# Patient Record
Sex: FEMALE | Race: WHITE | Employment: OTHER | URBAN - METROPOLITAN AREA
[De-identification: names, ages, dates, MRNs, and addresses within clinical notes are randomized per-mention and may not be internally consistent; named-entity substitution may affect disease eponyms.]

---

## 2017-02-08 ENCOUNTER — HOSPITAL ENCOUNTER (EMERGENCY)
Age: 72
Discharge: HOME | End: 2017-02-08
Attending: EMERGENCY MEDICINE
Payer: MEDICARE

## 2017-02-08 ENCOUNTER — APPOINTMENT (OUTPATIENT)
Dept: GENERAL RADIOLOGY | Age: 72
End: 2017-02-08
Attending: EMERGENCY MEDICINE
Payer: MEDICARE

## 2017-02-08 VITALS
TEMPERATURE: 97.9 F | DIASTOLIC BLOOD PRESSURE: 67 MMHG | HEIGHT: 62 IN | SYSTOLIC BLOOD PRESSURE: 157 MMHG | WEIGHT: 220 LBS | RESPIRATION RATE: 20 BRPM | OXYGEN SATURATION: 95 % | BODY MASS INDEX: 40.48 KG/M2 | HEART RATE: 61 BPM

## 2017-02-08 DIAGNOSIS — S20.212A RIB CONTUSION, LEFT, INITIAL ENCOUNTER: ICD-10-CM

## 2017-02-08 DIAGNOSIS — S50.02XA LEFT ELBOW CONTUSION: Primary | ICD-10-CM

## 2017-02-08 DIAGNOSIS — I10 ESSENTIAL HYPERTENSION: ICD-10-CM

## 2017-02-08 PROCEDURE — 99282 EMERGENCY DEPT VISIT SF MDM: CPT

## 2017-02-08 PROCEDURE — 71100 X-RAY EXAM RIBS UNI 2 VIEWS: CPT

## 2017-02-08 PROCEDURE — 73080 X-RAY EXAM OF ELBOW: CPT

## 2017-02-08 RX ORDER — TRAMADOL HYDROCHLORIDE 50 MG/1
50 TABLET ORAL
COMMUNITY
End: 2017-10-11 | Stop reason: SDUPTHER

## 2017-02-08 NOTE — DISCHARGE INSTRUCTIONS
[employer] Activation    Thank you for requesting access to [employer] Please follow the instructions below to securely access and download your online medical record. [employer] allows you to send messages to your doctor, view your test results, renew your prescriptions, schedule appointments, and more. How Do I Sign Up? 1. In your internet browser, go to [email or web address]  2. Click on the First Time User? Click Here link in the Sign In box. You will be redirect to the New Member Sign Up page. 3. Enter your [employer] Access Code exactly as it appears below. You will not need to use this code after youve completed the sign-up process. If you do not sign up before the expiration date, you must request a new code. [employer] Access Code: [email or web address]  Expires: 2017  9:28 AM (This is the date your [employer] access code will )    4. Enter the last four digits of your Social Security Number (xxxx) and Date of Birth (mm/dd/yyyy) as indicated and click Submit. You will be taken to the next sign-up page. 5. Create a [employer] ID. This will be your [employer] login ID and cannot be changed, so think of one that is secure and easy to remember. 6. Create a [employer] password. You can change your password at any time. 7. Enter your Password Reset Question and Answer. This can be used at a later time if you forget your password. 8. Enter your e-mail address. You will receive e-mail notification when new information is available in [address] 9. Click Sign Up. You can now view and download portions of your medical record. 10. Click the Download Summary menu link to download a portable copy of your medical information. Additional Information    If you have questions, please visit the Frequently Asked Questions section of the [employer] website at [email or web address] Remember, [employer] is NOT to be used for urgent needs. For medical emergencies, dial 911.

## 2017-02-08 NOTE — ED TRIAGE NOTES
Pt presents to ER c/o fall 2 weeks ago, states she was trying to sit down on toilet and fell striking her left ribs and left elbow on lip on walk in shower. Pt states she has been taking Tramadol, Tylenol 3,  motrin & aleve for pain. States Motrin helped pain. Pt denies any head injury or LOC.

## 2017-02-08 NOTE — ED NOTES
Guilherme Lopez is a 67 y.o. female that was discharged in stable. Pt was accompanied by self. Pt is driving. The patients diagnosis, condition and treatment were explained to  patient and aftercare instructions were given. The patient verbalized understanding. Patient armband removed and shredded.

## 2017-02-08 NOTE — ED PROVIDER NOTES
HPI Comments: Gunner Castillo is a 67year old female with a PMHX Essential Hypertension, DM, GERD, Chronic Pain, Arthritis, Unspecified Sleep Apnea, Depression, and Hiatal Hernia arrived to ER c/o injury to left elbow and left ribs two weeks ago. Patient states she accidentally hit it on the edge of a walk in tub. Patient states, \"my elbow hurts more than my ribs. \" Patient states she has been taking her prescribed tylenol #3 and tramadol with minimal to moderate relief. Denies falling or head injury. Denies fever, chills, hemoptysis, headache, dizziness, CP, SOB, abdominal pain, N/V/D, or any other concerns. Patient is a 67 y.o. female presenting with fall, rib pain, and elbow pain. The history is provided by the patient. Fall     Rib Pain   Pertinent negatives include no cough and no wheezing. Neck pain: left elbow injury. Elbow Pain    Neck pain: left elbow injury. 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     1973-asymptomatic    Hypertension     Psychiatric disorder      Depression    Unspecified sleep apnea      Bipap       Past Surgical History:   Procedure Laterality Date    Hx bladder suspension       4x    Hx tubal ligation      Hx appendectomy      Hx cholecystectomy      Hx orthopaedic  10/2013     RIGHT TKA    Hx septoplasty      Hx tonsillectomy      Hx heent       Sx for droopy eyes         Family History:   Problem Relation Age of Onset    Cancer Father 61     lung       Social History     Social History    Marital status:      Spouse name: N/A    Number of children: N/A    Years of education: N/A     Occupational History    Not on file.      Social History Main Topics    Smoking status: Never Smoker    Smokeless tobacco: Never Used    Alcohol use Yes      Comment: socially    Drug use: No    Sexual activity: Not on file     Other Topics Concern    Not on file     Social History Narrative         ALLERGIES: Other medication    Review of Systems   Constitutional: Negative. HENT: Negative. Eyes: Negative. Respiratory: Negative. Negative for apnea, cough, choking, chest tightness, shortness of breath, wheezing and stridor. C/o left rib injury   Cardiovascular: Negative. Gastrointestinal: Negative. Genitourinary: Negative. Musculoskeletal: Neck pain: left elbow injury. Skin: Negative. Neurological: Negative. Vitals:    02/08/17 1122   BP: 157/67   Pulse: 61   Resp: 20   Temp: 97.9 °F (36.6 °C)   SpO2: 95%   Weight: 99.8 kg (220 lb)   Height: 5' 2\" (1.575 m)            Physical Exam   Constitutional: She is oriented to person, place, and time. She appears well-developed and well-nourished. No distress. HENT:   Right Ear: Hearing, tympanic membrane, external ear and ear canal normal.   Left Ear: Hearing, tympanic membrane, external ear and ear canal normal.   Nose: Nose normal.   Mouth/Throat: Uvula is midline, oropharynx is clear and moist and mucous membranes are normal. No oropharyngeal exudate. Cardiovascular: Normal rate, regular rhythm and normal heart sounds. Exam reveals no gallop and no friction rub. No murmur heard. Pulmonary/Chest: Effort normal and breath sounds normal. No accessory muscle usage. No respiratory distress. She has no decreased breath sounds. She has no wheezes. She has no rhonchi. She has no rales. She exhibits tenderness and bony tenderness. She exhibits no crepitus, no edema, no deformity, no swelling and no retraction. Abdominal: Soft. Bowel sounds are normal. She exhibits no distension and no mass. There is no tenderness. There is no rebound and no guarding. Musculoskeletal: Normal range of motion. Left elbow: She exhibits swelling (very mild).  She exhibits normal range of motion, no effusion, no deformity and no laceration. Tenderness found. Radial head and medial epicondyle tenderness noted. Neurological: She is alert and oriented to person, place, and time. Skin: Skin is warm and dry. She is not diaphoretic. Psychiatric: She has a normal mood and affect. Her behavior is normal. Judgment and thought content normal.   Nursing note and vitals reviewed. MDM  Number of Diagnoses or Management Options  Essential hypertension:   Left elbow contusion:   Rib contusion, left, initial encounter:   Diagnosis management comments: DDX:  Rib Fracture  Dislocation  Pneumothorax    Left elbow fracture  Dislocation    Clinical Impression/plan:  Patient stable condition. Reviewed x-rays with patient. Answered questions. Follow up with PCP in 2-4 days. If symptoms worsen, return to ER. Patient verbalizes d/c instructions.         Amount and/or Complexity of Data Reviewed  Tests in the radiology section of CPT®: ordered and reviewed (Dr. Bryce Power has reviewed x-rays ,no fx or dislocation)  Review and summarize past medical records: yes    Risk of Complications, Morbidity, and/or Mortality  Presenting problems: low  Diagnostic procedures: low  Management options: low      ED Course       Procedures

## 2017-03-21 ENCOUNTER — OFFICE VISIT (OUTPATIENT)
Dept: ORTHOPEDIC SURGERY | Age: 72
End: 2017-03-21

## 2017-03-21 VITALS
BODY MASS INDEX: 40.24 KG/M2 | SYSTOLIC BLOOD PRESSURE: 149 MMHG | OXYGEN SATURATION: 95 % | RESPIRATION RATE: 18 BRPM | TEMPERATURE: 98.4 F | HEART RATE: 78 BPM | WEIGHT: 220 LBS | DIASTOLIC BLOOD PRESSURE: 62 MMHG

## 2017-03-21 DIAGNOSIS — M79.2 NEURITIS: ICD-10-CM

## 2017-03-21 DIAGNOSIS — R29.898 RIGHT LEG WEAKNESS: ICD-10-CM

## 2017-03-21 DIAGNOSIS — M54.50 PAIN OF LUMBAR SPINE: ICD-10-CM

## 2017-03-21 DIAGNOSIS — M43.17 SPONDYLOLISTHESIS AT L5-S1 LEVEL: Primary | ICD-10-CM

## 2017-03-21 DIAGNOSIS — R26.89 FUNCTIONAL GAIT ABNORMALITY: ICD-10-CM

## 2017-03-21 NOTE — MR AVS SNAPSHOT
Visit Information Date & Time Provider Department Dept. Phone Encounter #  
 3/21/2017  2:00 PM Lucita Lindquist MD [address] [facility] [phone] [id number] Follow-up Instructions Return in about 3 weeks (around 4/11/2017) for Diagnostic Test follow up. Upcoming Health Maintenance Date Due  
 LIPID PANEL Q1 1945 FOOT EXAM Q1 1/2/1954 MICROALBUMIN Q1 1/2/1954 EYE EXAM RETINAL OR DILATED Q1 1/2/1954 DTaP/Tdap/Td series (1 - Tdap) 1/2/1965 FOBT Q 1 YEAR AGE 50-75 1/2/1994 ZOSTER VACCINE AGE 60> 1/2/2004 GLAUCOMA SCREENING Q2Y 1/2/2009 Pneumococcal 65+ Low/Medium Risk (1 of 2 - PCV13) 1/2/2009 MEDICARE YEARLY EXAM 1/2/2009 HEMOGLOBIN A1C Q6M 5/11/2015 INFLUENZA AGE 9 TO ADULT 8/1/2016 BREAST CANCER SCRN MAMMOGRAM 11/4/2018 Allergies as of 3/21/2017  Review Complete On: 3/21/2017 By: Lucita Lindquist MD  
  
 Severity Noted Reaction Type Reactions Other Medication  01/21/2013    Other (comments) SEASONAL ALLERGIES Current Immunizations  Never Reviewed No immunizations on file. Not reviewed this visit You Were Diagnosed With   
  
 Codes Comments Spondylolisthesis at L5-S1 level    -  Primary ICD-10-CM: M43.17 ICD-9-CM: 756.12 Pain of lumbar spine     ICD-10-CM: M54.5 ICD-9-CM: 724.2 Neuritis     ICD-10-CM: M79.2 ICD-9-CM: 729.2 Right leg weakness     ICD-10-CM: M62.81 ICD-9-CM: 729.89 Functional gait abnormality     ICD-10-CM: R26.89 
ICD-9-CM: 781. 2 Vitals BP Pulse Temp Resp Weight(growth percentile) SpO2  
 149/62 78 98.4 °F (36.9 °C) (Oral) 18 220 lb (99.8 kg) 95% BMI OB Status Smoking Status 40.24 kg/m2 Menopause Never Smoker BMI and BSA Data Body Mass Index Body Surface Area  
 40.24 kg/m 2 2.09 m 2 Preferred Pharmacy Pharmacy Name Phone [facility] [address], [address] [phone] Your Updated Medication List  
  
   
This list is accurate as of: 3/21/17  3:14 PM.  Always use your most recent med list.  
  
  
  
  
 ABILIFY 5 mg tablet Generic drug:  ARIPiprazole Take 2.5 mg by mouth nightly. amLODIPine 10 mg tablet Commonly known as:  Radha Darting Take  by mouth nightly. Indications: HYPERTENSION  
  
 aspirin delayed-release 81 mg tablet Take  by mouth daily. chlorthalidone 25 mg tablet Commonly known as:  Aimee Mend Take  by mouth nightly. Indications: HYPERTENSION  
  
 FLAXSEED PO Take  by mouth two (2) times a day.  
  
 gabapentin 300 mg capsule Commonly known as:  NEURONTIN Take  by mouth three (3) times daily. 300mg in the morning, 1200mg at bedtime  Indications: NEUROPATHIC PAIN  
  
 glyBURIDE-metFORMIN 5-500 mg per tablet Commonly known as:  Trinda Grieve Take 2 Tabs by mouth two (2) times daily (with meals). LIPITOR 40 mg tablet Generic drug:  atorvastatin Take  by mouth nightly. lisinopril 40 mg tablet Commonly known as:  Marilynne Shows Take 40 mg by mouth nightly. Indications: HYPERTENSION  
  
 metoprolol tartrate 50 mg tablet Commonly known as:  LOPRESSOR Take 50 mg by mouth nightly. Indications: HYPERTENSION  
  
 NASONEX 50 mcg/actuation nasal spray Generic drug:  mometasone 2 sprays two (2) times a day. NUVIGIL 250 mg Tab tablet Generic drug:  Armodafinil Take 150 mg by mouth daily. Indications: NARCOLEPSY SYNDROME  
  
 traMADol 50 mg tablet Commonly known as:  ULTRAM  
Take 50 mg by mouth every six (6) hours as needed for Pain. ZOLOFT 100 mg tablet Generic drug:  sertraline Take 200 mg by mouth daily. We Performed the Following AMB POC XRAY, SPINE, LUMBOSACRAL; 4+ [id number] CPT(R)] Follow-up Instructions Return in about 3 weeks (around 4/11/2017) for Diagnostic Test follow up. To-Do List   
 03/28/2017 Imaging:  MRI LUMB SPINE WO CONT 03/29/2017  8:30 AM  
(Arrive by 8:00 AM) Appointment with [facility] at [facility] ([phone]) 1. Wash your hair the night before test.  Use only shampoo and water come to the lab with clean, dry hair. Do not use gels, mousse, hair spray or oils of any kind after washing your hair. Remove all extensions and or hair weaves. If not removed, we may not be able to do your study. 2.  Take all regular medication as scheduled, unless your doctor specifically tells you to stop. 3.  Continue to eat as usual.     4.  Bring a written list of all current medications including over the counter medications and any supplements you take. Please arrive 30 minutes prior to appaointment to register 03/29/2017  9:30 AM  
  Appointment with [facility] CATH LAB HOLDING; CARDIOVASCULAR PROC ROOM at [address] ([phone]) 1. Nothing to Eat or Drink  6 Hours before the Exam.     2. Wear Comfortable Clothes and shoes. 3. Bring a list of All Medications (prescription and over the counter). 4. Hold all medications unless instructed not to, by your ordering physician. You may bring them to take after stress test if desired. 5. Absolutely NO Caffeine 24 hours Prior to the exam (Coffee, Tea, Soda, Diet Soda, Chocolate, etc.. ). 6. You will not be able to drive yourself home, so please arrange to have a  with you when you check in for test.     7. Test may take up to 4 hours of your time. 8. To reschedule or cancel an appointment, please contact the [facility] [phone] as soon as possible. 04/09/2017  9:00 PM  
(Arrive by 8:45 PM) Appointment with Rodolfo Myles 4 at [facility] ([phone]) Please bring a list of ALL medications that you are currently taking (include prescription and over the counter medications).       NO SHOW FEE:  Due to patient demand, and limited availability of appointments we have instituted a $100.00 no show fee. As of April 1, 2011, you must give a 24 hour notice in advance to avoid a no show fee. Failure to do so within this limited time or failure to show up for scheduled appointments will result in a $100.00 fee charged to your account. The bill will be sent to the account roque and will not get charged to the insurance company. This is your responsibility and payment is expected immediately. 1. Please continue taking all prescribed medications unless otherwise instructed by the Sleep Lab. If you need to take medication while you are in the laboratory, bring them with you. We are an outpatient facility and we will not be able to provide any medications for you. Also bring a list of all current medications. 2.  Please DO NOT drink ANY alcohol on the day of your sleep study unless otherwise instructed by the Sleep Lab. 3.  Please DO NOT drink ANY stimulant (caffeinated) beverages AFTER 3:00pm; therefore, avoid coffee, tea, Coke, Pepsi, Mountain Dew, etc.       4.  Please DO NOT take ANY naps on the day of your study unless absolutely necessary. 5.  Please bring all necessary items needed for your stay in the laboratory (sleepwear, toothbrush, shampoo, favorite pillow, etc.). The laboratory supplies towels and soap. We suggest two-piece pajamas rather than a nightgown for female patients. Please remember to take your belongings with you the next morning. If you leave your belongings we will hold them for 2 weeks for you to come back and pick them up. After then we will donate them to [facility] 6.  Please shower and do not add any oils or lotions to your body or hair before coming into the lab. Use shampoo only. 7.  Please remember to wear your portable oxygen to the center if you wear it during the day.       8.  Check in at the [facility] Sleep Lab located in the [facility] [address] [facility] end entrance. Please arrive 15 minutes ahead of appointment time for orientation. Upon arrival at the laboratory, you will be assigned a private room and asked to complete several forms. Following this paperwork, you will prepare for bed and sensors will then be taped and glued to your scalp and body. There will be no electricity going through your body. We will record only the natural activity of your body. Also note that we will be monitoring you throughout the night with a low light camera, so as to identify different positions and for safety reasons  (i.e. to make sure you do not fall out of bed). TO CANCEL APPOINTMENTS, PLEASE CONTACT THE SLEEP LAB AT [phone]. To reschedule appointments, please contact Outpatient Scheduling at [phone]. Please arrive 30 minutes prior to appointment to register Patient Instructions Low Back Arthritis: Exercises Your Care Instructions Here are some examples of typical rehabilitation exercises for your condition. Start each exercise slowly. Ease off the exercise if you start to have pain. Your doctor or physical therapist will tell you when you can start these exercises and which ones will work best for you. When you are not being active, find a comfortable position for rest. Some people are comfortable on the floor or a medium-firm bed with a small pillow under their head and another under their knees. Some people prefer to lie on their side with a pillow between their knees. Don't stay in one position for too long. Take short walks (10 to 20 minutes) every 2 to 3 hours. Avoid slopes, hills, and stairs until you feel better. Walk only distances you can manage without pain, especially leg pain. How to do the exercises Pelvic tilt 1. Lie on your back with your knees bent. 2. \"Brace\" your stomachtighten your muscles by pulling in and imagining your belly button moving toward your spine. 3. Press your lower back into the floor. You should feel your hips and pelvis rock back. 4. Hold for 6 seconds while breathing smoothly. 5. Relax and allow your pelvis and hips to rock forward. 6. Repeat 8 to 12 times. Back stretches 1. Get down on your hands and knees on the floor. 2. Relax your head and allow it to droop. Round your back up toward the ceiling until you feel a nice stretch in your upper, middle, and lower back. Hold this stretch for as long as it feels comfortable, or about 15 to 30 seconds. 3. Return to the starting position with a flat back while you are on your hands and knees. 4. Let your back sway by pressing your stomach toward the floor. Lift your buttocks toward the ceiling. 5. Hold this position for 15 to 30 seconds. 6. Repeat 2 to 4 times. Follow-up care is a key part of your treatment and safety. Be sure to make and go to all appointments, and call your doctor if you are having problems. It's also a good idea to know your test results and keep a list of the medicines you take. Where can you learn more? Go to http://reza-isauro.info/. Enter J260 in the search box to learn more about \"Low Back Arthritis: Exercises. \" Current as of: May 23, 2016 Content Version: 11.1 © 9808-0640 [employer]. Care instructions adapted under license by [employer] (which disclaims liability or warranty for this information). If you have questions about a medical condition or this instruction, always ask your healthcare professional. [address] any warranty or liability for your use of this information. Introducing [facility]! Fozia Guerra introduces [employer] patient portal. Now you can access parts of your medical record, email your doctor's office, and request medication refills online. 1. In your internet browser, go to [email or web address] 2. Click on the First Time User? Click Here link in the Sign In box. You will see the New Member Sign Up page. 3. Enter your [employer] Access Code exactly as it appears below. You will not need to use this code after youve completed the sign-up process. If you do not sign up before the expiration date, you must request a new code. · [employer] Access Code: [email or web address] Expires: 5/9/2017 10:28 AM 
 
4. Enter the last four digits of your Social Security Number (xxxx) and Date of Birth (mm/dd/yyyy) as indicated and click Submit. You will be taken to the next sign-up page. 5. Create a [employer] ID. This will be your [employer] login ID and cannot be changed, so think of one that is secure and easy to remember. 6. Create a [employer] password. You can change your password at any time. 7. Enter your Password Reset Question and Answer. This can be used at a later time if you forget your password. 8. Enter your e-mail address. You will receive e-mail notification when new information is available in [address] 9. Click Sign Up. You can now view and download portions of your medical record. 10. Click the Download Summary menu link to download a portable copy of your medical information. If you have questions, please visit the Frequently Asked Questions section of the [employer] website. Remember, [employer] is NOT to be used for urgent needs. For medical emergencies, dial 911. Now available from your iPhone and Android! Please provide this summary of care documentation to your next provider. Your primary care clinician is listed as Lalit Aj. If you have any questions after today's visit, please call [phone].

## 2017-03-21 NOTE — PROGRESS NOTES
[facility]  [address] [place], [address]  Phone: [phone]  Fax: [phone]          HISTORY OF PRESENT ILLNESS:  Hoang Cleveland is a 67 y.o. female with history of chronic lumbar pain. Pt c/o pain radiating down the right leg to the foot. She admits to dragging both feet. Of note, she had a prior right shoulder and right knee replacement. Pt used to work as psychiatric nurse and admits to a few past falls while in the hospital. In the distant past, she also admits to falling backwards down about 12-15 steps while she was in [place] and thinks she may have fractured her coccyx at that time. Pt admits to hip pain at this time. She denies any dizziness but admits that she falls. Of note, Pt has been followed by Dr. Thuan Diaz in the past. She attends arthritis swim classes in [place]. Pt admits to a history of diabetic neuropathy. She has been taking Neurontin for years and currently takes 2 tabs QAM and 2 tabs QHS. Pt states that she is currently weaning off the Neurontin due to shortness of breath. She is currently on Zoloft and tried Cymbalta in the past with minimal improvement. Pt at this time desires to proceed with a lumbar MRI. Of note, she denies any prior lumbar surgery. Pain Scale: 6/10     PCP: Araceli Aviles MD      Past Medical History:   Diagnosis Date    Arthritis     Chronic pain     lower back, shoulders    Diabetes ([facility] [address]     Essential hypertension     GERD (gastroesophageal reflux disease)     Hiatal hernia     History of positive PPD, 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    1973-asymptomatic    Hypertension     Psychiatric disorder     Depression    Unspecified sleep apnea     Bipap        Social History     Social History    Marital status: UNKNOWN     Spouse name: N/A    Number of children: N/A    Years of education: N/A     Occupational History    Not on file.      Social History Main Topics    Smoking status: Never Smoker  Smokeless tobacco: Never Used    Alcohol use Yes      Comment: socially    Drug use: No    Sexual activity: Not on file     Other Topics Concern    Not on file     Social History Narrative       Current Outpatient Prescriptions   Medication Sig Dispense Refill    traMADol (ULTRAM) 50 mg tablet Take 50 mg by mouth every six (6) hours as needed for Pain.  amlodipine (NORVASC) 10 mg tablet Take  by mouth nightly. Indications: HYPERTENSION      metoprolol (LOPRESSOR) 50 mg tablet Take 50 mg by mouth nightly. Indications: HYPERTENSION      lisinopril (PRINIVIL, ZESTRIL) 40 mg tablet Take 40 mg by mouth nightly. Indications: HYPERTENSION      chlorthalidone (HYGROTEN) 25 mg tablet Take  by mouth nightly. Indications: HYPERTENSION      mometasone (NASONEX) 50 mcg/actuation nasal spray 2 sprays two (2) times a day.  FLAXSEED PO Take  by mouth two (2) times a day.  glyBURIDE-metFORMIN (GLUCOVANCE) 5-500 mg per tablet Take 2 Tabs by mouth two (2) times daily (with meals).  gabapentin (NEURONTIN) 300 mg capsule Take  by mouth three (3) times daily. 300mg in the morning, 1200mg at bedtime  Indications: NEUROPATHIC PAIN      Armodafinil (NUVIGIL) 250 mg Tab Take 150 mg by mouth daily. Indications: NARCOLEPSY SYNDROME      aspirin delayed-release 81 mg tablet Take  by mouth daily.  atorvastatin (LIPITOR) 40 mg tablet Take  by mouth nightly.  ARIPiprazole (ABILIFY) 5 mg tablet Take 2.5 mg by mouth nightly.  sertraline (ZOLOFT) 100 mg tablet Take 200 mg by mouth daily. Allergies   Allergen Reactions    Other Medication Other (comments)     SEASONAL ALLERGIES       REVIEW OF SYSTEMS    Constitutional: Negative for fever, chills, or weight change. Respiratory: Negative for cough or shortness of breath. Cardiovascular: Negative for chest pain or palpitations. Gastrointestinal: Negative for acid reflux, change in bowel habits, or constipation.   Genitourinary: Negative for dysuria and flank pain. Musculoskeletal: Positive for lumbar pain. Skin: Negative for rash. Neurological: Negative for headaches, dizziness, or numbness. Endo/Heme/Allergies: Negative for increased bruising. Psychiatric/Behavioral: Negative for difficulty with sleep. PHYSICAL EXAMINATION  Visit Vitals    /62    Pulse 78    Temp 98.4 °F (36.9 °C) (Oral)    Resp 18    Wt 220 lb (99.8 kg)    SpO2 95%    BMI 40.24 kg/m2       Constitutional: Awake, alert, and in no acute distress  HEENT: Normocephalic. Atraumatic. Oropharynx is moist and clear. PERRL. EOMI. Sclerae are nonicteric  Heart: Regular rate and rhythm  Lungs: Clear to auscultation bilaterally  Abdomen: Soft and nontender. Bowel sounds are present  Neurological: 1+ symmetrical DTRs in the upper extremities. 1+ symmetrical DTRs in the lower extremities. Sensation to light touch is intact. Negative Aretha's sign bilaterally. Skin: warm, dry, and intact. Musculoskeletal: Mild decreased right cervical flexion; otherwise good ROM. Tenderness to palpation in the lower lumbar region, R>L. No pain with extension, axial loading, or forward flexion. Pain with internal rotation of her right hip. Negative straight leg raise bilaterally. Biceps  Triceps Deltoids Wrist Ext Wrist Flex Hand Intrin   Right +4/5 +4/5 +4/5 +4/5 +4/5 +4/5   Left +4/5 +4/5 +4/5 +4/5 +4/5 +4/5      Hip Flex  Quads Hamstrings Ankle DF EHL Ankle PF   Right  4/5  4/5  4/5  4/5  4/5 +4/5   Left +4/5 +4/5 +4/5 +4/5 +4/5 +4/5     IMAGING:    Lumbar spine 4V x-rays from 03/21/2017 were personally reviewed with the Pt and demonstrated:  1) Mild degenerative changes in both hips. 2) Severe degenerative discs at L5-1.  3) Grade 1-2 listhesis at L5-S1. 4) Degenerative discs at L1-2 and L4-5.   5) Appears to be a compression fracture at T11-12. ASSESSMENT   Dolores Chavez was seen today for new patient and back pain.     Diagnoses and all orders for this visit:    Spondylolisthesis at L5-S1 level  -     MRI LUMB SPINE WO CONT; Future    Neuritis  -     MRI LUMB SPINE WO CONT; Future    Right leg weakness  -     MRI LUMB SPINE WO CONT; Future    Functional gait abnormality  -     MRI LUMB SPINE WO CONT; Future    Pain of lumbar spine  -     [06065] LS Spine 4V  -     MRI LUMB SPINE WO CONT; Future       IMPRESSION AND PLAN:  Fran David is a 67 y.o. female with history of chronic lumbar pain. Pt c/o pain radiating down the right leg to the foot. She admits to dragging both feet. She has not had any recent trauma but admits in the past she fell backwards down about 12-15 steps while she was living in [place]. 1) Pt was given information on lumbar arthritis exercises. 2) An open lumbar MRI was ordered. 3) Ms. Phuong Arellano has a reminder for a \"due or due soon\" health maintenance. I have asked that she contact her primary care provider, Araceli Chavez MD, for follow-up on this health maintenance. 4)  reviewed. 5) Pt will follow-up in 2-3 weeks.         Written by Guy Dan, as dictated by Damien Little MD.

## 2017-03-21 NOTE — PATIENT INSTRUCTIONS
Low Back Arthritis: Exercises  Your Care Instructions  Here are some examples of typical rehabilitation exercises for your condition. Start each exercise slowly. Ease off the exercise if you start to have pain. Your doctor or physical therapist will tell you when you can start these exercises and which ones will work best for you. When you are not being active, find a comfortable position for rest. Some people are comfortable on the floor or a medium-firm bed with a small pillow under their head and another under their knees. Some people prefer to lie on their side with a pillow between their knees. Don't stay in one position for too long. Take short walks (10 to 20 minutes) every 2 to 3 hours. Avoid slopes, hills, and stairs until you feel better. Walk only distances you can manage without pain, especially leg pain. How to do the exercises  Pelvic tilt    1. Lie on your back with your knees bent. 2. \"Brace\" your stomach--tighten your muscles by pulling in and imagining your belly button moving toward your spine. 3. Press your lower back into the floor. You should feel your hips and pelvis rock back. 4. Hold for 6 seconds while breathing smoothly. 5. Relax and allow your pelvis and hips to rock forward. 6. Repeat 8 to 12 times. Back stretches    1. Get down on your hands and knees on the floor. 2. Relax your head and allow it to droop. Round your back up toward the ceiling until you feel a nice stretch in your upper, middle, and lower back. Hold this stretch for as long as it feels comfortable, or about 15 to 30 seconds. 3. Return to the starting position with a flat back while you are on your hands and knees. 4. Let your back sway by pressing your stomach toward the floor. Lift your buttocks toward the ceiling. 5. Hold this position for 15 to 30 seconds. 6. Repeat 2 to 4 times. Follow-up care is a key part of your treatment and safety.  Be sure to make and go to all appointments, and call your doctor if you are having problems. It's also a good idea to know your test results and keep a list of the medicines you take. Where can you learn more? Go to http://reza-isauro.info/. Enter T402 in the search box to learn more about \"Low Back Arthritis: Exercises. \"  Current as of: May 23, 2016  Content Version: 11.1  © 2230-2890 [employer]. Care instructions adapted under license by [employer] (which disclaims liability or warranty for this information). If you have questions about a medical condition or this instruction, always ask your healthcare professional. [place] [address] any warranty or liability for your use of this information.

## 2017-03-27 ENCOUNTER — HOSPITAL ENCOUNTER (OUTPATIENT)
Age: 72
Discharge: HOME | End: 2017-03-27
Attending: PHYSICAL MEDICINE & REHABILITATION
Payer: MEDICARE

## 2017-03-27 DIAGNOSIS — R29.898 RIGHT LEG WEAKNESS: ICD-10-CM

## 2017-03-27 DIAGNOSIS — R26.89 FUNCTIONAL GAIT ABNORMALITY: ICD-10-CM

## 2017-03-27 DIAGNOSIS — M54.50 PAIN OF LUMBAR SPINE: ICD-10-CM

## 2017-03-27 DIAGNOSIS — M79.2 NEURITIS: ICD-10-CM

## 2017-03-27 DIAGNOSIS — M43.17 SPONDYLOLISTHESIS AT L5-S1 LEVEL: ICD-10-CM

## 2017-03-27 PROCEDURE — 72148 MRI LUMBAR SPINE W/O DYE: CPT

## 2017-04-11 ENCOUNTER — OFFICE VISIT (OUTPATIENT)
Dept: ORTHOPEDIC SURGERY | Age: 72
End: 2017-04-11

## 2017-04-11 VITALS
TEMPERATURE: 98.7 F | HEART RATE: 74 BPM | WEIGHT: 214 LBS | OXYGEN SATURATION: 95 % | BODY MASS INDEX: 39.14 KG/M2 | RESPIRATION RATE: 18 BRPM | DIASTOLIC BLOOD PRESSURE: 62 MMHG | SYSTOLIC BLOOD PRESSURE: 160 MMHG

## 2017-04-11 DIAGNOSIS — M47.816 LUMBAR FACET ARTHROPATHY: ICD-10-CM

## 2017-04-11 DIAGNOSIS — M79.2 NEURITIS: ICD-10-CM

## 2017-04-11 DIAGNOSIS — M62.830 MUSCLE SPASM OF BACK: ICD-10-CM

## 2017-04-11 DIAGNOSIS — M43.17 SPONDYLOLISTHESIS AT L5-S1 LEVEL: Primary | ICD-10-CM

## 2017-04-11 DIAGNOSIS — R26.89 FUNCTIONAL GAIT ABNORMALITY: ICD-10-CM

## 2017-04-11 RX ORDER — TOPIRAMATE 25 MG/1
TABLET ORAL
Qty: 90 TAB | Refills: 1 | Status: SHIPPED | OUTPATIENT
Start: 2017-04-11 | End: 2017-04-11 | Stop reason: SDUPTHER

## 2017-04-11 RX ORDER — TOPIRAMATE 25 MG/1
TABLET ORAL
Qty: 90 TAB | Refills: 1 | Status: SHIPPED | OUTPATIENT
Start: 2017-04-11 | End: 2017-06-09 | Stop reason: ALTCHOICE

## 2017-04-11 NOTE — PATIENT INSTRUCTIONS
Low Back Arthritis: Exercises  Your Care Instructions  Here are some examples of typical rehabilitation exercises for your condition. Start each exercise slowly. Ease off the exercise if you start to have pain. Your doctor or physical therapist will tell you when you can start these exercises and which ones will work best for you. When you are not being active, find a comfortable position for rest. Some people are comfortable on the floor or a medium-firm bed with a small pillow under their head and another under their knees. Some people prefer to lie on their side with a pillow between their knees. Don't stay in one position for too long. Take short walks (10 to 20 minutes) every 2 to 3 hours. Avoid slopes, hills, and stairs until you feel better. Walk only distances you can manage without pain, especially leg pain. How to do the exercises  Pelvic tilt    1. Lie on your back with your knees bent. 2. \"Brace\" your stomach--tighten your muscles by pulling in and imagining your belly button moving toward your spine. 3. Press your lower back into the floor. You should feel your hips and pelvis rock back. 4. Hold for 6 seconds while breathing smoothly. 5. Relax and allow your pelvis and hips to rock forward. 6. Repeat 8 to 12 times. Back stretches    1. Get down on your hands and knees on the floor. 2. Relax your head and allow it to droop. Round your back up toward the ceiling until you feel a nice stretch in your upper, middle, and lower back. Hold this stretch for as long as it feels comfortable, or about 15 to 30 seconds. 3. Return to the starting position with a flat back while you are on your hands and knees. 4. Let your back sway by pressing your stomach toward the floor. Lift your buttocks toward the ceiling. 5. Hold this position for 15 to 30 seconds. 6. Repeat 2 to 4 times. Follow-up care is a key part of your treatment and safety.  Be sure to make and go to all appointments, and call your doctor if you are having problems. It's also a good idea to know your test results and keep a list of the medicines you take. Where can you learn more? Go to http://reza-isauro.info/. Enter Q575 in the search box to learn more about \"Low Back Arthritis: Exercises. \"  Current as of: May 23, 2016  Content Version: 11.2  © 3394-1350 [employer]. Care instructions adapted under license by [employer] (which disclaims liability or warranty for this information). If you have questions about a medical condition or this instruction, always ask your healthcare professional. [address] any warranty or liability for your use of this information.

## 2017-04-11 NOTE — PROGRESS NOTES
[facility]  [address] [place], [address]  Phone: [phone]  Fax: [phone]          HISTORY OF PRESENT ILLNESS:  Brynn Diaz is a 67 y.o. female with history of chronic lumbar pain. She continues to experience constant lower back pain and soreness in the right lower back that radiates down the right leg to the foot. Pt admits to tightness in the lower back while in the pool. She c/o of weakness in the left knee and reports that she occasionally feels like it aleena while walking. Pt has scheduled an appointment with Dr. Rola Casarez in order to received a left knee brace. She has been taking OTC Tylenol. Pt also takes Neurontin 300 mg 1 tab QAM and 2 tabs QHS and admits to shortness of breath when taking the medication. She reports trying Cymbalta years ago but reports that her symptoms were different at that time. Pt has never tried Topamax and denies any history of renal stones or glaucoma. Pt at this time desires to proceed with steroid injections and medication evaluation. Pain Scale: 7/10    PCP: Araceli Joyce MD       Past Medical History:   Diagnosis Date    Arthritis     Chronic pain     lower back, shoulders    Diabetes ([address]     Essential hypertension     GERD (gastroesophageal reflux disease)     Hiatal hernia     History of positive PPD, 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    1973-asymptomatic    Hypertension     Psychiatric disorder     Depression    Unspecified sleep apnea     Bipap        Social History     Social History    Marital status: UNKNOWN     Spouse name: N/A    Number of children: N/A    Years of education: N/A     Occupational History    Not on file.      Social History Main Topics    Smoking status: Never Smoker    Smokeless tobacco: Never Used    Alcohol use Yes      Comment: socially    Drug use: No    Sexual activity: Not on file     Other Topics Concern    Not on file     Social History Narrative Current Outpatient Prescriptions   Medication Sig Dispense Refill    topiramate (TOPAMAX) 25 mg tablet Take 1 in the evening for 1 week, then increase to 2 the second week and continue with 3 in the evening 90 Tab 1    traMADol (ULTRAM) 50 mg tablet Take 50 mg by mouth every six (6) hours as needed for Pain.  amlodipine (NORVASC) 10 mg tablet Take  by mouth nightly. Indications: HYPERTENSION      metoprolol (LOPRESSOR) 50 mg tablet Take 50 mg by mouth nightly. Indications: HYPERTENSION      lisinopril (PRINIVIL, ZESTRIL) 40 mg tablet Take 40 mg by mouth nightly. Indications: HYPERTENSION      chlorthalidone (HYGROTEN) 25 mg tablet Take  by mouth nightly. Indications: HYPERTENSION      mometasone (NASONEX) 50 mcg/actuation nasal spray 2 sprays two (2) times a day.  FLAXSEED PO Take  by mouth two (2) times a day.  glyBURIDE-metFORMIN (GLUCOVANCE) 5-500 mg per tablet Take 2 Tabs by mouth two (2) times daily (with meals).  gabapentin (NEURONTIN) 300 mg capsule Take  by mouth three (3) times daily. 300mg in the morning, 1200mg at bedtime  Indications: NEUROPATHIC PAIN      Armodafinil (NUVIGIL) 250 mg Tab Take 150 mg by mouth daily. Indications: NARCOLEPSY SYNDROME      aspirin delayed-release 81 mg tablet Take  by mouth daily.  atorvastatin (LIPITOR) 40 mg tablet Take  by mouth nightly.  ARIPiprazole (ABILIFY) 5 mg tablet Take 2.5 mg by mouth nightly.  sertraline (ZOLOFT) 100 mg tablet Take 200 mg by mouth daily. Allergies   Allergen Reactions    Other Medication Other (comments)     SEASONAL ALLERGIES         REVIEW OF SYSTEMS    Constitutional: Negative for fever, chills, or weight change. Respiratory: Negative for cough or shortness of breath. Cardiovascular: Negative for chest pain or palpitations. Gastrointestinal: Negative for acid reflux, change in bowel habits, or constipation. Genitourinary: Negative for dysuria and flank pain.    Musculoskeletal: Positive for lumbar pain. Skin: Negative for rash. Neurological: Negative for headaches, dizziness, or numbness. Endo/Heme/Allergies: Negative for increased bruising. Psychiatric/Behavioral: Negative for difficulty with sleep. PHYSICAL EXAMINATION  Visit Vitals    /62    Pulse 74    Temp 98.7 °F (37.1 °C) (Oral)    Resp 18    Wt 214 lb (97.1 kg)    SpO2 95%    BMI 39.14 kg/m2       Constitutional: Awake, alert, and in no acute distress  Neurological:1+ symmetrical DTRs in the lower extremities. Sensation to light touch is intact. Skin: warm, dry, and intact. Musculoskeletal: Tenderness to palpation in the lower lumbar region. Moderate pain with extension and axial loading. No pain with internal or external rotation of her hips. Negative straight leg raise bilaterally. Hip Flex  Quads Hamstrings Ankle DF EHL Ankle PF   Right +4/5 +4/5 +4/5 +4/5 +4/5 +4/5   Left +4/5 +4/5 +4/5 +4/5 +4/5 +4/5     IMAGING:    Lumbar spine MRI from 03/27/2017 was personally reviewed with the Pt and demonstrated:    Results from Hospital Encounter encounter on 03/27/17   MRI LUMB SPINE WO CONT   Narrative MRI LUMBAR SPINE     CPT CODE: 99006    INDICATION: Chronic low back pain. Radiating right leg pain. .    TECHNIQUE: MRI of the lumbar spine performed consisting of sagittal T1, T2 and  inversion recovery sequences with additional axial T2 sequence and oblique axial  T1 sequence. COMPARISON: None. FINDINGS:     Sagittal images show moderate to severe intervertebral disc space narrowing  L5-S1, with anterior listhesis of approximately 6 mm. There is mild overall disc  space narrowing L4-5 with minimal grade 1 L4 anterior listhesis, less than 2 mm. Alignment preserved otherwise. No vertebral body edema or compression deformity. Cauda equina tapers at inferior L1. There is a broad-based central disc protrusion or herniation at T12-L1, which  effaces the ventral thecal sac, without cord distortion. This extends  approximately 5 mm into the canal. There is additionally a probable small disc  protrusion centrally at T11-12. Correlation with axial images shows:    L1-2:  Asymmetric mild diffuse disc bulge results in mild central canal  narrowing to 9.5 mm. There is no significant foraminal stenosis. L2-3:  Asymmetric mild diffuse disc bulge results in slight central canal  narrowing to 10.2 mm. No significant foraminal stenosis. There is a 2 cm high T2  signal lesion posterior left renal cortex, likely a cyst. 5 mm high T2 signal  lesion posterior right renal cortex, likely a cyst.    L3-4:  Asymmetric diffuse disc bulge. There is mild degenerative facet  hypertrophy and posterior ligamentous thickening. Central canal is triangulated,  mildly narrowed down to 10.5 mm midline. There is mild left-sided foraminal  narrowing due to disc bulge and facet encroachment. L4-5:  Bilateral facet arthropathy. Mild effective broad-based disc bulge due to  minimal listhesis. There appears to be some slight edema within the left sided  paramedian disc , sagittal image 5, suggesting a small amount of edema and  possible developing annular tear. . Central canal is mildly narrowed to 10.6 mm. Minimal foraminal encroachment. L5-S1:  Severe bilateral degenerative facet hypertrophy. There is posterior  ligamentous thickening. Central canal mildly narrowed to 11 mm at the disc  level. There is severe right-sided foraminal stenosis with disc material lateral  in the exit foramina contacting the undersurface of the exiting nerve root and  compressing it. There is additionally severe left-sided foraminal stenosis due  to similar factors with similar findings. Impression IMPRESSION:    1. Severe disc space narrowing with anterior listhesis L5 upon S1 apparently on  severe degenerative facet disease basis. -There is mild overall central canal narrowing, but marked severe bilateral  foraminal stenosis as above.     2. Mild to moderate disc space narrowing L5-S1 with trace anterior listhesis,  mild disc bulge/protrusion and some mild edema or questionable annular tear as  above. 3. Mild disc bulge/protrusion at several other lumbar and low thoracic levels,  with mild canal narrowing and foraminal encroachment at several levels as  discussed. ASSESSMENT   Flor Nickerson was seen today for back pain and hip pain. Diagnoses and all orders for this visit:    Spondylolisthesis at L5-S1 level  -     SCHEDULE SURGERY    Neuritis  -     Discontinue: topiramate (TOPAMAX) 25 mg tablet; Take 1 in the evening for 1 week, then increase to 2 the second week and continue with 3 in the evening  -     SCHEDULE SURGERY  -     topiramate (TOPAMAX) 25 mg tablet; Take 1 in the evening for 1 week, then increase to 2 the second week and continue with 3 in the evening    Lumbar facet arthropathy (HCC)  -     SCHEDULE SURGERY    Muscle spasm of back  -     SCHEDULE SURGERY    Functional gait abnormality         IMPRESSION AND PLAN:  Karlene Cohen is a 67 y.o. female with history of chronic lumbar pain. She continues to experience constant lower back pain and soreness in the right lower back that radiates down the right leg to the foot. Pt also c/o of weakness in the left knee and will follow up with Dr. Sotero Mark for a left knee brace. She takes Neurontin 300 mg 1 tab QAM and 2 tabs QHS and admits to shortness of breath when taking the medication. 1) Pt was given information on lumbar arthritis exercises. 2) Discussed treatment options with the Pt, including steroid injections. 3) She was scheduled for a right L5-S1 facet injection and a right L5 SNRB. 4) Pt will wean off the Neurontin 300 mg due to her shortness of breath. 5) She was prescribed Topamax 25 mg 3 tabs QHS, tapering up as directed. 6) Ms. Nabor Meza has a reminder for a \"due or due soon\" health maintenance.  I have asked that she contact her primary care provider, Araceli LOZANO Raven Velásquez MD, for follow-up on this health maintenance. 7)  reviewed. 8) Pt will follow-up in 1 month. Written by Irish Mckenzie, as dictated by Mirta Pierce MD.  I, Dr. Mirta Pierce confirm that all documentation is accurate.

## 2017-04-18 ENCOUNTER — HOSPITAL ENCOUNTER (OUTPATIENT)
Dept: PHYSICAL THERAPY | Age: 72
Discharge: HOME | End: 2017-04-18
Payer: MEDICARE

## 2017-04-18 PROCEDURE — G8978 MOBILITY CURRENT STATUS: HCPCS

## 2017-04-18 PROCEDURE — 97110 THERAPEUTIC EXERCISES: CPT

## 2017-04-18 PROCEDURE — G8979 MOBILITY GOAL STATUS: HCPCS

## 2017-04-18 PROCEDURE — 97162 PT EVAL MOD COMPLEX 30 MIN: CPT

## 2017-04-18 NOTE — PROGRESS NOTES
[facility] - [address]  [phone] fax    Plan of Care/ Statement of Necessity for Physical Therapy Services    Patient name: Fallon Somers Start of Care: 2017   Referral source: Lali Shields* : 1945    Medical Diagnosis: Unsteadiness on feet [R26.81]   Onset Date:>2 yrs ago    Treatment Diagnosis: Balance   Prior Hospitalization: see medical history Provider#: [id number]   Medications: Verified on Patient summary List    Comorbidities: HTN, Diabetes, Asthma, Depression, Arthritis, Scoliosis. Prior Level of Function: Lives with  in 1 story house. Paint portraits as hobby. Attend Aquatic Pool program [facility] the [facility] 2x/week. Previous R TKR. The Plan of Care and following information is based on the information from the initial evaluation. Assessment/ key information: Pt is a 67 yr old with reports of multiple falls w/o injury. She has had episodes of falling for no reason for many years now. Pt denies any dizziness prior falls. She said her legs just give out and she looses her balance. Pt ambulates with a SPC and reports she mostly carries it. Pt lives with her  in a 1 story home and likes to paint. She does not go out much. Romberg EO/EC with A/P sway, DHI=50. Pt ambulates with unsteady and fast gait with a NBOS. LE strength is grossly 4/5. SOT will be assessed next visit to determine sensory input. Pt will benefit from skilled therapy to improve balance, decrease fall risk, increase strength to improve QOL and to be a safe community Ambulator.      Evaluation Complexity History HIGH Complexity :3+ comorbidities / personal factors will impact the outcome/ POC ; Examination MEDIUM Complexity : 3 Standardized tests and measures addressing body structure, function, activity limitation and / or participation in recreation  ;Presentation MEDIUM Complexity : Evolving with changing characteristics  ; Clinical Decision Making MEDIUM Complexity : FOTO score of 26-74  Overall Complexity Rating: MEDIUM  Problem List: pain affecting function, decrease ROM, decrease strength, impaired gait/ balance, decrease ADL/ functional abilitiies, decrease activity tolerance, decrease flexibility/ joint mobility, decrease transfer abilities and other FALLS. Treatment Plan may include any combination of the following: Therapeutic exercise, Therapeutic activities, Neuromuscular re-education, Physical agent/modality, Gait/balance training, Manual therapy, Patient education, Self Care training, Functional mobility training, Home safety training and Stair training  Patient / Family readiness to learn indicated by: asking questions, trying to perform skills and interest  Persons(s) to be included in education: patient (P)  Barriers to Learning/Limitations: None  Patient Goal (s): No Falls, Walk better  Patient Self Reported Health Status: fair  Rehabilitation Potential: good    Short Term Goals: To be accomplished in 1 weeks: 1. Pt will be compliant with HEP to improve function. 2.Pt will report no falls. Long Term Goals: To be accomplished in 6 weeks: 1. Pt will increase FOTO score by 21 pts to improve function. 2.Pt will perform SOT to determine Sensory Input. 3.Pt will report no falls. 4.Pt will perform Romberg on a compliant surface EC w/o LOB >1 min to improve static stability. 5.Pt will Ambulate outdoors w/o LOB on curbs, grass etc w/o LOB to improve community ambulation function at D/C  Frequency / Duration: Patient to be seen 2 times per week for 6 weeks. Patient/ Caregiver education and instruction: Diagnosis, prognosis, exercises   [x]  Plan of care has been reviewed with PTA    G-Codes (GP)  Mobility   Current  CL= 60-79%   Goal  CJ= 20-39%    The severity rating is based on clinical judgment and the FOTO score.     Certification Period: 4/17/17-7/16/17  Millicent Coffman, PT 4/18/2017 11:20 AM    ________________________________________________________________________    I certify that the above Therapy Services are being furnished while the patient is under my care. I agree with the treatment plan and certify that this therapy is necessary.     Physician's Signature:____________________  Date:____________Time: _________    Please sign and return to [facility] - [facility]  [address] [place] [address]  [phone] fax

## 2017-04-18 NOTE — PROGRESS NOTES
PT DAILY TREATMENT NOTE - [facility]     Patient Name: Liudmila Gallegos  Date:2017  : 1945  [x]  Patient  Verified  Payor: Dorys Avila / Plan: [place] & B / Product Type: Medicare /    In time:1040  Out time:1110  Total Treatment Time (min): 30  Total Timed Codes (min): 20  1:1 Treatment Time ([address] only): 30   Visit #: 1 of 12    Treatment Area: Unsteadiness on feet [R26.81]    SUBJECTIVE  Pain Level (0-10 scale): 210  Any medication changes, allergies to medications, adverse drug reactions, diagnosis change, or new procedure performed?: [x] No    [] Yes (see summary sheet for update)  Subjective functional status/changes:   [] No changes reported  See eval    OBJECTIVE []redness - adverse reaction:     20 min []Eval                  []Re-Eval       20 min Therapeutic Exercise:  [] See flow sheet :   Rationale: improve coordination and improve balance to improve the patients ability to ease with ADL's. With   [] TE   [] TA   [] neuro   [] other: Patient Education: [x] Review HEP    [] Progressed/Changed HEP based on:   [] positioning   [] body mechanics   [] transfers   [] heat/ice application    [] other:      Other Objective/Functional Measures: see eval     Pain Level (0-10 scale) post treatment: 10    ASSESSMENT/Changes in Function: see eval    Patient will continue to benefit from skilled PT services to modify and progress therapeutic interventions, address functional mobility deficits, address ROM deficits, address strength deficits, analyze and address soft tissue restrictions, analyze and cue movement patterns, analyze and modify body mechanics/ergonomics, assess and modify postural abnormalities and address imbalance/dizziness to attain remaining goals.      [x]  See Plan of Care  []  See progress note/recertification  []  See Discharge Summary         Progress towards goals / Updated goals:  See POC    PLAN  [x]  Upgrade activities as tolerated     [x]  Continue plan of care  []  Update interventions per flow sheet       []  Discharge due to:_  []  Other:_      Shannan Pedro, PT 4/18/2017  10:51 AM    Future Appointments  Date Time Provider Yojana Cintron   5/28/2017 9:00 PM [id number] [facility]   6/7/2017 10:00 AM Griffin Doherty  [address]

## 2017-04-19 ENCOUNTER — APPOINTMENT (OUTPATIENT)
Dept: GENERAL RADIOLOGY | Age: 72
End: 2017-04-19
Attending: PHYSICAL MEDICINE & REHABILITATION
Payer: MEDICARE

## 2017-04-19 ENCOUNTER — HOSPITAL ENCOUNTER (OUTPATIENT)
Age: 72
Setting detail: OUTPATIENT SURGERY
Discharge: HOME | End: 2017-04-19
Attending: PHYSICAL MEDICINE & REHABILITATION | Admitting: PHYSICAL MEDICINE & REHABILITATION
Payer: MEDICARE

## 2017-04-19 VITALS
RESPIRATION RATE: 18 BRPM | SYSTOLIC BLOOD PRESSURE: 134 MMHG | DIASTOLIC BLOOD PRESSURE: 54 MMHG | HEIGHT: 62 IN | TEMPERATURE: 98.5 F | BODY MASS INDEX: 39.38 KG/M2 | OXYGEN SATURATION: 91 % | WEIGHT: 214 LBS | HEART RATE: 51 BPM

## 2017-04-19 LAB — GLUCOSE BLD STRIP.AUTO-MCNC: 80 MG/DL (ref 70–110)

## 2017-04-19 PROCEDURE — 74011250636 HC RX REV CODE- 250/636

## 2017-04-19 PROCEDURE — 74011250637 HC RX REV CODE- 250/637: Performed by: PHYSICAL MEDICINE & REHABILITATION

## 2017-04-19 PROCEDURE — 76010000009 HC PAIN MGT 0 TO 30 MIN PROC: Performed by: PHYSICAL MEDICINE & REHABILITATION

## 2017-04-19 PROCEDURE — 77030003669 HC NDL SPN COOK -B: Performed by: PHYSICAL MEDICINE & REHABILITATION

## 2017-04-19 PROCEDURE — 74011000250 HC RX REV CODE- 250

## 2017-04-19 PROCEDURE — 82962 GLUCOSE BLOOD TEST: CPT

## 2017-04-19 PROCEDURE — 77030003676 HC NDL SPN MPRI -A: Performed by: PHYSICAL MEDICINE & REHABILITATION

## 2017-04-19 PROCEDURE — 74011636320 HC RX REV CODE- 636/320

## 2017-04-19 RX ORDER — DEXAMETHASONE SODIUM PHOSPHATE 100 MG/10ML
INJECTION INTRAMUSCULAR; INTRAVENOUS AS NEEDED
Status: DISCONTINUED | OUTPATIENT
Start: 2017-04-19 | End: 2017-04-19 | Stop reason: HOSPADM

## 2017-04-19 RX ORDER — LIDOCAINE HYDROCHLORIDE 10 MG/ML
INJECTION, SOLUTION EPIDURAL; INFILTRATION; INTRACAUDAL; PERINEURAL AS NEEDED
Status: DISCONTINUED | OUTPATIENT
Start: 2017-04-19 | End: 2017-04-19 | Stop reason: HOSPADM

## 2017-04-19 RX ORDER — SODIUM CHLORIDE 0.9 % (FLUSH) 0.9 %
5-10 SYRINGE (ML) INJECTION AS NEEDED
Status: DISCONTINUED | OUTPATIENT
Start: 2017-04-19 | End: 2017-04-19 | Stop reason: HOSPADM

## 2017-04-19 RX ORDER — DIAZEPAM 5 MG/1
5-20 TABLET ORAL ONCE
Status: COMPLETED | OUTPATIENT
Start: 2017-04-19 | End: 2017-04-19

## 2017-04-19 RX ADMIN — DIAZEPAM 5 MG: 5 TABLET ORAL at 13:37

## 2017-04-19 NOTE — DISCHARGE INSTRUCTIONS
[facility]   (PATRICIA)  Dr. Dorothy Hamman, Dr. Debbi Michelle, Dr. Renetta Rojo not drive a car, operate heavy machinery or dangerous equipment for 24 hours. * Activity as tolerated; rest for the remainder of the day. * Resume pre-block medications including those for your family doctor. * Do not drink alcoholic beverages for 24 hours. Alcohol and the medications you have received may interact and cause an adverse reaction. * You may feel better this evening and worse tomorrow, as the numbing medications wears off and the steroid has yet to begin to work. After 48 hrs the steroid should begin to release bringing you relief. * You may shower this evening and remove any bandages. * Avoid hot tubs and heating pads for 24 hours. You may use cold packs on the procedure site as tolerated for the first 24 hours. * If a headache develops, drink plenty of fluids and rest.  Take over the counter medications for headache if needed. If the headache continues longer than 24 hours, call MD at the [address] [place] [address]. [phone]    * Continue taking pain medications as needed. * You may resume your regular diet if tolerated. Otherwise, start with sips of water and advance slowly. * If Diabetic: check your blood sugar three times a day for the next 3 days. If your sugar is greater than 300 call your family doctor. If your sugar is greater than 400, have someone transport you to the nearest Emergency Room. * If you experience any of the following problems, Please Call the [address] [place] [address] at [phone].         * Shortness of Breath    * Fever of 101 or higher    * Nausea / Vomiting    * Severe Headache    * Weakness or numbness in arms or legs that is not      resolving    * Prolonged increase in pain greater than 4 days      DISCHARGE SUMMARY from Nurse      PATIENT INSTRUCTIONS:    After oral sedation, for 24 hours or while taking prescription Narcotics:  · Limit your activities  · Do not drive and operate hazardous machinery  · Do not make important personal or business decisions  · Do  not drink alcoholic beverages  · If you have not urinated within 8 hours after discharge, please contact your surgeon on call. Report the following to your surgeon:  · Excessive pain, swelling, redness or odor of or around the surgical area  · Temperature over 101  · Nausea and vomiting lasting longer than 4 hours or if unable to take medications  · Any signs of decreased circulation or nerve impairment to extremity: change in color, persistent  numbness, tingling, coldness or increase pain  · Any questions            What to do at Home:  Recommended activity: Activity as tolerated, NO DRIVING FOR 12 Hours post injection          *  Please give a list of your current medications to your Primary Care Provider. *  Please update this list whenever your medications are discontinued, doses are      changed, or new medications (including over-the-counter products) are added. *  Please carry medication information at all times in case of emergency situations. These are general instructions for a healthy lifestyle:    No smoking/ No tobacco products/ Avoid exposure to second hand smoke    Surgeon General's Warning:  Quitting smoking now greatly reduces serious risk to your health. Obesity, smoking, and sedentary lifestyle greatly increases your risk for illness    A healthy diet, regular physical exercise & weight monitoring are important for maintaining a healthy lifestyle    You may be retaining fluid if you have a history of heart failure or if you experience any of the following symptoms:  Weight gain of 3 pounds or more overnight or 5 pounds in a week, increased swelling in our hands or feet or shortness of breath while lying flat in bed.   Please call your doctor as soon as you notice any of these symptoms; do not wait until your next office visit. Recognize signs and symptoms of STROKE:    F-face looks uneven    A-arms unable to move or move unevenly    S-speech slurred or non-existent    T-time-call 911 as soon as signs and symptoms begin-DO NOT go       Back to bed or wait to see if you get better-TIME IS BRAIN. MyChart Activation    Thank you for requesting access to [facility]. Please follow the instructions below to securely access and download your online medical record. [facility] allows you to send messages to your doctor, view your test results, renew your prescriptions, schedule appointments, and more. How Do I Sign Up? 1. In your internet browser, go to [email or web address]  2. Click on the First Time User? Click Here link in the Sign In box. You will be redirect to the New Member Sign Up page. 3. Enter your [facility] Access Code exactly as it appears below. You will not need to use this code after youve completed the sign-up process. If you do not sign up before the expiration date, you must request a new code. [facility] Access Code: [email or web address]  Expires: 2017 10:28 AM (This is the date your [facility] access code will )    4. Enter the last four digits of your Social Security Number (xxxx) and Date of Birth (mm/dd/yyyy) as indicated and click Submit. You will be taken to the next sign-up page. 5. Create a [facility] ID. This will be your [facility] login ID and cannot be changed, so think of one that is secure and easy to remember. 6. Create a [facility] password. You can change your password at any time. 7. Enter your Password Reset Question and Answer. This can be used at a later time if you forget your password. 8. Enter your e-mail address. You will receive e-mail notification when new information is available in [address] 9. Click Sign Up. You can now view and download portions of your medical record.   10. Click the Download Summary menu link to download a portable copy of your medical information. Additional Information    If you have questions, please visit the Frequently Asked Questions section of the [employer] website at [email or web address] Remember, [facility] is NOT to be used for urgent needs. For medical emergencies, dial 911. Dontae Garcia

## 2017-04-19 NOTE — PROCEDURES
Facet Joint Block Procedure Note    Patient Name: Jennifer Chavez    Date of Procedure: April 19, 2017    Preoperative Diagnosis: Acquired spondylolisthesis of lumbosacral region [M43.17]  Acute neuritis [M79.2]  Arthropathy of lumbar facet joint ([address] [M12.88]  Back muscle spasm [M62.830]    Post Operative Diagnosis: Procedure:Acquired spondylolisthesis of lumbosacral region [M43.17]  Acute neuritis [M79.2]  Arthropathy of lumbar facet joint ([address] [M12.88]  Back muscle spasm [M62.830]    right L5-S1 Facet Joint Block    Consent: Informed consent was obtained prior to the procedure. The patient was given the opportunity to ask questions regarding the procedure and its associated risks. In addition to the potential risks associated with the procedure itself, the patient was informed both verbally and in writing of potential side effects of the use of glucocorticoids. The patient appeared to comprehend the informed consent and desired to have the procedure perfored. Procedure: The patient was placed in the prone position on the flouroscopy table and the back was prepped and draped in the usual sterile manner. At each blocked level, the exact location of the facet joint was identified with flouroscopy, and after local Lidocaine 1% injection, a #22 gauge spinal needle was then advanced toward the joint. A total of 15 mg of preservative free Dexamethasone and 2.5 cc of Lidocaine was injected around and equally divided among all of the sites. The patient tolerated the procedure well. The injection area was cleaned and bandaids applied. No excessive bleeding was noted. Patient dressed and was discharged to home with instructions.     Discussion: tolerated the procedure well with no complications    Alyce Nicole MD  April 19, 2017   PROCEDURE NOTE      Patient Name: Jennifer Chavez    Date of Procedure: April 19, 2017    Preoperative Diagnosis:  Acquired spondylolisthesis of lumbosacral region [M43.17]  Acute neuritis [M79.2]  Arthropathy of lumbar facet joint ([address] [M12.88]  Back muscle spasm [M62.830]    Post Operative Diagnosis: Acquired spondylolisthesis of lumbosacral region [M43.17]  Acute neuritis [M79.2]  Arthropathy of lumbar facet joint ([address] [M12.88]  Back muscle spasm [M62.830]   Procedure :    right  L5 Selective Nerve Root Block    Consent:  Informed consent was obtained prior to the procedure. The patient was given the opportunity to ask questions regarding the procedure and its associated risks. In addition to the potential risks associated with the procedure itself, the patient was informed both verbally and in writing of the potential side effects of the use of glucocorticoid. The patient appeared to comprehend the informed consent and desired to have the procedure performed. Procedure: The patient was placed in the prone position on the fluoroscopy table and the back was prepped and draped in the usual sterile manner. The exact spinal level was  identified using fluoroscopy, and Lidocaine 1 % was injected locally, a # 22 gauge spinal needle was passed to the transverse process. The depth was noted and the needle redirected to pass inferior and approximately one cm anterior to the transverse process. YES    1 cc of Isovue M-200 was used to verify positioning in the epidural and paravertebral space and outlined the course of the spinal nerve into the epidural space. The same procedure was repeated at each spinal level indicated above. A total of 15 mg of preservative free Dexamethasone and 2.5 cc of Lidocaine was slowly injected. The patient tolerated the procedure well. The injection area was cleaned and bandaids applied. Not excessive bleeding was noted. Patient dressed and discharged to home with instructions. Discussion: The patient tolerated the procedure well.                                               Rex Beverly MD  April 19, 2017

## 2017-04-24 ENCOUNTER — HOSPITAL ENCOUNTER (OUTPATIENT)
Dept: PHYSICAL THERAPY | Age: 72
Discharge: HOME | End: 2017-04-24
Payer: MEDICARE

## 2017-04-24 PROCEDURE — 97530 THERAPEUTIC ACTIVITIES: CPT

## 2017-04-24 PROCEDURE — 97750 PHYSICAL PERFORMANCE TEST: CPT

## 2017-04-24 NOTE — PROGRESS NOTES
PT DAILY TREATMENT NOTE - [facility]     Patient Name: Linnea Holley  Date:2017  : 1945  [x]  Patient  Verified  Payor: Lorenza Marks / Plan: [place] MEDICARE PART A & B / Product Type: Medicare /    In time:2:39  Out time: 3:09  Total Treatment Time (min): 30  Total Timed Codes (min): 30  1:1 Treatment Time ( [address] only): 27   Visit #: 2 of 12    Treatment Area: Unsteadiness on feet [R26.81]    SUBJECTIVE  Pain Level (0-10 scale): 0/10 \"light-headed\"   Any medication changes, allergies to medications, adverse drug reactions, diagnosis change, or new procedure performed?: [x] No    [] Yes (see summary sheet for update)  Subjective functional status/changes:   [] No changes reported  Pt reports that she had a nerve block L4/L5 last Wednesday and she has been feeling light-headed since. She has irritable bowel syndrome and has diarrhea at times. She thought the diarrhea may be causing the light-headedness. Her blood sugar spiked after the nerve block, but it's come down since. It was in the 300s and she was supposed to tell her MD if it was >300, but she didn't call her MD. She is feeling much better now. OBJECTIVE    20 Physical Performance Test: SOT  Rationale: to further assess balance deficits to focus POC      10 min Therapeutic Activity:  []  See flow sheet : BP measurements, patient education    Rationale:  To ensure pt safely and understanding with therapy interventions        With   [] TE   [] TA   [] neuro   [] other: Patient Education: [x] Review HEP    [] Progressed/Changed HEP based on:   [] positioning   [] body mechanics   [] transfers   [] heat/ice application    [] other:      Other Objective/Functional Measures:     Pt 9 min late to therapy    /70k, HR 83 bpm prior to SOT  Instructed pt to call PCP regarding continued light-headed sensation and to inform them of previous blood pressure spike  Educated patient regarding purpose of SOT and findings    SOT composite score: 57  Pt reported that dizziness/light-headed sensation was better after SOT, but reported higher number    Pain Level (0-10 scale) post treatment: 2/10 \"light-headed\"    ASSESSMENT/Changes in Function:     Pt performed SOT this visit to further assess balance impairments. Composite score of 57 is below age-related norms, with greatest deficits evident with vestibular system. Pt demonstrates increased weight-shift to RLE during static stance d/t L knee pain. Will initiate treatment per POC next session to address vestibular impairments and reduce fall risk. Patient will continue to benefit from skilled PT services to modify and progress therapeutic interventions, address functional mobility deficits, address ROM deficits, address strength deficits, analyze and address soft tissue restrictions, analyze and cue movement patterns, address imbalance/dizziness and instruct in home and community integration to attain remaining goals. [x]  See Plan of Care  []  See progress note/recertification  []  See Discharge Summary         Progress towards goals / Updated goals:  Short Term Goals: To be accomplished in 1 weeks: 1. Pt will be compliant with HEP to improve function. 2.Pt will report no falls. Long Term Goals: To be accomplished in 6 weeks: 1. Pt will increase FOTO score by 21 pts to improve function. 2.Pt will perform SOT to determine Sensory Input. 3.Pt will report no falls. 4.Pt will perform Romberg on a compliant surface EC w/o LOB >1 min to improve static stability.   5.Pt will Ambulate outdoors w/o LOB on curbs, grass etc w/o LOB to improve community ambulation function at D/C    PLAN  [x]  Upgrade activities as tolerated     [x]  Continue plan of care  [x]  Update interventions per flow sheet       []  Discharge due to:_  []  Other:_      Fern Huang PT 4/24/2017  2:23 PM    Future Appointments  Date Time Provider Yojana Cintron   4/24/2017 2:30 PM Fern Huang PT [id number]   4/27/2017 1:00 PM Sukhdeep Hampton, PT [facility]   5/1/2017 9:30 AM Masha Marcela Sessions, PT [facility]   5/3/2017 9:30 AM Masha Marcela Sessions, PT [facility]   5/8/2017 9:00 AM Masha Marcela Sessions, PT [facility]   5/10/2017 9:30 AM Masha Marcela Sessions, PT [facility]   5/15/2017 9:00 AM Masha Marcela Sessions, PT [facility]   5/17/2017 9:00 AM Masha Marcela Sessions, PT [facility]   5/22/2017 9:00 AM Masha Marcela Sessions, PT [facility]   5/24/2017 9:00 AM Masha Hirsch, PT [facility]   5/28/2017 9:00 PM [id number] [facility]   5/31/2017 9:00 AM Masha Marcela Sessions, PT [facility]   6/7/2017 10:00 AM Ada Syed  [address]

## 2017-04-27 ENCOUNTER — APPOINTMENT (OUTPATIENT)
Dept: PHYSICAL THERAPY | Age: 72
End: 2017-04-27
Payer: MEDICARE

## 2017-05-01 ENCOUNTER — HOSPITAL ENCOUNTER (OUTPATIENT)
Dept: PHYSICAL THERAPY | Age: 72
Discharge: HOME | End: 2017-05-01
Payer: MEDICARE

## 2017-05-01 PROCEDURE — 97110 THERAPEUTIC EXERCISES: CPT

## 2017-05-01 NOTE — PROGRESS NOTES
PT DAILY TREATMENT NOTE - [facility]     Patient Name: Liudmila Gallegos  Date:2017  : 1945  [x]  Patient  Verified  Payor: [place] MEDICARE / Plan: [place] MEDICARE PART A & B / Product Type: Medicare /    In time:930  Out time:1000  Total Treatment Time (min): 30  Total Timed Codes (min): 30  1:1 Treatment Time ( only): 30   Visit #: 3 of 12    Treatment Area: Unsteadiness on feet [R26.81]    SUBJECTIVE  Pain Level (0-10 scale): 0/10  Any medication changes, allergies to medications, adverse drug reactions, diagnosis change, or new procedure performed?: [x] No    [] Yes (see summary sheet for update)  Subjective functional status/changes:   [] No changes reported  Reported she stopped her topamax because it was making her irritable and mean. She did not sleep well last night. Left knee pain and is wearing a brace, L/S pain as well . She had a shot that did not work. OBJECTIVE    30 min Therapeutic Exercise:  [] See flow sheet :   Rationale: increase ROM and increase strength to improve the patients ability to ease with ADL's        With   [] TE   [] TA   [] neuro   [] other: Patient Education: [x] Review HEP    [] Progressed/Changed HEP based on:   [] positioning   [] body mechanics   [] transfers   [] heat/ice application    [] other:      Other Objective/Functional Measures: Pt required 2 rest breaks. VSE x1 with horizontal turn to right with double vision with further distance vs closer    Pain Level (0-10 scale) post treatment: 0/10    ASSESSMENT/Changes in Function: Pt progressing well. HEP of VSEx 1 and Romberg and MSR in a safe place.     Patient will continue to benefit from skilled PT services to modify and progress therapeutic interventions, address functional mobility deficits, address ROM deficits, address strength deficits, analyze and address soft tissue restrictions, analyze and cue movement patterns, analyze and modify body mechanics/ergonomics, assess and modify postural abnormalities and address imbalance/dizziness to attain remaining goals. []  See Plan of Care  [x]  See progress note/recertification  []  See Discharge Summary         Progress towards goals / Updated goals:  Short Term Goals: To be accomplished in 1 weeks: 1. Pt will be compliant with HEP to improve function. 2.Pt will report no falls. Met. 5/1/17  Long Term Goals: To be accomplished in 6 weeks: 1. Pt will increase FOTO score by 21 pts to improve function. 2.Pt will perform SOT to determine Sensory Input. MET. 4/24/17  3. Pt will report no falls. 4.Pt will perform Romberg on a compliant surface EC w/o LOB >1 min to improve static stability.   5.Pt will Ambulate outdoors w/o LOB on curbs, grass etc w/o LOB to improve community ambulation function at D/C    PLAN  [x]  Upgrade activities as tolerated     [x]  Continue plan of care  []  Update interventions per flow sheet       []  Discharge due to:_  []  Other:_      Jose Mejia, PT 5/1/2017  9:34 AM    Future Appointments  Date Time Provider Yojana Cintron   5/3/2017 9:30 AM Jose Mejia PT [facility]   5/8/2017 9:00 AM Masha Mancia, PT [facility]   5/10/2017 9:30 AM Masha Mancia, PT [id number] [facility]   5/15/2017 9:00 AM Masha Mancia, PT [facility]   5/17/2017 9:00 AM Masha Mancia, PT [id number] [facility]   5/22/2017 9:00 AM Masha Mancia, PT [facility]   5/24/2017 9:00 AM Masha Hirsch PT [facility]   5/28/2017 9:00 PM [id number] [facility]   5/31/2017 9:00 AM Masha Mancia, PT [facility]   6/7/2017 10:00 AM Aguila Gamble  [address]

## 2017-05-03 ENCOUNTER — HOSPITAL ENCOUNTER (OUTPATIENT)
Dept: PHYSICAL THERAPY | Age: 72
Discharge: HOME | End: 2017-05-03
Payer: MEDICARE

## 2017-05-03 PROCEDURE — 97110 THERAPEUTIC EXERCISES: CPT

## 2017-05-03 PROCEDURE — 97530 THERAPEUTIC ACTIVITIES: CPT

## 2017-05-03 NOTE — PROGRESS NOTES
PT DAILY TREATMENT NOTE - [facility]     Patient Name: Humberto Au  Date:5/3/2017  : 1945  [x]  Patient  Verified  Payor: Adam Peres / Plan: [place] MEDICARE PART A & B / Product Type: Medicare /    In time:930  Out time:1008  Total Treatment Time (min): 38  Total Timed Codes (min): 38  1:1 Treatment Time ( [address] only): 45   Visit #: 4 of 12    Treatment Area: Unsteadiness on feet [R26.81]    SUBJECTIVE  Pain Level (0-10 scale): 0/10  Any medication changes, allergies to medications, adverse drug reactions, diagnosis change, or new procedure performed?: [x] No    [] Yes (see summary sheet for update)  Subjective functional status/changes:   [] No changes reported  Pt reports she is going out of town to [place] till next week and will be back to therapy. OBJECTIVE    38 min Therapeutic Activity:  []  See flow sheet :   Rationale: improve coordination, improve balance and increase proprioception  to improve the patients ability to ease with ADL's. With   [] TE   [] TA   [] neuro   [] other: Patient Education: [x] Review HEP    [] Progressed/Changed HEP based on:   [] positioning   [] body mechanics   [] transfers   [] heat/ice application    [] other:      Other Objective/Functional Measures: Ambulated outdoors w/o AD, on/off  Curb. Performed tandem ambulation with CGA. Challenged with COG over [address]. Pain Level (0-10 scale) post treatment: 0/10    ASSESSMENT/Changes in Function: Progressing well in therapy. No LOB in doors or outdoors.  Pt was cued for leaning forward to step over curbs/steps    Patient will continue to benefit from skilled PT services to modify and progress therapeutic interventions, address functional mobility deficits, address ROM deficits, address strength deficits, analyze and address soft tissue restrictions, analyze and cue movement patterns, analyze and modify body mechanics/ergonomics, assess and modify postural abnormalities and address imbalance/dizziness to attain remaining goals. [x]  See Plan of Care  []  See progress note/recertification  []  See Discharge Summary         Progress towards goals / Updated goals: 1. Pt will be compliant with HEP to improve function.     2. Pt will report no falls. Met. 5/1/17  Long Term Goals: To be accomplished in 6 weeks: 1. Pt will increase FOTO score by 21 pts to improve function. 2.Pt will perform SOT to determine Sensory Input. MET. 4/24/17  3. Pt will report no falls. 4.Pt will perform Romberg on a compliant surface EC w/o LOB >1 min to improve static stability.   5.Pt will Ambulate outdoors w/o LOB on curbs, grass etc w/o LOB to improve community ambulation function at D/C    PLAN  [x]  Upgrade activities as tolerated     [x]  Continue plan of care  []  Update interventions per flow sheet       []  Discharge due to:_  []  Other:_      Masha Shaw, PT 5/3/2017  10:15 AM    Future Appointments  Date Time Provider Yojana Cintron   5/15/2017 9:00 AM Masha Shaw, PT [facility]   5/17/2017 9:00 AM Masha Hernandez, PT [facility]   5/22/2017 9:00 AM Masha Hernandez, PT [facility]   5/24/2017 9:00 AM Masha Hirsch PT [facility]   5/28/2017 9:00 PM [phone] [facility]   5/31/2017 9:00 AM Masha Hernandez, PT [facility]   6/7/2017 10:00 AM Rex Beverly  [address]

## 2017-05-08 ENCOUNTER — APPOINTMENT (OUTPATIENT)
Dept: PHYSICAL THERAPY | Age: 72
End: 2017-05-08
Payer: MEDICARE

## 2017-05-10 ENCOUNTER — APPOINTMENT (OUTPATIENT)
Dept: PHYSICAL THERAPY | Age: 72
End: 2017-05-10
Payer: MEDICARE

## 2017-05-14 NOTE — PROGRESS NOTES
[facility]  [employer] COMPANY OF [address]  [phone] fax    Continued Plan of Care/ Re-certification for Physical Therapy Services    Patient name: Brynn Diaz Start of Care: 17   Referral source: Igor Rivas : 1945   Medical/Treatment Diagnosis: Unsteadiness on feet [R26.81] Onset Date:>2 yrs     Prior Hospitalization: see medical history Provider#: [id number]   Medications: Verified on Patient Summary List    Comorbidities: HTN, Diabetes, Asthma, Depression, Arthritis, Scoliosis. Prior Level of Function:Lives with  in 1 story house. Paint portraits as hobby. Attend Aquatic Pool program at the [facility] 2x/week. Previous R TKR. Visits from Start of Care: 5    Missed Visits: 0    The Plan of Care and following information is based on the patient's current status:  Goal:Lives with  in 1 story house. Paint portraits as hobby. Attend Aquatic Pool program at the [facility] 2x/week. Previous R TKR. Status at last note/certification:  Current Status: met    Goal:Pt will report no falls. Status at last note/certification:  Current Status: met    Goal:. Pt will perform SOT to determine Sensory Input  Status at last note/certification:  Current Status: met    Goal:. Pt will perform Romberg on a compliant surface EC w/o LOB >1 min to improve static stability. Status at last note/certification:  Current Status: Progressing    Pt will Ambulate outdoors w/o LOB on curbs, grass etc w/o LOB to improve community ambulation function at D/C  Progressing with outdoor ambulation. Key functional changes: Pt progressing well in therapy. She demonstrates fair compensatory strategies so far. She had a SOT Composite score of 57 which is below age-related norms, with greatest deficits evident with vestibular system. Pt demonstrates increased weight-shift to RLE during static stance d/t L knee pain. She has reported no falls.   She ambulated on curbs outdoors w/o AD. Pt compliant with attendance and with her HEP. She is working on maintaining COG over her ESTELA. Problems/ barriers to goal attainment: L/S Pain, Left knee Pain. Problem List: pain affecting function, decrease ROM, decrease strength, edema affecting function, impaired gait/ balance, decrease ADL/ functional abilitiies, decrease activity tolerance, decrease flexibility/ joint mobility and decrease transfer abilities    Treatment Plan: Therapeutic exercise, Therapeutic activities, Neuromuscular re-education, Physical agent/modality, Gait/balance training, Manual therapy, Patient education, Self Care training, Functional mobility training, Home safety training and Stair training     Patient Goal (s) has been updated and includes: decrease fall risk     Goals for this certification period to be accomplished in 4 weeks: 1. Pt will increase FOTO score by 21 pts to improve function. 2.Pt will increase Vestibular Sensory Input in SOT to improve balance. 3.Pt will report no falls. 4.Pt will perform Romberg on a compliant surface EC w/o LOB >1 min to improve static stability. 5.Pt will Ambulate outdoors w/o LOB on curbs, grass etc w/o LOB to improve community ambulation function at D/C    Frequency / Duration: Patient to be seen 2-3 times per week for 4 weeks:    Assessment / Recommendations:   Patient will continue to benefit from skilled PT services to modify and progress therapeutic interventions, address functional mobility deficits, address ROM deficits, address strength deficits, analyze and address soft tissue restrictions, analyze and cue movement patterns, analyze and modify body mechanics/ergonomics, assess and modify postural abnormalities and address imbalance/dizziness to attain remaining goals.     G-Codes (GP)  Mobility  {[facility] V[facility]03962}  Position  {[facility] POSITION G SVHBX:99819}  Carry  {[facility] CARRY G [id number]  Self Care  {[facility] SELF CARE G AVDBW:19284}    The severity rating is based on clinical judgment and the FOTO score. Certification Period: ***    Osmar Rodriguez, PT 5/13/2017 11:44 PM    ________________________________________________________________________  I certify that the above Therapy Services are being furnished while the patient is under my care. I agree with the treatment plan and certify that this therapy is necessary. [] I have read the above and request that my patient continue as recommended.   [] I have read the above report and request that my patient continue therapy with the following changes/special instructions: _______________________________________  [] I have read the above report and request that my patient be discharged from therapy    Physician's Signature:________________________________Date:___________Time:__________    Please sign and return to [facility]  [facility]  ESVIN  [address] [place] [address]  [phone] fax

## 2017-05-15 ENCOUNTER — HOSPITAL ENCOUNTER (OUTPATIENT)
Dept: PHYSICAL THERAPY | Age: 72
Discharge: HOME | End: 2017-05-15
Payer: MEDICARE

## 2017-05-15 PROCEDURE — 97530 THERAPEUTIC ACTIVITIES: CPT

## 2017-05-15 PROCEDURE — G8979 MOBILITY GOAL STATUS: HCPCS

## 2017-05-15 PROCEDURE — G8978 MOBILITY CURRENT STATUS: HCPCS

## 2017-05-15 NOTE — PROGRESS NOTES
[facility] - [facility] COMPANY OF [address]  [phone] fax    Continued Plan of Care/ Re-certification for Physical Therapy Services    Patient name: Sally Ayon Start of Care: 17   [facility] 1945   Medical/Treatment Diagnosis: Unsteadiness on feet [R26.81] Onset Date:>2 ys ago     Prior Hospitalization: see medical history Provider#: [id number]   Medications: Verified on Patient Summary List    Comorbidities: HTN, Diabetes, Asthma, Depression, Arthritis, Scoliosis. Prior Level of Function:Lives with  in 1 story house. Paint portraits as hobby. Attend [facility]atic Pool program [facility] the [facility] 2x/week. Previous R TKR.     Visits from Start of Care: 5    Missed Visits: 0    The Plan of Care and following information is based on the patient's current status:  Goal:1. Pt will be compliant with HEP to improve function. Status at last note/certification:  Current Status: met    Goal:2. Pt will report no falls  Status at last note/certification:  Current Status: met    Goal:. Pt will increase FOTO score by 21 pts to improve function  Status at last note/certification:  Current Status: not met    Goal:Pt will perform SOT to determine Sensory Input. Status at last note/certification:  Current Status: met    Pt will perform Romberg on a compliant surface EC w/o LOB >1 min to improve static stability. NA    Key functional changes:  Pt reports no falls since onset of therapy. She is continuing to progress with dynamic/static balance function. She has good/bad days and has been tolerating therapy with improvement in balance and confidence in stability most times. Pt performed Sensory Organization Test with demonstration in improved Vestibular input to WNL. Pt improving with fair compensatory strategies. Problems/ barriers to goal attainment: Decreased balance. Pt is Diabetic.      Problem List: decrease strength, impaired gait/ balance, decrease ADL/ functional abilitiies, decrease activity tolerance, decrease flexibility/ joint mobility and decrease transfer abilities    Treatment Plan: Therapeutic exercise, Therapeutic activities, Neuromuscular re-education, Gait/balance training, Manual therapy, Patient education, Self Care training, Functional mobility training and Home safety training     Patient Goal (s) has been updated and includes: Walk Better. Goals for this certification period to be accomplished in 4 weeks: 1. Pt will increase FOTO score by 21 pts to improve function. 2.Pt will increase SOT by 3-5 pts to improve balance. 3.Pt will continue to report no falls. 4.Pt will perform Romberg on a compliant surface EC w/o LOB >1 min to improve static stability. 5. Pt will perform dynamic activities in the clinic including obstacle course, to improve with asfety during ambulation. Frequency / Duration: Patient to be seen 2 times per week for 4 weeks:    Assessment / Recommendations:   Patient will continue to benefit from skilled PT services to modify and progress therapeutic interventions, address functional mobility deficits, address ROM deficits, address strength deficits, analyze and address soft tissue restrictions, analyze and cue movement patterns, analyze and modify body mechanics/ergonomics, assess and modify postural abnormalities and address imbalance/dizziness to attain remaining goals    G-Codes (GP)  Mobility  [id number] Current  CJ= 20-39%   Goal  CK= 40-59%    The severity rating is based on clinical judgment and the FOTO score. Certification Period: 5/15/17-8/14/17    Marlee Yip, PT 5/15/2017 11:41 AM    ________________________________________________________________________  I certify that the above Therapy Services are being furnished while the patient is under my care. I agree with the treatment plan and certify that this therapy is necessary.     [] I have read the above and request that my patient continue as recommended.   [] I have read the above report and request that my patient continue therapy with the following changes/special instructions: _______________________________________  [] I have read the above report and request that my patient be discharged from therapy    Physician's Signature:________________________________Date:___________Time:__________    Please sign and return to [facility] - [employer] [facility]  [address] [place] [address]  [phone] fax

## 2017-05-15 NOTE — PROGRESS NOTES
PT DAILY TREATMENT NOTE - [facility]     Patient Name: Jennifer Chavez  Date:5/15/2017  : 1945  [x]  Patient  Verified  Payor: Monse Mistryp / Plan: [place] MEDICARE PART A & B / Product Type: Medicare /    In time:910  Out time:935  Total Treatment Time (min): 25  Total Timed Codes (min): 25  1:1 Treatment Time ([address] only): 25   Visit #: 5 of 12    Treatment Area: Unsteadiness on feet [R26.81]    SUBJECTIVE  Pain Level (0-10 scale): dizziness 5/10,   Any medication changes, allergies to medications, adverse drug reactions, diagnosis change, or new procedure performed?: [x] No    [] Yes (see summary sheet for update)  Subjective functional status/changes:   [] No changes reported  Dizziy and not doing so good since travelling back from [place]. OBJECTIVE    25 min Therapeutic Activity:  []  See flow sheet :   Rationale: improve coordination, improve balance and increase proprioception  to improve the patients ability to ease with ADL's. With   [] TE   [] TA   [] neuro   [] other: Patient Education: [x] Review HEP    [] Progressed/Changed HEP based on:   [] positioning   [] body mechanics   [] transfers   [] heat/ice application    [] other:      Other Objective/Functional Measures: SOT=58 All sensory input WNL. Pt was able to tolerate testing. FOTO score=55     Pain Level (0-10 scale) post treatment: 5/10    ASSESSMENT/Changes in Function: Progressing toward goals slowly. Continues to have \"good/bad days\" She reports feeling nauseaous yesterday with mild dizziness. Patient will continue to benefit from skilled PT services to modify and progress therapeutic interventions, address functional mobility deficits, address ROM deficits, address strength deficits, analyze and address soft tissue restrictions, analyze and cue movement patterns, analyze and modify body mechanics/ergonomics, assess and modify postural abnormalities and address imbalance/dizziness to attain remaining goals.      []  See Plan of Care  [x]  See progress note/recertification  []  See Discharge Summary         Progress towards goals / Updated goals: 1. Pt will be compliant with HEP to improve function.   2.Pt will report no falls. Met. 5/1/17  Long Term Goals: To be accomplished in 6 weeks: 1. Pt will increase FOTO score by 21 pts to improve function. 2.Pt will perform SOT to determine Sensory Input. MET. 4/24/17  3. Pt will report no falls. 4.Pt will perform Romberg on a compliant surface EC w/o LOB >1 min to improve static stability. 5.Pt will Ambulate outdoors w/o LOB on curbs, grass etc w/o LOB to improve community ambulation function at D/C.       PLAN  [x]  Upgrade activities as tolerated     [x]  Continue plan of care  []  Update interventions per flow sheet       []  Discharge due to:_  []  Other:_      Boogie Noel, PT 5/15/2017  9:18 AM    Future Appointments  Date Time Provider Yojana Cintron   5/17/2017 9:00 AM Boogie Noel PT [facility]   5/22/2017 9:00 AM Masha Richardson, PT [facility]   5/24/2017 9:00 AM Masha Hirsch PT [facility]   5/28/2017 9:00 PM [id number] [facility]   5/31/2017 9:00 AM Masha Richardson, JUAN [facility]   6/7/2017 10:00 AM Nata Michel  [address]

## 2017-05-17 ENCOUNTER — HOSPITAL ENCOUNTER (OUTPATIENT)
Dept: PHYSICAL THERAPY | Age: 72
Discharge: HOME | End: 2017-05-17
Payer: MEDICARE

## 2017-05-17 PROCEDURE — 97110 THERAPEUTIC EXERCISES: CPT

## 2017-05-17 PROCEDURE — 97530 THERAPEUTIC ACTIVITIES: CPT

## 2017-05-17 NOTE — PROGRESS NOTES
PT DAILY TREATMENT NOTE - [facility]     Patient Name: Eduin Rick  Date:2017  : 1945  [x]  Patient  Verified  Payor: Wallace Vikas / Plan: VA MEDICARE PART A & B / Product Type: Medicare /    In time:904  Out time:934  Total Treatment Time (min): 30  Total Timed Codes (min): 30  1:1 Treatment Time ( [address] only): 30   Visit #: 6 of 12    Treatment Area: Unsteadiness on feet [R26.81]    SUBJECTIVE  Pain Level (0-10 scale): 4/10  Any medication changes, allergies to medications, adverse drug reactions, diagnosis change, or new procedure performed?: [x] No    [] Yes (see summary sheet for update)  Subjective functional status/changes:   [] No changes reported  Reports she stays dizzy but able to get through it. OBJECTIVE      15 min Therapeutic Exercise:  [] See flow sheet :   Rationale: increase ROM, increase strength, improve coordination and improve balance to improve the patients ability to ease with ADL's    15 min Therapeutic Activity:  []  See flow sheet :   Rationale: improve coordination, improve balance and increase proprioception  to improve the patients ability to decrease fall risk. With   [] TE   [] TA   [] neuro   [] other: Patient Education: [x] Review HEP    [] Progressed/Changed HEP based on:   [] positioning   [] body mechanics   [] transfers   [] heat/ice application    [] other:      Other Objective/Functional Measures: Decreasing use of UE during activities in clinic. Ambulating w.o AD with SBA. VSE with head turns more difficult in vertical direction. Pain Level (0-10 scale) post treatment: 4/10    ASSESSMENT/Changes in Function: Progressing well with balance activities. Pt reports her salt intake is less since returning from [place].     Patient will continue to benefit from skilled PT services to address ROM deficits, address strength deficits, analyze and address soft tissue restrictions, analyze and cue movement patterns, analyze and modify body mechanics/ergonomics, assess and modify postural abnormalities and address imbalance/dizziness to attain remaining goals. [x]  See Plan of Care  []  See progress note/recertification  []  See Discharge Summary         Progress towards goals / Updated goals: 1. Pt will increase FOTO score by 21 pts to improve function. 2.Pt will increase SOT by 3-5 pts to improve balance. 3.Pt will continue to report no falls. 4.Pt will perform Romberg on a compliant surface EC w/o LOB >1 min to improve static stability. 5. Pt will perform dynamic activities in the clinic including obstacle course, to improve with asfety during ambulation.     PLAN  []  Upgrade activities as tolerated     []  Continue plan of care  []  Update interventions per flow sheet       []  Discharge due to:_  []  Other:_      Awais Wall, PT 5/17/2017  9:23 AM    Future Appointments  Date Time Provider Yojana Cintron   5/22/2017 9:00 AM Awais Wall PT [facility]   5/24/2017 9:00 AM Masha Cavazos PT [facility]   5/28/2017 9:00 PM [phone] [facility]   5/31/2017 9:00 AM Masha Cavazos PT [facility]   6/7/2017 10:00 AM Boby Martinez  [address]

## 2017-05-17 NOTE — H&P
Date of Surgery Update:  Brynn Diaz was seen and examined. History and physical has been reviewed. The patient has been examined. There have been no significant clinical changes since the completion of the last office visit.       Signed By: Delta Walter MD     April 19, 2017 12:48 PM
rolling walker

## 2017-05-22 ENCOUNTER — HOSPITAL ENCOUNTER (OUTPATIENT)
Dept: PHYSICAL THERAPY | Age: 72
Discharge: HOME | End: 2017-05-22
Payer: MEDICARE

## 2017-05-22 PROCEDURE — 97110 THERAPEUTIC EXERCISES: CPT

## 2017-05-22 PROCEDURE — 97530 THERAPEUTIC ACTIVITIES: CPT

## 2017-05-22 NOTE — PROGRESS NOTES
PT DAILY TREATMENT NOTE - [facility]     Patient Name: Tony Malik  Date:2017  : 1945  [x]  Patient  Verified  Payor: Andrew Sanford / Plan: [place] MED[place] & B / Product Type: Medicare /    In time:900  Out time:933  Total Treatment Time (min): 33  Total Timed Codes (min): 33  1:1 Treatment Time ( [address] only): 33   Visit #: 2 of 10    Treatment Area: Unsteadiness on feet [R26.81]    SUBJECTIVE  Pain Level (0-10 scale): 5/10  Any medication changes, allergies to medications, adverse drug reactions, diagnosis change, or new procedure performed?: [x] No    [] Yes (see summary sheet for update)  Subjective functional status/changes:   [] No changes reported  Reports she always have some pain in hips and knees. She just has to deal with it. She started pool therapy. OBJECTIVE      15 min Therapeutic Exercise:  [] See flow sheet :   Rationale: increase ROM, increase strength, improve coordination and improve balance to improve the patients ability to ease with ADL    17 min Therapeutic Activity:  []  See flow sheet :   Rationale: improve coordination, improve balance and increase proprioception  to improve the patients ability to ease with ADLs'        With   [] TE   [] TA   [] neuro   [] other: Patient Education: [x] Review HEP    [] Progressed/Changed HEP based on:   [] positioning   [] body mechanics   [] transfers   [] heat/ice application    [] other:      Other Objective/Functional Measures: Pt progressing with decreasing use of UE during balance activities. Added 2# weights to ex's. Pain Level (0-10 scale) post treatment: 4/10    ASSESSMENT/Changes in Function: Progressing well. Stability is improving statically and dynamically. Pt challenging herself w/o using UE's and has been able to keep COg over ESTELA.     Patient will continue to benefit from skilled PT services to modify and progress therapeutic interventions, address functional mobility deficits, address ROM deficits, address strength deficits, analyze and address soft tissue restrictions, analyze and cue movement patterns, analyze and modify body mechanics/ergonomics, assess and modify postural abnormalities and address imbalance/dizziness to attain remaining goals.      []  See Plan of Care  []  See progress note/recertification  []  See Discharge Summary         Progress towards goals / Updated goals:      PLAN  []  Upgrade activities as tolerated     []  Continue plan of care  []  Update interventions per flow sheet       []  Discharge due to:_  []  Other:_      Boogie Noel, PT 5/22/2017  9:09 AM    Future Appointments  Date Time Provider Yojana Cintron   5/24/2017 9:00 AM Boogie Noel, PT [id number] [facility]   5/28/2017 9:00 PM [phone] [facility]   5/31/2017 9:00 AM Masha Richardson, JUAN [id number] [facility]   6/7/2017 10:00 AM Nata Michel  [address]

## 2017-05-24 ENCOUNTER — HOSPITAL ENCOUNTER (OUTPATIENT)
Dept: PHYSICAL THERAPY | Age: 72
Discharge: HOME | End: 2017-05-24
Payer: MEDICARE

## 2017-05-24 PROCEDURE — 97110 THERAPEUTIC EXERCISES: CPT

## 2017-05-24 PROCEDURE — 97530 THERAPEUTIC ACTIVITIES: CPT

## 2017-05-24 NOTE — PROGRESS NOTES
PT DAILY TREATMENT NOTE - [facility]     Patient Name: Dania Irwin  Date:2017  : 1945  [x]  Patient  Verified  Payor: Moriah Best / Plan: [place] MEDICARE PART A & B / Product Type: Medicare /    In time:903  Out time:935  Total Treatment Time (min): 32  Total Timed Codes (min): 32  1:1 Treatment Time ([address] only): 32   Visit #: 3 of 10    Treatment Area: Unsteadiness on feet [R26.81]    SUBJECTIVE  Pain Level (0-10 scale): 4/10  Any medication changes, allergies to medications, adverse drug reactions, diagnosis change, or new procedure performed?: [x] No    [] Yes (see summary sheet for update)  Subjective functional status/changes:   [] No changes reported  Pt reports she hates the rain it makes her leg hurt. Pt reports she is moving faster and much improved with the balance    OBJECTIVE    17 min Therapeutic Exercise:  [] See flow sheet :   Rationale: increase ROM, increase strength, improve coordination and improve balance to improve the patients ability to ease with ADL's    15 min Therapeutic Activity:  []  See flow sheet :   Rationale: improve balance and increase proprioception  to improve the patients ability to ease with Ambulation. With   [] TE   [] TA   [] neuro   [] other: Patient Education: [x] Review HEP    [] Progressed/Changed HEP based on:   [] positioning   [] body mechanics   [] transfers   [] heat/ice application    [] other:      Other Objective/Functional Measures: Pt making progress with standing and dynamic balance with decreased use of UE's. Reported right medial knee pain during cone taps with 2 fingers. Pain Level (0-10 scale) post treatment: 4/10 knee    ASSESSMENT/Changes in Function: Progressing well. Pt has improved spped with ex's maintaining balance WNL.     Patient will continue to benefit from skilled PT services to modify and progress therapeutic interventions, address functional mobility deficits, address ROM deficits, address strength deficits, analyze and address soft tissue restrictions, analyze and cue movement patterns, analyze and modify body mechanics/ergonomics, assess and modify postural abnormalities and address imbalance/dizziness to attain remaining goals. []  See Plan of Care  []  See progress note/recertification  []  See Discharge Summary         Progress towards goals / Updated goals: 1. Pt will increase FOTO score by 21 pts to improve function. 2.Pt will increase SOT by 3-5 pts to improve balance. 3.Pt will continue to report no falls. MET. 5/24/17  4. Pt will perform Romberg on a compliant surface EC w/o LOB >1 min to improve static stability. 5. Pt will perform dynamic activities in the clinic including obstacle course, to improve with asfety during ambulation.     PLAN  []  Upgrade activities as tolerated     [x]  Continue plan of care  []  Update interventions per flow sheet       []  Discharge due to:_  []  Other:_      Boogie Noel, PT 5/24/2017  9:24 AM    Future Appointments  Date Time Provider Yojana Saida   5/28/2017 9:00 PM [phone] [facility]   5/31/2017 9:00 AM Masha Richardson, PT [id number] [facility]   6/7/2017 10:00 AM Nata Michel  [address]    6/12/2017 9:30 AM Boogie Noel, PT [id number] [facility]   6/15/2017 9:30 AM Masha Richardson, PT [id number] [facility]   6/19/2017 9:30 AM Masha Richarsdon, PT [id number] [facility]   6/21/2017 9:30 AM Masha Richardson, PT [id number] [facility]   6/26/2017 9:00 AM Masha Richardson, PT [id number] [facility]   6/28/2017 9:00 AM Boogie Noel PT [id number] [facility]   7/3/2017 9:30 AM Kenisha Prom, PT [id number] [facility]   7/5/2017 9:00 AM Masha Richardson, PT [id number] [facility]

## 2017-05-31 ENCOUNTER — HOSPITAL ENCOUNTER (OUTPATIENT)
Dept: PHYSICAL THERAPY | Age: 72
Discharge: HOME | End: 2017-05-31
Payer: MEDICARE

## 2017-05-31 PROCEDURE — 97110 THERAPEUTIC EXERCISES: CPT

## 2017-05-31 NOTE — PROGRESS NOTES
PT DAILY TREATMENT NOTE - [facility]     Patient Name: Christelle Sanderson  Date:2017  : 1945  [x]  Patient  Verified  Payor: Nelson  / Plan: [place] MEDICARE PART A & B / Product Type: Medicare /    In time:901  Out time:950  Total Treatment Time (min): 49  Total Timed Codes (min): 49  1:1 Treatment Time ([address] only): 52   Visit #: 4 of 10    Treatment Area: Unsteadiness on feet [R26.81]    SUBJECTIVE  Pain Level (0-10 scale): 5/10  Any medication changes, allergies to medications, adverse drug reactions, diagnosis change, or new procedure performed?: [x] No    [] Yes (see summary sheet for update)  Subjective functional status/changes:   [] No changes reported  Pt reports she is doing better with balance at home and her confidence     OBJECTIVE      49 min Therapeutic Exercise:  [] See flow sheet :   Rationale: increase strength, improve coordination, improve balance and increase proprioception to improve the patients ability to ease with ADL's       With   [] TE   [] TA   [] neuro   [] other: Patient Education: [x] Review HEP    [] Progressed/Changed HEP based on:   [] positioning   [] body mechanics   [] transfers   [] heat/ice application    [] other:      Other Objective/Functional Measures: Sidestepping with 3# w/o UE assist.  Pt requires cueing to keep feet facing forward and avoid ER during all ex's. LLE hip weakness during step ups. Pain Level (0-10 scale) post treatment: 0/10    ASSESSMENT/Changes in Function: Progressing toward all goals. Pt has to slow down and  her feet to avoid stumbling.      Patient will continue to benefit from skilled PT services to modify and progress therapeutic interventions, address functional mobility deficits, address ROM deficits, address strength deficits, analyze and address soft tissue restrictions, analyze and cue movement patterns, analyze and modify body mechanics/ergonomics, assess and modify postural abnormalities and address imbalance/dizziness to attain remaining goals. []  See Plan of Care  []  See progress note/recertification  []  See Discharge Summary         Progress towards goals / Updated goals: 1. Pt will increase FOTO score by 21 pts to improve function. 2.Pt will increase SOT by 3-5 pts to improve balance. 3.Pt will continue to report no falls. MET. 5/24/17  4. Pt will perform Romberg on a compliant surface EC w/o LOB >1 min to improve static stability. Progressing . 5/31/17  5.  Pt will perform dynamic activities in the clinic including obstacle course, to improve with asfety during ambulation.       PLAN  [x]  Upgrade activities as tolerated     [x]  Continue plan of care  []  Update interventions per flow sheet       []  Discharge due to:_  []  Other:_      Diego Sarabia, PT 5/31/2017  2:51 PM    Future Appointments  Date Time Provider Yojana Cintron   6/7/2017 10:00 AM Jason Latham  [address]    6/12/2017 9:30 AM Suezanne Carrie, PT [id number] [facility]   6/15/2017 9:30 AM Suezanne Carrie, PT [id number] [facility]   6/19/2017 9:30 AM Suezanne Carrie, PT [id number] [facility]   6/21/2017 9:30 AM Lolitaezanne Carrie, PT [id number] [facility]   6/26/2017 9:00 AM Suezanne Carrie, PT [id number] [facility]   6/28/2017 9:00 AM Suezanne Carrie, PT [id number] [facility]   7/3/2017 9:30 AM Haydee Olmedo, PT [id number] [facility]   7/5/2017 9:00 AM Masha Lee, PT [email or web address] [facility]

## 2017-06-09 ENCOUNTER — OFFICE VISIT (OUTPATIENT)
Dept: ORTHOPEDIC SURGERY | Age: 72
End: 2017-06-09

## 2017-06-09 VITALS
HEART RATE: 82 BPM | RESPIRATION RATE: 16 BRPM | DIASTOLIC BLOOD PRESSURE: 74 MMHG | SYSTOLIC BLOOD PRESSURE: 170 MMHG | WEIGHT: 212 LBS | HEIGHT: 62 IN | TEMPERATURE: 98.2 F | BODY MASS INDEX: 39.01 KG/M2

## 2017-06-09 DIAGNOSIS — M47.816 LUMBAR FACET ARTHROPATHY: ICD-10-CM

## 2017-06-09 DIAGNOSIS — M53.87 SCIATICA OF RIGHT SIDE ASSOCIATED WITH DISORDER OF LUMBOSACRAL SPINE: ICD-10-CM

## 2017-06-09 DIAGNOSIS — M79.2 NEURITIS: Primary | ICD-10-CM

## 2017-06-09 RX ORDER — METHYLPREDNISOLONE 4 MG/1
TABLET ORAL
Qty: 1 DOSE PACK | Refills: 0 | Status: SHIPPED | OUTPATIENT
Start: 2017-06-09 | End: 2017-08-14

## 2017-06-09 RX ORDER — CYCLOBENZAPRINE HCL 10 MG
TABLET ORAL
COMMUNITY
End: 2017-08-14

## 2017-06-09 RX ORDER — DICLOFENAC EPOLAMINE 0.01 G/1
1 PATCH TOPICAL EVERY 12 HOURS
Qty: 30 PATCH | Refills: 0 | Status: SHIPPED | OUTPATIENT
Start: 2017-06-09 | End: 2017-08-14

## 2017-06-09 RX ORDER — PREGABALIN 75 MG/1
CAPSULE ORAL
Qty: 120 CAP | Refills: 0 | Status: SHIPPED | OUTPATIENT
Start: 2017-06-09 | End: 2017-07-10 | Stop reason: SDUPTHER

## 2017-06-09 NOTE — PATIENT INSTRUCTIONS
Pregabalin (By mouth)   Pregabalin (pre-GA-ba-odalys)  Treats nerve and muscle pain, including fibromyalgia. Also treats seizures. Brand Name(s): Lyrica   There may be other brand names for this medicine. When This Medicine Should Not Be Used: This medicine is not right for everyone. Do not use it if you had an allergic reaction to pregabalin. How to Use This Medicine:   Capsule, Liquid  · Take your medicine as directed. Your dose may need to be changed several times to find what works best for you. · Measure the oral liquid medicine with a marked measuring spoon, oral syringe, or medicine cup. · This medicine should come with a Medication Guide. Ask your pharmacist for a copy if you do not have one. · Missed dose: Take a dose as soon as you remember. If it is almost time for your next dose, wait until then and take a regular dose. Do not take extra medicine to make up for a missed dose. · Store the medicine in a closed container at room temperature, away from heat, moisture, and direct light. Drugs and Foods to Avoid:   Ask your doctor or pharmacist before using any other medicine, including over-the-counter medicines, vitamins, and herbal products. · Some medicines can affect how pregabalin works. Tell your doctor if you are using any of the following:   ¨ Diabetes medicine that you take by mouth (pioglitazone, rosiglitazone)  ¨ An ACE inhibitor (benazepril, enalapril, lisinopril, quinapril, ramipril)  · Tell your doctor if you use anything else that makes you sleepy. Some examples are allergy medicine, narcotic pain medicine, and alcohol. Warnings While Using This Medicine:   · Tell your doctor if you are pregnant or breastfeeding, or if you have kidney disease, heart failure, heart rhythm problems, angioedema, a bleeding disorder, or a low blood platelet count. Tell your doctor if you have a history of alcohol or drug abuse, depression, or other mood problems.   · This medicine may cause the following problems:   ¨ Serious allergic reactions  ¨ Suicidal thoughts  · This medicine may make you dizzy or drowsy. It may also cause blurry or double vision. Do not drive or do anything that could be dangerous until you know how this medicine affects you. · Do not stop using this medicine suddenly. Your doctor will need to slowly decrease your dose before you stop it completely. · Your doctor will check your progress and the effects of this medicine at regular visits. Keep all appointments. · Keep all medicine out of the reach of children. Never share your medicine with anyone. Possible Side Effects While Using This Medicine:   Call your doctor right away if you notice any of these side effects:  · Allergic reaction: Itching or hives, swelling in your face or hands, swelling or tingling in your mouth or throat, chest tightness, trouble breathing  · Blistering, peeling, red skin rash  · Blurry or double vision  · Fever, chills, cough, sore throat, and body aches  · Muscle pain, tenderness, or weakness, fever, or general ill feeling  · Rapid weight gain, swelling in your hands, ankles, or feet  · Severe dizziness or drowsiness  · Sudden mood changes, unusual thoughts or behavior such as extreme happiness or depression  · Suicidal thoughts  · Swelling in your throat, head, or neck  · Uneven heartbeat  · Unusual bleeding, bruising, or weakness  If you notice these less serious side effects, talk with your doctor:   · Confusion, trouble concentrating  · Constipation  · Dry mouth  If you notice other side effects that you think are caused by this medicine, tell your doctor. Call your doctor for medical advice about side effects. You may report side effects to FDA at 9-949-EMJ-1125  © 2017 [facility] Information is for End User's use only and may not be sold, redistributed or otherwise used for commercial purposes. The above information is an  only.  It is not intended as medical advice for individual conditions or treatments. Talk to your doctor, nurse or pharmacist before following any medical regimen to see if it is safe and effective for you. Oral Corticosteroids: Care Instructions  Your Care Instructions  Oral corticosteroids are commonly used medicines. They help calm down the body's response to inflammation. Oral means that they are taken by mouth. This is most often in the form of a pill. They are used for treating many conditions. You may take them for asthma, COPD, back pain, or allergic reactions. They are also used for other conditions such as autoimmune diseases and certain types of cancer. You may have side effects from taking this medicine. These include nausea, headache, dizziness, and anxiety. Pregnant women should not take this medicine unless their doctor tells them to. Follow your doctor's instructions on how to take this medicine. If you are taking it for 2 weeks or more, your doctor may give you special instructions to slowly reduce (taper) the amount you take. Slowly cutting down on the medicine over time helps your body adjust to the change. Follow-up care is a key part of your treatment and safety. Be sure to make and go to all appointments, and call your doctor if you are having problems. It's also a good idea to know your test results and keep a list of the medicines you take. How can you care for yourself at home? · Be safe with medicines. Take your medicines exactly as prescribed. Call your doctor if you think you are having a problem with your medicine. You will get more details on the specific medicines your doctor prescribes. · Take your medicine after a meal. It may cause nausea if you take it on an empty stomach. · Avoid taking nonsteroidal anti-inflammatory drugs (NSAIDs) while you are taking oral corticosteroids. Taking both of these medicines might cause an upset stomach. NSAIDs include ibuprofen (Advil, Motrin) and naproxen (Aleve).   · If you have a history of stomach ulcers, you may want to avoid taking this medicine and an NSAID at the same time. This can cause stomach upset or bleeding. · Follow your doctor's instructions for how to stop taking this medicine. You may need to taper it. This means the medicine should be slowly reduced. Do not stop taking the medicine all at once. When should you call for help? Call 911 if:  · You vomit blood or what looks like coffee grounds. Call your doctor now or seek immediate medical care if:  · Your symptoms are getting worse. · You are dizzy or lightheaded, or you feel like you may faint. · You have new or worse nausea or vomiting. · You have stomach pain that is getting worse. · Your stools are black. Watch closely for changes in your health, and be sure to contact your doctor if:  · You do not get better as expected. Where can you learn more? Go to http://reza-isauro.info/. Enter W212 in the search box to learn more about \"Oral Corticosteroids: Care Instructions. \"  Current as of: May 23, 2016  Content Version: 11.2  © 6176-0780 [employer]. Care instructions adapted under license by [id number] (which disclaims liability or warranty for this information). If you have questions about a medical condition or this instruction, always ask your healthcare professional. [address] any warranty or liability for your use of this information. Sciatica: Care Instructions  Your Care Instructions    Sciatica (say \"nnx-SG-jx-kuh\") is an irritation of one of the sciatic nerves, which come from the spinal cord in the lower back. The sciatic nerves and their branches extend down through the buttock to the foot. Sciatica can develop when an injured disc in the back presses against a spinal nerve root. Its main symptom is pain, numbness, or weakness that is often worse in the leg or foot than in the back.   Sciatica often will improve and go away with time. Early treatment usually includes medicines and exercises to relieve pain. Follow-up care is a key part of your treatment and safety. Be sure to make and go to all appointments, and call your doctor if you are having problems. It's also a good idea to know your test results and keep a list of the medicines you take. How can you care for yourself at home? · Take pain medicines exactly as directed. ¨ If the doctor gave you a prescription medicine for pain, take it as prescribed. ¨ If you are not taking a prescription pain medicine, ask your doctor if you can take an over-the-counter medicine. · Use heat or ice to relieve pain. ¨ To apply heat, put a warm water bottle, heating pad set on low, or warm cloth on your back. Do not go to sleep with a heating pad on your skin. ¨ To use ice, put ice or a cold pack on the area for 10 to 20 minutes at a time. Put a thin cloth between the ice and your skin. · Avoid sitting if possible, unless it feels better than standing. · Alternate lying down with short walks. Increase your walking distance as you are able to without making your symptoms worse. · Do not do anything that makes your symptoms worse. When should you call for help? Call 911 anytime you think you may need emergency care. For example, call if:  · You are unable to move a leg at all. Call your doctor now or seek immediate medical care if:  · You have new or worse symptoms in your legs or buttocks. Symptoms may include:  ¨ Numbness or tingling. ¨ Weakness. ¨ Pain. · You lose bladder or bowel control. Watch closely for changes in your health, and be sure to contact your doctor if:  · You are not getting better as expected. Where can you learn more? Go to http://reza-isauro.info/. Enter [phone] in the search box to learn more about \"Sciatica: Care Instructions. \"  Current as of: May 23, 2016  Content Version: 11.2  © 1536-0602 [employer].  Care instructions adapted under license by [employer] (which disclaims liability or warranty for this information). If you have questions about a medical condition or this instruction, always ask your healthcare professional. [address] any warranty or liability for your use of this information.

## 2017-06-09 NOTE — PROGRESS NOTES
[address]  [address], [address]  [place]  Phone: [phone]  Fax: [phone]  PROGRESS NOTE  Patient: Bridgte Rincon                MRN: [id number]       SSN: [id number]  YOB: 1945        AGE: 67 y.o. SEX: female  Body mass index is 38.78 kg/(m^2). PCP: Neno Machado. Lucero Roca MD  06/09/17    Chief Complaint   Patient presents with    Back Pain     more in the buttocks       HISTORY OF PRESENT ILLNESS:  Bridget Rincon is a 67 y.o.  female with history of low back and RLE and buttock pain. Pain is burning and throbbing and radiating to RLE from buttock to ankle in the L5/S1 distribution. Her pain is characteristic of sciatica. Pain is worse with sitting and standing and recreational activities . Pain is better with massage and lying down. She had a Right L5/S1 facet block on 4/19, which relieved her pain until about two weeks ago, just before leaving for vacation. Pain has flared up since she stopped the Neurontin and Topamax due to SEs and riding in a car for a long period for vacation. Denies any injury or falls. Reports that pain is the same location as it always been, but is increased. She also has Left knee pain and balance issues that she goes to PT for. Denies bladder/bowel dysfunction, saddle paresthesia, weakness, new gait disturbance, or other neurological deficit. States she has Tramadol and Flexeril at home from another provider. She has not tried taking this for her pain. Reports that OTC Motrin helps with her pain, but that it upsets her stomach. We will try Diclofenac patches instead. Pt at this time desires to  continue with current care/proceed with medication evaluation/proceed with evaluation of low back and RLE pain. Work Retired    Smoking Status Non smoker    ASSESSMENT   Sharif Hamilton was seen today for back pain.     Diagnoses and all orders for this visit:    Neuritis    Lumbar facet arthropathy ([address]    Sciatica of right side associated with disorder of lumbosacral spine    Other orders  -     pregabalin (LYRICA) 75 mg capsule; 1 cap bid x 1 week, then 2 caps bid  -     methylPREDNISolone (MEDROL, CAROL,) 4 mg tablet; Per dose pack instructions  -     diclofenac (FLECTOR) 1.3 % pt12; 1 Patch by TransDERmal route every twelve (12) hours every twelve (12) hours. IMPRESSION AND PLAN:.     1) Pt was given information on sciatica, Lyrica, and prednisone  2) Medrol dose pack, use heat and massage. 3) Trial of Lyrica and Diclofenac patches  4) Ms. Lasha Velasquez has a reminder for a \"due or due soon\" health maintenance. I have asked that she contact her primary care provider, Araceli Swartz MD, for follow-up on this health maintenance. 5)  demonstrated consistency with prescribing. 7) Pt will follow-up in 6-8 weeks. SUBJECTIVE  Pain Scale: 8/10    Pain Assessment  6/9/2017   Location of Pain Back;Buttocks   Severity of Pain 8   Quality of Pain Sharp;Burning   Duration of Pain -   Frequency of Pain Constant   Result of Injury No           REVIEW OF SYSTEMS  Constitutional: Negative for fever, chills, or weight change. Respiratory: Negative for cough or shortness of breath. Cardiovascular: Negative for chest pain or palpitations. Gastrointestinal: Negative for incontinence, acid reflux, change in bowel habits, or constipation. Genitourinary: Negative for incontinence, dysuria and flank pain. Musculoskeletal: Positive for low back and right knee pain. See HPI. Skin: Negative for rash. Neurological:RLE pain in L5/S1 distribution . See HPI. Endo/Heme/Allergies: Negative. Psychiatric/Behavioral: Negative. PHYSICAL EXAMINATION  Visit Vitals    /74    Pulse 82    Temp 98.2 °F (36.8 °C) (Oral)    Resp 16    Ht 5' 2\" (1.575 m)    Wt 212 lb (96.2 kg)    BMI 38.78 kg/m2         Accompanied by self. Constitutional:  Well developed, well nourished, in no acute distress. Psychiatric: Affect and mood are appropriate. Integumentary: No rashes or abrasions noted on exposed areas. Cardiovascular/Peripheral Vascular: +2 radial & pedal pulses. No peripheral edema is noted. Lymphatic:  No evidence of lymphedema. No cervical lymphadenopathy. SPINE/MUSCULOSKELETAL EXAM  k  Lumbar spine:  No rash, ecchymosis, or gross obliquity. No fasciculations. No focal atrophy is noted. Range of motion is decreased with flexion and extension. Tenderness to palpation right low back and sciatic notch . . SI joints non-tender. Trochanters non tender. Sensation grossly intact to light touch. Straight leg raise negative bilaterally    MOTOR:     Hip Flex Quads Hamstrings Ankle DF EHL Ankle PF   Right +4/5 +4/5 +4/5 +4/5 +4/5 +4/5   Left +4/5 +4/5 +4/5 +4/5 +4/5 +4/5         abnormal straight walking: antalgic, favors right    Ambulation with single point cane. FWB. PAST MEDICAL HISTORY   Past Medical History:   Diagnosis Date    Arthritis     Chronic pain     lower back, shoulders    Diabetes ([address]     Essential hypertension     GERD (gastroesophageal reflux disease)     Hiatal hernia     History of positive PPD, 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    1973-asymptomatic    Hypertension     Psychiatric disorder     Depression    Unspecified sleep apnea     Bipap       Past Surgical History:   Procedure Laterality Date    HX APPENDECTOMY      HX BLADDER SUSPENSION      4x    HX CHOLECYSTECTOMY      HX HEENT      Sx for droopy eyes    HX ORTHOPAEDIC  10/2013    RIGHT TKA    HX SEPTOPLASTY      HX TONSILLECTOMY      HX TUBAL LIGATION     . MEDICATIONS      Current Outpatient Prescriptions   Medication Sig Dispense Refill    cyclobenzaprine (FLEXERIL) 10 mg tablet Take  by mouth three (3) times daily as needed for Muscle Spasm(s).       pregabalin (LYRICA) 75 mg capsule 1 cap bid x 1 week, then 2 caps bid 120 Cap 0    methylPREDNISolone (MEDROL, CAROL,) 4 mg tablet Per dose pack instructions 1 Dose Pack 0    diclofenac (FLECTOR) 1.3 % pt12 1 Patch by TransDERmal route every twelve (12) hours every twelve (12) hours. 30 Patch 0    traMADol (ULTRAM) 50 mg tablet Take 50 mg by mouth every six (6) hours as needed for Pain.  amlodipine (NORVASC) 10 mg tablet Take  by mouth nightly. Indications: HYPERTENSION      metoprolol (LOPRESSOR) 50 mg tablet Take 50 mg by mouth nightly. Indications: HYPERTENSION      lisinopril (PRINIVIL, ZESTRIL) 40 mg tablet Take 40 mg by mouth nightly. Indications: HYPERTENSION      chlorthalidone (HYGROTEN) 25 mg tablet Take  by mouth nightly. Indications: HYPERTENSION      mometasone (NASONEX) 50 mcg/actuation nasal spray 2 sprays two (2) times a day.  FLAXSEED PO Take  by mouth two (2) times a day.  glyBURIDE-metFORMIN (GLUCOVANCE) 5-500 mg per tablet Take 2 Tabs by mouth two (2) times daily (with meals).  Armodafinil (NUVIGIL) 250 mg Tab Take 150 mg by mouth daily. Indications: NARCOLEPSY SYNDROME      aspirin delayed-release 81 mg tablet Take  by mouth daily.  atorvastatin (LIPITOR) 40 mg tablet Take  by mouth nightly.  ARIPiprazole (ABILIFY) 5 mg tablet Take 2.5 mg by mouth nightly.  sertraline (ZOLOFT) 100 mg tablet Take 200 mg by mouth daily. ALLERGIES    Allergies   Allergen Reactions    Other Medication Other (comments)     SEASONAL ALLERGIES          SOCIAL HISTORY    Social History     Social History    Marital status: UNKNOWN     Spouse name: N/A    Number of children: N/A    Years of education: N/A     Occupational History    Not on file.      Social History Main Topics    Smoking status: Never Smoker    Smokeless tobacco: Never Used    Alcohol use Yes      Comment: socially    Drug use: No    Sexual activity: Not on file     Other Topics Concern    Not on file     Social History Narrative     Social History Narrative      Problem Relation Age of Onset    Cancer Father 61     lung Ana Miles, NP

## 2017-06-09 NOTE — MR AVS SNAPSHOT
Visit Information Date & Time Provider Department Dept. Phone Encounter #  
 6/9/2017 10:30 AM Sary Lizama NP [facility] [phone] [id number] Follow-up Instructions Return in about 6 weeks (around 7/21/2017). Your Appointments 7/13/2017  8:30 AM  
Follow Up with Claudette Barnes MD  
[facility] [facility]) Appt Note: SAM FU; Zechariah Norwood 4/19/17; .  
 [address] [facility] [address]  
[phone]  
  
   
 [address] [facility] [address] Upcoming Health Maintenance Date Due  
 LIPID PANEL Q1 1945 FOOT EXAM Q1 1/2/1954 MICROALBUMIN Q1 1/2/1954 EYE EXAM RETINAL OR DILATED Q1 1/2/1954 DTaP/Tdap/Td series (1 - Tdap) 1/2/1965 FOBT Q 1 YEAR AGE 50-75 1/2/1994 ZOSTER VACCINE AGE 60> 1/2/2004 GLAUCOMA SCREENING Q2Y 1/2/2009 Pneumococcal 65+ Low/Medium Risk (1 of 2 - PCV13) 1/2/2009 MEDICARE YEARLY EXAM 1/2/2009 HEMOGLOBIN A1C Q6M 5/11/2015 INFLUENZA AGE 9 TO ADULT 8/1/2017 BREAST CANCER SCRN MAMMOGRAM 11/4/2018 Allergies as of 6/9/2017  Review Complete On: 6/9/2017 By: Sary Lizama NP Severity Noted Reaction Type Reactions Other Medication  01/21/2013    Other (comments) SEASONAL ALLERGIES Current Immunizations  Never Reviewed No immunizations on file. Not reviewed this visit You Were Diagnosed With   
  
 Codes Comments Neuritis    -  Primary ICD-10-CM: M79.2 ICD-9-CM: 729.2 Lumbar facet arthropathy (HCC)     ICD-10-CM: M12.88 ICD-9-CM: 721.3 Vitals BP Pulse Temp Resp Height(growth percentile) Weight(growth percentile) 170/74 82 98.2 °F (36.8 °C) (Oral) 16 5' 2\" (1.575 m) 212 lb (96.2 kg) BMI OB Status Smoking Status 38.78 kg/m2 Menopause Never Smoker BMI and BSA Data Body Mass Index Body Surface Area  38.78 kg/m 2 2.05 m 2  
 Preferred Pharmacy Pharmacy Name Phone [facility], [address] [phone] Your Updated Medication List  
  
   
This list is accurate as of: 6/9/17 11:26 AM.  Always use your most recent med list.  
  
  
  
  
 ABILIFY 5 mg tablet Generic drug:  ARIPiprazole Take 2.5 mg by mouth nightly. amLODIPine 10 mg tablet Commonly known as:  Geeta Credit Take  by mouth nightly. Indications: HYPERTENSION  
  
 aspirin delayed-release 81 mg tablet Take  by mouth daily. chlorthalidone 25 mg tablet Commonly known as:  Jonita Ruder Take  by mouth nightly. Indications: HYPERTENSION  
  
 cyclobenzaprine 10 mg tablet Commonly known as:  FLEXERIL Take  by mouth three (3) times daily as needed for Muscle Spasm(s). diclofenac 1.3 % Pt12 Commonly known as:  FLECTOR  
1 Patch by TransDERmal route every twelve (12) hours every twelve (12) hours. FLAXSEED PO Take  by mouth two (2) times a day. glyBURIDE-metFORMIN 5-500 mg per tablet Commonly known as:  Karyn Croissant Take 2 Tabs by mouth two (2) times daily (with meals). LIPITOR 40 mg tablet Generic drug:  atorvastatin Take  by mouth nightly. lisinopril 40 mg tablet Commonly known as:  Cuba Bold Take 40 mg by mouth nightly. Indications: HYPERTENSION  
  
 methylPREDNISolone 4 mg tablet Commonly known as:  MEDROL (CAROL) Per dose pack instructions  
  
 metoprolol tartrate 50 mg tablet Commonly known as:  LOPRESSOR Take 50 mg by mouth nightly. Indications: HYPERTENSION  
  
 NASONEX 50 mcg/actuation nasal spray Generic drug:  mometasone 2 sprays two (2) times a day. NUVIGIL 250 mg Tab tablet Generic drug:  Armodafinil Take 150 mg by mouth daily. Indications: NARCOLEPSY SYNDROME pregabalin 75 mg capsule Commonly known as:  LYRICA  
1 cap bid x 1 week, then 2 caps bid  
  
 traMADol 50 mg tablet Commonly known as:  Jacky Menon  
 Take 50 mg by mouth every six (6) hours as needed for Pain. ZOLOFT 100 mg tablet Generic drug:  sertraline Take 200 mg by mouth daily. Prescriptions Printed Refills  
 pregabalin (LYRICA) 75 mg capsule 0 Si cap bid x 1 week, then 2 caps bid Class: Print  
 methylPREDNISolone (MEDROL, CAROL,) 4 mg tablet 0 Sig: Per dose pack instructions Class: Print Prescriptions Sent to Pharmacy Refills  
 diclofenac (FLECTOR) 1.3 % pt12 0 Si Patch by TransDERmal route every twelve (12) hours every twelve (12) hours. Class: Normal  
 Pharmacy: [facility] [address] [place] [address], [address] [place] [address] #: [phone] Route: TransDERmal  
  
Follow-up Instructions Return in about 6 weeks (around 2017). To-Do List   
 2017 9:30 AM  
  Appointment with Lucero Vergara PT at [facility] [address] ([phone])  
  
 06/15/2017 9:30 AM  
  Appointment with Lucero Vergara PT at [facility] PT [address] ([phone])  
  
 2017 9:30 AM  
  Appointment with Lucero Vergara PT at [facility] PT [address] ([phone])  
  
 2017 9:30 AM  
  Appointment with Lucero Vergara PT at [facility] PT [address] ([phone])  
  
 2017 9:00 AM  
  Appointment with Lucero Vergara PT at [facility] PT [address] ([phone])  
  
 2017 9:00 AM  
  Appointment with Lucero Vergara PT at [facility] PT [address] ([phone])  
  
 2017 9:30 AM  
  Appointment with Gal Grey PT at [facility] PT [address] ([phone])  
  
 2017 9:00 AM  
  Appointment with Lucero Vergara PT at [address] [place] [address] ([phone]) Patient Instructions Pregabalin (By mouth) Pregabalin (pre-GA-ba-odalys) Treats nerve and muscle pain, including fibromyalgia. Also treats seizures. Brand Name(s): Lyrica There may be other brand names for this medicine. When This Medicine Should Not Be Used: This medicine is not right for everyone. Do not use it if you had an allergic reaction to pregabalin. How to Use This Medicine:  
Capsule, Liquid · Take your medicine as directed. Your dose may need to be changed several times to find what works best for you. · Measure the oral liquid medicine with a marked measuring spoon, oral syringe, or medicine cup. · This medicine should come with a Medication Guide. Ask your pharmacist for a copy if you do not have one. · Missed dose: Take a dose as soon as you remember. If it is almost time for your next dose, wait until then and take a regular dose. Do not take extra medicine to make up for a missed dose. · Store the medicine in a closed container at room temperature, away from heat, moisture, and direct light. Drugs and Foods to Avoid: Ask your doctor or pharmacist before using any other medicine, including over-the-counter medicines, vitamins, and herbal products. · Some medicines can affect how pregabalin works. Tell your doctor if you are using any of the following: ¨ Diabetes medicine that you take by mouth (pioglitazone, rosiglitazone) ¨ An ACE inhibitor (benazepril, enalapril, lisinopril, quinapril, ramipril) · Tell your doctor if you use anything else that makes you sleepy. Some examples are allergy medicine, narcotic pain medicine, and alcohol. Warnings While Using This Medicine: · Tell your doctor if you are pregnant or breastfeeding, or if you have kidney disease, heart failure, heart rhythm problems, angioedema, a bleeding disorder, or a low blood platelet count. Tell your doctor if you have a history of alcohol or drug abuse, depression, or other mood problems. · This medicine may cause the following problems:  
¨ Serious allergic reactions ¨ Suicidal thoughts · This medicine may make you dizzy or drowsy. It may also cause blurry or double vision.  Do not drive or do anything that could be dangerous until you know how this medicine affects you. · Do not stop using this medicine suddenly. Your doctor will need to slowly decrease your dose before you stop it completely. · Your doctor will check your progress and the effects of this medicine at regular visits. Keep all appointments. · Keep all medicine out of the reach of children. Never share your medicine with anyone. Possible Side Effects While Using This Medicine:  
Call your doctor right away if you notice any of these side effects: · Allergic reaction: Itching or hives, swelling in your face or hands, swelling or tingling in your mouth or throat, chest tightness, trouble breathing · Blistering, peeling, red skin rash · Blurry or double vision · Fever, chills, cough, sore throat, and body aches · Muscle pain, tenderness, or weakness, fever, or general ill feeling · Rapid weight gain, swelling in your hands, ankles, or feet · Severe dizziness or drowsiness · Sudden mood changes, unusual thoughts or behavior such as extreme happiness or depression · Suicidal thoughts · Swelling in your throat, head, or neck · Uneven heartbeat · Unusual bleeding, bruising, or weakness If you notice these less serious side effects, talk with your doctor: · Confusion, trouble concentrating · Constipation · Dry mouth If you notice other side effects that you think are caused by this medicine, tell your doctor. Call your doctor for medical advice about side effects. You may report side effects to FDA at 6-952-FDA-0674 © 2017 [address]  Information is for End User's use only and may not be sold, redistributed or otherwise used for commercial purposes. The above information is an  only. It is not intended as medical advice for individual conditions or treatments. Talk to your doctor, nurse or pharmacist before following any medical regimen to see if it is safe and effective for you. Oral Corticosteroids: Care Instructions Your Care Instructions Oral corticosteroids are commonly used medicines. They help calm down the body's response to inflammation. Oral means that they are taken by mouth. This is most often in the form of a pill. They are used for treating many conditions. You may take them for asthma, COPD, back pain, or allergic reactions. They are also used for other conditions such as autoimmune diseases and certain types of cancer. You may have side effects from taking this medicine. These include nausea, headache, dizziness, and anxiety. Pregnant women should not take this medicine unless their doctor tells them to. Follow your doctor's instructions on how to take this medicine. If you are taking it for 2 weeks or more, your doctor may give you special instructions to slowly reduce (taper) the amount you take. Slowly cutting down on the medicine over time helps your body adjust to the change. Follow-up care is a key part of your treatment and safety. Be sure to make and go to all appointments, and call your doctor if you are having problems. It's also a good idea to know your test results and keep a list of the medicines you take. How can you care for yourself at home? · Be safe with medicines. Take your medicines exactly as prescribed. Call your doctor if you think you are having a problem with your medicine. You will get more details on the specific medicines your doctor prescribes. · Take your medicine after a meal. It may cause nausea if you take it on an empty stomach. · Avoid taking nonsteroidal anti-inflammatory drugs (NSAIDs) while you are taking oral corticosteroids. Taking both of these medicines might cause an upset stomach. NSAIDs include ibuprofen (Advil, Motrin) and naproxen (Aleve). · If you have a history of stomach ulcers, you may want to avoid taking this medicine and an NSAID at the same time. This can cause stomach upset or bleeding. · Follow your doctor's instructions for how to stop taking this medicine. You may need to taper it. This means the medicine should be slowly reduced. Do not stop taking the medicine all at once. When should you call for help? Call 911 if: 
· You vomit blood or what looks like coffee grounds. Call your doctor now or seek immediate medical care if: 
· Your symptoms are getting worse. · You are dizzy or lightheaded, or you feel like you may faint. · You have new or worse nausea or vomiting. · You have stomach pain that is getting worse. · Your stools are black. Watch closely for changes in your health, and be sure to contact your doctor if: 
· You do not get better as expected. Where can you learn more? Go to http://reza-isauro.info/. Enter D122 in the search box to learn more about \"Oral Corticosteroids: Care Instructions. \" Current as of: May 23, 2016 Content Version: 11.2 © 6782-1268 [employer]. Care instructions adapted under license by [employer] (which disclaims liability or warranty for this information). If you have questions about a medical condition or this instruction, always ask your healthcare professional. [place] [address] any warranty or liability for your use of this information. Sciatica: Care Instructions Your Care Instructions Sciatica (say \"oqi-EW-us-kuh\") is an irritation of one of the sciatic nerves, which come from the spinal cord in the lower back. The sciatic nerves and their branches extend down through the buttock to the foot. Sciatica can develop when an injured disc in the back presses against a spinal nerve root. Its main symptom is pain, numbness, or weakness that is often worse in the leg or foot than in the back. Sciatica often will improve and go away with time. Early treatment usually includes medicines and exercises to relieve pain. Follow-up care is a key part of your treatment and safety. Be sure to make and go to all appointments, and call your doctor if you are having problems. It's also a good idea to know your test results and keep a list of the medicines you take. How can you care for yourself at home? · Take pain medicines exactly as directed. ¨ If the doctor gave you a prescription medicine for pain, take it as prescribed. ¨ If you are not taking a prescription pain medicine, ask your doctor if you can take an over-the-counter medicine. · Use heat or ice to relieve pain. ¨ To apply heat, put a warm water bottle, heating pad set on low, or warm cloth on your back. Do not go to sleep with a heating pad on your skin. ¨ To use ice, put ice or a cold pack on the area for 10 to 20 minutes at a time. Put a thin cloth between the ice and your skin. · Avoid sitting if possible, unless it feels better than standing. · Alternate lying down with short walks. Increase your walking distance as you are able to without making your symptoms worse. · Do not do anything that makes your symptoms worse. When should you call for help? Call 911 anytime you think you may need emergency care. For example, call if: 
· You are unable to move a leg at all. Call your doctor now or seek immediate medical care if: 
· You have new or worse symptoms in your legs or buttocks. Symptoms may include: ¨ Numbness or tingling. ¨ Weakness. ¨ Pain. · You lose bladder or bowel control. Watch closely for changes in your health, and be sure to contact your doctor if: 
· You are not getting better as expected. Where can you learn more? Go to http://reza-isauro.info/. Enter [phone] in the search box to learn more about \"Sciatica: Care Instructions. \" Current as of: May 23, 2016 Content Version: 11.2 © 1428-8294 [employer], [employer].  Care instructions adapted under license by [employer] (which disclaims liability or warranty for this information). If you have questions about a medical condition or this instruction, always ask your healthcare professional. [address] any warranty or liability for your use of this information. Introducing [facility]! Noelle Antonio introduces BellaDati patient portal. Now you can access parts of your medical record, email your doctor's office, and request medication refills online. 1. In your internet browser, go to [email or web address] 2. Click on the First Time User? Click Here link in the Sign In box. You will see the New Member Sign Up page. 3. Enter your BellaDati Access Code exactly as it appears below. You will not need to use this code after youve completed the sign-up process. If you do not sign up before the expiration date, you must request a new code. · BellaDati Access Code: [email or web address] Expires: 8/13/2017  9:07 AM 
 
4. Enter the last four digits of your Social Security Number (xxxx) and Date of Birth (mm/dd/yyyy) as indicated and click Submit. You will be taken to the next sign-up page. 5. Create a BellaDati ID. This will be your BellaDati login ID and cannot be changed, so think of one that is secure and easy to remember. 6. Create a BellaDati password. You can change your password at any time. 7. Enter your Password Reset Question and Answer. This can be used at a later time if you forget your password. 8. Enter your e-mail address. You will receive e-mail notification when new information is available in [address] 9. Click Sign Up. You can now view and download portions of your medical record. 10. Click the Download Summary menu link to download a portable copy of your medical information. If you have questions, please visit the Frequently Asked Questions section of the BellaDati website. Remember, BellaDati is NOT to be used for urgent needs. For medical emergencies, dial 911. Now available from your iPhone and Android! Please provide this summary of care documentation to your next provider. Your primary care clinician is listed as Ena Owens. If you have any questions after today's visit, please call [phone].

## 2017-06-12 ENCOUNTER — HOSPITAL ENCOUNTER (OUTPATIENT)
Dept: PHYSICAL THERAPY | Age: 72
Discharge: HOME | End: 2017-06-12
Payer: MEDICARE

## 2017-06-12 PROCEDURE — 97110 THERAPEUTIC EXERCISES: CPT

## 2017-06-12 PROCEDURE — 97530 THERAPEUTIC ACTIVITIES: CPT

## 2017-06-12 NOTE — PROGRESS NOTES
PT DAILY TREATMENT NOTE - [facility]     Patient Name: Pamella Gottron  Date:2017  : 1945  [x]  Patient  Verified  Payor: Giulia Host / Plan: [place] MEDICARE PART A & B / Product Type: Medicare /    In time:930  Out time:1006  Total Treatment Time (min): 36  Total Timed Codes (min): 36  1:1 Treatment Time ( [address] only): 36   Visit #: 5 of 12    Treatment Area: Unsteadiness on feet [R26.81]    SUBJECTIVE  Pain Level (0-10 scale): 0/10  Any medication changes, allergies to medications, adverse drug reactions, diagnosis change, or new procedure performed?: [x] No    [] Yes (see summary sheet for update)  Subjective functional status/changes:   [] No changes reported  Pt reports 2 weeks ago she tapered her self off Neuropathy meds and now has a flare up of Sciatica. Pt reports she is now on oral steroids since Saturday. Shew is also a little dizzy. OBJECTIVE      16 min Therapeutic Exercise:  [] See flow sheet :   Rationale: increase ROM, increase strength, improve coordination, improve balance and increase proprioception to improve the patients ability to ease with ADL's    20 min Therapeutic Activity:  []  See flow sheet :   Rationale: improve coordination, improve balance and increase proprioception  to improve the patients ability to ease with ambulation           With   [] TE   [] TA   [] neuro   [] other: Patient Education: [x] Review HEP    [] Progressed/Changed HEP based on:   [] positioning   [] body mechanics   [] transfers   [] heat/ice application    [] other:      Other Objective/Functional Measures: Kept 3# on ankle for duration of session. Dynamic gait with cues to keep feet apart while ambulating backward with SBA  There was no LOB today. Step up/down from unstable surfaces w/o LOB. Pain Level (0-10 scale) post treatment: 0/10    ASSESSMENT/Changes in Function: Progressing well. Pt reports she will be going to aquatic therapy at noon today.  She continues to progress w/o complaints of sciatica. Patient will continue to benefit from skilled PT services to modify and progress therapeutic interventions, address functional mobility deficits, address ROM deficits, address strength deficits, analyze and address soft tissue restrictions, analyze and cue movement patterns, analyze and modify body mechanics/ergonomics, assess and modify postural abnormalities and address imbalance/dizziness to attain remaining goals. []  See Plan of Care  []  See progress note/recertification  []  See Discharge Summary         Progress towards goals / Updated goals: 1. Pt will increase FOTO score by 21 pts to improve function. 2.Pt will increase SOT by 3-5 pts to improve balance. 3.Pt will continue to report no falls. MET. 5/24/17  4. Pt will perform Romberg on a compliant surface EC w/o LOB >1 min to improve static stability. Progressing . 5/31/17  5. Pt will perform dynamic activities in the clinic including obstacle course, to improve with asfety during ambulation.   Met 6/12/17    PLAN  [x]  Upgrade activities as tolerated     [x]  Continue plan of care  []  Update interventions per flow sheet       []  Discharge due to:_  []  Other:_      Ingrid Boothe PT 6/12/2017  9:56 AM    Future Appointments  Date Time Provider Yojana Cintron   6/15/2017 9:30 AM Ingrid Boothe PT [id number] [facility]   6/19/2017 9:30 AM Masha Davies, PT [id number] [facility]   6/21/2017 9:30 AM Masha Davies, PT [id number] [facility]   6/26/2017 9:00 AM Masha Davies, PT [id number] [facility]   6/28/2017 9:00 AM Masha Davies, PT [id number] [facility]   7/3/2017 9:30 AM Christiano Morse, PT [id number] [facility]   7/5/2017 9:00 AM Ingrid Boothe PT [id number] [facility]   7/13/2017 8:30 AM Fredo Naylor  [address]

## 2017-06-15 ENCOUNTER — HOSPITAL ENCOUNTER (OUTPATIENT)
Dept: PHYSICAL THERAPY | Age: 72
Discharge: HOME | End: 2017-06-15
Payer: MEDICARE

## 2017-06-15 PROCEDURE — G8979 MOBILITY GOAL STATUS: HCPCS

## 2017-06-15 PROCEDURE — G8978 MOBILITY CURRENT STATUS: HCPCS

## 2017-06-15 PROCEDURE — 97530 THERAPEUTIC ACTIVITIES: CPT

## 2017-06-15 PROCEDURE — 97110 THERAPEUTIC EXERCISES: CPT

## 2017-06-15 NOTE — PROGRESS NOTES
PT DAILY TREATMENT NOTE - [facility]     Patient Name: Carlos Gordillo  Date:6/15/2017  : 1945  [x]  Patient  Verified  Payor: Dasia Car / Plan: [place] MED[place] & B / Product Type: Medicare /    In time:927  Out time:1008  Total Treatment Time (min): 41  Total Timed Codes (min): 41  1:1 Treatment Time ([address] only): 23   Visit #: 6 of 12    Treatment Area: Unsteadiness on feet [R26.81]    SUBJECTIVE  Pain Level (0-10 scale): 0/10  Any medication changes, allergies to medications, adverse drug reactions, diagnosis change, or new procedure performed?: [x] No    [] Yes (see summary sheet for update)  Subjective functional status/changes:   [] No changes reported  Reports she is doing much better today. Taking lyrica and feeling better. Also wearing a back brace and helping with LS sx. OBJECTIVE    20 min Therapeutic Exercise:  [] See flow sheet :   Rationale: increase ROM and increase strength to improve the patients ability to ease with ADL's    21 min Therapeutic Activity:  []  See flow sheet :   Rationale: increase ROM, increase strength, improve coordination, improve balance and increase proprioception  to improve the patients ability to ease with ADL's          With   [] TE   [] TA   [] neuro   [] other: Patient Education: [x] Review HEP    [] Progressed/Changed HEP based on:   [] positioning   [] body mechanics   [] transfers   [] heat/ice application    [] other:      Other Objective/Functional Measures: FOTO= 67  SOT=67  All sensory input WNL  Pain Level (0-10 scale) post treatment: 0/10    ASSESSMENT/Changes in Function: See progress note. Patient will continue to benefit from skilled PT services to modify and progress therapeutic interventions, analyze and cue movement patterns, analyze and modify body mechanics/ergonomics, assess and modify postural abnormalities and address imbalance/dizziness to attain remaining goals.      []  See Plan of Care  []  See progress note/recertification  [] See Discharge Summary         Progress towards goals / Updated goals:  See PN    PLAN  []  Upgrade activities as tolerated     [x]  Continue plan of care  []  Update interventions per flow sheet       []  Discharge due to:_  []  Other:_      Sofia Dowling, PT 6/15/2017  9:56 AM    Future Appointments  Date Time Provider Yojana Cintron   6/19/2017 9:30 AM Sofia Dowling, PT IVGEYTM [facility]   6/21/2017 9:30 AM Masha Seay, PT HECEGQJ [facility]   6/26/2017 9:00 AM Masha Seay, PT MMCPTPB [facility]   6/28/2017 9:00 AM Sofia Dowling, PT MMCPTPB [facility]   7/3/2017 9:30 AM Radha Frias, PT MMCPTPB [facility]   7/5/2017 9:00 AM Sofia Dowling, PT MMCPTPB [facility]   7/13/2017 8:30 AM Miriam Rivera  [address]

## 2017-06-19 ENCOUNTER — HOSPITAL ENCOUNTER (OUTPATIENT)
Dept: PHYSICAL THERAPY | Age: 72
Discharge: HOME | End: 2017-06-19
Payer: MEDICARE

## 2017-06-19 PROCEDURE — 97110 THERAPEUTIC EXERCISES: CPT

## 2017-06-19 NOTE — PROGRESS NOTES
[facility]  [address] [place] [address]  [phone] fax    Continued Plan of Care/ Re-certification for Physical Therapy Services    Patient name: Flakita Steven Start of Care: 17   Referral source: Kenton HerediaSamm holderma : 1945   Medical/Treatment Diagnosis: Unsteadiness on feet [R26.81] Onset Date:>2 yrs     Prior Hospitalization: see medical history Provider#: [id number]   Medications: Verified on Patient Summary List    Comorbidities: HTN, Diabetes, Asthma, Depression, Arthritis, Scoliosis. Prior Level of Function:Lives with  in 1 story house. Paint portraits as hobby. Attend Aquatic Pool program at the [facility] 2x/week. Previous R TKR. Visits from Start of Care: 7    Missed Visits: 0    The Plan of Care and following information is based on the patient's current status:  Goal:Lives with  in 1 story house. Paint portraits as hobby. Attend Aquatic Pool program at the [facility] 2x/week. Previous R TKR. Status at last note/certification:  Current Status: met    Goal:Pt will report no falls. Status at last note/certification:  Current Status: met    Goal:. Pt will perform SOT to determine Sensory Input  Status at last note/certification:  Current Status: met    Goal:. Pt will perform Romberg on a compliant surface EC w/o LOB >1 min to improve static stability. Status at last note/certification:  Current Status: Progressing    Pt will Ambulate outdoors w/o LOB on curbs, grass etc w/o LOB to improve community ambulation function at D/C  Progressing with outdoor ambulation. Key functional changes: Pt progressing well in therapy. She demonstrates fair compensatory strategies so far. She had a SOT Composite score of 57 which is below age-related norms, with greatest deficits evident with vestibular system. Pt demonstrates increased weight-shift to RLE during static stance d/t L knee pain. She has reported no falls.   She ambulated on curbs outdoors w/o AD. Pt compliant with attendance and with her HEP. She is working on maintaining COG over her ESTELA. Problems/ barriers to goal attainment: L/S Pain, Left knee Pain. Problem List: pain affecting function, decrease ROM, decrease strength, edema affecting function, impaired gait/ balance, decrease ADL/ functional abilitiies, decrease activity tolerance, decrease flexibility/ joint mobility and decrease transfer abilities    Treatment Plan: Therapeutic exercise, Therapeutic activities, Neuromuscular re-education, Physical agent/modality, Gait/balance training, Manual therapy, Patient education, Self Care training, Functional mobility training, Home safety training and Stair training     Patient Goal (s) has been updated and includes: decrease fall risk     Goals for this certification period to be accomplished in 4 weeks: 1. Pt will increase FOTO score by 21 pts to improve function. 2. Pt will be able to transfer from  Floor to bed/ chair independently incase of a fall. 3.Pt will report no falls. 4.Pt will perform Romberg on a compliant surface EC w/o LOB >1 min to improve static stability. 5.Pt will Ambulate outdoors w/o LOB on curbs, grass etc w/o LOB to improve community ambulation function at D/C    Frequency / Duration: Patient to be seen 2-3 times per week for 4 weeks:    Assessment / Recommendations:   Patient will continue to benefit from skilled PT services to modify and progress therapeutic interventions, address functional mobility deficits, address ROM deficits, address strength deficits, analyze and address soft tissue restrictions, analyze and cue movement patterns, analyze and modify body mechanics/ergonomics, assess and modify postural abnormalities and address imbalance/dizziness to attain remaining goals. G-Codes (GP)  Mobility  [id number] Current  CK= 40-59%  [id number] Goal  CJ= 20-39%  The severity rating is based on clinical judgment and the FOTO score.     Certification Period: 6/19/17-9/18/17    Masha Shaw, PT 6/19/2017 11:44 PM    ________________________________________________________________________  I certify that the above Therapy Services are being furnished while the patient is under my care. I agree with the treatment plan and certify that this therapy is necessary. [] I have read the above and request that my patient continue as recommended.   [] I have read the above report and request that my patient continue therapy with the following changes/special instructions: _______________________________________  [] I have read the above report and request that my patient be discharged from therapy    Physician's Signature:________________________________Date:___________Time:__________    Please sign and return to [facility] [employer] [facility]  [address] [place] [address]  [phone] fax

## 2017-06-21 ENCOUNTER — HOSPITAL ENCOUNTER (OUTPATIENT)
Dept: PHYSICAL THERAPY | Age: 72
Discharge: HOME | End: 2017-06-21
Payer: MEDICARE

## 2017-06-21 PROCEDURE — 97110 THERAPEUTIC EXERCISES: CPT

## 2017-06-21 PROCEDURE — 97530 THERAPEUTIC ACTIVITIES: CPT

## 2017-06-21 NOTE — PROGRESS NOTES
PT DAILY TREATMENT NOTE - [facility]     Patient Name: Wilbert Sosa  Date:2017  : 1945  [x]  Patient  Verified  Payor: Kym Martínez / Plan: [place] MEDICARE PART A & B / Product Type: Medicare /    In time:930  Out time:1000  Total Treatment Time (min): 30  Total Timed Codes (min): 30  1:1 Treatment Time ([address] only): 30   Visit #: 8 of 12    Treatment Area: Unsteadiness on feet [R26.81]    SUBJECTIVE  Pain Level (0-10 scale): 5/10 LS and Left knee  Any medication changes, allergies to medications, adverse drug reactions, diagnosis change, or new procedure performed?: [x] No    [] Yes (see summary sheet for update)  Subjective functional status/changes:   [] No changes reported  Doing great. Walk the dog every afternoon. OBJECTIVE      30 min Therapeutic Exercise:  [] See flow sheet :   Rationale: increase ROM, increase strength and improve coordination to improve the patients ability to ease with ADL's    30 min Therapeutic Activity:  []  See flow sheet :   Rationale: increase ROM, improve balance and increase proprioception  to improve the patients ability to ease with ADL's. With   [] TE   [] TA   [] neuro   [] other: Patient Education: [x] Review HEP    [] Progressed/Changed HEP based on:   [] positioning   [] body mechanics   [] transfers   [] heat/ice application    [] other:      Other Objective/Functional Measures: Pt is dynamically stable except when fatigued. She is more aware of picking up her feet for clearance. Pain Level (0-10 scale) post treatment: 5/10    ASSESSMENT/Changes in Function: Progressing well. Ambulated steps without HR but was unstable. Pt required to hold HR for safety reasons.     Patient will continue to benefit from skilled PT services to modify and progress therapeutic interventions, address functional mobility deficits, address ROM deficits, address strength deficits, analyze and address soft tissue restrictions, analyze and cue movement patterns, analyze and modify body mechanics/ergonomics, assess and modify postural abnormalities and address imbalance/dizziness to attain remaining goals. []  See Plan of Care  []  See progress note/recertification  []  See Discharge Summary         Progress towards goals / Updated goals: 1. Pt will increase FOTO score by 21 pts to improve function. 2.Pt will increase SOT by 3-5 pts to improve balance. MET/6/21/17  3. Pt will continue to report no falls. MET. 5/24/17  4. Pt will perform Romberg on a compliant surface EC w/o LOB >1 min to improve static stability. Met:( 6/19/17)  5. Pt will perform dynamic activities in the clinic including obstacle course, to improve with asfety during ambulation.   Met 6/12/17  PLAN  []  Upgrade activities as tolerated     [x]  Continue plan of care  []  Update interventions per flow sheet       []  Discharge due to:_  []  Other:_      Millicent Coffman, PT 6/21/2017  3:03 PM    Future Appointments  Date Time Provider Yojana Cintron   6/26/2017 9:00 AM [facility] [address] [facility]   6/28/2017 9:00 AM Masha Monique, PT MMCPTPB [facility]   7/3/2017 9:30 AM Sabine Dawn, PT MMCPTPB [facility]   7/5/2017 9:00 AM Millicent Coffman, PT MMCPTPB [facility]   7/13/2017 8:30 AM Claudette Barnes  [address]

## 2017-06-26 ENCOUNTER — HOSPITAL ENCOUNTER (OUTPATIENT)
Dept: PHYSICAL THERAPY | Age: 72
Discharge: HOME | End: 2017-06-26
Payer: MEDICARE

## 2017-06-26 PROCEDURE — 97110 THERAPEUTIC EXERCISES: CPT

## 2017-06-26 PROCEDURE — 97530 THERAPEUTIC ACTIVITIES: CPT

## 2017-06-26 NOTE — PROGRESS NOTES
PT DAILY TREATMENT NOTE - [facility]     Patient Name: Audrey Ambriz  Date:2017  : 1945  [x]  Patient  Verified  Payor: Suzy Figueroa / Plan: [place] MEDICARE PART A & B / Product Type: Medicare /    In time:9:13  Out time:9:40  Total Treatment Time (min): 27  Total Timed Codes (min): 27  1:1 Treatment Time ( [address] only): 25   Visit #: 9 of 12    Treatment Area: Unsteadiness on feet [R26.81]    SUBJECTIVE  Pain Level (0-10 scale): 0/10  Any medication changes, allergies to medications, adverse drug reactions, diagnosis change, or new procedure performed?: [x] No    [] Yes (see summary sheet for update)  Subjective functional status/changes:   [] No changes reported  Pt reported no dizziness, no falling, able to go up & down stair. \"My legs are just weak. \"  OBJECTIVE      15 min Therapeutic Exercise:  [x] See flow sheet :   Rationale: increase ROM, increase strength, improve coordination and improve balance to improve the patients ability to amb safely. 10 min Therapeutic Activity:  [x]  See flow sheet : getting up from the floor    Rationale: increase strength, improve balance and increase proprioception  to improve the patients ability to safely getting up from floor if falling. With   [] TE   [] TA   [] neuro   [] other: Patient Education: [x] Review HEP    [x] Progressed/Changed HEP based on:   [] positioning   [x] body mechanics   [] transfers   [] heat/ice application    [] other:      Other Objective/Functional Measures: LOB ~40% during foam stepping exrecises      Pain Level (0-10 scale) post treatment: 0/10    ASSESSMENT/Changes in Function: Pt required min verbal cues to perform exercises controllingly to avoid compensation and LOB ; pt was able to get up from the floor after instruction with no LOB and no increased pain; able to navigate 4 steps of stair 3x using 2 rails but demonstrated increased L toes out and compensation with L hip External Rotation.  Recommend continue PT for LE strengthening, pt in agreement. Patient will continue to benefit from skilled PT services to modify and progress therapeutic interventions, address functional mobility deficits, address ROM deficits, address strength deficits, analyze and address soft tissue restrictions, analyze and cue movement patterns, analyze and modify body mechanics/ergonomics, assess and modify postural abnormalities, address imbalance/dizziness and instruct in home and community integration to attain remaining goals. [x]  See Plan of Care  []  See progress note/recertification  []  See Discharge Summary         Progress towards goals / Updated goals:     1. Pt will increase FOTO score by 21 pts to improve function. 2.Pt will increase SOT by 3-5 pts to improve balance. MET/6/21/17  3. Pt will continue to report no falls. MET. 5/24/17  4. Pt will perform Romberg on a compliant surface EC w/o LOB >1 min to improve static stability. Met:( 6/19/17)  5. Pt will perform dynamic activities in the clinic including obstacle course, to improve with asfety during ambulation.  Met 6/12/17    PLAN  [x]  Upgrade activities as tolerated     [x]  Continue plan of care  []  Update interventions per flow sheet       []  Discharge due to:_  []  Other:_      NELLIE Glover 6/26/2017  9:15 AM    Future Appointments  Date Time Provider Yojana Cintron   6/28/2017 9:00 AM Diego Sarabia, PT [facility]CPTPB [facility]   7/3/2017 9:30 AM Haydee Olmedo PT OITOSQH [facility]   7/5/2017 9:00 AM Diego Sarabia PT [facility]CPTPB [facility]   7/13/2017 8:30 AM Jason Latham  [address]

## 2017-06-28 ENCOUNTER — HOSPITAL ENCOUNTER (OUTPATIENT)
Dept: PHYSICAL THERAPY | Age: 72
Discharge: HOME | End: 2017-06-28
Payer: MEDICARE

## 2017-06-28 PROCEDURE — 97530 THERAPEUTIC ACTIVITIES: CPT

## 2017-06-28 PROCEDURE — 97110 THERAPEUTIC EXERCISES: CPT

## 2017-06-28 NOTE — PROGRESS NOTES
PT DAILY TREATMENT NOTE - [facility]     Patient Name: Keesha Span  Date:2017  : 1945  [x]  Patient  Verified  Payor: Glo Garzon / Plan: [place] MEDICARE PART A & B / Product Type: Medicare /    In time:901  Out time:940  Total Treatment Time (min): 39  Total Timed Codes (min): 39  1:1 Treatment Time ([address] only): 44   Visit #: 3 of 5-8    Treatment Area: Unsteadiness on feet [R26.81]    SUBJECTIVE  Pain Level (0-10 scale): 4/10 knees  Any medication changes, allergies to medications, adverse drug reactions, diagnosis change, or new procedure performed?: [x] No    [] Yes (see summary sheet for update)  Subjective functional status/changes:   [] No changes reported  Reporst after coffee at 2 pm she walks around the house. Reports left leg pain that stops mid calf. OBJECTIVE        15 min Therapeutic Exercise:  [] See flow sheet :   Rationale: increase ROM, increase strength, improve coordination and improve balance to improve the patients ability to ease with Ambulation. 24 min Therapeutic Activity:  []  See flow sheet :   Rationale: increase ROM, increase strength, improve coordination, improve balance and increase proprioception  to improve the patients ability to ease with ADL's. With   [] TE   [] TA   [] neuro   [] other: Patient Education: [x] Review HEP    [] Progressed/Changed HEP based on:   [] positioning   [] body mechanics   [] transfers   [] heat/ice application    [] other:      Other Objective/Functional Measures: Due to increased knee pain floor to bed was held today. Cone step overs was a challenge. Pain Level (0-10 scale) post treatment: 4/10    ASSESSMENT/Changes in Function: Progressing well with therapy with exception of bilateral knee pain. She reports she needs a knee replacement.  Pt will follow up with MD.    Patient will continue to benefit from skilled PT services to modify and progress therapeutic interventions, address functional mobility deficits, address ROM deficits, address strength deficits, analyze and address soft tissue restrictions, analyze and cue movement patterns, analyze and modify body mechanics/ergonomics, assess and modify postural abnormalities and address imbalance/dizziness to attain remaining goals. [x]  See Plan of Care  []  See progress note/recertification  []  See Discharge Summary         Progress towards goals / Updated goals: Sandie Cornejo Pt will increase FOTO score by 21 pts to improve function. 2.Pt will increase SOT by 3-5 pts to improve balance. MET/6/21/17  3. Pt will continue to report no falls. MET. 5/24/17  4. Pt will perform Romberg on a compliant surface EC w/o LOB >1 min to improve static stability. Met:( 6/19/17)  5. Pt will perform dynamic activities in the clinic including obstacle course, to improve with asfety during ambulation. Met 6/12/17   Met.  Pt walks outdoors everyday on grass and curbs with her dog. 6/28/17    PLAN  [x]  Upgrade activities as tolerated     [x]  Continue plan of care  []  Update interventions per flow sheet       []  Discharge due to:_  []  Other:_      Akiko Leyva, PT 6/28/2017  9:10 AM    Future Appointments  Date Time Provider Yojana Cintron   7/3/2017 9:30 AM Gerry Amaya PT [facility]CPTPB [facility]   7/5/2017 9:00 AM [facility] [facility]   7/13/2017 8:30 AM Joyce Avila  [address]

## 2017-07-03 ENCOUNTER — HOSPITAL ENCOUNTER (OUTPATIENT)
Dept: PHYSICAL THERAPY | Age: 72
Discharge: HOME | End: 2017-07-03
Payer: MEDICARE

## 2017-07-03 PROCEDURE — 97530 THERAPEUTIC ACTIVITIES: CPT

## 2017-07-03 PROCEDURE — 97110 THERAPEUTIC EXERCISES: CPT

## 2017-07-03 NOTE — PROGRESS NOTES
PT DAILY TREATMENT NOTE - [facility]     Patient Name: Isabel Owusu  Date:7/3/2017  : 1945  [x]  Patient  Verified  Payor: VA MEDICARE / Plan: [place] MEDICARE PART A & B / Product Type: Medicare /    In time:9:30  Out time:10:06  Total Treatment Time (min): 36  Total Timed Codes (min): 36  1:1 Treatment Time ( only): 26   Visit #: 4 of 5-8    Treatment Area: Unsteadiness on feet [R26.81]    SUBJECTIVE  Pain Level (0-10 scale): 3/10  Any medication changes, allergies to medications, adverse drug reactions, diagnosis change, or new procedure performed?: [x] No    [] Yes (see summary sheet for update)  Subjective functional status/changes:   [] No changes reported  Pt reports that she sees her orthopedic MD on Monday and her ENT in a couple more weeks. She believes that therapy is helping. She reports 50% improvement so far with therapy. Pt reports stiffness in the back of her RLE for the past few months. She has a long hx of back pain and has broken her tail bone multiple times. She also has a LLD.       OBJECTIVE      16 min Therapeutic Exercise:  [x] See flow sheet :   Rationale: increase ROM, increase strength and improve coordination to improve the patients ability to improve ease of prolonged ambulation and stairs     20 min Therapeutic Activity:  [x]  See flow sheet :   Rationale: improve coordination, improve balance and increase proprioception  to improve the patients ability to reduce fall risk and improve safety with ambulation and ADLs            With   [] TE   [] TA   [] neuro   [] other: Patient Education: [x] Review HEP    [] Progressed/Changed HEP based on:   [] positioning   [] body mechanics   [] transfers   [] heat/ice application    [x] other: advised pt to ask MD about PT for back pain      Other Objective/Functional Measures:     Reduced stiffness reported in RLE after hamstring stretch  Instability noted with backward ambulation, pt intermittently demonstrating narrowed ESTELA  Intermittent decreased foot clearance L ascending and R descending with step up interventions with therapy   No increased pain with therapy      Discussed DC next visit and pt would not like to DC. Discussed that skilled intervention is no longer required and pt can continue strengthening independently. Pt will return Wednesday and then trial hold from PT for one final appointment next week before DC - pt agreed     FOTO 60    Pain Level (0-10 scale) post treatment: 2/10    ASSESSMENT/Changes in Function:     Pt tolerated therapy without complaint of increased sx and is making slow, steady progress towards therapy goals. Pt demonstrates improving ambulatory ability and is increasing the distance she walks her dog. She is slowly improving ability with steps ups but continues to demonstrate intermittent decreased foot clearance when distracted. FOTO score has improved 20 points since eval but no change since last assessed. Will continue to address strength and static/dynamic balance deficits for improved ease/safety with ambulation and daily tasks with transition towards final HEP. Anticipated DC in 2-3 visits as skilled services will no longer be required once HEP is established and pt demonstrates understanding/compliance. Patient will continue to benefit from skilled PT services to modify and progress therapeutic interventions, address ROM deficits, address strength deficits, analyze and address soft tissue restrictions, analyze and cue movement patterns, assess and modify postural abnormalities, address imbalance/dizziness and instruct in home and community integration to attain remaining goals. Progress towards goals / Updated goals: Joana Dalton Pt will increase FOTO score by 21 pts to improve function. Progressing/Not met. Improved 20 points from eval.  No change since last assessed 7/3/17  2. Pt will increase SOT by 3-5 pts to improve balance. MET/6/21/17  3. Pt will continue to report no falls. MET. 7/3/17  4. Pt will perform Romberg on a compliant surface EC w/o LOB >1 min to improve static stability. Met:( 6/19/17)  5. Pt will perform dynamic activities in the clinic including obstacle course, to improve with asfety during ambulation. Met 6/12/17   Met.  Pt walks outdoors everyday on grass and curbs with her dog. 6/28/17    PLAN  []  Upgrade activities as tolerated     [x]  Continue plan of care  []  Update interventions per flow sheet       []  Discharge due to:_  []  Other:_      Vaishali Hagen, PT 7/3/2017  9:44 AM    Future Appointments  Date Time Provider Yojana Cintron   7/5/2017 9:00 AM [facility] PT [address] MMCPTPB [facility]   7/13/2017 8:30 AM Twan Torres  [address]

## 2017-07-05 ENCOUNTER — HOSPITAL ENCOUNTER (OUTPATIENT)
Dept: PHYSICAL THERAPY | Age: 72
Discharge: HOME | End: 2017-07-05
Payer: MEDICARE

## 2017-07-05 PROCEDURE — 97110 THERAPEUTIC EXERCISES: CPT

## 2017-07-05 NOTE — PROGRESS NOTES
[facility] [employer] [facility]  [address] [place] [address]  [phone] fax    Physical Therapy Discharge Summary  Patient name: Janki Flro Start of Care: 2017   Referral source: Osbaldo Ford* : 1945                         Medical Diagnosis: Unsteadiness on feet [R26.81] Onset Date:>2 yrs ago                         Treatment Diagnosis: Balance   Prior Hospitalization: see medical history Provider#: [id number]   Medications: Verified on Patient summary List    Comorbidities: HTN, Diabetes, Asthma, Depression, Arthritis, Scoliosis. Prior Level of Function: Lives with  in 1 story house. Paint portraits as hobby. Attend Aquatic Pool program [facility] the [facility] 2x/week. Previous R TKR. Visits from Start of Care: 16    Missed Visits: 1    Reporting Period : 2017 to 2017    Summary of Care:  Goal: . Pt will increase FOTO score by 21 pts to improve function. Status at last note/certification:  Status at discharge: not met    Goal: Pt will increase SOT by 3-5 pts to improve balance  Status at last note/certification: Met 4-74-78  Status at discharge: met    Goal: Pt will perform Romberg on a compliant surface EC w/o LOB >1 min to improve static stability. Status at last note/certification: Met 1-93-95  Status at discharge: met    Goal: Pt will perform dynamic activities in the clinic including obstacle course, to improve with asfety during ambulation. Status at last note/certification: Met. Pt walks outdoors everyday on grass and curbs with her dog. 17  Status at discharge: met    G-Codes (GP)  Mobility    Goal  CJ= 20-39%  D/C  CK= 40-59%      The severity rating is based on clinical judgment and the FOTO score. ASSESSMENT/RECOMMENDATIONS: Pt made great progress with meeting 3/4 goals. Her FOTO score showed significant improvement with functional mobility.  Pt is satisfied with her progress and agreed to be discharge. HEP has been updated and pt performed all of them iwht appropriate form after instruction. Pt reported stumbling by the [facility], PT re-educated her on how to adjust the cane and usage as necessary for long distance amb; she demonstrated good understanding of assistive device usage after education.   [x]Discontinue therapy: [x]Patient has reached or is progressing toward set goals      []Patient is non-compliant or has abdicated      []Due to lack of appreciable progress towards set goals    Rickie Boone 7/5/2017 9:08 AM

## 2017-07-05 NOTE — PROGRESS NOTES
PT DAILY TREATMENT NOTE - [facility]     Patient Name: Liu Hameed  Date:2017  : 1945  [x]  Patient  Verified  Payor: Aurora Aleman / Plan: [place] MED[place] PART A & B / Product Type: Medicare /    In time: 9:00  Out time:931  Total Treatment Time (min): 31  Total Timed Codes (min): 31  1:1 Treatment Time ( [address] only): 31   Visit #: 5 of 5-8    Treatment Area: Unsteadiness on feet [R26.81]    SUBJECTIVE  Pain Level (0-10 scale): 0/10  Any medication changes, allergies to medications, adverse drug reactions, diagnosis change, or new procedure performed?: [x] No    [] Yes (see summary sheet for update)  Subjective functional status/changes:   [] No changes reported  \"I just need to strengthen my leg. I can walk without using the cane but my  wants me to have it. It makes me stumble sometimes. The only thing still difficult to do is getting up in the morning\"    OBJECTIVE      31 min Therapeutic Exercise:  [x] See flow sheet :   Rationale: increase ROM and increase strength to improve the patients ability to amb safely          With   [] TE   [] TA   [] neuro   [] other: Patient Education: [x] Review HEP    [] Progressed/Changed HEP based on:   [] positioning   [] body mechanics   [] transfers   [] heat/ice application    [] other:      Other Objective/Functional Measures:   Increased waling speed with TM  Required min verbal cues for trunk stabilization during standing hip ext     Pain Level (0-10 scale) post treatment: 0/10    ASSESSMENT/Changes in Function: see discharge note  []  See Plan of Care  []  See progress note/recertification  [x]  See Discharge Summary         Progress towards goals / Updated goals:  Pt will increase FOTO score by 21 pts to improve function. Progressing/Not met. Improved 20 points from eval.  No change since last assessed 7/3/17  2. Pt will increase SOT by 3-5 pts to improve balance. MET/17  3. Pt will continue to report no falls. MET. 7/3/17  4. Pt will perform Romberg on a compliant surface EC w/o LOB >1 min to improve static stability. Met:( 6/19/17)  5. Pt will perform dynamic activities in the clinic including obstacle course, to improve with asfety during ambulation. Met 6/12/17   Met.  Pt walks outdoors everyday on grass and curbs with her dog. 6/28/17    PLAN  []  Upgrade activities as tolerated     []  Continue plan of care  []  Update interventions per flow sheet       [x]  Discharge due to:_ met/progressed toward goals  []  Other:_      Cj Paniagua PT 7/5/2017  9:06 AM    Future Appointments  Date Time Provider Yojana Saida   7/13/2017 8:30 AM Juan R Valenzuela  [address]

## 2017-07-10 RX ORDER — PREGABALIN 75 MG/1
150 CAPSULE ORAL 2 TIMES DAILY
Qty: 360 CAP | Refills: 0 | Status: SHIPPED | OUTPATIENT
Start: 2017-07-10 | End: 2017-07-13 | Stop reason: SDUPTHER

## 2017-07-10 NOTE — TELEPHONE ENCOUNTER
Patient requesting a 90 d/s of medication to fill per [facility] mail order pharmacy. Last Visit: 06/09/2017 with LONDON Astorga    Next Appointment: 07/13/2017 with MD Ismael Jose   Previous Refill Encounters: 06/09/2017 per LONDON Astorga #[id number]     Requested Prescriptions     Pending Prescriptions Disp Refills    pregabalin (LYRICA) 75 mg capsule 360 Cap 0     Sig: Take 2 Caps by mouth two (2) times a day. Max Daily Amount: 300 mg.

## 2017-07-10 NOTE — TELEPHONE ENCOUNTER
Attempted to contact patient to inform, we no longer fax to [facility] due to having multiple occassions of [facility] not receiving Rx for medication, and patient having a prolonged wait time in receiving medication. Male voice answered phone, left message for patient to return my call, per male voice she is at another [facility] appointment at this time, and she will call me back later.

## 2017-07-10 NOTE — TELEPHONE ENCOUNTER
NP Sweede ask me to fax the Patients Lyrica Rx to [facility]. I faxed per [facility] NP but I then called the patient to inform her that the Nurse Cyn Sotelo had called her earlier and explained to her we don't fax Rx any more because we have been having trouble of them getting the RX . And she stated\" I hear ya\". I faxed to [phone] and received a confirmation and sent it to scanning.

## 2017-07-10 NOTE — TELEPHONE ENCOUNTER
Pt returned call:  Expressed to pt reasoning of picking up her lyrica rx per response by nurse Alfonso Salas. Pt is not accepting that. Also reminded pt she has a f/u appt w/dr Johnston Foil this upcoming Thursday 7/13/17.     Please call pt back at [phone]

## 2017-07-13 ENCOUNTER — OFFICE VISIT (OUTPATIENT)
Dept: ORTHOPEDIC SURGERY | Age: 72
End: 2017-07-13

## 2017-07-13 VITALS
SYSTOLIC BLOOD PRESSURE: 137 MMHG | WEIGHT: 214 LBS | HEART RATE: 60 BPM | TEMPERATURE: 98.2 F | BODY MASS INDEX: 39.38 KG/M2 | HEIGHT: 62 IN | DIASTOLIC BLOOD PRESSURE: 55 MMHG | RESPIRATION RATE: 14 BRPM

## 2017-07-13 DIAGNOSIS — M53.87 SCIATICA OF RIGHT SIDE ASSOCIATED WITH DISORDER OF LUMBOSACRAL SPINE: Primary | ICD-10-CM

## 2017-07-13 DIAGNOSIS — M47.816 LUMBAR FACET ARTHROPATHY: ICD-10-CM

## 2017-07-13 DIAGNOSIS — M62.830 MUSCLE SPASM OF BACK: ICD-10-CM

## 2017-07-13 DIAGNOSIS — M79.2 NEURITIS: ICD-10-CM

## 2017-07-13 DIAGNOSIS — R26.89 FUNCTIONAL GAIT ABNORMALITY: ICD-10-CM

## 2017-07-13 RX ORDER — PREGABALIN 150 MG/1
150 CAPSULE ORAL 2 TIMES DAILY
Qty: 60 CAP | Refills: 5 | Status: SHIPPED | OUTPATIENT
Start: 2017-07-13 | End: 2017-10-11

## 2017-07-13 NOTE — PROGRESS NOTES
[facility]  [address] [place], [facility] [address]  Phone: [phone]  Fax: [phone]      ASSESSMENT   Sarah Cosby was seen today for back pain. Diagnoses and all orders for this visit:    Sciatica of right side associated with disorder of lumbosacral spine  -     pregabalin (LYRICA) 150 mg capsule; Take 1 Cap by mouth two (2) times a day. Max Daily Amount: 300 mg. Neuritis  -     pregabalin (LYRICA) 150 mg capsule; Take 1 Cap by mouth two (2) times a day. Max Daily Amount: 300 mg. Lumbar facet arthropathy (HCC)    Functional gait abnormality    Muscle spasm of back         IMPRESSION AND PLAN:  Alfa Calderon is a 67 y.o. female with history of lumbar pain. She tried a right L5-S1 facet injection and a right L5 SNRB on 04/19/2017 and experienced significant relief lasting longer than she had expected. Pt discontinued Topamax since it caused irritability and she continues to take Lyrica 75 mg 2 tabs BID. 1) Pt was given information on lumbar arthritis exercises. 2) She received a refill of Lyrica 150 mg 1 tab BID. 3) Topamax was added to her allergy list due to irritability. 4) Pt will call to schedule a right L5-S1 facet injection and a right L5 SNRB after her knee surgery in 08/2017.  5) Ms. Pau Sarabia has a reminder for a \"due or due soon\" health maintenance. I have asked that she contact her primary care provider, Araceli Cadena MD, for follow-up on this health maintenance. 6)  demonstrated consistency with prescribing. 7) Pt will follow-up in 3 months. HISTORY OF PRESENT ILLNESS:  Alfa Calderon is a 67 y.o. female with history of lumbar pain. She tried a right L5-S1 facet injection and a right L5 SNRB on 04/19/2017 and experienced significant relief lasting longer than she had expected. She will be having knee surgery in 08/2017 and wishes to try an injection after the surgery before her week long [place] trip.  She discontinued Topamax since she reports that it made her feel like \"killing someone\" and made her easily aggravated. Pt takes Lyrica 75 mg 2 tabs BID and reports that since she was going to run out of medication she has been taking only 1 tab BID. She rarely takes Ultram 50 mg as her pain warrants and does not need a refill at this time. Pt desires to proceed with medication refills and a steroid injection. 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 Social History Main Topics    Smoking status: Never Smoker    Smokeless tobacco: Never Used    Alcohol use Yes      Comment: socially    Drug use: No    Sexual activity: Not on file     Other Topics Concern    Not on file     Social History Narrative       Current Outpatient Prescriptions   Medication Sig Dispense Refill    pregabalin (LYRICA) 150 mg capsule Take 1 Cap by mouth two (2) times a day. Max Daily Amount: 300 mg. 60 Cap 5    cyclobenzaprine (FLEXERIL) 10 mg tablet Take  by mouth three (3) times daily as needed for Muscle Spasm(s).  traMADol (ULTRAM) 50 mg tablet Take 50 mg by mouth every six (6) hours as needed for Pain.  amlodipine (NORVASC) 10 mg tablet Take  by mouth nightly. Indications: HYPERTENSION      metoprolol (LOPRESSOR) 50 mg tablet Take 50 mg by mouth nightly. Indications: HYPERTENSION      lisinopril (PRINIVIL, ZESTRIL) 40 mg tablet Take 40 mg by mouth nightly. Indications: HYPERTENSION      chlorthalidone (HYGROTEN) 25 mg tablet Take  by mouth nightly. Indications: HYPERTENSION      mometasone (NASONEX) 50 mcg/actuation nasal spray 2 sprays two (2) times a day.  FLAXSEED PO Take  by mouth two (2) times a day.  glyBURIDE-metFORMIN (GLUCOVANCE) 5-500 mg per tablet Take 2 Tabs by mouth two (2) times daily (with meals).  Armodafinil (NUVIGIL) 250 mg Tab Take 150 mg by mouth daily. Indications: NARCOLEPSY SYNDROME      aspirin delayed-release 81 mg tablet Take  by mouth daily.  atorvastatin (LIPITOR) 40 mg tablet Take  by mouth nightly.  ARIPiprazole (ABILIFY) 5 mg tablet Take 2.5 mg by mouth nightly.  sertraline (ZOLOFT) 100 mg tablet Take 200 mg by mouth daily.  methylPREDNISolone (MEDROL, CAROL,) 4 mg tablet Per dose pack instructions 1 Dose Pack 0    diclofenac (FLECTOR) 1.3 % pt12 1 Patch by TransDERmal route every twelve (12) hours every twelve (12) hours. 30 Patch 0       Allergies   Allergen Reactions    Other Medication Other (comments)     SEASONAL ALLERGIES    Topamax [Topiramate] Other (comments)         REVIEW OF SYSTEMS    Constitutional: Negative for fever, chills, or weight change. Respiratory: Negative for cough or shortness of breath. Cardiovascular: Negative for chest pain or palpitations. Gastrointestinal: Negative for acid reflux, change in bowel habits, or constipation. Genitourinary: Negative for dysuria and flank pain. Musculoskeletal: Positive for lumbar pain. Skin: Negative for rash. Neurological: Negative for headaches, dizziness, or numbness. Endo/Heme/Allergies: Negative for increased bruising. Psychiatric/Behavioral: Negative for difficulty with sleep.       PHYSICAL EXAMINATION  Visit Vitals    /55    Pulse 60    Temp 98.2 °F (36.8 °C) (Oral)    Resp 14    Ht 5' 2\" (1.575 m)    Wt 214 lb (97.1 kg)    BMI 39.14 kg/m2       Constitutional: Awake, alert, and in no acute distress  Neurological: 1+ symmetrical DTRs in the lower extremities. Sensation to light touch is intact. Skin: warm, dry, and intact. Musculoskeletal: Tenderness to palpation in the lower lumbar region. Moderate pain with extension and axial loading. No pain with internal or external rotation of her hips. Negative straight leg raise bilaterally. Pt ambulates with the assistance of a single point cane. Hip Flex  Quads Hamstrings Ankle DF EHL Ankle PF   Right +4/5 +4/5 +4/5 +4/5 +4/5 +4/5   Left +4/5 +4/5 +4/5 +4/5 +4/5 +4/5     IMAGING:    Lumbar spine MRI from 03/27/2017 was  personally reviewed with the Pt and demonstrated:    Results from Hospital Encounter encounter on 03/27/17   MRI LUMB SPINE WO CONT   Narrative MRI LUMBAR SPINE     CPT CODE: 26391    INDICATION: Chronic low back pain. Radiating right leg pain. .    TECHNIQUE: MRI of the lumbar spine performed consisting of sagittal T1, T2 and  inversion recovery sequences with additional axial T2 sequence and oblique axial  T1 sequence. COMPARISON: None. FINDINGS:     Sagittal images show moderate to severe intervertebral disc space narrowing  L5-S1, with anterior listhesis of approximately 6 mm. There is mild overall disc  space narrowing L4-5 with minimal grade 1 L4 anterior listhesis, less than 2 mm. Alignment preserved otherwise. No vertebral body edema or compression deformity. Cauda equina tapers at inferior L1. There is a broad-based central disc protrusion or herniation at T12-L1, which  effaces the ventral thecal sac, without cord distortion. This extends  approximately 5 mm into the canal. There is additionally a probable small disc  protrusion centrally at T11-12. Correlation with axial images shows:    L1-2:  Asymmetric mild diffuse disc bulge results in mild central canal  narrowing to 9.5 mm. There is no significant foraminal stenosis.     L2-3:  Asymmetric mild diffuse disc bulge results in slight central canal  narrowing to 10.2 mm. No significant foraminal stenosis. There is a 2 cm high T2  signal lesion posterior left renal cortex, likely a cyst. 5 mm high T2 signal  lesion posterior right renal cortex, likely a cyst.    L3-4:  Asymmetric diffuse disc bulge. There is mild degenerative facet  hypertrophy and posterior ligamentous thickening. Central canal is triangulated,  mildly narrowed down to 10.5 mm midline. There is mild left-sided foraminal  narrowing due to disc bulge and facet encroachment. L4-5:  Bilateral facet arthropathy. Mild effective broad-based disc bulge due to  minimal listhesis. There appears to be some slight edema within the left sided  paramedian disc , sagittal image 5, suggesting a small amount of edema and  possible developing annular tear. . Central canal is mildly narrowed to 10.6 mm. Minimal foraminal encroachment. L5-S1:  Severe bilateral degenerative facet hypertrophy. There is posterior  ligamentous thickening. Central canal mildly narrowed to 11 mm at the disc  level. There is severe right-sided foraminal stenosis with disc material lateral  in the exit foramina contacting the undersurface of the exiting nerve root and  compressing it. There is additionally severe left-sided foraminal stenosis due  to similar factors with similar findings. Impression IMPRESSION:    1. Severe disc space narrowing with anterior listhesis L5 upon S1 apparently on  severe degenerative facet disease basis. -There is mild overall central canal narrowing, but marked severe bilateral  foraminal stenosis as above. 2. Mild to moderate disc space narrowing L5-S1 with trace anterior listhesis,  mild disc bulge/protrusion and some mild edema or questionable annular tear as  above. 3. Mild disc bulge/protrusion at several other lumbar and low thoracic levels,  with mild canal narrowing and foraminal encroachment at several levels as  discussed.         Written by Jacquenette Sports, as dictated by Glenard Burkitt, MD.  I, Dr. Glenard Burkitt confirm that all documentation is accurate.

## 2017-07-13 NOTE — PATIENT INSTRUCTIONS
Touchbase Activation    Thank you for requesting access to Touchbase. Please follow the instructions below to securely access and download your online medical record. Touchbase allows you to send messages to your doctor, view your test results, renew your prescriptions, schedule appointments, and more. How Do I Sign Up? 1. In your internet browser, go to [email or web address]  2. Click on the First Time User? Click Here link in the Sign In box. You will be redirect to the New Member Sign Up page. 3. Enter your Touchbase Access Code exactly as it appears below. You will not need to use this code after youve completed the sign-up process. If you do not sign up before the expiration date, you must request a new code. Touchbase Access Code: [email or web address]  Expires: 2017  9:07 AM (This is the date your Touchbase access code will )    4. Enter the last four digits of your Social Security Number (xxxx) and Date of Birth (mm/dd/yyyy) as indicated and click Submit. You will be taken to the next sign-up page. 5. Create a Touchbase ID. This will be your Touchbase login ID and cannot be changed, so think of one that is secure and easy to remember. 6. Create a Touchbase password. You can change your password at any time. 7. Enter your Password Reset Question and Answer. This can be used at a later time if you forget your password. 8. Enter your e-mail address. You will receive e-mail notification when new information is available in [address]. 9. Click Sign Up. You can now view and download portions of your medical record. 10. Click the Download Summary menu link to download a portable copy of your medical information. Additional Information    If you have questions, please visit the Frequently Asked Questions section of the Touchbase website at [email or web address] Remember, Touchbase is NOT to be used for urgent needs. For medical emergencies, dial 911.          Low Back Arthritis: Exercises  Your Care Instructions  Here are some examples of typical rehabilitation exercises for your condition. Start each exercise slowly. Ease off the exercise if you start to have pain. Your doctor or physical therapist will tell you when you can start these exercises and which ones will work best for you. When you are not being active, find a comfortable position for rest. Some people are comfortable on the floor or a medium-firm bed with a small pillow under their head and another under their knees. Some people prefer to lie on their side with a pillow between their knees. Don't stay in one position for too long. Take short walks (10 to 20 minutes) every 2 to 3 hours. Avoid slopes, hills, and stairs until you feel better. Walk only distances you can manage without pain, especially leg pain. How to do the exercises  Pelvic tilt    1. Lie on your back with your knees bent. 2. \"Brace\" your stomachtighten your muscles by pulling in and imagining your belly button moving toward your spine. 3. Press your lower back into the floor. You should feel your hips and pelvis rock back. 4. Hold for 6 seconds while breathing smoothly. 5. Relax and allow your pelvis and hips to rock forward. 6. Repeat 8 to 12 times. Back stretches    1. Get down on your hands and knees on the floor. 2. Relax your head and allow it to droop. Round your back up toward the ceiling until you feel a nice stretch in your upper, middle, and lower back. Hold this stretch for as long as it feels comfortable, or about 15 to 30 seconds. 3. Return to the starting position with a flat back while you are on your hands and knees. 4. Let your back sway by pressing your stomach toward the floor. Lift your buttocks toward the ceiling. 5. Hold this position for 15 to 30 seconds. 6. Repeat 2 to 4 times. Follow-up care is a key part of your treatment and safety.  Be sure to make and go to all appointments, and call your doctor if you are having problems. It's also a good idea to know your test results and keep a list of the medicines you take. Where can you learn more? Go to http://reza-isauro.info/. Enter G708 in the search box to learn more about \"Low Back Arthritis: Exercises. \"  Current as of: March 21, 2017  Content Version: 11.3  © 1090-2958 [employer]. Care instructions adapted under license by [employer] (which disclaims liability or warranty for this information). If you have questions about a medical condition or this instruction, always ask your healthcare professional. [place] [address] any warranty or liability for your use of this information.

## 2017-07-13 NOTE — MR AVS SNAPSHOT
Visit Information Date & Time Provider Department Dept. Phone Encounter #  
 7/13/2017  8:30 AM Joyce Avila, [address] [facility] [phone] [id number] Follow-up Instructions Return in about 3 months (around 10/13/2017) for Medication follow up. Upcoming Health Maintenance Date Due  
 LIPID PANEL Q1 1944 FOOT EXAM Q1 1/2/1954 MICROALBUMIN Q1 1/2/1954 EYE EXAM RETINAL OR DILATED Q1 1/2/1954 DTaP/Tdap/Td series (1 - Tdap) 1/2/1965 FOBT Q 1 YEAR AGE 50-75 1/2/1994 ZOSTER VACCINE AGE 60> 1/2/2004 GLAUCOMA SCREENING Q2Y 1/2/2009 Pneumococcal 65+ Low/Medium Risk (1 of 2 - PCV13) 1/2/2009 MEDICARE YEARLY EXAM 1/2/2009 HEMOGLOBIN A1C Q6M 5/11/2015 INFLUENZA AGE 9 TO ADULT 8/1/2017 BREAST CANCER SCRN MAMMOGRAM 11/4/2018 Allergies as of 7/13/2017  Review Complete On: 7/13/2017 By: Joyce Avila MD  
  
 Severity Noted Reaction Type Reactions Other Medication  01/21/2013    Other (comments) SEASONAL ALLERGIES Topamax [Topiramate]  07/13/2017    Other (comments) Current Immunizations  Never Reviewed No immunizations on file. Not reviewed this visit You Were Diagnosed With   
  
 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 
ICD-9-CM: 781.2 Muscle spasm of back     ICD-10-CM: D35.734 ICD-9-CM: 724.8 Vitals BP Pulse Temp Resp Height(growth percentile) Weight(growth percentile) 137/55 60 98.2 °F (36.8 °C) (Oral) 14 5' 2\" (1.575 m) 214 lb (97.1 kg) BMI OB Status Smoking Status 39.14 kg/m2 Menopause Never Smoker BMI and BSA Data Body Mass Index Body Surface Area 39.14 kg/m 2 2.06 m 2 Preferred Pharmacy Pharmacy Name Phone Ketan Haji [address] [facility], [address] [phone] Your Updated Medication List  
  
   
This list is accurate as of: 7/13/17  9:07 AM.  Always use your most recent med list.  
  
  
  
  
 ABILIFY 5 mg tablet Generic drug:  ARIPiprazole Take 2.5 mg by mouth nightly. amLODIPine 10 mg tablet Commonly known as:  Tank Kaur Take  by mouth nightly. Indications: HYPERTENSION  
  
 aspirin delayed-release 81 mg tablet Take  by mouth daily. chlorthalidone 25 mg tablet Commonly known as:  Bora Cane Beds Take  by mouth nightly. Indications: HYPERTENSION  
  
 cyclobenzaprine 10 mg tablet Commonly known as:  FLEXERIL Take  by mouth three (3) times daily as needed for Muscle Spasm(s). diclofenac 1.3 % Pt12 Commonly known as:  FLECTOR  
1 Patch by TransDERmal route every twelve (12) hours every twelve (12) hours. FLAXSEED PO Take  by mouth two (2) times a day. glyBURIDE-metFORMIN 5-500 mg per tablet Commonly known as:  Redge Cheeks Take 2 Tabs by mouth two (2) times daily (with meals). LIPITOR 40 mg tablet Generic drug:  atorvastatin Take  by mouth nightly. lisinopril 40 mg tablet Commonly known as:  Tildon Deepa Take 40 mg by mouth nightly. Indications: HYPERTENSION  
  
 methylPREDNISolone 4 mg tablet Commonly known as:  MEDROL (CAROL) Per dose pack instructions  
  
 metoprolol tartrate 50 mg tablet Commonly known as:  LOPRESSOR Take 50 mg by mouth nightly. Indications: HYPERTENSION  
  
 NASONEX 50 mcg/actuation nasal spray Generic drug:  mometasone 2 sprays two (2) times a day. NUVIGIL 250 mg Tab tablet Generic drug:  Armodafinil Take 150 mg by mouth daily. Indications: NARCOLEPSY SYNDROME pregabalin 150 mg capsule Commonly known as:  [address] Take 1 Cap by mouth two (2) times a day. Max Daily Amount: 300 mg. traMADol 50 mg tablet Commonly known as:  ULTRAM  
Take 50 mg by mouth every six (6) hours as needed for Pain. ZOLOFT 100 mg tablet Generic drug:  sertraline Take 200 mg by mouth daily. Prescriptions Printed Refills  
 pregabalin (LYRICA) 150 mg capsule 5 Sig: Take 1 Cap by mouth two (2) times a day. Max Daily Amount: 300 mg. Class: Print Route: Oral  
  
Follow-up Instructions Return in about 3 months (around 10/13/2017) for Medication follow up. Patient Instructions MyChart Activation Thank you for requesting access to [employer]. Please follow the instructions below to securely access and download your online medical record. [employer] allows you to send messages to your doctor, view your test results, renew your prescriptions, schedule appointments, and more. How Do I Sign Up? 1. In your internet browser, go to [email or web address] 
2. Click on the First Time User? Click Here link in the Sign In box. You will be redirect to the New Member Sign Up page. 3. Enter your [employer] Access Code exactly as it appears below. You will not need to use this code after youve completed the sign-up process. If you do not sign up before the expiration date, you must request a new code. Group[employer] Access Code: [email or web address] Expires: 2017  9:07 AM (This is the date your Group[employer] access code will ) 4. Enter the last four digits of your Social Security Number (xxxx) and Date of Birth (mm/dd/yyyy) as indicated and click Submit. You will be taken to the next sign-up page. 5. Create a Grouply ID. This will be your Group[employer] login ID and cannot be changed, so think of one that is secure and easy to remember. 6. Create a Group[employer] password. You can change your password at any time. 7. Enter your Password Reset Question and Answer. This can be used at a later time if you forget your password. 8. Enter your e-mail address. You will receive e-mail notification when new information is available in [address] 9. Click Sign Up. You can now view and download portions of your medical record. 10. Click the Download Summary menu link to download a portable copy of your medical information. Additional Information If you have questions, please visit the Frequently Asked Questions section of the [employer] website at [email or web address] Remember, [employer] is NOT to be used for urgent needs. For medical emergencies, dial 911. Low Back Arthritis: Exercises Your Care Instructions Here are some examples of typical rehabilitation exercises for your condition. Start each exercise slowly. Ease off the exercise if you start to have pain. Your doctor or physical therapist will tell you when you can start these exercises and which ones will work best for you. When you are not being active, find a comfortable position for rest. Some people are comfortable on the floor or a medium-firm bed with a small pillow under their head and another under their knees. Some people prefer to lie on their side with a pillow between their knees. Don't stay in one position for too long. Take short walks (10 to 20 minutes) every 2 to 3 hours. Avoid slopes, hills, and stairs until you feel better. Walk only distances you can manage without pain, especially leg pain. How to do the exercises Pelvic tilt 1. Lie on your back with your knees bent. 2. \"Brace\" your stomachtighten your muscles by pulling in and imagining your belly button moving toward your spine. 3. Press your lower back into the floor. You should feel your hips and pelvis rock back. 4. Hold for 6 seconds while breathing smoothly. 5. Relax and allow your pelvis and hips to rock forward. 6. Repeat 8 to 12 times. Back stretches 1. Get down on your hands and knees on the floor. 2. Relax your head and allow it to droop. Round your back up toward the ceiling until you feel a nice stretch in your upper, middle, and lower back. Hold this stretch for as long as it feels comfortable, or about 15 to 30 seconds. 3. Return to the starting position with a flat back while you are on your hands and knees. 4. Let your back sway by pressing your stomach toward the floor. Lift your buttocks toward the ceiling. 5. Hold this position for 15 to 30 seconds. 6. Repeat 2 to 4 times. Follow-up care is a key part of your treatment and safety. Be sure to make and go to all appointments, and call your doctor if you are having problems. It's also a good idea to know your test results and keep a list of the medicines you take. Where can you learn more? Go to http://reza-iasuro.info/. Enter B097 in the search box to learn more about \"Low Back Arthritis: Exercises. \" Current as of: March 21, 2017 Content Version: 11.3 © 6936-0712 [employer]. Care instructions adapted under license by [employer] (which disclaims liability or warranty for this information). If you have questions about a medical condition or this instruction, always ask your healthcare professional. [place] [address] any warranty or liability for your use of this information. Introducing [facility]! [facility] introduces "[employer] patient portal. Now you can access parts of your medical record, email your doctor's office, and request medication refills online. 1. In your internet browser, go to [email or web address] 2. Click on the First Time User? Click Here link in the Sign In box. You will see the New Member Sign Up page. 3. Enter your "[employer] Access Code exactly as it appears below. You will not need to use this code after youve completed the sign-up process. If you do not sign up before the expiration date, you must request a new code. · [employer] Access Code: [email or web address] Expires: 8/13/2017  9:07 AM 
 
4. Enter the last four digits of your Social Security Number (xxxx) and Date of Birth (mm/dd/yyyy) as indicated and click Submit. You will be taken to the next sign-up page. 5. Create a Smarter[employer] ID. This will be your [employer] login ID and cannot be changed, so think of one that is secure and easy to remember. 6. Create a [employer] password. You can change your password at any time. 7. Enter your Password Reset Question and Answer. This can be used at a later time if you forget your password. 8. Enter your e-mail address. You will receive e-mail notification when new information is available in [address] 9. Click Sign Up. You can now view and download portions of your medical record. 10. Click the Download Summary menu link to download a portable copy of your medical information. If you have questions, please visit the Frequently Asked Questions section of the [employer] website. Remember, [employer] is NOT to be used for urgent needs. For medical emergencies, dial 911. Now available from your iPhone and Android! Please provide this summary of care documentation to your next provider. Your primary care clinician is listed as Alexandra Campos. If you have any questions after today's visit, please call [phone].

## 2017-07-24 ENCOUNTER — HOSPITAL ENCOUNTER (OUTPATIENT)
Dept: NUCLEAR MEDICINE | Age: 72
Discharge: HOME | End: 2017-07-24
Attending: ORTHOPAEDIC SURGERY
Payer: MEDICARE

## 2017-07-24 ENCOUNTER — HOSPITAL ENCOUNTER (OUTPATIENT)
Dept: GENERAL RADIOLOGY | Age: 72
Discharge: HOME | End: 2017-07-24
Attending: ORTHOPAEDIC SURGERY
Payer: MEDICARE

## 2017-07-24 DIAGNOSIS — M17.12 DEGENERATIVE ARTHRITIS OF LEFT KNEE: ICD-10-CM

## 2017-07-24 DIAGNOSIS — M17.11 OSTEOARTHRITIS OF RIGHT KNEE: ICD-10-CM

## 2017-07-24 PROCEDURE — 73562 X-RAY EXAM OF KNEE 3: CPT

## 2017-07-24 PROCEDURE — 78315 BONE IMAGING 3 PHASE: CPT

## 2017-08-07 ENCOUNTER — TELEPHONE (OUTPATIENT)
Dept: ORTHOPEDIC SURGERY | Age: 72
End: 2017-08-07

## 2017-08-07 NOTE — TELEPHONE ENCOUNTER
Per patient, she is taking Lyrica 75mg PO q AM and 150mg PO q PM.    Per patient, she decreased the dose per her Psychiatrist's advise. Per patient this dose, seems to be therapeutic as far as her pain is concerned, and she is not having issues with feeling fatigued during the day. Per patient, she has plenty of Lyrica 75mg and will not need a refill at this time. Patient informed to continue her Lyrica as advised by her Psychiatrist, and keep her f/u appt with Dr. Meño Ascencio to discuss. Patient informed to return call to our office with any further questions or concerns. Patient verbalized agreement/understanding. NO further action required at this time.

## 2017-08-07 NOTE — TELEPHONE ENCOUNTER
PATIENT CALLED FOR DR. Feliciano Barreto. PATIENT SAID THAT AFTER TAKING THE LYRICA MEDICATION, 150 MG TWICE A DAY IT IS CAUSING HER SOME HEAVY SLEEPING IN THE AFTERNOON. PATIENT SAID SHE IS NOW TAKING LESS OF THE MEDICATION BUT  NEEDS TO SPEAK TO SOMEONE ABOUT THIS TO SEE IF THIS IS OKAY. PATIENT TEL. [phone].

## 2017-08-11 ENCOUNTER — TELEPHONE (OUTPATIENT)
Dept: ORTHOPEDIC SURGERY | Age: 72
End: 2017-08-11

## 2017-08-11 NOTE — TELEPHONE ENCOUNTER
Patient last seen by Dr. Meño Ascencio on 07/13/17, Please see note from telephone encounter on 08/07/17:    \"Per patient, she is taking Lyrica 75mg PO q AM and 150mg PO q PM.     Per patient, she decreased the dose per her Psychiatrist's advise.      Per patient this dose, seems to be therapeutic as far as her pain is concerned, and she is not having issues with feeling fatigued during the day.     Per patient, she has plenty of Lyrica 75mg and will not need a refill at this time.     Patient informed to continue her Lyrica as advised by her Psychiatrist, and keep her f/u appt with Dr. Meño Ascencio to discuss. Patient informed to return call to our office with any further questions or concerns.     Patient verbalized agreement/understanding. NO further action required at this time. \"    Called and spoke with patient, per patient, she changed taking the medication, to 150mg capsule and 75mg capsule all at bedtime. Patient complains of feeling \"Ataxic\" and stepping on her own feet and feeling out of it. However patient does admit feeling relief from pain with medication. Please review and advise.

## 2017-08-11 NOTE — TELEPHONE ENCOUNTER
Patient will try to take 150mg of Lyrica at bedtime only and see how that helps. No further action required at this time.

## 2017-08-11 NOTE — TELEPHONE ENCOUNTER
She should be taking them as prescribed. Is she taking it all at night? Did the symptoms start after she started taking it all at once nightly?

## 2017-08-11 NOTE — TELEPHONE ENCOUNTER
She can either continue with 150 mg qhs or She can taper off completely if she desires and once she is tapered off we can consider a different medication.

## 2017-08-11 NOTE — TELEPHONE ENCOUNTER
Pt wants to come off her lyrica/requesting to change her medication.   Please call to advise pt at [phone]

## 2017-08-14 ENCOUNTER — HOSPITAL ENCOUNTER (OUTPATIENT)
Dept: PREADMISSION TESTING | Age: 72
Discharge: HOME | End: 2017-08-14
Payer: MEDICARE

## 2017-08-14 ENCOUNTER — HOSPITAL ENCOUNTER (OUTPATIENT)
Dept: GENERAL RADIOLOGY | Age: 72
Discharge: HOME | End: 2017-08-14
Payer: MEDICARE

## 2017-08-14 DIAGNOSIS — Z01.818 PREOP TESTING: ICD-10-CM

## 2017-08-14 DIAGNOSIS — I10 HTN (HYPERTENSION): ICD-10-CM

## 2017-08-14 LAB
ABO + RH BLD: NORMAL
ALBUMIN SERPL BCP-MCNC: 3.6 G/DL (ref 3.4–5)
ALBUMIN/GLOB SERPL: 1 {RATIO} (ref 0.8–1.7)
ALP SERPL-CCNC: 132 U/L (ref 45–117)
ALT SERPL-CCNC: 41 U/L (ref 13–56)
ANION GAP BLD CALC-SCNC: 8 MMOL/L (ref 3–18)
APPEARANCE UR: CLEAR
APTT PPP: 35.1 SEC (ref 23–36.4)
AST SERPL W P-5'-P-CCNC: 39 U/L (ref 15–37)
BILIRUB SERPL-MCNC: 0.2 MG/DL (ref 0.2–1)
BILIRUB UR QL: NEGATIVE
BLOOD GROUP ANTIBODIES SERPL: NORMAL
BUN SERPL-MCNC: 21 MG/DL (ref 7–18)
BUN/CREAT SERPL: 26 (ref 12–20)
CALCIUM SERPL-MCNC: 8.8 MG/DL (ref 8.5–10.1)
CHLORIDE SERPL-SCNC: 107 MMOL/L (ref 100–108)
CO2 SERPL-SCNC: 27 MMOL/L (ref 21–32)
COLOR UR: YELLOW
CREAT SERPL-MCNC: 0.8 MG/DL (ref 0.6–1.3)
ERYTHROCYTE [DISTWIDTH] IN BLOOD BY AUTOMATED COUNT: 13.4 % (ref 11.6–14.5)
GLOBULIN SER CALC-MCNC: 3.7 G/DL (ref 2–4)
GLUCOSE SERPL-MCNC: 77 MG/DL (ref 74–99)
GLUCOSE UR STRIP.AUTO-MCNC: NEGATIVE MG/DL
HBA1C MFR BLD: 7.6 % (ref 4.2–5.6)
HCT VFR BLD AUTO: 39.4 % (ref 35–45)
HGB BLD-MCNC: 12.8 G/DL (ref 12–16)
HGB UR QL STRIP: NEGATIVE
INR PPP: 0.9 (ref 0.8–1.2)
KETONES UR QL STRIP.AUTO: NEGATIVE MG/DL
LEUKOCYTE ESTERASE UR QL STRIP.AUTO: NEGATIVE
MCH RBC QN AUTO: 28.5 PG (ref 24–34)
MCHC RBC AUTO-ENTMCNC: 32.5 G/DL (ref 31–37)
MCV RBC AUTO: 87.8 FL (ref 74–97)
NITRITE UR QL STRIP.AUTO: NEGATIVE
PH UR STRIP: 5 [PH] (ref 5–8)
PLATELET # BLD AUTO: 240 K/UL (ref 135–420)
PMV BLD AUTO: 9.7 FL (ref 9.2–11.8)
POTASSIUM SERPL-SCNC: 4 MMOL/L (ref 3.5–5.5)
PROT SERPL-MCNC: 7.3 G/DL (ref 6.4–8.2)
PROT UR STRIP-MCNC: NEGATIVE MG/DL
PROTHROMBIN TIME: 12 SEC (ref 11.5–15.2)
RBC # BLD AUTO: 4.49 M/UL (ref 4.2–5.3)
SODIUM SERPL-SCNC: 142 MMOL/L (ref 136–145)
SP GR UR REFRACTOMETRY: 1.02 (ref 1–1.03)
SPECIMEN EXP DATE BLD: NORMAL
UROBILINOGEN UR QL STRIP.AUTO: 0.2 EU/DL (ref 0.2–1)
WBC # BLD AUTO: 8.2 K/UL (ref 4.6–13.2)

## 2017-08-14 PROCEDURE — 71020 XR CHEST PA LAT: CPT

## 2017-08-14 PROCEDURE — 83036 HEMOGLOBIN GLYCOSYLATED A1C: CPT | Performed by: ORTHOPAEDIC SURGERY

## 2017-08-14 PROCEDURE — 85027 COMPLETE CBC AUTOMATED: CPT | Performed by: ORTHOPAEDIC SURGERY

## 2017-08-14 PROCEDURE — 81003 URINALYSIS AUTO W/O SCOPE: CPT | Performed by: ORTHOPAEDIC SURGERY

## 2017-08-14 PROCEDURE — 36415 COLL VENOUS BLD VENIPUNCTURE: CPT | Performed by: ORTHOPAEDIC SURGERY

## 2017-08-14 PROCEDURE — 85730 THROMBOPLASTIN TIME PARTIAL: CPT | Performed by: ORTHOPAEDIC SURGERY

## 2017-08-14 PROCEDURE — 87086 URINE CULTURE/COLONY COUNT: CPT | Performed by: ORTHOPAEDIC SURGERY

## 2017-08-14 PROCEDURE — 85610 PROTHROMBIN TIME: CPT | Performed by: ORTHOPAEDIC SURGERY

## 2017-08-14 PROCEDURE — 86900 BLOOD TYPING SEROLOGIC ABO: CPT | Performed by: ORTHOPAEDIC SURGERY

## 2017-08-14 PROCEDURE — 93005 ELECTROCARDIOGRAM TRACING: CPT

## 2017-08-14 PROCEDURE — 80053 COMPREHEN METABOLIC PANEL: CPT | Performed by: ORTHOPAEDIC SURGERY

## 2017-08-14 RX ORDER — LANOLIN ALCOHOL/MO/W.PET/CERES
325 CREAM (GRAM) TOPICAL
COMMUNITY

## 2017-08-14 RX ORDER — METOPROLOL SUCCINATE 100 MG/1
100 TABLET, EXTENDED RELEASE ORAL
COMMUNITY

## 2017-08-14 RX ORDER — BISMUTH SUBSALICYLATE 262 MG
1 TABLET,CHEWABLE ORAL DAILY
COMMUNITY

## 2017-08-14 RX ORDER — ARMODAFINIL 250 MG/1
250 TABLET ORAL
COMMUNITY

## 2017-08-14 RX ORDER — MELATONIN
1000 DAILY
COMMUNITY

## 2017-08-14 RX ORDER — ARIPIPRAZOLE 2 MG/1
2 TABLET ORAL
COMMUNITY
End: 2022-09-26

## 2017-08-14 NOTE — PROGRESS NOTES
Tong Bingham attended the Joint Replacement Pre-Operative class on 8/14/17. Topics discussed included surgery preparation, what to expect the day of surgery, medications, physical and occupational therapy, and discharge planning. It was discussed that this is considered an elective surgery and that prior to the surgery they need to make decisions such as arranging for help at home once they are discharged. She was given a knee patient education notebook to take home. Opportunity was given to ask questions and phone number of  was given for any questions or concerns that may arise later.

## 2017-08-15 LAB
ATRIAL RATE: 50 BPM
BACTERIA SPEC CULT: NORMAL
CALCULATED P AXIS, ECG09: 54 DEGREES
CALCULATED R AXIS, ECG10: 32 DEGREES
CALCULATED T AXIS, ECG11: 66 DEGREES
DIAGNOSIS, 93000: NORMAL
P-R INTERVAL, ECG05: 172 MS
Q-T INTERVAL, ECG07: 424 MS
QRS DURATION, ECG06: 82 MS
QTC CALCULATION (BEZET), ECG08: 386 MS
SERVICE CMNT-IMP: NORMAL
VENTRICULAR RATE, ECG03: 50 BPM

## 2017-08-23 ENCOUNTER — TELEPHONE (OUTPATIENT)
Dept: ORTHOPEDIC SURGERY | Age: 72
End: 2017-08-23

## 2017-08-23 NOTE — TELEPHONE ENCOUNTER
pateint called in stated she would like to come off of Lyrica and restart Gabapentin after her knee SX on 08/25/17. Please advise patient at [phone].

## 2017-08-23 NOTE — TELEPHONE ENCOUNTER
What is the reason for wanting to switch back? Per an old Dr. Burden Sayer note, Pt also takes Neurontin 300 mg 1 tab QAM and 2 tabs QHS and admits to shortness of breath when taking the medication. She will have to taper off the Lyrica in a similar way she tapering onto it. Then we could restart another medication.

## 2017-08-24 ENCOUNTER — ANESTHESIA EVENT (OUTPATIENT)
Dept: SURGERY | Age: 72
DRG: 470 | End: 2017-08-24
Payer: MEDICARE

## 2017-08-24 RX ORDER — TIAGABINE HYDROCHLORIDE 2 MG/1
2 TABLET, FILM COATED ORAL EVERY EVENING
Qty: 30 TAB | Refills: 1 | Status: SHIPPED | OUTPATIENT
Start: 2017-08-24 | End: 2017-09-18 | Stop reason: SDUPTHER

## 2017-08-24 NOTE — TELEPHONE ENCOUNTER
Called patient, verified , informed of message below. Per patient she is only taking 150mg of Lyrica at night. Informed patient, she can stop taking the Lyrica, she will not need to titrate down. Informed patient, once she stops the Lyrica, she can start the Gabitril. Verified Pharmacy on file. Patient verbalized agreement/understanding. No further action required at this time.

## 2017-08-24 NOTE — TELEPHONE ENCOUNTER
Lets try Gabitril 2 mg daily. Let her know we can increase the dose after we see if she tolerates the new medication.

## 2017-08-24 NOTE — TELEPHONE ENCOUNTER
In previous encounters/messages, patient was having problems with Lyrica causing her to feel \"out of it. \"    Can we try Gabitril or Gralise instead?

## 2017-08-25 ENCOUNTER — APPOINTMENT (OUTPATIENT)
Dept: GENERAL RADIOLOGY | Age: 72
DRG: 470 | End: 2017-08-25
Attending: ORTHOPAEDIC SURGERY
Payer: MEDICARE

## 2017-08-25 ENCOUNTER — HOSPITAL ENCOUNTER (INPATIENT)
Age: 72
LOS: 2 days | Discharge: HOME HEALTH | DRG: 470 | End: 2017-08-27
Attending: ORTHOPAEDIC SURGERY | Admitting: ORTHOPAEDIC SURGERY
Payer: MEDICARE

## 2017-08-25 ENCOUNTER — ANESTHESIA (OUTPATIENT)
Dept: SURGERY | Age: 72
DRG: 470 | End: 2017-08-25
Payer: MEDICARE

## 2017-08-25 PROBLEM — M17.9 KNEE OSTEOARTHRITIS: Status: ACTIVE | Noted: 2017-08-25

## 2017-08-25 LAB
AMPHET UR QL SCN: NEGATIVE
BARBITURATES UR QL SCN: NEGATIVE
BENZODIAZ UR QL: NEGATIVE
CANNABINOIDS UR QL SCN: NEGATIVE
COCAINE UR QL SCN: NEGATIVE
GLUCOSE BLD STRIP.AUTO-MCNC: 122 MG/DL (ref 70–110)
GLUCOSE BLD STRIP.AUTO-MCNC: 123 MG/DL (ref 70–110)
GLUCOSE BLD STRIP.AUTO-MCNC: 160 MG/DL (ref 70–110)
HDSCOM,HDSCOM: NORMAL
METHADONE UR QL: NEGATIVE
OPIATES UR QL: NEGATIVE
PCP UR QL: NEGATIVE

## 2017-08-25 PROCEDURE — 77030034479 HC ADH SKN CLSR PRINEO J&J -B: Performed by: ORTHOPAEDIC SURGERY

## 2017-08-25 PROCEDURE — 74011250636 HC RX REV CODE- 250/636: Performed by: NURSE ANESTHETIST, CERTIFIED REGISTERED

## 2017-08-25 PROCEDURE — 77030008683 HC TU ET CUF COVD -A: Performed by: NURSE ANESTHETIST, CERTIFIED REGISTERED

## 2017-08-25 PROCEDURE — C1776 JOINT DEVICE (IMPLANTABLE): HCPCS | Performed by: ORTHOPAEDIC SURGERY

## 2017-08-25 PROCEDURE — 77030012935 HC DRSG AQUACEL BMS -B: Performed by: ORTHOPAEDIC SURGERY

## 2017-08-25 PROCEDURE — 76010000171 HC OR TIME 2 TO 2.5 HR INTENSV-TIER 1: Performed by: ORTHOPAEDIC SURGERY

## 2017-08-25 PROCEDURE — 77030034850: Performed by: ORTHOPAEDIC SURGERY

## 2017-08-25 PROCEDURE — 77030012890: Performed by: ORTHOPAEDIC SURGERY

## 2017-08-25 PROCEDURE — C9290 INJ, BUPIVACAINE LIPOSOME: HCPCS | Performed by: ORTHOPAEDIC SURGERY

## 2017-08-25 PROCEDURE — 77030031139 HC SUT VCRL2 J&J -A: Performed by: ORTHOPAEDIC SURGERY

## 2017-08-25 PROCEDURE — 76060000035 HC ANESTHESIA 2 TO 2.5 HR: Performed by: ORTHOPAEDIC SURGERY

## 2017-08-25 PROCEDURE — 77030021370 HC WRP CLD THER ICMN DJOR -B: Performed by: ORTHOPAEDIC SURGERY

## 2017-08-25 PROCEDURE — 77030013079 HC BLNKT BAIR HGGR 3M -A: Performed by: ORTHOPAEDIC SURGERY

## 2017-08-25 PROCEDURE — 74011000258 HC RX REV CODE- 258

## 2017-08-25 PROCEDURE — 77030013480 HC CUF TRNQT J&J -B: Performed by: ORTHOPAEDIC SURGERY

## 2017-08-25 PROCEDURE — C1713 ANCHOR/SCREW BN/BN,TIS/BN: HCPCS | Performed by: ORTHOPAEDIC SURGERY

## 2017-08-25 PROCEDURE — 65270000029 HC RM PRIVATE

## 2017-08-25 PROCEDURE — 82962 GLUCOSE BLOOD TEST: CPT

## 2017-08-25 PROCEDURE — 77030012012 HC AIRWY OP SFT TELE -A: Performed by: NURSE ANESTHETIST, CERTIFIED REGISTERED

## 2017-08-25 PROCEDURE — 77030018836 HC SOL IRR NACL ICUM -A: Performed by: ORTHOPAEDIC SURGERY

## 2017-08-25 PROCEDURE — 77030010785: Performed by: ORTHOPAEDIC SURGERY

## 2017-08-25 PROCEDURE — 74011250636 HC RX REV CODE- 250/636

## 2017-08-25 PROCEDURE — 74011250636 HC RX REV CODE- 250/636: Performed by: ORTHOPAEDIC SURGERY

## 2017-08-25 PROCEDURE — 77030029494 HC CLD THER UNIT ICMN DJOR -B: Performed by: ORTHOPAEDIC SURGERY

## 2017-08-25 PROCEDURE — 77030011640 HC PAD GRND REM COVD -A: Performed by: ORTHOPAEDIC SURGERY

## 2017-08-25 PROCEDURE — 0SRD0J9 REPLACEMENT OF LEFT KNEE JOINT WITH SYNTHETIC SUBSTITUTE, CEMENTED, OPEN APPROACH: ICD-10-PCS | Performed by: ORTHOPAEDIC SURGERY

## 2017-08-25 PROCEDURE — 77030002933 HC SUT MCRYL J&J -A: Performed by: ORTHOPAEDIC SURGERY

## 2017-08-25 PROCEDURE — 64447 NJX AA&/STRD FEMORAL NRV IMG: CPT | Performed by: ANESTHESIOLOGY

## 2017-08-25 PROCEDURE — 77030018548 HC SUT ETHBND2 J&J -B: Performed by: ORTHOPAEDIC SURGERY

## 2017-08-25 PROCEDURE — 76942 ECHO GUIDE FOR BIOPSY: CPT | Performed by: ANESTHESIOLOGY

## 2017-08-25 PROCEDURE — 77030026438 HC STYL ET INTUB CARD -A: Performed by: NURSE ANESTHETIST, CERTIFIED REGISTERED

## 2017-08-25 PROCEDURE — 74011000258 HC RX REV CODE- 258: Performed by: ORTHOPAEDIC SURGERY

## 2017-08-25 PROCEDURE — 74011000250 HC RX REV CODE- 250: Performed by: ORTHOPAEDIC SURGERY

## 2017-08-25 PROCEDURE — 74011250637 HC RX REV CODE- 250/637: Performed by: ORTHOPAEDIC SURGERY

## 2017-08-25 PROCEDURE — 77030016544 HC BLD SAW RECIP1 STRY -B: Performed by: ORTHOPAEDIC SURGERY

## 2017-08-25 PROCEDURE — 74011636637 HC RX REV CODE- 636/637: Performed by: NURSE ANESTHETIST, CERTIFIED REGISTERED

## 2017-08-25 PROCEDURE — 77030020782 HC GWN BAIR PAWS FLX 3M -B: Performed by: ORTHOPAEDIC SURGERY

## 2017-08-25 PROCEDURE — 77030013708 HC HNDPC SUC IRR PULS STRY –B: Performed by: ORTHOPAEDIC SURGERY

## 2017-08-25 PROCEDURE — 74011000250 HC RX REV CODE- 250

## 2017-08-25 PROCEDURE — 76210000017 HC OR PH I REC 1.5 TO 2 HR: Performed by: ORTHOPAEDIC SURGERY

## 2017-08-25 PROCEDURE — 77030006835 HC BLD SAW SAG STRY -B: Performed by: ORTHOPAEDIC SURGERY

## 2017-08-25 PROCEDURE — 77030000032 HC CUF TRNQT ZIMM -B: Performed by: ORTHOPAEDIC SURGERY

## 2017-08-25 PROCEDURE — 77030032490 HC SLV COMPR SCD KNE COVD -B: Performed by: ORTHOPAEDIC SURGERY

## 2017-08-25 PROCEDURE — 77030003601 HC NDL NRV BLK BBMI -A: Performed by: ORTHOPAEDIC SURGERY

## 2017-08-25 PROCEDURE — 80307 DRUG TEST PRSMV CHEM ANLYZR: CPT

## 2017-08-25 PROCEDURE — 73560 X-RAY EXAM OF KNEE 1 OR 2: CPT

## 2017-08-25 PROCEDURE — 3E0T3BZ INTRODUCTION OF ANESTHETIC AGENT INTO PERIPHERAL NERVES AND PLEXI, PERCUTANEOUS APPROACH: ICD-10-PCS | Performed by: ANESTHESIOLOGY

## 2017-08-25 PROCEDURE — 74011250637 HC RX REV CODE- 250/637: Performed by: NURSE ANESTHETIST, CERTIFIED REGISTERED

## 2017-08-25 PROCEDURE — 77030012406 HC DRN WND PENRS BARD -A: Performed by: ORTHOPAEDIC SURGERY

## 2017-08-25 DEVICE — INSERT TIB RP FEM KNEE CEM: Type: IMPLANTABLE DEVICE | Site: KNEE | Status: FUNCTIONAL

## 2017-08-25 DEVICE — CEMENT BNE GENTAMICIN 40 GM SMARTSET GMV: Type: IMPLANTABLE DEVICE | Site: KNEE | Status: FUNCTIONAL

## 2017-08-25 DEVICE — COMPONENT FEM SZ 7 L KNEE POST STBL CEM ATTUNE: Type: IMPLANTABLE DEVICE | Site: KNEE | Status: FUNCTIONAL

## 2017-08-25 RX ORDER — MAGNESIUM SULFATE 100 %
4 CRYSTALS MISCELLANEOUS AS NEEDED
Status: DISCONTINUED | OUTPATIENT
Start: 2017-08-25 | End: 2017-08-25 | Stop reason: HOSPADM

## 2017-08-25 RX ORDER — HYDROMORPHONE HYDROCHLORIDE 1 MG/ML
1 INJECTION, SOLUTION INTRAMUSCULAR; INTRAVENOUS; SUBCUTANEOUS
Status: DISCONTINUED | OUTPATIENT
Start: 2017-08-25 | End: 2017-08-27 | Stop reason: HOSPADM

## 2017-08-25 RX ORDER — DIPHENHYDRAMINE HCL 25 MG
25 CAPSULE ORAL
Status: DISCONTINUED | OUTPATIENT
Start: 2017-08-25 | End: 2017-08-27 | Stop reason: HOSPADM

## 2017-08-25 RX ORDER — SODIUM CHLORIDE 0.9 % (FLUSH) 0.9 %
5-10 SYRINGE (ML) INJECTION EVERY 8 HOURS
Status: DISCONTINUED | OUTPATIENT
Start: 2017-08-25 | End: 2017-08-25 | Stop reason: HOSPADM

## 2017-08-25 RX ORDER — DEXTROSE 50 % IN WATER (D50W) INTRAVENOUS SYRINGE
25-50 AS NEEDED
Status: DISCONTINUED | OUTPATIENT
Start: 2017-08-25 | End: 2017-08-25 | Stop reason: HOSPADM

## 2017-08-25 RX ORDER — ROPIVACAINE HYDROCHLORIDE 2 MG/ML
60 INJECTION, SOLUTION EPIDURAL; INFILTRATION; PERINEURAL
Status: COMPLETED | OUTPATIENT
Start: 2017-08-25 | End: 2017-08-25

## 2017-08-25 RX ORDER — CELECOXIB 400 MG/1
400 CAPSULE ORAL ONCE
Status: COMPLETED | OUTPATIENT
Start: 2017-08-25 | End: 2017-08-25

## 2017-08-25 RX ORDER — PROPOFOL 10 MG/ML
INJECTION, EMULSION INTRAVENOUS AS NEEDED
Status: DISCONTINUED | OUTPATIENT
Start: 2017-08-25 | End: 2017-08-25 | Stop reason: HOSPADM

## 2017-08-25 RX ORDER — LIDOCAINE HYDROCHLORIDE 20 MG/ML
INJECTION, SOLUTION EPIDURAL; INFILTRATION; INTRACAUDAL; PERINEURAL AS NEEDED
Status: DISCONTINUED | OUTPATIENT
Start: 2017-08-25 | End: 2017-08-25 | Stop reason: HOSPADM

## 2017-08-25 RX ORDER — METOPROLOL SUCCINATE 100 MG/1
100 TABLET, EXTENDED RELEASE ORAL DAILY
Status: DISCONTINUED | OUTPATIENT
Start: 2017-08-26 | End: 2017-08-27 | Stop reason: HOSPADM

## 2017-08-25 RX ORDER — ONDANSETRON 2 MG/ML
INJECTION INTRAMUSCULAR; INTRAVENOUS AS NEEDED
Status: DISCONTINUED | OUTPATIENT
Start: 2017-08-25 | End: 2017-08-25 | Stop reason: HOSPADM

## 2017-08-25 RX ORDER — NALOXONE HYDROCHLORIDE 0.4 MG/ML
0.4 INJECTION, SOLUTION INTRAMUSCULAR; INTRAVENOUS; SUBCUTANEOUS AS NEEDED
Status: DISCONTINUED | OUTPATIENT
Start: 2017-08-25 | End: 2017-08-27 | Stop reason: HOSPADM

## 2017-08-25 RX ORDER — GLYCOPYRROLATE 0.2 MG/ML
INJECTION INTRAMUSCULAR; INTRAVENOUS AS NEEDED
Status: DISCONTINUED | OUTPATIENT
Start: 2017-08-25 | End: 2017-08-25 | Stop reason: HOSPADM

## 2017-08-25 RX ORDER — OXYCODONE HYDROCHLORIDE 5 MG/1
5-15 TABLET ORAL
Status: DISCONTINUED | OUTPATIENT
Start: 2017-08-25 | End: 2017-08-27 | Stop reason: HOSPADM

## 2017-08-25 RX ORDER — SODIUM CHLORIDE 0.9 % (FLUSH) 0.9 %
5-10 SYRINGE (ML) INJECTION EVERY 8 HOURS
Status: DISCONTINUED | OUTPATIENT
Start: 2017-08-25 | End: 2017-08-27 | Stop reason: HOSPADM

## 2017-08-25 RX ORDER — INSULIN LISPRO 100 [IU]/ML
INJECTION, SOLUTION INTRAVENOUS; SUBCUTANEOUS ONCE
Status: DISCONTINUED | OUTPATIENT
Start: 2017-08-25 | End: 2017-08-25 | Stop reason: HOSPADM

## 2017-08-25 RX ORDER — MIDAZOLAM HYDROCHLORIDE 1 MG/ML
2 INJECTION, SOLUTION INTRAMUSCULAR; INTRAVENOUS ONCE
Status: COMPLETED | OUTPATIENT
Start: 2017-08-25 | End: 2017-08-25

## 2017-08-25 RX ORDER — ATORVASTATIN CALCIUM 40 MG/1
40 TABLET, FILM COATED ORAL
Status: DISCONTINUED | OUTPATIENT
Start: 2017-08-25 | End: 2017-08-27 | Stop reason: HOSPADM

## 2017-08-25 RX ORDER — CEFAZOLIN SODIUM 2 G/50ML
2 SOLUTION INTRAVENOUS ONCE
Status: COMPLETED | OUTPATIENT
Start: 2017-08-25 | End: 2017-08-25

## 2017-08-25 RX ORDER — PREGABALIN 50 MG/1
50 CAPSULE ORAL ONCE
Status: COMPLETED | OUTPATIENT
Start: 2017-08-25 | End: 2017-08-25

## 2017-08-25 RX ORDER — FAMOTIDINE 20 MG/1
20 TABLET, FILM COATED ORAL ONCE
Status: COMPLETED | OUTPATIENT
Start: 2017-08-25 | End: 2017-08-25

## 2017-08-25 RX ORDER — SODIUM CHLORIDE 0.9 % (FLUSH) 0.9 %
5-10 SYRINGE (ML) INJECTION AS NEEDED
Status: DISCONTINUED | OUTPATIENT
Start: 2017-08-25 | End: 2017-08-25 | Stop reason: HOSPADM

## 2017-08-25 RX ORDER — HYDROMORPHONE HYDROCHLORIDE 2 MG/ML
0.5 INJECTION, SOLUTION INTRAMUSCULAR; INTRAVENOUS; SUBCUTANEOUS
Status: DISCONTINUED | OUTPATIENT
Start: 2017-08-25 | End: 2017-08-25 | Stop reason: HOSPADM

## 2017-08-25 RX ORDER — NEOSTIGMINE METHYLSULFATE 5 MG/5 ML
SYRINGE (ML) INTRAVENOUS AS NEEDED
Status: DISCONTINUED | OUTPATIENT
Start: 2017-08-25 | End: 2017-08-25 | Stop reason: HOSPADM

## 2017-08-25 RX ORDER — FENTANYL CITRATE 50 UG/ML
100 INJECTION, SOLUTION INTRAMUSCULAR; INTRAVENOUS ONCE
Status: COMPLETED | OUTPATIENT
Start: 2017-08-25 | End: 2017-08-25

## 2017-08-25 RX ORDER — SUCCINYLCHOLINE CHLORIDE 20 MG/ML
INJECTION INTRAMUSCULAR; INTRAVENOUS AS NEEDED
Status: DISCONTINUED | OUTPATIENT
Start: 2017-08-25 | End: 2017-08-25 | Stop reason: HOSPADM

## 2017-08-25 RX ORDER — DEXTROSE 50 % IN WATER (D50W) INTRAVENOUS SYRINGE
25-50 AS NEEDED
Status: DISCONTINUED | OUTPATIENT
Start: 2017-08-25 | End: 2017-08-27 | Stop reason: HOSPADM

## 2017-08-25 RX ORDER — SODIUM CHLORIDE, SODIUM LACTATE, POTASSIUM CHLORIDE, CALCIUM CHLORIDE 600; 310; 30; 20 MG/100ML; MG/100ML; MG/100ML; MG/100ML
75 INJECTION, SOLUTION INTRAVENOUS CONTINUOUS
Status: DISCONTINUED | OUTPATIENT
Start: 2017-08-25 | End: 2017-08-25 | Stop reason: HOSPADM

## 2017-08-25 RX ORDER — MAGNESIUM SULFATE 100 %
16 CRYSTALS MISCELLANEOUS AS NEEDED
Status: DISCONTINUED | OUTPATIENT
Start: 2017-08-25 | End: 2017-08-27 | Stop reason: HOSPADM

## 2017-08-25 RX ORDER — CELECOXIB 100 MG/1
200 CAPSULE ORAL EVERY 12 HOURS
Status: DISCONTINUED | OUTPATIENT
Start: 2017-08-25 | End: 2017-08-27 | Stop reason: HOSPADM

## 2017-08-25 RX ORDER — SODIUM CHLORIDE 0.9 % (FLUSH) 0.9 %
5-10 SYRINGE (ML) INJECTION AS NEEDED
Status: DISCONTINUED | OUTPATIENT
Start: 2017-08-25 | End: 2017-08-27 | Stop reason: HOSPADM

## 2017-08-25 RX ORDER — FENTANYL CITRATE 50 UG/ML
INJECTION, SOLUTION INTRAMUSCULAR; INTRAVENOUS AS NEEDED
Status: DISCONTINUED | OUTPATIENT
Start: 2017-08-25 | End: 2017-08-25 | Stop reason: HOSPADM

## 2017-08-25 RX ORDER — ACETAMINOPHEN 325 MG/1
650 TABLET ORAL EVERY 4 HOURS
Status: DISCONTINUED | OUTPATIENT
Start: 2017-08-25 | End: 2017-08-27 | Stop reason: HOSPADM

## 2017-08-25 RX ORDER — CEFAZOLIN SODIUM 2 G/50ML
2 SOLUTION INTRAVENOUS EVERY 8 HOURS
Status: COMPLETED | OUTPATIENT
Start: 2017-08-25 | End: 2017-08-26

## 2017-08-25 RX ORDER — PREGABALIN 75 MG/1
150 CAPSULE ORAL EVERY 12 HOURS
Status: DISCONTINUED | OUTPATIENT
Start: 2017-08-25 | End: 2017-08-27 | Stop reason: HOSPADM

## 2017-08-25 RX ORDER — POLYETHYLENE GLYCOL 3350 17 G/17G
17 POWDER, FOR SOLUTION ORAL DAILY
Status: DISCONTINUED | OUTPATIENT
Start: 2017-08-26 | End: 2017-08-27 | Stop reason: HOSPADM

## 2017-08-25 RX ORDER — INSULIN LISPRO 100 [IU]/ML
INJECTION, SOLUTION INTRAVENOUS; SUBCUTANEOUS
Status: DISCONTINUED | OUTPATIENT
Start: 2017-08-26 | End: 2017-08-27 | Stop reason: HOSPADM

## 2017-08-25 RX ORDER — ROCURONIUM BROMIDE 10 MG/ML
INJECTION, SOLUTION INTRAVENOUS AS NEEDED
Status: DISCONTINUED | OUTPATIENT
Start: 2017-08-25 | End: 2017-08-25 | Stop reason: HOSPADM

## 2017-08-25 RX ORDER — ONDANSETRON 2 MG/ML
4 INJECTION INTRAMUSCULAR; INTRAVENOUS ONCE
Status: DISCONTINUED | OUTPATIENT
Start: 2017-08-25 | End: 2017-08-25 | Stop reason: HOSPADM

## 2017-08-25 RX ORDER — OXYCODONE HCL 10 MG/1
10 TABLET, FILM COATED, EXTENDED RELEASE ORAL EVERY 12 HOURS
Status: DISCONTINUED | OUTPATIENT
Start: 2017-08-25 | End: 2017-08-25 | Stop reason: HOSPADM

## 2017-08-25 RX ORDER — DOCUSATE SODIUM 100 MG/1
100 CAPSULE, LIQUID FILLED ORAL 2 TIMES DAILY
Status: DISCONTINUED | OUTPATIENT
Start: 2017-08-25 | End: 2017-08-27 | Stop reason: HOSPADM

## 2017-08-25 RX ORDER — SODIUM CHLORIDE 9 MG/ML
125 INJECTION, SOLUTION INTRAVENOUS CONTINUOUS
Status: DISCONTINUED | OUTPATIENT
Start: 2017-08-25 | End: 2017-08-27 | Stop reason: HOSPADM

## 2017-08-25 RX ADMIN — FAMOTIDINE 20 MG: 20 TABLET, FILM COATED ORAL at 13:08

## 2017-08-25 RX ADMIN — CELECOXIB 200 MG: 100 CAPSULE ORAL at 21:36

## 2017-08-25 RX ADMIN — LIDOCAINE HYDROCHLORIDE 60 MG: 20 INJECTION, SOLUTION EPIDURAL; INFILTRATION; INTRACAUDAL; PERINEURAL at 14:32

## 2017-08-25 RX ADMIN — OXYCODONE HYDROCHLORIDE 10 MG: 10 TABLET, FILM COATED, EXTENDED RELEASE ORAL at 13:08

## 2017-08-25 RX ADMIN — ATORVASTATIN CALCIUM 40 MG: 40 TABLET, FILM COATED ORAL at 21:35

## 2017-08-25 RX ADMIN — Medication 10 ML: at 13:30

## 2017-08-25 RX ADMIN — ROPIVACAINE HYDROCHLORIDE 60 MG: 2 INJECTION, SOLUTION EPIDURAL; INFILTRATION at 13:30

## 2017-08-25 RX ADMIN — HYDROMORPHONE HYDROCHLORIDE 0.5 MG: 2 INJECTION, SOLUTION INTRAMUSCULAR; INTRAVENOUS; SUBCUTANEOUS at 17:11

## 2017-08-25 RX ADMIN — CEFAZOLIN SODIUM 2 G: 2 SOLUTION INTRAVENOUS at 21:34

## 2017-08-25 RX ADMIN — CELECOXIB 400 MG: 400 CAPSULE ORAL at 13:07

## 2017-08-25 RX ADMIN — FENTANYL CITRATE 50 MCG: 50 INJECTION, SOLUTION INTRAMUSCULAR; INTRAVENOUS at 14:32

## 2017-08-25 RX ADMIN — Medication 10 ML: at 21:54

## 2017-08-25 RX ADMIN — Medication 3 MG: at 16:22

## 2017-08-25 RX ADMIN — HYDROMORPHONE HYDROCHLORIDE 0.5 MG: 2 INJECTION, SOLUTION INTRAMUSCULAR; INTRAVENOUS; SUBCUTANEOUS at 17:30

## 2017-08-25 RX ADMIN — PREGABALIN 150 MG: 75 CAPSULE ORAL at 21:35

## 2017-08-25 RX ADMIN — ACETAMINOPHEN 650 MG: 325 TABLET ORAL at 21:35

## 2017-08-25 RX ADMIN — ONDANSETRON 4 MG: 2 INJECTION INTRAMUSCULAR; INTRAVENOUS at 15:12

## 2017-08-25 RX ADMIN — FENTANYL CITRATE 50 MCG: 50 INJECTION INTRAMUSCULAR; INTRAVENOUS at 13:27

## 2017-08-25 RX ADMIN — ROCURONIUM BROMIDE 10 MG: 10 INJECTION, SOLUTION INTRAVENOUS at 15:03

## 2017-08-25 RX ADMIN — GLYCOPYRROLATE 0.2 MG: 0.2 INJECTION INTRAMUSCULAR; INTRAVENOUS at 14:27

## 2017-08-25 RX ADMIN — ROCURONIUM BROMIDE 5 MG: 10 INJECTION, SOLUTION INTRAVENOUS at 14:32

## 2017-08-25 RX ADMIN — SODIUM CHLORIDE 1 G: 900 INJECTION, SOLUTION INTRAVENOUS at 14:36

## 2017-08-25 RX ADMIN — DOCUSATE SODIUM 100 MG: 100 CAPSULE, LIQUID FILLED ORAL at 21:35

## 2017-08-25 RX ADMIN — SUCCINYLCHOLINE CHLORIDE 120 MG: 20 INJECTION INTRAMUSCULAR; INTRAVENOUS at 14:32

## 2017-08-25 RX ADMIN — HYDROMORPHONE HYDROCHLORIDE 1 MG: 1 INJECTION, SOLUTION INTRAMUSCULAR; INTRAVENOUS; SUBCUTANEOUS at 21:35

## 2017-08-25 RX ADMIN — GLYCOPYRROLATE 0.6 MG: 0.2 INJECTION INTRAMUSCULAR; INTRAVENOUS at 16:22

## 2017-08-25 RX ADMIN — FENTANYL CITRATE 50 MCG: 50 INJECTION, SOLUTION INTRAMUSCULAR; INTRAVENOUS at 15:04

## 2017-08-25 RX ADMIN — HYDROMORPHONE HYDROCHLORIDE 0.5 MG: 2 INJECTION, SOLUTION INTRAMUSCULAR; INTRAVENOUS; SUBCUTANEOUS at 17:01

## 2017-08-25 RX ADMIN — ROCURONIUM BROMIDE 25 MG: 10 INJECTION, SOLUTION INTRAVENOUS at 14:45

## 2017-08-25 RX ADMIN — PROPOFOL 160 MG: 10 INJECTION, EMULSION INTRAVENOUS at 14:32

## 2017-08-25 RX ADMIN — SODIUM CHLORIDE, SODIUM LACTATE, POTASSIUM CHLORIDE, AND CALCIUM CHLORIDE 75 ML/HR: 600; 310; 30; 20 INJECTION, SOLUTION INTRAVENOUS at 13:07

## 2017-08-25 RX ADMIN — SODIUM CHLORIDE 125 ML/HR: 900 INJECTION, SOLUTION INTRAVENOUS at 21:00

## 2017-08-25 RX ADMIN — MIDAZOLAM HYDROCHLORIDE 2 MG: 1 INJECTION, SOLUTION INTRAMUSCULAR; INTRAVENOUS at 13:27

## 2017-08-25 RX ADMIN — PREGABALIN 50 MG: 50 CAPSULE ORAL at 13:07

## 2017-08-25 RX ADMIN — SODIUM CHLORIDE, SODIUM LACTATE, POTASSIUM CHLORIDE, AND CALCIUM CHLORIDE: 600; 310; 30; 20 INJECTION, SOLUTION INTRAVENOUS at 15:00

## 2017-08-25 RX ADMIN — CEFAZOLIN SODIUM 2 G: 2 SOLUTION INTRAVENOUS at 14:27

## 2017-08-25 RX ADMIN — SODIUM CHLORIDE 1 G: 900 INJECTION, SOLUTION INTRAVENOUS at 15:52

## 2017-08-25 RX ADMIN — INSULIN LISPRO 3 UNITS: 100 INJECTION, SOLUTION INTRAVENOUS; SUBCUTANEOUS at 19:00

## 2017-08-25 NOTE — IP AVS SNAPSHOT
[address] [place] [address] [address] Patient: Radha Bernabe MRN: [id number] You are allergic to the following Allergen Reactions Other Medication Other (comments) SEASONAL ALLERGIES Topamax (Topiramate) Other (comments) \"irritable\" Recent Documentation Height Weight BMI OB Status Smoking Status 1.562 m 97.1 kg 39.78 kg/m2 Postmenopausal Never Smoker Emergency Contacts Name Discharge Info Relation Home Work Mobile [place] DISCHARGE CAREGIVER [3] Spouse [3] [phone] About your hospitalization You were admitted on:  August 25, 2017 You last received care in the:  [facility] [address] You were discharged on:  August 27, 2017 Unit phone number:  [phone] Why you were hospitalized Your primary diagnosis was:  Not on File Your diagnoses also included:  Knee Osteoarthritis Providers Seen During Your Hospitalizations Provider Role Specialty Primary office phone Arturo Brito MD Attending Provider Orthopedic Surgery [phone] Your Primary Care Physician (PCP) Primary Care Physician Office Phone Office Fax [address] Follow-up Information Follow up With Details Comments Contact Info Suzy Goldmann, MD  The  will call the office on Monday morning for 1 week f/u appt with the pcp-will call with appt and time. Aubrey [address] [facility] [address] 
[phone] Arturo Brito MD  The  will call the office on Monday morning for 10 day f/u appt with the surgeon,will call with appt and time. [address] [facility] [address] [facility] [address] 
[phone] Your Appointments Wednesday October 11, 2017  9:15 AM EDT Follow Up with Ragena Mcardle, MD  
[address] [facility], [address] and [facility]) [address] [place] [id number]  
[phone] Current Discharge Medication List  
  
START taking these medications Dose & Instructions Dispensing Information Comments Morning Noon Evening Bedtime  
 celecoxib 200 mg capsule Commonly known as:  CELEBREX Your last dose was: Your next dose is:    
   
   
 Dose:  200 mg Take 1 Cap by mouth every twelve (12) hours every twelve (12) hours for 21 days. Indications: POSTOPERATIVE ACUTE PAIN Quantity:  42 Cap Refills:  0  
     
   
   
   
  
 oxyCODONE IR 5 mg immediate release tablet Commonly known as:  Linda Burger Your last dose was: Your next dose is:    
   
   
 Dose:  5-15 mg Take 1-3 Tabs by mouth every four (4) hours as needed. Max Daily Amount: 90 mg. Quantity:  60 Tab Refills:  0  
     
   
   
   
  
 polyethylene glycol 17 gram packet Commonly known as:  Chrissy Cancel Your last dose was: Your next dose is:    
   
   
 Dose:  17 g Take 1 Packet by mouth daily. Quantity:  15 Packet Refills:  0  
     
   
   
   
  
 rivaroxaban 10 mg tablet Commonly known as:  Laurel Harvey Your last dose was: Your next dose is:    
   
   
 Dose:  10 mg Take 1 Tab by mouth daily (with breakfast) for 21 days. Indications: KNEE REPLACEMENT DEEP VEIN THROMBOSIS PREVENTION Quantity:  21 Tab Refills:  0 CONTINUE these medications which have NOT CHANGED Dose & Instructions Dispensing Information Comments Morning Noon Evening Bedtime ABILIFY 2 mg tablet Generic drug:  ARIPiprazole Your last dose was: Your next dose is:    
   
   
 Dose:  2 mg Take 2 mg by mouth daily. Refills:  0  
     
   
   
   
  
 allergy injection Your last dose was: Your next dose is:    
   
   
 every seven (7) days. Refills:  0  
     
   
   
   
  
 amLODIPine 10 mg tablet Commonly known as:  Lamonte Lute Your last dose was: Your next dose is: Take  by mouth nightly. Indications: HYPERTENSION Refills:  0  
     
   
   
   
  
 chlorthalidone 25 mg tablet Commonly known as:  Ariel Gojennas Your last dose was: Your next dose is: Take  by mouth nightly. Indications: HYPERTENSION Refills:  0 FLAXSEED PO Your last dose was: Your next dose is: Take  by mouth two (2) times a day. Refills:  0 GLUCOSAMINE-CHONDROITIN COMPLX PO Your last dose was: Your next dose is: Take  by mouth. Refills:  0  
     
   
   
   
  
 glyBURIDE-metFORMIN 5-500 mg per tablet Commonly known as:  Dionte Bee Your last dose was: Your next dose is:    
   
   
 Dose:  2 Tab Take 2 Tabs by mouth two (2) times daily (with meals). Refills:  0 Iron 325 mg (65 mg iron) tablet Generic drug:  ferrous sulfate Your last dose was: Your next dose is:    
   
   
 Dose:  325 mg Take 325 mg by mouth Daily (before breakfast). Refills:  0 LIPITOR 40 mg tablet Generic drug:  atorvastatin Your last dose was: Your next dose is: Take  by mouth nightly. Refills:  0  
     
   
   
   
  
 lisinopril 40 mg tablet Commonly known as:  Dee Early Your last dose was: Your next dose is:    
   
   
 Dose:  40 mg Take 40 mg by mouth nightly. Indications: HYPERTENSION Refills:  0  
     
   
   
   
  
 metoprolol succinate 100 mg tablet Commonly known as:  TOPROL-XL Your last dose was: Your next dose is:    
   
   
 Dose:  100 mg Take 100 mg by mouth daily. Refills:  0  
     
   
   
   
  
 multivitamin tablet Commonly known as:  ONE A DAY Your last dose was: Your next dose is: Dose:  1 Tab Take 1 Tab by mouth daily. Refills:  0  
     
   
   
   
  
 NASONEX 50 mcg/actuation nasal spray Generic drug:  mometasone Your last dose was: Your next dose is:    
   
   
 Dose:  2 Spray  
2 sprays two (2) times a day. Refills:  0 NUVIGIL 250 mg Tab tablet Generic drug:  Armodafinil Your last dose was: Your next dose is:    
   
   
 Dose:  250 mg Take 250 mg by mouth Daily (before breakfast). Refills:  0  
     
   
   
   
  
 OCUVITE PO Your last dose was: Your next dose is: Take  by mouth. Refills:  0  
     
   
   
   
  
 pregabalin 150 mg capsule Commonly known as:  Everardo Lejaneand Your last dose was: Your next dose is:    
   
   
 Dose:  150 mg Take 1 Cap by mouth two (2) times a day. Max Daily Amount: 300 mg. Quantity:  60 Cap Refills:  5  
     
   
   
   
  
 tiaGABine 2 mg tablet Commonly known as:  GABITRIL Your last dose was: Your next dose is:    
   
   
 Dose:  2 mg Take 1 Tab by mouth every evening. Quantity:  30 Tab Refills:  1  
     
   
   
   
  
 traMADol 50 mg tablet Commonly known as:  ULTRAM  
   
Your last dose was: Your next dose is:    
   
   
 Dose:  50 mg Take 50 mg by mouth every six (6) hours as needed for Pain. Refills:  0  
     
   
   
   
  
 ZOLOFT 100 mg tablet Generic drug:  sertraline Your last dose was: Your next dose is:    
   
   
 Dose:  200 mg Take 200 mg by mouth daily. Refills:  0 STOP taking these medications   
 aspirin delayed-release 81 mg tablet ASK your doctor about these medications Dose & Instructions Dispensing Information Comments Morning Noon Evening Bedtime VITAMIN D3 1,000 unit tablet Generic drug:  cholecalciferol Your last dose was: Your next dose is:    
   
   
 Dose:  1000 Units Take 1,000 Units by mouth daily. Refills:  0 Where to Get Your Medications Information on where to get these meds will be given to you by the nurse or doctor. ! Ask your nurse or doctor about these medications  
  celecoxib 200 mg capsule  
 oxyCODONE IR 5 mg immediate release tablet  
 polyethylene glycol 17 gram packet  
 rivaroxaban 10 mg tablet Discharge Instructions Knee Arthroscopy: What to Expect at [facility] Your Recovery Arthroscopy is a way to find problems and do surgery inside a joint without making a large cut (incision). Your doctor put a lighted tube with a tiny cameracalled an arthroscope, or scopeand surgical tools through small incisions in your knee. You will feel tired for several days. Your knee will be swollen, and you may notice that your skin is a different color near the cuts (incisions). The swelling is normal and will start to go away in a few days. Keeping your leg higher than your heart will help with swelling and pain. You will probably need about 6 weeks to recover. If your doctor repaired damaged tissue, recovery will take longer. You may have to limit your activity until your knee strength and movement return to normal. You may also be in a physical rehabilitation (rehab) program. 
You may be able to return to a desk job or your normal routine in a few days. But if you do physical labor, it may be as long as 2 months before you can return to work. This care sheet gives you a general idea about how long it will take for you to recover. But each person recovers at a different pace. Follow the steps below to get better as quickly as possible. How can you care for yourself at home? Activity · Rest when you feel tired. Getting enough sleep will help you recover. Use pillows to raise your ankle and leg above the level of your heart. · Try to walk each day, after your doctor has said you can.  Start by walking a little more than you did the day before. Bit by bit, increase the amount you walk. Walking boosts blood flow and helps prevent pneumonia and constipation. · You may have a brace or crutches or both. · Your doctor will tell you how often and how much you can move your leg and knee. · If you have a desk job, you may be able to return to work a few days after the surgery. If you lift things or stand or walk a lot at work, it may be as long as 2 months before you can return. · You can take a shower 48 to 72 hours after surgery and clean the incisions with regular soap and water. Do not take a bath or soak your knee until your doctor says it is okay. · Ask your doctor when you can drive again. · If you had a repair of torn tissue, follow your doctor's instructions for lifting things or moving your knee. Diet · You can eat your normal diet. If your stomach is upset, try bland, low-fat foods like plain rice, broiled chicken, toast, and yogurt. · Drink plenty of fluids, unless your doctor tells you not to. · You may notice that your bowel movements are not regular right after your surgery. This is common. Try to avoid constipation and straining with bowel movements. You may want to take a fiber supplement every day. If you have not had a bowel movement after a couple of days, ask your doctor about taking a mild laxative. Medicines · Your doctor will tell you if and when you can restart your medicines. He or she will also give you instructions about taking any new medicines. · If you take blood thinners, such as warfarin (Coumadin), clopidogrel (Plavix), or aspirin, be sure to talk to your doctor. He or she will tell you if and when to start taking those medicines again. Make sure that you understand exactly what your doctor wants you to do. · Be safe with medicines. Take pain medicines exactly as directed. ¨ If the doctor gave you a prescription medicine for pain, take it as prescribed. ¨ If you are not taking a prescription pain medicine, ask your doctor if you can take an over-the-counter medicine. · If you think your pain medicine is making you sick to your stomach: 
¨ Take your medicine after meals (unless your doctor has told you not to). ¨ Ask your doctor for a different pain medicine. · If your doctor prescribed antibiotics, take them as directed. Do not stop taking them just because you feel better. You need to take the full course of antibiotics. Incision care · If you have a dressing over your cuts (incisions), keep it clean and dry. You may remove it 48 to 72 hours after the surgery. · If your incisions are open to the air, keep the area clean and dry. · If you have strips of tape on the incisions, leave the tape on for a week or until it falls off. Exercise · Move your toes and ankle as much as your bandages will allow. · Bend and straighten your knee slowly several times during the day. · Depending on why you had the surgery, you may have to do ankle and leg exercises. Your doctor or physical therapist will give you exercises as part of a rehabilitation program. 
· Stop any activity that causes sharp pain. Talk to your doctor or physical therapist about what sports or other exercise you can do. Ice and elevation · To reduce swelling and pain, put ice or a cold pack on your knee for 10 to 20 minutes at a time. Do this every 1 to 2 hours. Put a thin cloth between the ice and your skin. Follow-up care is a key part of your treatment and safety. Be sure to make and go to all appointments, and call your doctor if you are having problems. It's also a good idea to know your test results and keep a list of the medicines you take. When should you call for help? Call 911 anytime you think you may need emergency care. For example, call if: 
· You passed out (lost consciousness). · You have severe trouble breathing. · You have sudden chest pain and shortness of breath, or you cough up blood. Call your doctor now or seek immediate medical care if: 
· Your foot or toes are numb or tingling. · Your foot is cool or pale, or it changes color. · You have signs of a blood clot, such as: 
¨ Pain in your calf, back of the knee, thigh, or groin. ¨ Redness and swelling in your leg or groin. · You are sick to your stomach or cannot keep fluids down. · You have pain that does not get better after you take pain medicine. · You have loose stitches, or your incision comes open. · Bright red blood has soaked through the bandage over your incision. · You have signs of infection, such as: 
¨ Increased pain, swelling, warmth, or redness. ¨ Red streaks leading from the incisions. ¨ Pus draining from the incisions. ¨ A fever. Watch closely for any changes in your health, and be sure to contact your doctor if: 
· You do not have a bowel movement after taking a laxative. Where can you learn more? Go to http://reza-isauro.info/. Enter H660 in the search box to learn more about \"Knee Arthroscopy: What to Expect at Home. \" Current as of: March 21, 2017 Content Version: 11.3 © 3390-2559 [employer]. Care instructions adapted under license by [employer] (which disclaims liability or warranty for this information). If you have questions about a medical condition or this instruction, always ask your healthcare professional. [place] [address] any warranty or liability for your use of this information. DISCHARGE SUMMARY from Nurse The following personal items are in your possession at time of discharge: 
 
Dental Appliances: None Visual Aid: Glasses, With patient Home Medications: None Jewelry: None Clothing: Socks, Footwear, Shorts, Pants, Undergarments Other Valuables: Cell Phone PATIENT INSTRUCTIONS: 
 
 
F-face looks uneven A-arms unable to move or move unevenly S-speech slurred or non-existent T-time-call 911 as soon as signs and symptoms begin-DO NOT go Back to bed or wait to see if you get better-TIME IS BRAIN. Warning Signs of HEART ATTACK Call 911 if you have these symptoms: 
? Chest discomfort. Most heart attacks involve discomfort in the center of the chest that lasts more than a few minutes, or that goes away and comes back. It can feel like uncomfortable pressure, squeezing, fullness, or pain. ? Discomfort in other areas of the upper body. Symptoms can include pain or discomfort in one or both arms, the back, neck, jaw, or stomach. ? Shortness of breath with or without chest discomfort. ? Other signs may include breaking out in a cold sweat, nausea, or lightheadedness. Don't wait more than five minutes to call [address]! Fast action can save your life. Calling 911 is almost always the fastest way to get lifesaving treatment. Emergency Medical Services staff can begin treatment when they arrive  up to an hour sooner than if someone gets to the hospital by car. The discharge information has been reviewed with the patient. The patient verbalized understanding. Discharge medications reviewed with the patient and appropriate educational materials and side effects teaching were provided. [facility] Activation Thank you for requesting access to [employer] [facility]. Please follow the instructions below to securely access and download your online medical record. [facility] [facility] allows you to send messages to your doctor, view your test results, renew your prescriptions, schedule appointments, and more. How Do I Sign Up? 1. In your internet browser, go to [email or web address] 
2. Click on the First Time User? Click Here link in the Sign In box. You will be redirect to the New Member Sign Up page. 3. Enter your Compass Access Code exactly as it appears below. You will not need to use this code after youve completed the sign-up process. If you do not sign up before the expiration date, you must request a new code. [employer] Met[employer] Access Code: Activation code not generated Current Compass Status: Patient Declined (This is the date your The E[employer] access code will ) 4. Enter the last four digits of your Social Security Number (xxxx) and Date of Birth (mm/dd/yyyy) as indicated and click Submit. You will be taken to the next sign-up page. 5. Create a Compass ID. This will be your Compass login ID and cannot be changed, so think of one that is secure and easy to remember. 6. Create a Compass password. You can change your password at any time. 7. Enter your Password Reset Question and Answer. This can be used at a later time if you forget your password. 8. Enter your e-mail address. You will receive e-mail notification when new information is available in [address] 9. Click Sign Up. You can now view and download portions of your medical record. 10. Click the Download Summary menu link to download a portable copy of your medical information. Additional Information If you have questions, please visit the Frequently Asked Questions section of the Compass website at [email or web address] Remember, Compass is NOT to be used for urgent needs. For medical emergencies, dial 911. Patient armband removed and shredded Discharge Orders None Compass Announcement We are excited to announce that we are making your provider's discharge notes available to you in Compass.   You will see these notes when they are completed and signed by the physician that discharged you from your recent hospital stay. If you have any questions or concerns about any information you see in MyChart, please call the Health Information Department where you were seen or reach out to your Primary Care Provider for more information about your plan of care. General Information Please provide this summary of care documentation to your next provider. Patient Signature:  ____________________________________________________________ Date:  ____________________________________________________________  
  
Dewane Salen Provider Signature:  ____________________________________________________________ Date:  ____________________________________________________________

## 2017-08-25 NOTE — OP NOTES
[address]    Name:  Mary Ellen Abel  MR#:  [id number]  :  1945  Account #:  [de-identified]  Date of Adm:  2017  Date of Surgery:  2017      PREOPERATIVE DIAGNOSIS: Left knee tricompartmental  degenerative osteoarthritis. POSTOPERATIVE DIAGNOSIS: Left knee tricompartmental  degenerative osteoarthritis. PROCEDURES PERFORMED: Left knee cemented tricompartmental  posterior stabilized rotating platform total knee arthroplasty. ANESTHESIA:  1. General.  2. Block. 3. Exparel. SURGEON: Tomasa Fernandes MD    ASSISTANT:  1. Rizwana Heard. 2. Wandy Latham. ESTIMATED BLOOD LOSS: 50 mL. ANTIBIOTICS: 2 grams cefazolin. TOURNIQUET TIME: 57 minutes at 275 mmHg. IMPLANTS:  1. DePuy Attune size 7 left posterior stabilized femur. 2. DePuy Attune size 5 tibial tray. 3. DePuy Attune size 75 mm posterior stabilized rotating platform  polyethylene. 4. A 38 mm anatomic patella. SPECIMENS REMOVED: None. DESCRIPTION OF PROCEDURE: The patient was identified in the  preoperative holding area. The left leg was marked with indelible  marker. I reviewed the informed consent, block was placed. She was  transported to the operative theater. SCDs were placed. Antibiotics  were administered. Tranexamic acid  was administered. Anesthesia was induced. A nonsterile tourniquet  was placed on the left leg. A bump was placed under the left hip, the  left leg was prepped and draped in the usual sterile fashion. Robust  time-out was performed. The extremity was exsanguinated and  tourniquet inflated. A mid vastus approach to the left knee was performed. Skin and  subcutaneous tissues were dissected. A median parapatellar  arthrotomy was created. This was extended proximally through the  vastus medialis obliquus fibers. A synovectomy was performed. The  deep medial collateral ligament was elevated off of the tibia. The fat  pad was excised.  The patella was subluxed laterally. The distal femur  was exposed. An intramedullary guide donna was placed. A 5-degree  valgus, 9 mm distal femoral cut was performed. The ACL and PCL  were transected. The knee was hyperflexed and the proximal tibia  exposed. A proximal tibia cut was performed. Utilizing an intramedullary guide  donna, a 9 mm cut templated off the lateral side was created. This was  orthogonal with  mechanical axis of the tibia. The collateral ligaments and patellar  tendon were protected at all times. The tibial plateau was then  removed. The medial and lateral menisci were removed in their  entirety. The knee was brought into full extension. There was a  symmetric extension gap with a 5 mm implant. Attention was then returned to the distal femur. The epicondylar axis  was marked. An anteriorly referenced sizing guide was externally  rotated 3 degrees from the posterior condylar axis. This created an  anterior and posterior cut that were parallel to the epicondylar axis. A  size 7 implant was found to be the appropriate size. The 4-in-1 cutting  guide was placed. Anterior cuts, posterior cuts, anterior and posterior  chamfers were completed. Posteromedial and posterolateral osteophytes were removed. Our  extension and flexion gaps were now nicely balanced with a 5 mm  spacer. Attention was then returned to the tibia in the tibia was exposed. A size  5 tibial tray was found to give appropriate coverage. This was  externally rotated in order to completely fill the posterolateral tibial  plateau. This was reamed and punched. A 5 mm implant was trialed. The knee came into full extension, full flexion with excellent collateral  ligament stability. Attention was then turned to the patella. The patella was everted. It  was 25 mm thick. A 10 mm cut was then performed, leaving 15 mm  remaining. A 38 mm anatomic patellar component was drilled and  trialed. There was excellent patellar tracking.  All trial components were  removed. The cut surfaces were thoroughly irrigated. The  posteromedial and posterolateral capsule were injected with Exparel  local anesthetic. Cut surfaces were meticulously dried. Cementation of final components was performed. The proximal tibia,  followed by the distal femur, followed by the patella were cemented. A  trial polyethylene was placed. The knee was brought into full  extension. The tourniquet was deflated. Hemostasis was obtained. The  wounds were thoroughly irrigated with Betadine and normal saline. Once complete cement curing was achieved, any excess cement was  meticulously removed. Again, there was appropriate knee stability with  a 5 mm implant. The final polyethylene was placed. Final irrigation was  performed. Periarticular tissues were injected with Exparel local  anesthetic. Wound closure was performed. The knee was brought into mid flexion. The arthrotomy was reapproximated with 0 Ethibond suture. This was  oversewn with a Stratafix suture for a watertight closure. Irrigation was  performed between layers. A layered closure was performed. Skin glue  was placed on the skin. Sterile dressings were applied. A cooling  device was incorporated. The patient was awakened from anesthesia  and transported to the post-anesthesia care unit in stable condition. All  sponge and instrument counts were correct at the end of procedure.         MD TALITA Willis / Enrique Aguilar  D:  08/25/2017   16:37  T:  08/25/2017   17:13  Job #:  [id number]

## 2017-08-25 NOTE — ANESTHESIA PREPROCEDURE EVALUATION
Anesthetic History   No history of anesthetic complications            Review of Systems / Medical History  Patient summary reviewed and pertinent labs reviewed    Pulmonary        Sleep apnea: BiPAP           Neuro/Psych         Psychiatric history (Depression)     Cardiovascular    Hypertension: well controlled              Exercise tolerance: >4 METS     GI/Hepatic/Renal     GERD: well controlled           Endo/Other    Diabetes: type 2    Arthritis     Other Findings   Comments:   Risk Factors for Postoperative nausea/vomiting:       History of postoperative nausea/vomiting? NO       Female? YES       Motion sickness? NO       Intended opioid administration for postoperative analgesia? NO      Smoking Abstinence  Current Smoker? NO  Elective Surgery? YES  Seen preoperatively by anesthesiologist or proxy prior to day of surgery? YES  Pt abstained from smoking 24 hours prior to anesthesia?  N/A           Physical Exam    Airway  Mallampati: II  TM Distance: 4 - 6 cm  Neck ROM: normal range of motion   Mouth opening: Normal     Cardiovascular  Regular rate and rhythm,  S1 and S2 normal,  no murmur, click, rub, or gallop             Dental  No notable dental hx       Pulmonary  Breath sounds clear to auscultation               Abdominal  GI exam deferred       Other Findings            Anesthetic Plan    ASA: 3  Anesthesia type: general and regional - femoral single shot          Induction: Intravenous  Anesthetic plan and risks discussed with: Patient

## 2017-08-25 NOTE — ANESTHESIA PROCEDURE NOTES
Peripheral Block    Start time: 8/25/2017 1:15 PM  End time: 8/25/2017 1:25 PM  Performed by: Lizzie Thomas  Authorized by: Lizzie Thomas       Pre-procedure: Indications: at surgeon's request, post-op pain management and procedure for pain    Preanesthetic Checklist: patient identified, risks and benefits discussed, site marked, timeout performed, anesthesia consent given and patient being monitored    Timeout Time: 13:15          Block Type:   Block Type:  Femoral single shot  Laterality:  Left  Monitoring:  Standard ASA monitoring, continuous pulse ox, frequent vital sign checks, oxygen, heart rate and responsive to questions  Injection Technique:  Single shot  Procedures: ultrasound guided and nerve stimulator    Patient Position: supine  Prep: chlorhexidine    Location:  Upper thigh  Needle Type:  Stimuplex  Needle Gauge:  21 G  Needle Localization:  Ultrasound guidance and nerve stimulator  Motor Response: minimal motor response >0.4 mA    Medication Injected:  0.2%  ropivacaine  Volume (mL):  30    Assessment:  Number of attempts:  1  Injection Assessment:  No paresthesia, incremental injection every 5 mL, ultrasound image on chart, no intravascular symptoms, negative aspiration for blood and local visualized surrounding nerve on ultrasound  Patient tolerance:  Patient tolerated the procedure well with no immediate complications  Location:  PREOP HOLDING    Patient given 2 mg IV Versed and 50 mcg IV Fentanyl for sedation.     8/25/2017     1:38 PM     Jaziel Love MD

## 2017-08-25 NOTE — PROCEDURES
Pre-op DX: left knee OA  Post-op DX: Same  Procedure: L TKA  Anesthesia: GETA, block, exparel  Surgeon: Yoshi Wall  Antibiotics: cefazolin  EBL: 50mL  Implants:  -Depuy attune size 7 L PS femur  -Depuy attune size 5 tibial tray  -Size 5 mm poly  - 38 mm patella    Plan:  -xarelto x 21 days  -PT/OT  -pain control  -cefazolin x 23 hours  -Dispo: home with home health  -admit to ortho  -routine post-op care     op note [id number]

## 2017-08-25 NOTE — H&P
Surgery History and Physical    Subjective:      Thiago Umana is a 67 y.o. female who presents for elective left total knee arthroplasty. She has previously had a successful R TKA. 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    1973-asymptomatic    Hypertension     Psychiatric disorder     Depression    Unspecified sleep apnea     Bipap     Past Surgical History:   Procedure Laterality Date    HX APPENDECTOMY      HX BLADDER SUSPENSION      4x    HX CHOLECYSTECTOMY      HX HEENT      Sx for droopy eyes    HX ORTHOPAEDIC  10/2013    RIGHT TKA    HX SEPTOPLASTY      HX SHOULDER REPLACEMENT Right     reverse    HX TONSILLECTOMY      HX TUBAL LIGATION        Family History   Problem Relation Age of Onset   [facility] Father 61     lung     Social History   Substance Use Topics    Smoking status: Never Smoker    Smokeless tobacco: Never Used    Alcohol use Yes      Comment: wine occasionally      Prior to Admission medications    Medication Sig Start Date End Date Taking? Authorizing Provider   allergy injection every seven (7) days. Yes Historical Provider   ARIPiprazole (ABILIFY) 2 mg tablet Take 2 mg by mouth daily. Yes Historical Provider   Armodafinil (NUVIGIL) 250 mg tab tablet Take 250 mg by mouth Daily (before breakfast). Yes Historical Provider   metoprolol succinate (TOPROL-XL) 100 mg tablet Take 100 mg by mouth daily. Yes Historical Provider   ferrous sulfate (IRON) 325 mg (65 mg iron) tablet Take 325 mg by mouth Daily (before breakfast). Yes Historical Provider   cholecalciferol (VITAMIN D3) 1,000 unit tablet Take 1,000 Units by mouth daily.    Yes Historical Provider   multivitamin (ONE A DAY) tablet Take 1 Tab by mouth daily. Yes Historical Provider   GLUCOSAMINE/CHONDRO CARLIN A/C/MN (GLUCOSAMINE-CHONDROITIN COMPLX PO) Take  by mouth. Yes Historical Provider   traMADol (ULTRAM) 50 mg tablet Take 50 mg by mouth every six (6) hours as needed for Pain. Yes Michael Wheeler MD   amlodipine (NORVASC) 10 mg tablet Take  by mouth nightly. Indications: HYPERTENSION   Yes Historical Provider   lisinopril (PRINIVIL, ZESTRIL) 40 mg tablet Take 40 mg by mouth nightly. Indications: HYPERTENSION   Yes Historical Provider   chlorthalidone (HYGROTEN) 25 mg tablet Take  by mouth nightly. Indications: HYPERTENSION   Yes Historical Provider   mometasone (NASONEX) 50 mcg/actuation nasal spray 2 sprays two (2) times a day. Yes Historical Provider   FLAXSEED PO Take  by mouth two (2) times a day. Yes Historical Provider   glyBURIDE-metFORMIN (GLUCOVANCE) 5-500 mg per tablet Take 2 Tabs by mouth two (2) times daily (with meals). Yes Historical Provider   aspirin delayed-release 81 mg tablet Take  by mouth daily. Yes Historical Provider   atorvastatin (LIPITOR) 40 mg tablet Take  by mouth nightly. Yes Historical Provider   sertraline (ZOLOFT) 100 mg tablet Take 200 mg by mouth daily. Yes Historical Provider   tiaGABine (GABITRIL) 2 mg tablet Take 1 Tab by mouth every evening. 8/24/17   Zain Gutierrez MD   VIT C/VIT E/LUTEIN/MIN/OMEGA-3 (OCUVITE PO) Take  by mouth. Historical Provider   pregabalin (LYRICA) 150 mg capsule Take 1 Cap by mouth two (2) times a day.  Max Daily Amount: 300 mg. 7/13/17   Zain Gutierrez MD      Allergies   Allergen Reactions    Other Medication Other (comments)     SEASONAL ALLERGIES    Topamax [Topiramate] Other (comments)     \"irritable\"         Review of Systems:  As per HPI for left knee pain    Objective:     Temp: 98.8 °F (37.1 °C) (08/25/17 1254) Pulse (Heart Rate): (!) 59 (08/25/17 1254) Resp Rate: 17 (08/25/17 1254) BP: 145/56 (08/25/17 1254) O2 Sat (%): 97 % (08/25/17 1254) Weight: 97.1 kg (214 lb) (08/25/17 1254)     LLE varus alignment. ROM 3-110. Distal examination intact. Assessment:     Left knee OA    Plan: Will proceed with L TKA as scheduled.     Signed By: Katerina Mandujano MD     August 25, 2017

## 2017-08-26 LAB
ANION GAP SERPL CALC-SCNC: 7 MMOL/L (ref 3–18)
BUN SERPL-MCNC: 17 MG/DL (ref 7–18)
BUN/CREAT SERPL: 19 (ref 12–20)
CALCIUM SERPL-MCNC: 8.2 MG/DL (ref 8.5–10.1)
CHLORIDE SERPL-SCNC: 104 MMOL/L (ref 100–108)
CO2 SERPL-SCNC: 29 MMOL/L (ref 21–32)
CREAT SERPL-MCNC: 0.89 MG/DL (ref 0.6–1.3)
GLUCOSE BLD STRIP.AUTO-MCNC: 148 MG/DL (ref 70–110)
GLUCOSE BLD STRIP.AUTO-MCNC: 171 MG/DL (ref 70–110)
GLUCOSE BLD STRIP.AUTO-MCNC: 237 MG/DL (ref 70–110)
GLUCOSE SERPL-MCNC: 171 MG/DL (ref 74–99)
HCT VFR BLD AUTO: 33.7 % (ref 35–45)
HGB BLD-MCNC: 10.4 G/DL (ref 12–16)
POTASSIUM SERPL-SCNC: 3.6 MMOL/L (ref 3.5–5.5)
SODIUM SERPL-SCNC: 140 MMOL/L (ref 136–145)

## 2017-08-26 PROCEDURE — 74011250637 HC RX REV CODE- 250/637: Performed by: ORTHOPAEDIC SURGERY

## 2017-08-26 PROCEDURE — 97165 OT EVAL LOW COMPLEX 30 MIN: CPT

## 2017-08-26 PROCEDURE — 74011636637 HC RX REV CODE- 636/637: Performed by: ORTHOPAEDIC SURGERY

## 2017-08-26 PROCEDURE — 77030027138 HC INCENT SPIROMETER -A

## 2017-08-26 PROCEDURE — 36415 COLL VENOUS BLD VENIPUNCTURE: CPT | Performed by: ORTHOPAEDIC SURGERY

## 2017-08-26 PROCEDURE — 97116 GAIT TRAINING THERAPY: CPT

## 2017-08-26 PROCEDURE — 80048 BASIC METABOLIC PNL TOTAL CA: CPT | Performed by: ORTHOPAEDIC SURGERY

## 2017-08-26 PROCEDURE — 85018 HEMOGLOBIN: CPT | Performed by: ORTHOPAEDIC SURGERY

## 2017-08-26 PROCEDURE — 74011250636 HC RX REV CODE- 250/636: Performed by: ORTHOPAEDIC SURGERY

## 2017-08-26 PROCEDURE — 97530 THERAPEUTIC ACTIVITIES: CPT

## 2017-08-26 PROCEDURE — 82962 GLUCOSE BLOOD TEST: CPT

## 2017-08-26 PROCEDURE — 97161 PT EVAL LOW COMPLEX 20 MIN: CPT

## 2017-08-26 PROCEDURE — 65270000029 HC RM PRIVATE

## 2017-08-26 RX ORDER — FLUCONAZOLE 100 MG/1
150 TABLET ORAL ONCE
Status: COMPLETED | OUTPATIENT
Start: 2017-08-26 | End: 2017-08-26

## 2017-08-26 RX ADMIN — OXYCODONE HYDROCHLORIDE 10 MG: 5 TABLET ORAL at 00:05

## 2017-08-26 RX ADMIN — DOCUSATE SODIUM 100 MG: 100 CAPSULE, LIQUID FILLED ORAL at 08:58

## 2017-08-26 RX ADMIN — OXYCODONE HYDROCHLORIDE 15 MG: 5 TABLET ORAL at 04:42

## 2017-08-26 RX ADMIN — CEFAZOLIN SODIUM 2 G: 2 SOLUTION INTRAVENOUS at 13:58

## 2017-08-26 RX ADMIN — POLYETHYLENE GLYCOL 3350 17 G: 17 POWDER, FOR SOLUTION ORAL at 08:58

## 2017-08-26 RX ADMIN — ACETAMINOPHEN 650 MG: 325 TABLET ORAL at 12:12

## 2017-08-26 RX ADMIN — FLUCONAZOLE 150 MG: 100 TABLET ORAL at 12:11

## 2017-08-26 RX ADMIN — SODIUM CHLORIDE 125 ML/HR: 900 INJECTION, SOLUTION INTRAVENOUS at 17:48

## 2017-08-26 RX ADMIN — METOPROLOL SUCCINATE 100 MG: 100 TABLET, EXTENDED RELEASE ORAL at 08:57

## 2017-08-26 RX ADMIN — PREGABALIN 150 MG: 75 CAPSULE ORAL at 10:01

## 2017-08-26 RX ADMIN — Medication 10 ML: at 21:28

## 2017-08-26 RX ADMIN — INSULIN LISPRO 4 UNITS: 100 INJECTION, SOLUTION INTRAVENOUS; SUBCUTANEOUS at 13:57

## 2017-08-26 RX ADMIN — ACETAMINOPHEN 650 MG: 325 TABLET ORAL at 08:58

## 2017-08-26 RX ADMIN — CELECOXIB 200 MG: 100 CAPSULE ORAL at 10:01

## 2017-08-26 RX ADMIN — Medication 5 ML: at 14:00

## 2017-08-26 RX ADMIN — ACETAMINOPHEN 650 MG: 325 TABLET ORAL at 21:19

## 2017-08-26 RX ADMIN — RIVAROXABAN 10 MG: 10 TABLET, FILM COATED ORAL at 08:58

## 2017-08-26 RX ADMIN — ACETAMINOPHEN 650 MG: 325 TABLET ORAL at 00:05

## 2017-08-26 RX ADMIN — ATORVASTATIN CALCIUM 40 MG: 40 TABLET, FILM COATED ORAL at 21:18

## 2017-08-26 RX ADMIN — CEFAZOLIN SODIUM 2 G: 2 SOLUTION INTRAVENOUS at 06:49

## 2017-08-26 RX ADMIN — OXYCODONE HYDROCHLORIDE 15 MG: 5 TABLET ORAL at 12:11

## 2017-08-26 RX ADMIN — SODIUM CHLORIDE 125 ML/HR: 900 INJECTION, SOLUTION INTRAVENOUS at 03:38

## 2017-08-26 RX ADMIN — CELECOXIB 200 MG: 100 CAPSULE ORAL at 21:18

## 2017-08-26 RX ADMIN — DOCUSATE SODIUM 100 MG: 100 CAPSULE, LIQUID FILLED ORAL at 17:41

## 2017-08-26 RX ADMIN — ACETAMINOPHEN 650 MG: 325 TABLET ORAL at 16:09

## 2017-08-26 RX ADMIN — PREGABALIN 150 MG: 75 CAPSULE ORAL at 21:18

## 2017-08-26 RX ADMIN — Medication 10 ML: at 06:48

## 2017-08-26 RX ADMIN — OXYCODONE HYDROCHLORIDE 15 MG: 5 TABLET ORAL at 08:58

## 2017-08-26 RX ADMIN — OXYCODONE HYDROCHLORIDE 15 MG: 5 TABLET ORAL at 21:18

## 2017-08-26 RX ADMIN — OXYCODONE HYDROCHLORIDE 15 MG: 5 TABLET ORAL at 16:09

## 2017-08-26 RX ADMIN — ACETAMINOPHEN 650 MG: 325 TABLET ORAL at 04:42

## 2017-08-26 RX ADMIN — INSULIN LISPRO 2 UNITS: 100 INJECTION, SOLUTION INTRAVENOUS; SUBCUTANEOUS at 17:41

## 2017-08-26 NOTE — PROGRESS NOTES
Aby Dawkins PN  Pt doing well, c/o of itching secondary to yeast infection  AFVSS  LLE:  Dressing c/d/i  SILT  EHL/FHL/DF/PF intact  2+ DP pulse    A L TKA  P  1. Pain control  2. Diflucan  3. Xarelto  4. OOB  5. PT/OT  6. Home likely tomorrow with home health    Trixie 1827.  Filemon Lopez MD

## 2017-08-26 NOTE — PROGRESS NOTES
Problem: Self Care Deficits Care Plan (Adult)  Goal: *Acute Goals and Plan of Care (Insert Text)  Outcome: Resolved/Met Date Met:  08/26/17  OCCUPATIONAL THERAPY EVALUATION/DISCHARGE     Patient: Bairon Lauren (38 y.o. female)  Date: 8/26/2017  Primary Diagnosis: Osteoarthritis of left knee, unspecified osteoarthritis type [M17.12]  Procedure(s) (LRB):  LEFT TOTAL KNEE ARTHROPLASTY/DEPUY/2 SA'S/FEMORAL NERVE BLOCK (Left) 1 Day Post-Op   Precautions:  Fall, WBAT      ASSESSMENT AND RECOMMENDATIONS:  Based on the objective data described below, the patient needed CGA with functional mobility/toilet transfer with rolling walker and additional time/min cues for walker management. Patient had WFL BUE AROM and strength. Patient reported that her  is going to help with all LE self care tasks at home. Patient deferred to PT for mobility. Skilled acute care occupational therapy is not indicated at this time. Discharge Recommendations: Home Health  Further Equipment Recommendations for Discharge: N/A       Barriers to Learning/Limitations: None      COMPLEXITY      Eval Complexity: History: LOW Complexity : Brief history review ; Examination: LOW Complexity : 1-3 performance deficits relating to physical, cognitive , or psychosocial skils that result in activity limitations and / or participation restrictions ; Decision Making:LOW Complexity : No comorbidities that affect functional and no verbal or physical assistance needed to complete eval tasks  Assessment: Low Complexity          G-CODES:      Self Care  Current  CJ= 20-39%   Goal  CJ= 20-39%   D/C  CJ= 20-39%. The severity rating is based on the Level of Assistance required for Functional Mobility and ADLs. SUBJECTIVE:   Patient stated My  will do that. 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     1973-asymptomatic    Hypertension      Psychiatric disorder       Depression    Unspecified sleep apnea       Bipap     Past Surgical History:   Procedure Laterality Date    HX APPENDECTOMY        HX BLADDER SUSPENSION         4x    HX CHOLECYSTECTOMY        HX HEENT         Sx for droopy eyes    HX ORTHOPAEDIC   10/2013     RIGHT TKA    HX SEPTOPLASTY        HX SHOULDER REPLACEMENT Right       reverse    HX TONSILLECTOMY        HX TUBAL LIGATION         Prior Level of Function/Home Situation: Patient reported she was modified independent in basic self care tasks and functional mobility PTA, with cane. Home Situation  Home Environment: Private residence  # Steps to Enter: 4  One/Two Story Residence: One story  Living Alone: No  Support Systems: Spouse/Significant Other/Partner  Patient Expects to be Discharged to[de-identified] Private residence  Current DME Used/Available at Home: Norma Jackson, samira, [address] chair, Grab bars, Commode, bedside  Tub or Shower Type: Shower  [X]     Right hand dominant       [ ]     Left hand dominant  Cognitive/Behavioral Status:  Neurologic State: Alert  Orientation Level: Oriented X4  Cognition: Follows commands     Skin: No skin changes noted     Edema: No edema noted     Vision/Perceptual:       Acuity: Within Defined Limits       Coordination:  Coordination: Within functional limits (BUEs)       Balance:  Sitting: Intact  Standing: Impaired; With support (rolling walker)  Standing - Static: Good  Standing - Dynamic : Fair     Strength:  Strength:  Within functional limits (BUEs)     Tone & Sensation:  Tone: Normal (BUEs)  Sensation: Intact (BUEs)     Range of Motion:  AROM: Within functional limits (BUEs)     Functional Mobility and Transfers for ADLs:  Bed Mobility:  Rolling: Supervision  Supine to Sit: Minimum assistance  Scooting: Contact guard assistance  Transfers:  Sit to Stand: Contact guard assistance              Toilet Transfer : Contact guard assistance (with rolling walker)                 ADL Assessment:(clinical judgement)  Feeding: Independent     Oral Facial Hygiene/Grooming: Contact guard assistance     Bathing: Maximum assistance (LE's )     Upper Body Dressing: Independent     Lower Body Dressing: Maximum assistance     Toileting: Contact guard assistance     Pain:  Pt reports 9/10 pain or discomfort prior to treatment. Pt reports 9/10 pain or discomfort post treatment. Activity Tolerance:  Good     Please refer to the flowsheet for vital signs taken during this treatment. After treatment:   [X]  Patient left in no apparent distress sitting up in chair  [ ]  Patient left in no apparent distress in bed  [X]  Call bell left within reach  [X]  Nursing notified  [ ]  Caregiver present  [ ]  Bed alarm activated      COMMUNICATION/EDUCATION:   Communication/Collaboration:  [X]      Home safety education was provided and the patient/caregiver indicated understanding. [X]      Patient/family have participated as able and agree with findings and recommendations. [ ]      Patient is unable to participate in plan of care at this time.      Olivier Gonzalez OTR/L  Time Calculation: 19 mins

## 2017-08-26 NOTE — PROGRESS NOTES
Problem: Mobility Impaired (Adult and Pediatric)  Goal: *Acute Goals and Plan of Care (Insert Text)  Physical Therapy Goals  Initiated 8/26/2017 and to be accomplished within 7 day(s)  1. Patient will move from supine to sit and sit to supine in bed with supervision/set-up. 2. Patient will transfer from bed to chair and chair to bed with minimal assistance/contact guard assist using the least restrictive device. 3. Patient will perform sit to stand with supervision/set-up. 4. Patient will ambulate with supervision/set-up for 200 feet with the least restrictive device. 5. Patient will ascend/descend 4 stairs with L handrail ascending with minimal assistance/contact guard assist.  PHYSICAL THERAPY EVALUATION     Patient: oRss Moser (2 y.o. female)  Date: 8/26/2017  Primary Diagnosis: Osteoarthritis of left knee, unspecified osteoarthritis type [M17.12]  Procedure(s) (LRB):  LEFT TOTAL KNEE ARTHROPLASTY/DEPUY/2 SA'S/FEMORAL NERVE BLOCK (Left) 1 Day Post-Op   Precautions: Fall, LLE WBAT         ASSESSMENT :  Based on the objective data described below, the patient presents with decreased left knee range of motion, decreased LLE strength, impaired balance reactions and decreased functional independence s/p L TKR. Pt has hx of R TKR and R total shoulder replacement. Pt received reclined in recliner and agreeable to PT session. Discussed therapeutic exercise post TKR to be completed every hour (see below), pt verbalized understanding. Pt able to stand with Bronwyn to RW. Performed x10 weight shifts to left side increasing weight as able. Pt able to  right LE x1 second. Ambulated in room x20 feet with CGA and RW. Ambulated to bathroom, navigating with RW with verbal cuing. Able to perform toileting with SBA. Needing Bronwyn for LE management for sit to supine in bed. Pt was left in bed with all needs met and call bell in reach.       Patient will benefit from skilled intervention to address the above impairments. Patients rehabilitation potential is considered to be Excellent  Factors which may influence rehabilitation potential include:   [X]         None noted  [ ]         Mental ability/status  [ ]         Medical condition  [ ]         Home/family situation and support systems  [ ]         Safety awareness  [ ]         Pain tolerance/management  [ ]         Other:        PLAN :  Recommendations and Planned Interventions:  [X]           Bed Mobility Training             [ ]    Neuromuscular Re-Education  [X]           Transfer Training                   [ ]    Orthotic/Prosthetic Training  [X]           Gait Training                          [ ]    Modalities  [X]           Therapeutic Exercises          [ ]    Edema Management/Control  [X]           Therapeutic Activities            [X]    Patient and Family Training/Education  [ ]           Other (comment):     Frequency/Duration: Patient will be followed by physical therapy 1-2 times per day to address goals. Discharge Recommendations: Outpatient  Further Equipment Recommendations for Discharge: rolling walker        SUBJECTIVE:   Patient stated I guess I have to do it to get better, I am going to [place]. 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     1973-asymptomatic    Hypertension      Psychiatric disorder       Depression    Unspecified sleep apnea       Bipap     Past Surgical History:   Procedure Laterality Date    HX APPENDECTOMY        HX BLADDER SUSPENSION         4x    HX CHOLECYSTECTOMY        HX HEENT         Sx for droopy eyes    HX ORTHOPAEDIC   10/2013     RIGHT TKA    HX SEPTOPLASTY        HX SHOULDER REPLACEMENT Right       reverse    HX TONSILLECTOMY        HX TUBAL LIGATION         Barriers to Learning/Limitations: None  Compensate with: N/A  Prior Level of Function/Home Situation: Pt reports living in 1 story house with 4 YANICK with handrail on left side while ascending. Pt uses SPC at home for ambulation. She lives with her  and still drives her vehicle. Home Situation  Home Environment: Private residence  # Steps to Enter: 4  One/Two Story Residence: One story  Living Alone: No  Support Systems: Spouse/Significant Other/Partner  Patient Expects to be Discharged to[de-identified] Private residence  Current DME Used/Available at Home: None  Critical Behavior:     Psychosocial  Purposeful Interaction: Yes  Pt Identified Daily Priority: Clinical issues (comment)  [employer] Process: Establish trust;Teaching/learning; Attend basic human needs  Caring Interventions: Reassure; Therapeutic modalities  Reassure: Therapeutic listening; Informing  Therapeutic Modalities: Humor  Strength:    Strength: Generally decreased, functional  Tone & Sensation:   Tone: Normal  Sensation: Intact   Range Of Motion:  AROM: Generally decreased, functional   Able to achieve ~60 degrees of L knee flexion during heel slides, in sitting able to achieve ~85 degrees of L knee flexion  Functional Mobility:  Bed Mobility:        Sit to Supine: Minimum assistance (for LLE management)  Scooting: Stand-by asssistance  Transfers:  Sit to Stand: Minimum assistance  Stand to Sit: Contact guard assistance  Balance:   Sitting: Intact  Standing: Impaired  Standing - Static: Fair;Unsupported  Standing - Dynamic : Fair  Ambulation/Gait Training:  Distance (ft): 30 Feet (ft) (x20, x10)  Assistive Device: Walker, rolling  Ambulation - Level of Assistance: Contact guard assistance        Base of Support: Narrowed  Speed/Serena: Slow   Step to gait pattern on right, verbal cuing for proper RW management throughout  Stairs: Therapeutic Exercises:   Instructed in ankle pumps, quad sets and heel slides x10  Pain:  Pain Scale 1: Numeric (0 - 10)  Pain Intensity 1: 6  Pain Location 1: Knee  Pain Orientation 1: Left  Pain Description 1: Aching  Pain Intervention(s) 1: Medication (see MAR)  Activity Tolerance:   Good activity tolerance without complaints of fatigue during ambulation and toileting. Please refer to the flowsheet for vital signs taken during this treatment. After treatment:   [ ] Patient left in no apparent distress sitting up in chair  [ ] Patient left sitting on EOB  [X] Patient left in no apparent distress in bed     [X] Call bell left within reach  [ ] Nursing notified  [ ] Caregiver present  [ ] Bed alarm activated      COMMUNICATION/EDUCATION:   [X]         Fall prevention education was provided and the patient/caregiver indicated understanding. [X]         Patient/family have participated as able in goal setting and plan of care. [X]         Patient/family agree to work toward stated goals and plan of care. [ ]         Patient understands intent and goals of therapy, but is neutral about his/her participation. [ ]         Patient is unable to participate in goal setting and plan of care. Thank you for this referral.  Naman Lamas, PT   Time Calculation: 40 mins      Mobility  Current  CJ= 20-39%   Goal  CI= 1-19%. The severity rating is based on the Level of Assistance required for Functional Mobility and ADLs.   Eval Complexity: History: MEDIUM  Complexity : 1-2 comorbidities / personal factors will impact the outcome/ POC Exam:MEDIUM Complexity : 3 Standardized tests and measures addressing body structure, function, activity limitation and / or participation in recreation  Presentation: LOW Complexity : Stable, uncomplicated Overall Complexity:LOW

## 2017-08-26 NOTE — ROUTINE PROCESS
Patients IV infiltrated. IV removed. Patient stated she doesn't want IV back in if she doesn't have any more antibiotics due.

## 2017-08-26 NOTE — PROGRESS NOTES
Problem: Mobility Impaired (Adult and Pediatric)  Goal: *Acute Goals and Plan of Care (Insert Text)  Physical Therapy Goals  Initiated 8/26/2017 and to be accomplished within 7 day(s)  1. Patient will move from supine to sit and sit to supine in bed with supervision/set-up. 2. Patient will transfer from bed to chair and chair to bed with minimal assistance/contact guard assist using the least restrictive device. 3. Patient will perform sit to stand with supervision/set-up. 4. Patient will ambulate with supervision/set-up for 200 feet with the least restrictive device. 5. Patient will ascend/descend 4 stairs with L handrail ascending with minimal assistance/contact guard assist.   PHYSICAL THERAPY TREATMENT     Patient: Talia Collins (58 y.o. female)  Date: 8/26/2017  Diagnosis: Osteoarthritis of left knee, unspecified osteoarthritis type [M17.12] <principal problem not specified>  Procedure(s) (LRB):  LEFT TOTAL KNEE ARTHROPLASTY/DEPUY/2 SA'S/FEMORAL NERVE BLOCK (Left) 1 Day Post-Op  Precautions: Fall, WBAT   Chart, physical therapy assessment, plan of care and goals were reviewed. ASSESSMENT:    Pt received sitting in recliner and agreeable to treatment session. Completed toileting with mod I with verbal cues for proper walker management. Ambulated x100 feet with RW and CGA with decreased gait speed and step to gait pattern. Continued with stair training with 6 inch step x15 reps with CGA/Kala and biateral UE support on walker, discussed proper foot placement and verbalized understanding. Discussed RW adjustments to height for RW at home and discussed LE positioning to prevent knee flexion contractures. L knee flexion to ~90 degrees during heel slides. Pt returned to bed with all needs met and call bell in alexsander, RN notified.    Progression toward goals:  [X]      Improving appropriately and progressing toward goals  [ ]      Improving slowly and progressing toward goals  [ ]      Not making progress toward goals and plan of care will be adjusted       PLAN:  Patient continues to benefit from skilled intervention to address the above impairments. Continue treatment per established plan of care. Discharge Recommendations:  Outpatient  Further Equipment Recommendations for Discharge:  Rolling walker and cane at home       SUBJECTIVE:   Patient stated I can work with you.       OBJECTIVE DATA SUMMARY:   Critical Behavior:  Neurologic State: Alert  Orientation Level: Oriented X4  Cognition: Follows commands     Functional Mobility Training:  Bed Mobility:  Rolling: Supervision  Supine to Sit: Minimum assistance  Sit to Supine: Minimum assistance (for LLE management)  Scooting: Contact guard assistance                    Transfers:  Sit to Stand: Contact guard assistance  Stand to Sit: Stand-by asssistance  Balance:  Sitting: Intact  Standing: Impaired; With support (rolling walker)  Standing - Static: Good  Standing - Dynamic : Fair  Ambulation/Gait Training:  Distance (ft): 100 Feet (ft)  Assistive Device: Walker, rolling  Ambulation - Level of Assistance: Contact guard assistance     Gait Description (WDL): Exceptions to WDL           Base of Support: Narrowed     Speed/Serena: Slow  Step Length: Right shortened (step to gait pattern)        Stairs:  Number of Stairs Trained: 15  Stairs - Level of Assistance: Contact guard assistance;Minimum assistance  Rail Use: Both     Therapeutic Exercises:   Heel slides x15  Pain:  4/10  Activity Tolerance:   Improving activity tolerance, standing x40 minutes without rest break, completing toileting, stair training and gait training without complaints of fatigue  Please refer to the flowsheet for vital signs taken during this treatment.   After treatment:   [ ] Patient left in no apparent distress sitting up in chair  [X] Patient left in no apparent distress in bed  [X] Call bell left within reach  [X] Nursing notified  [ ] Caregiver present  [ ] Bed alarm activated Wash Lower, PT   Time Calculation: 46 mins      Mobility  Current  CJ= 20-39%. The severity rating is based on the Level of Assistance required for Functional Mobility and ADLs.

## 2017-08-26 NOTE — PERIOP NOTES
TRANSFER - OUT REPORT:    Verbal report given to Cuca Grant RN(name) on Aurora Guzman  being transferred to [address] [place] [address](unit) for routine post - op       Report consisted of patients Situation, Background, Assessment and   Recommendations(SBAR). Information from the following report(s) SBAR, Kardex, OR Summary, Procedure Summary, Intake/Output, MAR, Recent Results and Med Rec Status was reviewed with the receiving nurse. Lines:   Peripheral IV 08/25/17 Right Hand (Active)   Site Assessment Clean, dry, & intact 8/25/2017  4:51 PM   Phlebitis Assessment 0 8/25/2017  4:51 PM   Infiltration Assessment 0 8/25/2017  4:51 PM   Dressing Status Clean, dry, & intact 8/25/2017  4:51 PM   Dressing Type Tape;Transparent 8/25/2017  4:51 PM   Hub Color/Line Status Pink; Infusing 8/25/2017  4:51 PM        Opportunity for questions and clarification was provided.       Patient transported with:   O2 @ 3 liters  Tech

## 2017-08-26 NOTE — PROGRESS NOTES
Problem: Falls - Risk of  Goal: *Absence of Falls  Document Jeremiah Fall Risk and appropriate interventions in the flowsheet.    Outcome: Progressing Towards Goal  Fall Risk Interventions:  Mobility Interventions: Patient to call before getting OOB     Mentation Interventions: Door open when patient unattended     Medication Interventions: Patient to call before getting OOB, Teach patient to arise slowly     Elimination Interventions: Call light in reach, Patient to call for help with toileting needs     History of Falls Interventions: Door open when patient unattended

## 2017-08-26 NOTE — PROGRESS NOTES
2149  Received pt in stable condition,   General: lying in bed in supine, not apparent distress, pain 10/10, med given and tolerated well. Neuro: AOx4, denies numbness, 0 tingling, able to wiggle toes to bilateral extremities  Cardio: pedal pulses palpable, denies chest pain  Respiratory: denies shortness of breath  Skin: Dressing to left knee clean, dry and intact, polar ice in place  GI: head, clear, yellow urine, no odor  : denies nausea and vomiting  Mus: WBAT  Bed in low position and call bell within reach.       3870  AOx4, NAD, stable, head dc'd, pt tolerated well.

## 2017-08-26 NOTE — ROUTINE PROCESS
Patient stable, no signs of distress. Patient in chair eating breakfast.  Patients pain controlled with pain medication.   Patient now working with PT.

## 2017-08-26 NOTE — ROUTINE PROCESS
Patient stable, no signs of distress. Patient worked with PT now back in bed. Pain controlled with pain medication. Family in room with patient.

## 2017-08-26 NOTE — ROUTINE PROCESS
Bedside and Verbal shift change report given to [address] [place] [address] (oncoming nurse) by Yuridia Floyd (offgoing nurse). Report included the following information SBAR, Kardex, MAR and Recent Results. SITUATION:    Code Status: Prior   Reason for Admission: Osteoarthritis of left knee, unspecified osteoarthritis type Aubrey Booth 1560 day: 1   Problem List:       Hospital Problems  Date Reviewed: 2017          Codes Class Noted POA    Knee osteoarthritis ICD-10-CM: M17.10  ICD-9-CM: 715.36  2017 Unknown              BACKGROUND:    Past Medical History:   Past Medical History:   Diagnosis Date    Adverse effect of anesthesia     hard time waking up    Allergic rhinitis     weekly allergy injections    Arthritis     Chronic pain     lower back, shoulders    Diabetes ([facility] [address]     Essential hypertension     GERD (gastroesophageal reflux disease)     Hiatal hernia     History of positive PPD, 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    -asymptomatic    Hypertension     Psychiatric disorder     Depression    Unspecified sleep apnea     Bipap         Patient taking anticoagulants no     ASSESSMENT:    Changes in Assessment Throughout Shift: No     Patient has Central Line: no Reasons if yes:    Patient has Gomez Cath: no Reasons if yes:       Last Vitals:     Vitals:    17 0444 17 0817 17 1200 17 1555   BP: 118/65 126/69 110/62 144/61   Pulse: 64 73 69 65   Resp: 18 18 18 18   Temp: 96.7 °F (35.9 °C) 97.8 °F (36.6 °C) 98.2 °F (36.8 °C) 98.9 °F (37.2 °C)   SpO2: 93% 94% 90% 93%   Weight:       Height:            IV and DRAINS (will only show if present)   Peripheral IV 17 Right Hand-Site Assessment: Clean, dry, & intact  Manuel Drain #1 17 Left; Anterior Knee-Site Assessment: Clean, dry, & intact     WOUND (if present)   Wound Type:  none, incision, knee   Dressing present Dressing Present : No   Wound Concerns/Notes:  none     PAIN    Pain Assessment    Pain Intensity 1: 6 (17 7494)    Pain Location 1: Knee    Pain Intervention(s) 1: Medication (see MAR)    Patient Stated Pain Goal: 0  o Interventions for Pain:  none, Oxycodone  o Intervention effective: yes  o Time of last intervention: 1609  o Reassessment Completed: yes      Last 3 Weights:  Last 3 Recorded Weights in this Encounter    08/14/17 1035 08/25/17 1254   Weight: 97.1 kg (214 lb) 97.1 kg (214 lb)     Weight change:      INTAKE/OUPUT    Current Shift: 08/26 0701 - 08/26 1900  In: 508.3 [I.V.:508.3]  Out: 450 [Urine:200; Drains:250]    Last three shifts: 08/24 1901 - 08/26 0700  In: 5853 [P.O.:200; I.V.:2025]  Out: 840 [Urine:600; Drains:240]     LAB RESULTS     Recent Labs      08/26/17   0323   HGB  10.4*   HCT  33.7*        Recent Labs      08/26/17   0323   NA  140   K  3.6   GLU  171*   BUN  17   CREA  0.89   CA  8.2*       RECOMMENDATIONS AND DISCHARGE PLANNING     1. Pending tests/procedures/ Plan of Care or Other Needs:      2. Discharge plan for patient and Needs/Barriers:     3. Estimated Discharge Date:  Posted on Whiteboard in Patients Room: no      4. The patient's care plan was reviewed with the oncoming nurse. \"HEALS\" SAFETY CHECK      Fall Risk    Total Score: 3    Safety Measures: Safety Measures: Bed/Chair-Wheels locked, Bed in low position, Call light within reach, Gripper socks    A safety check occurred in the patient's room between off going nurse and oncoming nurse listed above.     The safety check included the below items  Area Items   H  High Alert Medications - Verify all high alert medication drips (heparin, PCA, etc.)   E  Equipment - Suction is set up for ALL patients (with yanker)  - Red plugs utilized for all equipment (IV pumps, etc.)  - WOWs wiped down at end of shift.  - Room stocked with oxygen, suction, and other unit-specific supplies   A  Alarms - Bed alarm is set for fall risk patients  - Ensure chair alarm is in place and activated if patient is up in a chair   L  Lines - Check IV for any infiltration  - Gomez bag is empty if patient has a Gomez   - Tubing and IV bags are labeled   S  Safety   - Room is clean, patient is clean, and equipment is clean. - Hallways are clear from equipment besides carts. - Fall bracelet on for fall risk patients  - Ensure room is clear and free of clutter  - Suction is set up for ALL patients (with yanker)  - Hallways are clear from equipment besides carts.    - Isolation precautions followed, supplies available outside room, sign posted     Cyril Nelson

## 2017-08-26 NOTE — PROGRESS NOTES
Problem: Falls - Risk of  Goal: *Absence of Falls  Document Jeremiah Fall Risk and appropriate interventions in the flowsheet.    Outcome: Progressing Towards Goal  Fall Risk Interventions:  Mobility Interventions: Patient to call before getting OOB           Medication Interventions: Teach patient to arise slowly, Patient to call before getting OOB     Elimination Interventions: Call light in reach

## 2017-08-26 NOTE — ANESTHESIA POSTPROCEDURE EVALUATION
Post-Anesthesia Evaluation and Assessment    Patient: Richi Norton MRN: [id number]  SSN: [id number]    YOB: 1945  Age: 67 y.o. Sex: female       Cardiovascular Function/Vital Signs  Visit Vitals    /69 (BP 1 Location: Right arm, BP Patient Position: At rest)    Pulse 73    Temp 36.6 °C (97.8 °F)    Resp 18    Ht 5' 1.5\" (1.562 m)    Wt 97.1 kg (214 lb)    SpO2 94%    BMI 39.78 kg/m2       Patient is status post general, regional anesthesia for Procedure(s):  LEFT TOTAL KNEE ARTHROPLASTY/DEPUY/2 SA'S/FEMORAL NERVE BLOCK. Nausea/Vomiting: None    Postoperative hydration reviewed and adequate. Pain:  Pain Scale 1: Numeric (0 - 10) (08/26/17 0650)  Pain Intensity 1: 3 (08/26/17 0650)   Managed    Neurological Status:   Neuro (WDL): Within Defined Limits (08/25/17 1651)   At baseline    Mental Status and Level of Consciousness: Alert and oriented     Pulmonary Status:   O2 Device: Nasal cannula (08/25/17 1708)   Adequate oxygenation and airway patent    Complications related to anesthesia: None    Post-anesthesia assessment completed. No concerns   Block resolved, pain managed adequately by primary team, all anesthesia questions answered.       Signed By: Jasiel Ruffin MD     August 26, 2017

## 2017-08-26 NOTE — PROGRESS NOTES
Mobility Intervention:       [x] Pt dangled at edge of bed    [] Pt assisted OOB to bedside commode    [] Pt assisted OOB to chair    [] Pt ambulated to bathroom    [] Patient was ambulated in room/hallway    Assistive Device Utilized (check all that apply):       [] Rolling walker   [] Crutches   [] Straight Cane   [] Knee immobilizer   [] IV pole    After Mobilization:     [] Patient left in no apparent distress sitting up in chair  [x] Patient left in no apparent distress in bed  [x] Call bell left within reach  Assistive Device:        [] RN notified  [] Caregiver present  [] Bed alarm activated    Comments:

## 2017-08-26 NOTE — PROGRESS NOTES
Bedside and Verbal shift change report given to [facility] (oncoming nurse) by Kayleen Cantu RN (offgoing nurse). Report included the following information SBAR, Kardex, MAR and Recent Results. SITUATION:    Code Status: Prior   Reason for Admission: Osteoarthritis of left knee, unspecified osteoarthritis type Aubrey Emmy 1560 day: 1   Problem List:       Hospital Problems  Date Reviewed: 2017          Codes Class Noted POA    Knee osteoarthritis ICD-10-CM: M17.10  ICD-9-CM: 715.36  2017 Unknown              BACKGROUND:    Past Medical History:   Past Medical History:   Diagnosis Date    Adverse effect of anesthesia     hard time waking up    Allergic rhinitis     weekly allergy injections    Arthritis     Chronic pain     lower back, shoulders    Diabetes ([facility] [address]     Essential hypertension     GERD (gastroesophageal reflux disease)     Hiatal hernia     History of positive PPD, 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    -asymptomatic    Hypertension     Psychiatric disorder     Depression    Unspecified sleep apnea     Bipap         Patient taking anticoagulants yes     ASSESSMENT:    Changes in Assessment Throughout Shift: none     Patient has Central Line: no Reasons if yes:      Patient has Gomez Cath: no Reasons if yes:          Last Vitals:     Vitals:    17 1746 17 1947 17 2322 17 0444   BP: 123/51 111/64 104/54 118/65   Pulse: 67 70 61 64   Resp: 14 15 17 18   Temp:  98.1 °F (36.7 °C) 98.2 °F (36.8 °C) 96.7 °F (35.9 °C)   SpO2: 93% 91% 92% 93%   Weight:       Height:            IV and DRAINS (will only show if present)   Peripheral IV 17 Right Hand-Site Assessment: Clean, dry, & intact  Manuel Drain #1 17 Left; Anterior Knee-Site Assessment: Clean, dry, & intact     WOUND (if present)   Wound Type:  surgical   Dressing present yes   Wound Concerns/Notes:  none     PAIN    Pain Assessment    Pain Intensity 1: 0 (17 0515)    Pain Location 1: Knee    Pain Intervention(s) 1: Medication (see MAR)    Patient Stated Pain Goal: 0  o Interventions for Pain:  medication  o Intervention effective: yes  o Time of last intervention: see mar   o Reassessment Completed: yes      Last 3 Weights:  Last 3 Recorded Weights in this Encounter    08/14/17 1035 08/25/17 1254   Weight: 97.1 kg (214 lb) 97.1 kg (214 lb)     Weight change:      INTAKE/OUPUT    Current Shift: 08/25 1901 - 08/26 0700  In: 1225 [P.O.:200; I.V.:1025]  Out: 350 [Urine:250; Drains:100]    Last three shifts: 08/24 0701 - 08/25 1900  In: 1000 [I.V.:1000]  Out: -      LAB RESULTS   No results for input(s): WBC, HGB, HCT, PLT, HGBEXT, HCTEXT, PLTEXT in the last 72 hours. No results for input(s): NA, K, GLU, BUN, CREA, CA, MG, INR in the last 72 hours. No lab exists for component: PT, PTT, INREXT    RECOMMENDATIONS AND DISCHARGE PLANNING     1. Pending tests/procedures/ Plan of Care or Other Needs: pain management, PT/OT     2. Discharge plan for patient and Needs/Barriers:     3. Estimated Discharge Date: 8/27/17 Posted on Whiteboard in [address] [place] [address] Room: yes      4. The patient's care plan was reviewed with the oncoming nurse. \"HEALS\" SAFETY CHECK      Fall Risk    Total Score: 3    Safety Measures: Safety Measures: Bed/Chair-Wheels locked, Bed in low position, Caregiver at bedside, Call light within reach, Fall prevention (comment), Gripper socks    A safety check occurred in the patient's room between off going nurse and oncoming nurse listed above.     The safety check included the below items  Area Items   H  High Alert Medications - Verify all high alert medication drips (heparin, PCA, etc.)   E  Equipment - Suction is set up for ALL patients (with yanker)  - Red plugs utilized for all equipment (IV pumps, etc.)  - WOWs wiped down at end of shift.  - Room stocked with oxygen, suction, and other unit-specific supplies   A  Alarms - Bed alarm is set for fall risk patients  - Ensure chair alarm is in place and activated if patient is up in a chair   L  Lines - Check IV for any infiltration  - Gomez bag is empty if patient has a Gomez   - Tubing and IV bags are labeled   S  Safety   - Room is clean, patient is clean, and equipment is clean. - Hallways are clear from equipment besides carts. - Fall bracelet on for fall risk patients  - Ensure room is clear and free of clutter  - Suction is set up for ALL patients (with yanker)  - Hallways are clear from equipment besides carts.    - Isolation precautions followed, supplies available outside room, sign posted     Maxine Edwards RN

## 2017-08-27 ENCOUNTER — HOME HEALTH ADMISSION (OUTPATIENT)
Dept: HOME HEALTH SERVICES | Facility: HOME HEALTH | Age: 72
End: 2017-08-27
Payer: MEDICARE

## 2017-08-27 VITALS
DIASTOLIC BLOOD PRESSURE: 58 MMHG | SYSTOLIC BLOOD PRESSURE: 109 MMHG | TEMPERATURE: 98.2 F | HEART RATE: 62 BPM | OXYGEN SATURATION: 92 % | WEIGHT: 214 LBS | HEIGHT: 62 IN | RESPIRATION RATE: 16 BRPM | BODY MASS INDEX: 39.38 KG/M2

## 2017-08-27 LAB
GLUCOSE BLD STRIP.AUTO-MCNC: 174 MG/DL (ref 70–110)
HCT VFR BLD AUTO: 33.6 % (ref 35–45)
HGB BLD-MCNC: 10.5 G/DL (ref 12–16)

## 2017-08-27 PROCEDURE — 74011250637 HC RX REV CODE- 250/637: Performed by: ORTHOPAEDIC SURGERY

## 2017-08-27 PROCEDURE — 74011636637 HC RX REV CODE- 636/637: Performed by: ORTHOPAEDIC SURGERY

## 2017-08-27 PROCEDURE — 36415 COLL VENOUS BLD VENIPUNCTURE: CPT | Performed by: ORTHOPAEDIC SURGERY

## 2017-08-27 PROCEDURE — 77030012891

## 2017-08-27 PROCEDURE — 97110 THERAPEUTIC EXERCISES: CPT

## 2017-08-27 PROCEDURE — 97530 THERAPEUTIC ACTIVITIES: CPT

## 2017-08-27 PROCEDURE — 97116 GAIT TRAINING THERAPY: CPT

## 2017-08-27 PROCEDURE — 85018 HEMOGLOBIN: CPT | Performed by: ORTHOPAEDIC SURGERY

## 2017-08-27 PROCEDURE — 82962 GLUCOSE BLOOD TEST: CPT

## 2017-08-27 RX ORDER — OXYCODONE HYDROCHLORIDE 5 MG/1
5-15 TABLET ORAL
Qty: 60 TAB | Refills: 0 | Status: SHIPPED | OUTPATIENT
Start: 2017-08-27 | End: 2017-10-11

## 2017-08-27 RX ORDER — POLYETHYLENE GLYCOL 3350 17 G/17G
17 POWDER, FOR SOLUTION ORAL DAILY
Qty: 15 PACKET | Refills: 0 | Status: SHIPPED | OUTPATIENT
Start: 2017-08-27 | End: 2018-06-09

## 2017-08-27 RX ORDER — CELECOXIB 200 MG/1
200 CAPSULE ORAL EVERY 12 HOURS
Qty: 42 CAP | Refills: 0 | Status: SHIPPED | OUTPATIENT
Start: 2017-08-27 | End: 2017-09-17

## 2017-08-27 RX ADMIN — CELECOXIB 200 MG: 100 CAPSULE ORAL at 11:23

## 2017-08-27 RX ADMIN — OXYCODONE HYDROCHLORIDE 15 MG: 5 TABLET ORAL at 11:23

## 2017-08-27 RX ADMIN — METOPROLOL SUCCINATE 100 MG: 100 TABLET, EXTENDED RELEASE ORAL at 08:39

## 2017-08-27 RX ADMIN — POLYETHYLENE GLYCOL 3350 17 G: 17 POWDER, FOR SOLUTION ORAL at 08:39

## 2017-08-27 RX ADMIN — PREGABALIN 150 MG: 75 CAPSULE ORAL at 11:23

## 2017-08-27 RX ADMIN — OXYCODONE HYDROCHLORIDE 15 MG: 5 TABLET ORAL at 04:22

## 2017-08-27 RX ADMIN — RIVAROXABAN 10 MG: 10 TABLET, FILM COATED ORAL at 08:39

## 2017-08-27 RX ADMIN — ACETAMINOPHEN 650 MG: 325 TABLET ORAL at 04:22

## 2017-08-27 RX ADMIN — Medication 10 ML: at 04:23

## 2017-08-27 RX ADMIN — DOCUSATE SODIUM 100 MG: 100 CAPSULE, LIQUID FILLED ORAL at 08:39

## 2017-08-27 RX ADMIN — ACETAMINOPHEN 650 MG: 325 TABLET ORAL at 11:23

## 2017-08-27 RX ADMIN — ACETAMINOPHEN 650 MG: 325 TABLET ORAL at 08:39

## 2017-08-27 RX ADMIN — INSULIN LISPRO 2 UNITS: 100 INJECTION, SOLUTION INTRAVENOUS; SUBCUTANEOUS at 11:30

## 2017-08-27 RX ADMIN — OXYCODONE HYDROCHLORIDE 15 MG: 5 TABLET ORAL at 08:39

## 2017-08-27 NOTE — PROGRESS NOTES
Bedside and Verbal shift change report given to Tom Soto RN (oncoming nurse) by Karmen Jones RN (offgoing nurse). Report included the following information SBAR, Kardex, MAR and Recent Results. SITUATION:    Code Status: Prior   Reason for Admission: Osteoarthritis of left knee, unspecified osteoarthritis type Aubrey  1560 day: 2   Problem List:       Hospital Problems  Date Reviewed: 2017          Codes Class Noted POA    Knee osteoarthritis ICD-10-CM: M17.10  ICD-9-CM: 715.36  2017 Unknown              BACKGROUND:    Past Medical History:   Past Medical History:   Diagnosis Date    Adverse effect of anesthesia     hard time waking up    Allergic rhinitis     weekly allergy injections    Arthritis     Chronic pain     lower back, shoulders    Diabetes ([facility] [address]     Essential hypertension     GERD (gastroesophageal reflux disease)     Hiatal hernia     History of positive PPD, 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    -asymptomatic    Hypertension     Psychiatric disorder     Depression    Unspecified sleep apnea     Bipap         Patient taking anticoagulants yes     ASSESSMENT:    Changes in Assessment Throughout Shift: none     Patient has Central Line: no Reasons if yes:      Patient has Gomez Cath: no Reasons if yes:          Last Vitals:     Vitals:    17 1200 17 1555 17 1914 17 0421   BP: 110/62 144/61 134/60 125/62   Pulse: 69 65 65 60   Resp: 18 18 18 16   Temp: 98.2 °F (36.8 °C) 98.9 °F (37.2 °C) 98.7 °F (37.1 °C) 97.4 °F (36.3 °C)   SpO2: 90% 93% 92% 94%   Weight:       Height:            IV and DRAINS (will only show if present)   [REMOVED] Peripheral IV 17 Right Hand-Site Assessment: Clean, dry, & intact  Manuel Drain #1 17 Left; Anterior Knee-Site Assessment: Clean, dry, & intact     WOUND (if present)   Wound Type:  surgical   Dressing present yes   Wound Concerns/Notes:  none     PAIN    Pain Assessment    Pain Intensity 1: 0 (17 0515)    Pain Location 1: Knee    Pain Intervention(s) 1: Medication (see MAR)    Patient Stated Pain Goal: 0  o Interventions for Pain:  medication  o Intervention effective: yes  o Time of last intervention: see mar   o Reassessment Completed: yes      Last 3 Weights:  Last 3 Recorded Weights in this Encounter    08/14/17 1035 08/25/17 1254   Weight: 97.1 kg (214 lb) 97.1 kg (214 lb)     Weight change:      INTAKE/OUPUT    Current Shift: 08/26 1901 - 08/27 0700  In: 200 [P.O.:200]  Out: 400 [Urine:300; Drains:100]    Last three shifts: 08/25 0701 - 08/26 1900  In: 2733.3 [P.O.:200; I.V.:2533.3]  Out: 1440 [Urine:950; Drains:490]     LAB RESULTS     Recent Labs      08/26/17   0323   HGB  10.4*   HCT  33.7*        Recent Labs      08/26/17   0323   NA  140   K  3.6   GLU  171*   BUN  17   CREA  0.89   CA  8.2*       RECOMMENDATIONS AND DISCHARGE PLANNING     1. Pending tests/procedures/ Plan of Care or Other Needs: pain management, PT/OT     2. Discharge plan for patient and Needs/Barriers:     3. Estimated Discharge Date: 8/27/17 Posted on Whiteboard in [address] [place] [address] Room: yes      4. The patient's care plan was reviewed with the oncoming nurse. \"HEALS\" SAFETY CHECK      Fall Risk    Total Score: 3    Safety Measures: Safety Measures: Bed/Chair-Wheels locked, Bed in low position, Call light within reach, Fall prevention (comment), Gripper socks    A safety check occurred in the patient's room between off going nurse and oncoming nurse listed above.     The safety check included the below items  Area Items   H  High Alert Medications - Verify all high alert medication drips (heparin, PCA, etc.)   E  Equipment - Suction is set up for ALL patients (with yanker)  - Red plugs utilized for all equipment (IV pumps, etc.)  - WOWs wiped down at end of shift.  - Room stocked with oxygen, suction, and other unit-specific supplies   A  Alarms - Bed alarm is set for fall risk patients  - Ensure chair alarm is in place and activated if patient is up in a chair   L  Lines - Check IV for any infiltration  - Gomez bag is empty if patient has a Gomez   - Tubing and IV bags are labeled   S  Safety   - Room is clean, patient is clean, and equipment is clean. - Hallways are clear from equipment besides carts. - Fall bracelet on for fall risk patients  - Ensure room is clear and free of clutter  - Suction is set up for ALL patients (with yanker)  - Hallways are clear from equipment besides carts.    - Isolation precautions followed, supplies available outside room, sign posted     Arturo Escalante RN

## 2017-08-27 NOTE — DISCHARGE INSTRUCTIONS
Knee Arthroscopy: What to Expect at Home  Your Recovery    Arthroscopy is a way to find problems and do surgery inside a joint without making a large cut (incision). Your doctor put a lighted tube with a tiny camera--called an arthroscope, or scope--and surgical tools through small incisions in your knee. You will feel tired for several days. Your knee will be swollen, and you may notice that your skin is a different color near the cuts (incisions). The swelling is normal and will start to go away in a few days. Keeping your leg higher than your heart will help with swelling and pain. You will probably need about 6 weeks to recover. If your doctor repaired damaged tissue, recovery will take longer. You may have to limit your activity until your knee strength and movement return to normal. You may also be in a physical rehabilitation (rehab) program.  You may be able to return to a desk job or your normal routine in a few days. But if you do physical labor, it may be as long as 2 months before you can return to work. This care sheet gives you a general idea about how long it will take for you to recover. But each person recovers at a different pace. Follow the steps below to get better as quickly as possible. How can you care for yourself at home? Activity  · Rest when you feel tired. Getting enough sleep will help you recover. Use pillows to raise your ankle and leg above the level of your heart. · Try to walk each day, after your doctor has said you can. Start by walking a little more than you did the day before. Bit by bit, increase the amount you walk. Walking boosts blood flow and helps prevent pneumonia and constipation. · You may have a brace or crutches or both. · Your doctor will tell you how often and how much you can move your leg and knee. · If you have a desk job, you may be able to return to work a few days after the surgery.  If you lift things or stand or walk a lot at work, it may be as long as 2 months before you can return. · You can take a shower 48 to 72 hours after surgery and clean the incisions with regular soap and water. Do not take a bath or soak your knee until your doctor says it is okay. · Ask your doctor when you can drive again. · If you had a repair of torn tissue, follow your doctor's instructions for lifting things or moving your knee. Diet  · You can eat your normal diet. If your stomach is upset, try bland, low-fat foods like plain rice, broiled chicken, toast, and yogurt. · Drink plenty of fluids, unless your doctor tells you not to. · You may notice that your bowel movements are not regular right after your surgery. This is common. Try to avoid constipation and straining with bowel movements. You may want to take a fiber supplement every day. If you have not had a bowel movement after a couple of days, ask your doctor about taking a mild laxative. Medicines  · Your doctor will tell you if and when you can restart your medicines. He or she will also give you instructions about taking any new medicines. · If you take blood thinners, such as warfarin (Coumadin), clopidogrel (Plavix), or aspirin, be sure to talk to your doctor. He or she will tell you if and when to start taking those medicines again. Make sure that you understand exactly what your doctor wants you to do. · Be safe with medicines. Take pain medicines exactly as directed. ¨ If the doctor gave you a prescription medicine for pain, take it as prescribed. ¨ If you are not taking a prescription pain medicine, ask your doctor if you can take an over-the-counter medicine. · If you think your pain medicine is making you sick to your stomach:  ¨ Take your medicine after meals (unless your doctor has told you not to). ¨ Ask your doctor for a different pain medicine. · If your doctor prescribed antibiotics, take them as directed. Do not stop taking them just because you feel better.  You need to take the full course of antibiotics. Incision care  · If you have a dressing over your cuts (incisions), keep it clean and dry. You may remove it 48 to 72 hours after the surgery. · If your incisions are open to the air, keep the area clean and dry. · If you have strips of tape on the incisions, leave the tape on for a week or until it falls off. Exercise  · Move your toes and ankle as much as your bandages will allow. · Bend and straighten your knee slowly several times during the day. · Depending on why you had the surgery, you may have to do ankle and leg exercises. Your doctor or physical therapist will give you exercises as part of a rehabilitation program.  · Stop any activity that causes sharp pain. Talk to your doctor or physical therapist about what sports or other exercise you can do. Ice and elevation  · To reduce swelling and pain, put ice or a cold pack on your knee for 10 to 20 minutes at a time. Do this every 1 to 2 hours. Put a thin cloth between the ice and your skin. Follow-up care is a key part of your treatment and safety. Be sure to make and go to all appointments, and call your doctor if you are having problems. It's also a good idea to know your test results and keep a list of the medicines you take. When should you call for help? Call 911 anytime you think you may need emergency care. For example, call if:  · You passed out (lost consciousness). · You have severe trouble breathing. · You have sudden chest pain and shortness of breath, or you cough up blood. Call your doctor now or seek immediate medical care if:  · Your foot or toes are numb or tingling. · Your foot is cool or pale, or it changes color. · You have signs of a blood clot, such as:  ¨ Pain in your calf, back of the knee, thigh, or groin. ¨ Redness and swelling in your leg or groin. · You are sick to your stomach or cannot keep fluids down. · You have pain that does not get better after you take pain medicine.   · You have loose stitches, or your incision comes open. · Bright red blood has soaked through the bandage over your incision. · You have signs of infection, such as:  ¨ Increased pain, swelling, warmth, or redness. ¨ Red streaks leading from the incisions. ¨ Pus draining from the incisions. ¨ A fever. Watch closely for any changes in your health, and be sure to contact your doctor if:  · You do not have a bowel movement after taking a laxative. Where can you learn more? Go to http://reza-isauro.info/. Enter S957 in the search box to learn more about \"Knee Arthroscopy: What to Expect at Home. \"  Current as of: March 21, 2017  Content Version: 11.3  © 5495-1615 [employer]. Care instructions adapted under license by [employer] (which disclaims liability or warranty for this information). If you have questions about a medical condition or this instruction, always ask your healthcare professional. [place] [address] any warranty or liability for your use of this information. DISCHARGE SUMMARY from Nurse    The following personal items are in your possession at time of discharge:    Dental Appliances: None  Visual Aid: Glasses, With patient     Home Medications: None  Jewelry: None  Clothing: Socks, Footwear, Shorts, Pants, Undergarments  Other Valuables: Cell Phone             PATIENT INSTRUCTIONS:    After general anesthesia or intravenous sedation, for 24 hours or while taking prescription Narcotics:  · Limit your activities  · Do not drive and operate hazardous machinery  · Do not make important personal or business decisions  · Do  not drink alcoholic beverages  · If you have not urinated within 8 hours after discharge, please contact your surgeon on call.     Report the following to your surgeon:  · Excessive pain, swelling, redness or odor of or around the surgical area  · Temperature over 100.5  · Nausea and vomiting lasting longer than 4 hours or if unable to take medications  · Any signs of decreased circulation or nerve impairment to extremity: change in color, persistent  numbness, tingling, coldness or increase pain  · Any questions        What to do at Home:  Recommended activity: Activity as tolerated. If you experience any of the following symptoms pain not relieved by pain medication, drainage that smells, or is green, nausea, vomiting please follow up with Dr. Reena Drake. *  Please give a list of your current medications to your Primary Care Provider. *  Please update this list whenever your medications are discontinued, doses are      changed, or new medications (including over-the-counter products) are added. *  Please carry medication information at all times in case of emergency situations. These are general instructions for a healthy lifestyle:    No smoking/ No tobacco products/ Avoid exposure to second hand smoke    Surgeon General's Warning:  Quitting smoking now greatly reduces serious risk to your health. Obesity, smoking, and sedentary lifestyle greatly increases your risk for illness    A healthy diet, regular physical exercise & weight monitoring are important for maintaining a healthy lifestyle    You may be retaining fluid if you have a history of heart failure or if you experience any of the following symptoms:  Weight gain of 3 pounds or more overnight or 5 pounds in a week, increased swelling in our hands or feet or shortness of breath while lying flat in bed. Please call your doctor as soon as you notice any of these symptoms; do not wait until your next office visit. Recognize signs and symptoms of STROKE:    F-face looks uneven    A-arms unable to move or move unevenly    S-speech slurred or non-existent    T-time-call 911 as soon as signs and symptoms begin-DO NOT go       Back to bed or wait to see if you get better-TIME IS BRAIN.     Warning Signs of HEART ATTACK     Call 911 if you have these symptoms:   Chest discomfort. Most heart attacks involve discomfort in the center of the chest that lasts more than a few minutes, or that goes away and comes back. It can feel like uncomfortable pressure, squeezing, fullness, or pain.  Discomfort in other areas of the upper body. Symptoms can include pain or discomfort in one or both arms, the back, neck, jaw, or stomach.  Shortness of breath with or without chest discomfort.  Other signs may include breaking out in a cold sweat, nausea, or lightheadedness. Don't wait more than five minutes to call 911 - MINUTES MATTER! Fast action can save your life. Calling 911 is almost always the fastest way to get lifesaving treatment. Emergency Medical Services staff can begin treatment when they arrive -- up to an hour sooner than if someone gets to the hospital by car. The discharge information has been reviewed with the patient. The patient verbalized understanding. Discharge medications reviewed with the patient and appropriate educational materials and side effects teaching were provided. [employer] Activation    Thank you for requesting access to [employer]. Please follow the instructions below to securely access and download your online medical record. [employer] allows you to send messages to your doctor, view your test results, renew your prescriptions, schedule appointments, and more. How Do I Sign Up? 1. In your internet browser, go to [email or web address]  2. Click on the First Time User? Click Here link in the Sign In box. You will be redirect to the New Member Sign Up page. 3. Enter your [employer] Access Code exactly as it appears below. You will not need to use this code after youve completed the sign-up process. If you do not sign up before the expiration date, you must request a new code. [employer] Access Code:  Activation code not generated  Current [employer] Status: Patient Declined (This is the date your [employer] access code will )    4. Enter the last four digits of your Social Security Number (xxxx) and Date of Birth (mm/dd/yyyy) as indicated and click Submit. You will be taken to the next sign-up page. 5. Create a [employer] ID. This will be your [employer] login ID and cannot be changed, so think of one that is secure and easy to remember. 6. Create a [employer] password. You can change your password at any time. 7. Enter your Password Reset Question and Answer. This can be used at a later time if you forget your password. 8. Enter your e-mail address. You will receive e-mail notification when new information is available in [address] 9. Click Sign Up. You can now view and download portions of your medical record. 10. Click the Download Summary menu link to download a portable copy of your medical information. Additional Information    If you have questions, please visit the Frequently Asked Questions section of the [employer] website at [email or web address] Remember, [employer] is NOT to be used for urgent needs. For medical emergencies, dial 911.     Patient armband removed and shredded

## 2017-08-27 NOTE — PROGRESS NOTES
Patient is not available to be assessed at this time.       [address] [facility].   [phone]

## 2017-08-27 NOTE — ROUTINE PROCESS
Patient stable, no signs of distress. Patient ambulated from chair to bathroom then back to bed. Patient tolerated well. Patient voiding in bathroom. Patient also voided on the way to the bathroom, gown, stockings, and socks changed. Patients pain level controlled with pain medication. Patients drain d/c per order.

## 2017-08-27 NOTE — ROUTINE PROCESS
Patient stable, no signs of distress. Patient received discharge information. Patient given the opportunity to ask questions. Patient had no questions. Armbands cut and shredded. Patient getting dressed, will be taken downstairs in wheelchair by vesta Cavanaugh RN.

## 2017-08-27 NOTE — PROGRESS NOTES
Problem: Falls - Risk of  Goal: *Absence of Falls  Document Jeremiah Fall Risk and appropriate interventions in the flowsheet.    Outcome: Progressing Towards Goal  Fall Risk Interventions:  Mobility Interventions: Patient to call before getting OOB, Utilize walker, cane, or other assitive device     Mentation Interventions: Adequate sleep, hydration, pain control, Door open when patient unattended     Medication Interventions: Patient to call before getting OOB, Teach patient to arise slowly     Elimination Interventions: Call light in reach, Patient to call for help with toileting needs, Toilet paper/wipes in reach     History of Falls Interventions: Door open when patient unattended

## 2017-08-27 NOTE — DISCHARGE SUMMARY
Total Joint Discharge Summary      Patient ID:  Barbara Xiao  [id number]  89 y.o.  1945    Admit date: 8/25/2017    Discharge date and time: 8/27/2017     Admitting Physician: Kindra Worthington MD     Discharge Physician: Carlota Walton    Admission Diagnoses: Osteoarthritis of left knee, unspecified osteoarthritis type [M17.12]    Discharge Diagnoses: Active Problems:    Knee osteoarthritis (8/25/2017)      Procedure: Left total knee arthroplasty    Surgeon: Carlota Walton    Preoperative Medical Clearance: Dr. Michaelle Castleman                          Perioperative Antibiotics: Ancef      Postoperative Pain Management:  Exparel, celebrex, oxycodone    DVT Prophylaxis:  Xarelto x 21 days                                    Postoperative transfusions:     none    Post Op complications: none apparent    Hemoglobin at discharge: _10.5_    Discharge Exam      Temp: 97.4 °F (36.3 °C) (08/27/17 0849) Pulse (Heart Rate): 60 (08/27/17 0849) Resp Rate: 16 (08/27/17 0849) BP: 134/64 (08/27/17 0849) O2 Sat (%): 93 % (08/27/17 0849) Weight: 97.1 kg (214 lb) (08/25/17 1254)     Left knee incision c/d/i  EHL, FHL, GS, TA motor function intact. L3-S1 sensation intact  No calf tenderness  DP pulse palpable    Wound appears to be healing without any evidence of infection. Physical Therapy started on the day following surgery and progressed to independent ambulation with the aid of a walker. At the time of discharge, able to go up and down stairs and had understanding of precautions needed following surgery.       PT at time of DC: _90__degrees    Discharged to: Home with home health    Discharge instructions:  - Anticoagulate with: xarelto x 21 days  -Resume pre hospital diet            -Resume home medications per medical continuation form     -Ambulate with walker, appropriate total joint protocol  -Follow up in office as scheduled       Signed:  Kindra Worthington MD  8/27/2017  11:58 AM

## 2017-08-27 NOTE — PROGRESS NOTES
Care Management Interventions  PCP Verified by CM: Yes (Dr. Amie Lugo)  Mode of Transport at Discharge: Other (see comment) (Patient's spouse)  Transition of Care Consult (CM Consult): Discharge Planning, [address]: Yes  Discharge Durable Medical Equipment: No (Pt has cane, walker, rollator, 3-in-1)  Physical Therapy Consult: Yes  Occupational Therapy Consult: Yes  Current Support Network: Lives with Spouse, Own Home, Family Lives Nearby (Pt lives with her spouse who is a orthopedic RN. Her sister lives nearby and mohit\n assist if needed.)  Confirm Follow Up Transport: Family (Pt's spouse will drive until she is cleared)  Plan discussed with Pt/Family/Caregiver: Yes  Freedom of Choice Offered: Yes  Discharge Location  Discharge Placement: Home with home health    Patient is a 66 yo female. She was admitted for a knee replacement. Patient will be going home with home health. She signed [facility] for [facility]. Referral placed in  and called in. Patient lives with her spouse with her sister nearby. Patient's home is one story with 4 steps to enter and a sunken den. She has a cane, walker, rollator, and 3-in-1 at home. Her spouse, Linda Angulo, is at bedside. He is an orthopedic nurse and will be her primary caregiver.

## 2017-08-27 NOTE — PROGRESS NOTES
2149  Received pt in stable condition,   General: lying in bed in supine, not apparent distress  Neuro: AOx4, denies numbness, 0 tingling, able to wiggle toes to bilateral extremities  Cardio: pedal pulses palpable, denies chest pain  Respiratory: denies shortness of breath  Skin: Dressing to left knee clean, dry and intact, polar ice in place  GI: voiding, clear, yellow urine, no odor  : denies nausea and vomiting  Mus: WBAT  Bed in low position and call bell within reach. [id number]  Patient is alert and oriented times three no signs or symptoms of distress, no chest pain, no SOB, pedal pulses palpable to bilateral lower extremities. In stable condition.

## 2017-08-27 NOTE — PROGRESS NOTES
Problem: Mobility Impaired (Adult and Pediatric)  Goal: *Acute Goals and Plan of Care (Insert Text)  Physical Therapy Goals  Initiated 8/26/2017 and to be accomplished within 7 day(s)  1. Patient will move from supine to sit and sit to supine in bed with supervision/set-up. 2. Patient will transfer from bed to chair and chair to bed with minimal assistance/contact guard assist using the least restrictive device. 3. Patient will perform sit to stand with supervision/set-up. 4. Patient will ambulate with supervision/set-up for 200 feet with the least restrictive device. 5. Patient will ascend/descend 4 stairs with L handrail ascending with minimal assistance/contact guard assist.   Outcome: Progressing Towards Goal  PHYSICAL THERAPY TREATMENT     Patient: Ayesha Melara (67 y.o. female)  Date: 8/27/2017  Diagnosis: Osteoarthritis of left knee, unspecified osteoarthritis type [M17.12] <principal problem not specified>  Procedure(s) (LRB):  LEFT TOTAL KNEE ARTHROPLASTY/DEPUY/2 SA'S/FEMORAL NERVE BLOCK (Left) 2 Days Post-Op  Precautions: Fall, WBAT  Chart, physical therapy assessment, plan of care and goals were reviewed. ASSESSMENT:  Pt demonstrated increased independence with functional transfers and was able to perform all transfers with SBA/Supervision. Pt ambulated 200 feet with RW and varied between step to and step through gait pattern. Stair training was performed and patient ascended and descended 4 steps with B rails and then repeated this with only the left rail like she has at home. Patients  was present throughout treatment and will be able to assist patient appropriately with transfer and ambulation.   Progression toward goals:  [X]      Improving appropriately and progressing toward goals  [ ]      Improving slowly and progressing toward goals  [ ]      Not making progress toward goals and plan of care will be adjusted       PLAN:  Patient continues to benefit from skilled intervention to address the above impairments. Continue treatment per established plan of care. Discharge Recommendations:  Home Health  Further Equipment Recommendations for Discharge:  rolling walker       G-CODES:      Mobility  Current  CI= 1-19%. The severity rating is based on the Level of Assistance required for Functional Mobility and ADLs. SUBJECTIVE:   Patient stated I'm ready to work.       OBJECTIVE DATA SUMMARY:   Critical Behavior:  Neurologic State: Alert  Orientation Level: Oriented X4  Cognition: Appropriate decision making, Appropriate for age attention/concentration, Appropriate safety awareness, Follows commands     Functional Mobility Training:  Bed Mobility:  Supine to Sit: Supervision                 Transfers:  Sit to Stand: Stand-by asssistance  Stand to Sit: Supervision        Balance:  Sitting: Intact  Standing: With support  Standing - Static: Good  Standing - Dynamic :  (fair+)  Ambulation/Gait Training:  Distance (ft): 200 Feet (ft)  Assistive Device: Walker, rolling  Ambulation - Level of Assistance: Supervision  Speed/Serena: Fluctuations  Stairs:  Number of Stairs Trained: 8  Stairs - Level of Assistance: Stand-by asssistance  Rail Use: Left      Therapeutic Exercises:   Supine LE exercises x10  Pain:  Pt reports 2/10 pain or discomfort prior to treatment. Pt reports 3/10 pain or discomfort post treatment. Activity Tolerance:   fair  Please refer to the flowsheet for vital signs taken during this treatment.   After treatment:   [X] Patient left in no apparent distress sitting up in chair with ice machine applied and leg rest elevated  [ ] Patient left in no apparent distress in bed  [X] Call bell left within reach  [X] Nursing notified  [X] Caregiver present  [ ] Bed alarm activated      Filiberto Garcia PT   Time Calculation: 30 mins

## 2017-08-28 ENCOUNTER — HOME CARE VISIT (OUTPATIENT)
Dept: HOME HEALTH SERVICES | Facility: HOME HEALTH | Age: 72
End: 2017-08-28

## 2017-08-28 ENCOUNTER — HOME CARE VISIT (OUTPATIENT)
Dept: SCHEDULING | Facility: HOME HEALTH | Age: 72
End: 2017-08-28
Payer: MEDICARE

## 2017-08-28 VITALS
OXYGEN SATURATION: 91 % | RESPIRATION RATE: 18 BRPM | HEART RATE: 64 BPM | SYSTOLIC BLOOD PRESSURE: 120 MMHG | TEMPERATURE: 98 F | WEIGHT: 214 LBS | BODY MASS INDEX: 40.4 KG/M2 | HEIGHT: 61 IN | DIASTOLIC BLOOD PRESSURE: 70 MMHG

## 2017-08-28 VITALS
DIASTOLIC BLOOD PRESSURE: 58 MMHG | HEART RATE: 58 BPM | TEMPERATURE: 98 F | OXYGEN SATURATION: 94 % | SYSTOLIC BLOOD PRESSURE: 120 MMHG

## 2017-08-28 PROCEDURE — G0299 HHS/HOSPICE OF RN EA 15 MIN: HCPCS

## 2017-08-28 PROCEDURE — 3331090002 HH PPS REVENUE DEBIT

## 2017-08-28 PROCEDURE — 3331090001 HH PPS REVENUE CREDIT

## 2017-08-28 PROCEDURE — 400013 HH SOC

## 2017-08-28 PROCEDURE — G0151 HHCP-SERV OF PT,EA 15 MIN: HCPCS

## 2017-08-29 ENCOUNTER — HOME CARE VISIT (OUTPATIENT)
Dept: SCHEDULING | Facility: HOME HEALTH | Age: 72
End: 2017-08-29
Payer: MEDICARE

## 2017-08-29 ENCOUNTER — TELEPHONE (OUTPATIENT)
Dept: MEDSURG UNIT | Age: 72
End: 2017-08-29

## 2017-08-29 ENCOUNTER — HOME CARE VISIT (OUTPATIENT)
Dept: HOME HEALTH SERVICES | Facility: HOME HEALTH | Age: 72
End: 2017-08-29
Payer: MEDICARE

## 2017-08-29 PROCEDURE — 3331090001 HH PPS REVENUE CREDIT

## 2017-08-29 PROCEDURE — G0157 HHC PT ASSISTANT EA 15: HCPCS

## 2017-08-29 PROCEDURE — 3331090002 HH PPS REVENUE DEBIT

## 2017-08-30 ENCOUNTER — HOME CARE VISIT (OUTPATIENT)
Dept: SCHEDULING | Facility: HOME HEALTH | Age: 72
End: 2017-08-30
Payer: MEDICARE

## 2017-08-30 VITALS
OXYGEN SATURATION: 93 % | SYSTOLIC BLOOD PRESSURE: 140 MMHG | HEART RATE: 81 BPM | OXYGEN SATURATION: 90 % | DIASTOLIC BLOOD PRESSURE: 60 MMHG | DIASTOLIC BLOOD PRESSURE: 70 MMHG | SYSTOLIC BLOOD PRESSURE: 120 MMHG | HEART RATE: 64 BPM

## 2017-08-30 PROCEDURE — 3331090002 HH PPS REVENUE DEBIT

## 2017-08-30 PROCEDURE — G0152 HHCP-SERV OF OT,EA 15 MIN: HCPCS

## 2017-08-30 PROCEDURE — 3331090001 HH PPS REVENUE CREDIT

## 2017-08-30 PROCEDURE — G0157 HHC PT ASSISTANT EA 15: HCPCS

## 2017-08-31 ENCOUNTER — HOME CARE VISIT (OUTPATIENT)
Dept: SCHEDULING | Facility: HOME HEALTH | Age: 72
End: 2017-08-31
Payer: MEDICARE

## 2017-08-31 ENCOUNTER — HOME CARE VISIT (OUTPATIENT)
Dept: HOME HEALTH SERVICES | Facility: HOME HEALTH | Age: 72
End: 2017-08-31
Payer: MEDICARE

## 2017-08-31 VITALS — DIASTOLIC BLOOD PRESSURE: 76 MMHG | OXYGEN SATURATION: 97 % | HEART RATE: 89 BPM | SYSTOLIC BLOOD PRESSURE: 115 MMHG

## 2017-08-31 PROCEDURE — G0157 HHC PT ASSISTANT EA 15: HCPCS

## 2017-08-31 PROCEDURE — 3331090001 HH PPS REVENUE CREDIT

## 2017-08-31 PROCEDURE — 3331090002 HH PPS REVENUE DEBIT

## 2017-09-01 ENCOUNTER — HOME CARE VISIT (OUTPATIENT)
Dept: SCHEDULING | Facility: HOME HEALTH | Age: 72
End: 2017-09-01
Payer: MEDICARE

## 2017-09-01 ENCOUNTER — HOME CARE VISIT (OUTPATIENT)
Dept: HOME HEALTH SERVICES | Facility: HOME HEALTH | Age: 72
End: 2017-09-01
Payer: MEDICARE

## 2017-09-01 VITALS
HEART RATE: 66 BPM | SYSTOLIC BLOOD PRESSURE: 128 MMHG | OXYGEN SATURATION: 93 % | DIASTOLIC BLOOD PRESSURE: 70 MMHG | HEART RATE: 70 BPM | OXYGEN SATURATION: 85 % | DIASTOLIC BLOOD PRESSURE: 58 MMHG | SYSTOLIC BLOOD PRESSURE: 140 MMHG

## 2017-09-01 PROCEDURE — G0300 HHS/HOSPICE OF LPN EA 15 MIN: HCPCS

## 2017-09-01 PROCEDURE — 3331090002 HH PPS REVENUE DEBIT

## 2017-09-01 PROCEDURE — 3331090001 HH PPS REVENUE CREDIT

## 2017-09-01 PROCEDURE — G0157 HHC PT ASSISTANT EA 15: HCPCS

## 2017-09-02 ENCOUNTER — HOME CARE VISIT (OUTPATIENT)
Dept: SCHEDULING | Facility: HOME HEALTH | Age: 72
End: 2017-09-02
Payer: MEDICARE

## 2017-09-02 VITALS — DIASTOLIC BLOOD PRESSURE: 66 MMHG | HEART RATE: 58 BPM | OXYGEN SATURATION: 92 % | SYSTOLIC BLOOD PRESSURE: 144 MMHG

## 2017-09-02 PROCEDURE — 3331090001 HH PPS REVENUE CREDIT

## 2017-09-02 PROCEDURE — G0157 HHC PT ASSISTANT EA 15: HCPCS

## 2017-09-02 PROCEDURE — 3331090002 HH PPS REVENUE DEBIT

## 2017-09-03 ENCOUNTER — HOME CARE VISIT (OUTPATIENT)
Dept: SCHEDULING | Facility: HOME HEALTH | Age: 72
End: 2017-09-03
Payer: MEDICARE

## 2017-09-03 PROCEDURE — G0157 HHC PT ASSISTANT EA 15: HCPCS

## 2017-09-03 PROCEDURE — 3331090002 HH PPS REVENUE DEBIT

## 2017-09-03 PROCEDURE — 3331090001 HH PPS REVENUE CREDIT

## 2017-09-04 ENCOUNTER — HOME CARE VISIT (OUTPATIENT)
Dept: HOME HEALTH SERVICES | Facility: HOME HEALTH | Age: 72
End: 2017-09-04
Payer: MEDICARE

## 2017-09-04 VITALS — DIASTOLIC BLOOD PRESSURE: 78 MMHG | HEART RATE: 62 BPM | OXYGEN SATURATION: 94 % | SYSTOLIC BLOOD PRESSURE: 138 MMHG

## 2017-09-04 PROCEDURE — 3331090001 HH PPS REVENUE CREDIT

## 2017-09-04 PROCEDURE — 3331090002 HH PPS REVENUE DEBIT

## 2017-09-05 ENCOUNTER — HOME CARE VISIT (OUTPATIENT)
Dept: SCHEDULING | Facility: HOME HEALTH | Age: 72
End: 2017-09-05
Payer: MEDICARE

## 2017-09-05 VITALS — SYSTOLIC BLOOD PRESSURE: 120 MMHG | OXYGEN SATURATION: 94 % | HEART RATE: 63 BPM | DIASTOLIC BLOOD PRESSURE: 52 MMHG

## 2017-09-05 PROCEDURE — 3331090001 HH PPS REVENUE CREDIT

## 2017-09-05 PROCEDURE — 3331090002 HH PPS REVENUE DEBIT

## 2017-09-05 PROCEDURE — G0157 HHC PT ASSISTANT EA 15: HCPCS

## 2017-09-06 ENCOUNTER — HOME CARE VISIT (OUTPATIENT)
Dept: SCHEDULING | Facility: HOME HEALTH | Age: 72
End: 2017-09-06
Payer: MEDICARE

## 2017-09-06 VITALS — HEART RATE: 54 BPM | OXYGEN SATURATION: 94 % | DIASTOLIC BLOOD PRESSURE: 50 MMHG | SYSTOLIC BLOOD PRESSURE: 118 MMHG

## 2017-09-06 PROCEDURE — 3331090001 HH PPS REVENUE CREDIT

## 2017-09-06 PROCEDURE — G0157 HHC PT ASSISTANT EA 15: HCPCS

## 2017-09-06 PROCEDURE — 3331090002 HH PPS REVENUE DEBIT

## 2017-09-07 ENCOUNTER — HOME CARE VISIT (OUTPATIENT)
Dept: HOME HEALTH SERVICES | Facility: HOME HEALTH | Age: 72
End: 2017-09-07
Payer: MEDICARE

## 2017-09-07 ENCOUNTER — HOME CARE VISIT (OUTPATIENT)
Dept: SCHEDULING | Facility: HOME HEALTH | Age: 72
End: 2017-09-07
Payer: MEDICARE

## 2017-09-07 VITALS — HEART RATE: 63 BPM | SYSTOLIC BLOOD PRESSURE: 126 MMHG | OXYGEN SATURATION: 93 % | DIASTOLIC BLOOD PRESSURE: 62 MMHG

## 2017-09-07 PROCEDURE — 3331090001 HH PPS REVENUE CREDIT

## 2017-09-07 PROCEDURE — G0157 HHC PT ASSISTANT EA 15: HCPCS

## 2017-09-07 PROCEDURE — 3331090002 HH PPS REVENUE DEBIT

## 2017-09-07 PROCEDURE — G0300 HHS/HOSPICE OF LPN EA 15 MIN: HCPCS

## 2017-09-08 ENCOUNTER — HOME CARE VISIT (OUTPATIENT)
Dept: SCHEDULING | Facility: HOME HEALTH | Age: 72
End: 2017-09-08
Payer: MEDICARE

## 2017-09-08 VITALS
TEMPERATURE: 98 F | SYSTOLIC BLOOD PRESSURE: 130 MMHG | HEART RATE: 55 BPM | DIASTOLIC BLOOD PRESSURE: 58 MMHG | OXYGEN SATURATION: 99 %

## 2017-09-08 PROCEDURE — 3331090002 HH PPS REVENUE DEBIT

## 2017-09-08 PROCEDURE — 3331090003 HH PPS REVENUE ADJ

## 2017-09-08 PROCEDURE — G0151 HHCP-SERV OF PT,EA 15 MIN: HCPCS

## 2017-09-08 PROCEDURE — 3331090001 HH PPS REVENUE CREDIT

## 2017-09-09 PROCEDURE — 3331090002 HH PPS REVENUE DEBIT

## 2017-09-09 PROCEDURE — 3331090001 HH PPS REVENUE CREDIT

## 2017-09-10 PROCEDURE — 3331090001 HH PPS REVENUE CREDIT

## 2017-09-10 PROCEDURE — 3331090002 HH PPS REVENUE DEBIT

## 2017-09-11 PROCEDURE — 3331090001 HH PPS REVENUE CREDIT

## 2017-09-11 PROCEDURE — 3331090002 HH PPS REVENUE DEBIT

## 2017-09-12 PROCEDURE — 3331090002 HH PPS REVENUE DEBIT

## 2017-09-12 PROCEDURE — 3331090001 HH PPS REVENUE CREDIT

## 2017-09-13 ENCOUNTER — HOSPITAL ENCOUNTER (OUTPATIENT)
Dept: PHYSICAL THERAPY | Age: 72
Discharge: HOME | End: 2017-09-13
Payer: MEDICARE

## 2017-09-13 PROCEDURE — 3331090001 HH PPS REVENUE CREDIT

## 2017-09-13 PROCEDURE — 97162 PT EVAL MOD COMPLEX 30 MIN: CPT

## 2017-09-13 PROCEDURE — G8979 MOBILITY GOAL STATUS: HCPCS

## 2017-09-13 PROCEDURE — 97110 THERAPEUTIC EXERCISES: CPT

## 2017-09-13 PROCEDURE — 3331090002 HH PPS REVENUE DEBIT

## 2017-09-13 PROCEDURE — G8978 MOBILITY CURRENT STATUS: HCPCS

## 2017-09-13 NOTE — PROGRESS NOTES
[facility]  [address]  [place], [address]  [phone] fax    Plan of Care/ Statement of Necessity for Physical Therapy Services    Patient name: Molly Martínez Start of Care: 2017   Referral source: Jocelyne Medina MD : 1945    Medical Diagnosis: Left knee pain [M25.562]   Onset Date: 2017    Treatment Diagnosis: L TKR   Prior Hospitalization: see medical history Provider#: [id number]   Medications: Verified on Patient summary List    Comorbidities: Heart Disease, Depression, Diabetes, Arthritis, HTN, Visual Impaired, Hearing impaired, L TKR, R TKR   Prior Level of Function: The patient states that she had  ain with prolonged ambulation, uses SPC for ambulation. The Plan of Care and following information is based on the information from the initial evaluation. Assessment/ key information: The patient is a 67year old female s/p L TKR . She states she spent about 2 days in the hospital and was discharged home and lives with her . She suffered from CHF shortly after her arrival back home but has been treated with medication and responded well. She participated in home health PT 7 days per week and progressed nicely, uses RW for ambulation. The patient has impairments related to the diagnosis consisting of pain, decreased ROM, decreased flexibility, decreased strength, and limited ADL ease. She will benefit from PT in order to address the aforementioned impairments. Objective measures:  L knee  Inspection: closed incisions, no signs of infection  ROM: 12 - 122 degrees actively  Strength: 5/5 quads and HS  Flexibility: + Elys  Ambulation: Currently ambulates with RW with decreased stance phase L LE.     Evaluation Complexity History MEDIUM  Complexity : 1-2 comorbidities / personal factors will impact the outcome/ POC ; Examination MEDIUM Complexity : 3 Standardized tests and measures addressing body structure, function, activity limitation and / or participation in recreation  ;Presentation MEDIUM Complexity : Evolving with changing characteristics  ; Clinical Decision Making MEDIUM Complexity : FOTO score of 26-74  Overall Complexity Rating: MEDIUM  Problem List: pain affecting function, decrease ROM, decrease strength, edema affecting function, impaired gait/ balance, decrease ADL/ functional abilitiies, decrease activity tolerance, decrease flexibility/ joint mobility and decrease transfer abilities   Treatment Plan may include any combination of the following: Therapeutic exercise, Therapeutic activities, Neuromuscular re-education, Physical agent/modality, Gait/balance training, Manual therapy, Patient education, Self Care training, Functional mobility training, Home safety training and Stair training  Patient / Family readiness to learn indicated by: asking questions, trying to perform skills and interest  Persons(s) to be included in education: patient (P) and family support person (FSP);list Pt's . He was educated regarding occasional ache and stiffness associated with rehab process, as he expressed concerns about this and stopped the patient during her participating on stationary bike. Barriers to Learning/Limitations: None  Patient Goal (s): Improve ROM  Patient Self Reported Health Status: good  Rehabilitation Potential: excellent    Short Term Goals: To be accomplished in 2 weeks:   1. The patient will be independent and compliant with HEP to maximize therapeutic benefit. 2. The patient will improve knee extension to lacking 5 degrees to improve ease of ambulation. Long Term Goals: To be accomplished in 4 weeks:   1. The patient will improve FOTO score to 47 to maximize quality of life. 2. The patient will ambulate void of antalgia utilizing SPC to maximize ease of community ambulation. 3. The patient will improve knee extension to lacking 2 degrees to improve ease of ambulation.    4. The patient will improve stair negotiation to using 1 HR without compensation up ascent and descent. Frequency / Duration: Patient to be seen 3 times per week for 4 weeks. Mobility  [id number] Current  CL= 60-79%   Goal  CK= 40-59%    The severity rating is based on clinical judgment and the FOTO score. Certification Period: 9/13/2017 - 12/13/2017  Patient/ Caregiver education and instruction: Diagnosis, prognosis, self care, activity modification and exercises   [x]  Plan of care has been reviewed with EDWARD De La Rosa, PT 9/13/2017 3:11 PM    ________________________________________________________________________    I certify that the above Therapy Services are being furnished while the patient is under my care. I agree with the treatment plan and certify that this therapy is necessary.     [de-identified] Signature:____________________  Date:____________Time: _________    Please sign and return to [facility]  [address] [facility] [address]  [place], [address]  [phone] fax

## 2017-09-13 NOTE — MR AVS SNAPSHOT
Visit Information Date & Time Provider Department Dept. Phone Encounter #  
 9/13/2017  1:00 PM Sheree Chanel, PT [id number] [phone] [id number] Your Appointments 10/11/2017  9:15 AM  
Follow Up with Erica Nassar MD  
[facility] [address] [place] [address]) Appt Note: 3 mo f/u  
 [address] [facility] [address]  
[phone]  
  
   
 [address] [facility] [address] Upcoming Health Maintenance Date Due  
 LIPID PANEL Q1 1945 FOOT EXAM Q1 1/2/1954 MICROALBUMIN Q1 1/2/1954 EYE EXAM RETINAL OR DILATED Q1 1/2/1954 DTaP/Tdap/Td series (1 - Tdap) 1/2/1965 FOBT Q 1 YEAR AGE 50-75 1/2/1994 ZOSTER VACCINE AGE 60> 11/2/2003 GLAUCOMA SCREENING Q2Y 1/2/2009 Pneumococcal 65+ Low/Medium Risk (1 of 2 - PCV13) 1/2/2009 MEDICARE YEARLY EXAM 1/2/2009 INFLUENZA AGE 9 TO ADULT 8/1/2017 HEMOGLOBIN A1C Q6M 2/14/2018 BREAST CANCER SCRN MAMMOGRAM 11/4/2018 Allergies as of 9/13/2017  Review Complete On: 9/8/2017 By: Myron Corbett Severity Noted Reaction Type Reactions Other Medication  01/21/2013    Other (comments) SEASONAL ALLERGIES Topamax [Topiramate]  07/13/2017    Other (comments) \"irritable\" Current Immunizations  Never Reviewed No immunizations on file. Not reviewed this visit Vitals OB Status Smoking Status Postmenopausal Never Smoker Your Updated Medication List  
  
ASK your doctor about these medications ABILIFY 2 mg tablet Generic drug:  ARIPiprazole Take 2 mg by mouth daily. acetaminophen 500 mg Cap Take 1 Cap by mouth three (3) times daily. take three times a day for 7 days ALLER- mg tablet Generic drug:  fexofenadine Take 180 mg by mouth daily. allergy injection  
every seven (7) days. amLODIPine 10 mg tablet Commonly known as:  Bgifty Take 10 mg by mouth nightly. Indications: hypertension  
  
 celecoxib 200 mg capsule Commonly known as:  CELEBREX Take 1 Cap by mouth every twelve (12) hours every twelve (12) hours for 21 days. Indications: POSTOPERATIVE ACUTE PAIN  
  
 chlorthalidone 25 mg tablet Commonly known as:  Janes Tenorio Take 25 mg by mouth nightly. Indications: hypertension COLACE 100 mg capsule Generic drug:  docusate sodium Take 100 mg by mouth two (2) times a day. FLAXSEED PO Take 1,000 mg by mouth two (2) times a day. GLUCOSAMINE-CHONDROITIN COMPLX PO Take 1 Cap by mouth daily. glyBURIDE-metFORMIN 5-500 mg per tablet Commonly known as:  Dolores Andrés Take 2 Tabs by mouth two (2) times daily (with meals). ipratropium-albuterol  mcg/actuation inhaler Commonly known as:  Jocelyn Marker Take 2 Puffs by inhalation two (2) times a day. Iron 325 mg (65 mg iron) tablet Generic drug:  ferrous sulfate Take 325 mg by mouth Daily (before breakfast). LIPITOR 40 mg tablet Generic drug:  atorvastatin Take 40 mg by mouth nightly. lisinopril 40 mg tablet Commonly known as:  Paulino Garrett Take 40 mg by mouth nightly. Indications: HYPERTENSION  
  
 metoprolol succinate 100 mg tablet Commonly known as:  TOPROL-XL Take 100 mg by mouth daily. multivitamin tablet Commonly known as:  ONE A DAY Take 1 Tab by mouth daily. NASONEX 50 mcg/actuation nasal spray Generic drug:  mometasone 2 Sprays by Both Nostrils route two (2) times a day. NUVIGIL 250 mg Tab tablet Generic drug:  Armodafinil Take 250 mg by mouth Daily (before breakfast). OCUVITE PO Take 1 Cap by mouth daily. olopatadine 0.6 % Spry Commonly known as:  PATANASE 2 Squirts by Both Nostrils route two (2) times a day. oxyCODONE IR 5 mg immediate release tablet Commonly known as:  Damir Patel  
 Take 1-3 Tabs by mouth every four (4) hours as needed. Max Daily Amount: 90 mg.  
  
 polyethylene glycol 17 gram packet Commonly known as:  Bradford Muzzy Take 1 Packet by mouth daily. pregabalin 150 mg capsule Commonly known as:  Karishmarachel Noelle Take 1 Cap by mouth two (2) times a day. Max Daily Amount: 300 mg.  
  
 rivaroxaban 10 mg tablet Commonly known as:  Felicie Jaramillo Take 1 Tab by mouth daily (with breakfast) for 21 days. Indications: KNEE REPLACEMENT DEEP VEIN THROMBOSIS PREVENTION  
  
 tiaGABine 2 mg tablet Commonly known as:  GABITRIL Take 1 Tab by mouth every evening. traMADol 50 mg tablet Commonly known as:  ULTRAM  
Take 50 mg by mouth every six (6) hours as needed for Pain. VITAMIN D3 1,000 unit tablet Generic drug:  cholecalciferol Take 1,000 Units by mouth daily. ZOLOFT 100 mg tablet Generic drug:  sertraline Take 200 mg by mouth daily. To-Do List   
 09/13/2017  1:00 PM  
  Appointment with Bart Serna PT at [facility]  [facility] ([phone]) Please provide this summary of care documentation to your next provider. Your primary care clinician is listed as Nick Brennan. If you have any questions after today's visit, please call [phone].

## 2017-09-13 NOTE — PROGRESS NOTES
PT DAILY TREATMENT NOTE - [facility]     Patient Name: Warren Kramer  Date:2017  : 1945  [x]  Patient  Verified  Payor: VA MEDICARE / Plan: VA MEDICARE PART A & B / Product Type: Medicare /    In time:1:04  Out time:1:55  Total Treatment Time (min): 51  Total Timed Codes (min): 25  1:1 Treatment Time ([address] only): 46   Visit #: 1 of 12    Treatment Area: Left knee pain [M25.562]    SUBJECTIVE  Pain Level (0-10 scale): 4/10  Any medication changes, allergies to medications, adverse drug reactions, diagnosis change, or new procedure performed?: [x] No    [] Yes (see summary sheet for update)  Subjective functional status/changes:   [] No changes reported  The patient has a chief complaint of L knee pain following L TKR. OBJECTIVE  25 min Therapeutic Exercise:  [x] See flow sheet :   Rationale: increase ROM and increase strength to improve the patients ability to improve ADL ease. With   [] TE   [] TA   [] neuro   [] other: Patient Education: [x] Review HEP    [] Progressed/Changed HEP based on:   [] positioning   [] body mechanics   [] transfers   [] heat/ice application    [] other:      Other Objective/Functional Measures: See IE     Pain Level (0-10 scale) post treatment: 3/10    ASSESSMENT/Changes in Function: See POC    Patient will continue to benefit from skilled PT services to modify and progress therapeutic interventions, address functional mobility deficits, address ROM deficits, address strength deficits, analyze and address soft tissue restrictions, analyze and cue movement patterns, analyze and modify body mechanics/ergonomics, assess and modify postural abnormalities, address imbalance/dizziness and instruct in home and community integration to attain remaining goals. []  See Plan of Care  []  See progress note/recertification  []  See Discharge Summary         Progress towards goals / Updated goals:  Short Term Goals:  To be accomplished in 2 weeks:                         1. The patient will be independent and compliant with HEP to maximize therapeutic benefit. 2. The patient will improve knee extension to lacking 5 degrees to improve ease of ambulation. Long Term Goals: To be accomplished in 4 weeks:                         1. The patient will improve FOTO score to 47 to maximize quality of life. 2. The patient will ambulate void of antalgia utilizing SPC to maximize ease of community ambulation. 3. The patient will improve knee extension to lacking 2 degrees to improve ease of ambulation.                          4. The patient will improve stair negotiation to using 1 HR without compensation up ascent and descent.       PLAN  []  Upgrade activities as tolerated     [x]  Continue plan of care  []  Update interventions per flow sheet       []  Discharge due to:_  []  Other:_      Kristin De La Rosa, PT 9/13/2017  3:18 PM    Future Appointments  Date Time Provider Yojana Cintron   9/15/2017 9:30 AM Kenji Boykin, PT [facility]   9/18/2017 1:00 PM Sandie Vega, PT [facility]   9/20/2017 2:00 PM Sandie Vega, PT [facility]   9/21/2017 1:00 PM Sandie Vega, PT [facility]   9/25/2017 2:00 PM Kristin De La Rosa, PT [facility]   9/27/2017 2:00 PM Sandie Vega, PT [facility]   9/29/2017 2:00 PM Kristin De La Rosa, PT [facility]   10/2/2017 2:00 PM Kristin De La Rosa, PT [facility]   10/4/2017 3:00 PM Kristin De La Rosa, PT [facility]   10/6/2017 2:00 PM Kristin De La Rosa, PT [facility]   10/9/2017 2:00 PM Kristin De La Rosa, PT [facility]   10/11/2017 9:15 AM Ely Carrizales  [address]   10/11/2017 2:00 PM Sandie Vega, PT [facility]

## 2017-09-14 PROCEDURE — 3331090001 HH PPS REVENUE CREDIT

## 2017-09-14 PROCEDURE — 3331090002 HH PPS REVENUE DEBIT

## 2017-09-15 ENCOUNTER — HOSPITAL ENCOUNTER (OUTPATIENT)
Dept: PHYSICAL THERAPY | Age: 72
Discharge: HOME | End: 2017-09-15
Payer: MEDICARE

## 2017-09-15 PROCEDURE — 3331090001 HH PPS REVENUE CREDIT

## 2017-09-15 PROCEDURE — 97140 MANUAL THERAPY 1/> REGIONS: CPT

## 2017-09-15 PROCEDURE — 97110 THERAPEUTIC EXERCISES: CPT

## 2017-09-15 PROCEDURE — 97016 VASOPNEUMATIC DEVICE THERAPY: CPT

## 2017-09-15 PROCEDURE — 3331090002 HH PPS REVENUE DEBIT

## 2017-09-15 NOTE — PROGRESS NOTES
PT DAILY TREATMENT NOTE - [facility]    Patient Name: Aurora Amaral  Date:9/15/2017  : 1945  [x]  Patient  Verified  Payor: VA MEDICARE / Plan: VA MEDICARE PART A & B / Product Type: Medicare /    In TKMF:[id number]  Out time:10:20  Total Treatment Time (min): 54  Total Timed Codes (min): 44  1:1 Treatment Time ([facility] only): 47   Visit #: 2 of 12    Treatment Area: Left knee pain [M25.562]    SUBJECTIVE  Pain Level (0-10 scale): 3  Any medication changes, allergies to medications, adverse drug reactions, diagnosis change, or new procedure performed?: [x] No    [] Yes (see summary sheet for update)  Subjective functional status/changes:   [] No changes reported  Pt reports her leg aches today. Reports minimal HEP compliance due to pain.      OBJECTIVE    Modality rationale: decrease edema, decrease inflammation and decrease pain to improve the patients ability to perform functional activiteis   Min Type Additional Details    [] Estim:  []Unatt       []IFC  []Premod                        []Other:  []w/ice   []w/heat  Position:  Location:    [] Estim: []Att    []TENS instruct  []NMES                    []Other:  []w/US   []w/ice   []w/heat  Position:  Location:    []  Traction: [] Cervical       []Lumbar                       [] Prone          []Supine                       []Intermittent   []Continuous Lbs:  [] before manual  [] after manual    []  Ultrasound: []Continuous   [] Pulsed                           []1MHz   []3MHz Location:  W/cm2:    []  Iontophoresis with dexamethasone         Location: [] Take home patch   [] In clinic    []  Ice     []  heat  []  Ice massage  []  Laser   []  Anodyne Position:  Location:    []  Laser with stim  []  Other: Position:  Location:   10 [x]  Vasopneumatic Device Pressure:       [x] lo [] med [] hi   Temperature: [x] lo [] med [] hi   [] Skin assessment post-treatment:  []intact []redness- no adverse reaction    []redness - adverse reaction:       36 min Therapeutic Exercise: [x] See flow sheet :   Rationale: increase ROM and increase strength to improve the patients ability to perform daily activities     8 min Manual Therapy:  Patellar mobs, PROM L knee extension, knee extension mobs, STM medial HS tendons at the knee   Rationale: decrease pain, increase ROM and increase tissue extensibility to improve functional mobility             With   [] TE   [] TA   [] neuro   [] other: Patient Education: [x] Review HEP    [] Progressed/Changed HEP based on:   [] positioning   [] body mechanics   [] transfers   [] heat/ice application    [] other:      Other Objective/Functional Measures: initiated therex per flow sheet     Pain Level (0-10 scale) post treatment: 5    ASSESSMENT/Changes in Function: pt with c/o increased LBP following treatment but did not have complaints of LBP during treatment. Will monitor and adjust treatment if indicated. Pt was educated in importance of gaining extension and HEP. Patient will continue to benefit from skilled PT services to modify and progress therapeutic interventions, address functional mobility deficits, address ROM deficits, address strength deficits, analyze and address soft tissue restrictions, analyze and cue movement patterns and analyze and modify body mechanics/ergonomics to attain remaining goals. []  See Plan of Care  []  See progress note/recertification  []  See Discharge Summary         Progress towards goals / Updated goals:  Short Term Goals: To be accomplished in 2 weeks:                         1. The patient will be independent and compliant with HEP to maximize therapeutic benefit. - limited 9-15-17                         2. The patient will improve knee extension to lacking 5 degrees to improve ease of ambulation. Long Term Goals: To be accomplished in 4 weeks:                         1. The patient will improve FOTO score to 47 to maximize quality of life.                          2. The patient will ambulate void of antalgia utilizing SPC to maximize ease of community ambulation. 3. The patient will improve knee extension to lacking 2 degrees to improve ease of ambulation.                          4. The patient will improve stair negotiation to using 1 HR without compensation up ascent and descent.       PLAN  [x]  Upgrade activities as tolerated     []  Continue plan of care  []  Update interventions per flow sheet       []  Discharge due to:_  []  Other:_      Tatianna Peralta, PT 9/15/2017  9:30 AM

## 2017-09-16 PROCEDURE — 3331090001 HH PPS REVENUE CREDIT

## 2017-09-16 PROCEDURE — 3331090002 HH PPS REVENUE DEBIT

## 2017-09-17 VITALS
SYSTOLIC BLOOD PRESSURE: 123 MMHG | HEART RATE: 62 BPM | OXYGEN SATURATION: 98 % | TEMPERATURE: 97.7 F | DIASTOLIC BLOOD PRESSURE: 76 MMHG | RESPIRATION RATE: 18 BRPM

## 2017-09-17 PROCEDURE — 3331090001 HH PPS REVENUE CREDIT

## 2017-09-17 PROCEDURE — 3331090002 HH PPS REVENUE DEBIT

## 2017-09-18 ENCOUNTER — HOSPITAL ENCOUNTER (OUTPATIENT)
Dept: PHYSICAL THERAPY | Age: 72
Discharge: HOME | End: 2017-09-18
Payer: MEDICARE

## 2017-09-18 PROCEDURE — 3331090001 HH PPS REVENUE CREDIT

## 2017-09-18 PROCEDURE — 97110 THERAPEUTIC EXERCISES: CPT | Performed by: PHYSICAL THERAPIST

## 2017-09-18 PROCEDURE — 97140 MANUAL THERAPY 1/> REGIONS: CPT | Performed by: PHYSICAL THERAPIST

## 2017-09-18 PROCEDURE — 3331090002 HH PPS REVENUE DEBIT

## 2017-09-18 PROCEDURE — 97016 VASOPNEUMATIC DEVICE THERAPY: CPT | Performed by: PHYSICAL THERAPIST

## 2017-09-18 RX ORDER — TIAGABINE HYDROCHLORIDE 2 MG/1
2 TABLET, FILM COATED ORAL EVERY EVENING
Qty: 90 TAB | Refills: 0 | Status: SHIPPED | OUTPATIENT
Start: 2017-09-18 | End: 2017-11-14 | Stop reason: SDUPTHER

## 2017-09-18 NOTE — PROGRESS NOTES
PT DAILY TREATMENT NOTE - [facility]     Patient Name: Clara Akhtar  Date:2017  : 1945  [x]  Patient  Verified  Payor: VA MEDICARE / Plan: VA MEDICARE PART A & B / Product Type: Medicare /    In time:1:00  Out time:1:50  Total Treatment Time (min): 50  Total Timed Codes (min): 50  1:1 Treatment Time ([address] only): 40   Visit #: 3 of 12    Treatment Area: Left knee pain [M25.562]    SUBJECTIVE  Pain Level (0-10 scale): 4  Any medication changes, allergies to medications, adverse drug reactions, diagnosis change, or new procedure performed?: [x] No    [] Yes (see summary sheet for update)  Subjective functional status/changes:   [] No changes reported  Has had a lot pain since last visit, the back is bothering her. It has already eased off, but is still painful. OBJECTIVE    Modality rationale: decrease edema and decrease pain to improve the patients ability to complete ADLs   Min Type Additional Details   10 [x]  Vasopneumatic Device Pressure:       [x] lo [x] med [] hi   Temperature: [x] lo [] med [] hi   [x] Skin assessment post-treatment:  [x]intact [x]redness- no adverse reaction      32 min Therapeutic Exercise:  [x] See flow sheet :   Rationale: increase ROM, increase strength and decrease pain to improve the patients ability to complete ADLs    8 min Manual Therapy:  PROM   Rationale: decrease pain and increase ROM to complete ADLs        With   [] TE   [] TA   [] neuro   [] other: Patient Education: [x] Review HEP    [] Progressed/Changed HEP based on:   [] positioning   [] body mechanics   [] transfers   [] heat/ice application    [] other:      Other Objective/Functional Measures:      Pain Level (0-10 scale) post treatment: 2    ASSESSMENT/Changes in Function: Patient responds well to treatment session. Patient moves slowly and antalgic today, secondary to low back pain. Intensity was kept the same as last session and the patient had minimal to no issue. Will progress as tolerated.  No adverse effects were noted from today's treatment session      Patient will continue to benefit from skilled PT services to modify and progress therapeutic interventions, address functional mobility deficits, address ROM deficits, address strength deficits and analyze and address soft tissue restrictions to attain remaining goals. []  See Plan of Care  []  See progress note/recertification  []  See Discharge Summary         Progress towards goals / Updated goals:  Short Term Goals: To be accomplished in 2 weeks:                         5. The patient will be independent and compliant with HEP to maximize therapeutic benefit. - limited 9-15-17                         2. The patient will improve knee extension to lacking 5 degrees to improve ease of ambulation. Long Term Goals: To be accomplished in 4 weeks:                         1. The patient will improve FOTO score to 47 to maximize quality of life.                         2. The patient will ambulate void of antalgia utilizing SPC to maximize ease of community ambulation.                                          3. The patient will improve knee extension to lacking 2 degrees to improve ease of ambulation.                         4. The patient will improve stair negotiation to using 1 HR without compensation up ascent and descent.     PLAN  []  Upgrade activities as tolerated     [x]  Continue plan of care  []  Update interventions per flow sheet       []  Discharge due to:_  []  Other:_      Harlan Pires, PT, DPT 9/18/2017  1:39 PM    Future Appointments  Date Time Provider Yojana Cintron   9/20/2017 2:00 PM Harlan Pires, PT [facility]   9/21/2017 1:00 PM Harlan Pires, PT [facility]   9/25/2017 2:00 PM Ival Estela, PT [facility]   9/27/2017 2:00 PM Harlan Pires, PT [facility]   9/29/2017 2:00 PM Ival Estela, PT [facility]   10/2/2017 2:00 PM Ival Estela, PT [facility]   10/4/2017 3:00 PM Ival Estela, PT [facility] 10/6/2017 2:00 PM Jose Oakley, PT [facility]   10/9/2017 2:00 PM Jose Oakley, PT [facility]   10/11/2017 9:15 AM Kim Stinson  [address]   10/11/2017 2:00 PM [facility]

## 2017-09-18 NOTE — TELEPHONE ENCOUNTER
Patient states she is now required to use mail order. Last Visit: 07/13/2017 with MD Marilin Lynn    Next Appointment: 10/11/2017 with MD Marilin Lynn     Requested Prescriptions     Pending Prescriptions Disp Refills    tiaGABine (GABITRIL) 2 mg tablet 90 Tab 0     Sig: Take 1 Tab by mouth every evening.

## 2017-09-19 PROCEDURE — 3331090001 HH PPS REVENUE CREDIT

## 2017-09-19 PROCEDURE — 3331090002 HH PPS REVENUE DEBIT

## 2017-09-20 ENCOUNTER — HOSPITAL ENCOUNTER (OUTPATIENT)
Dept: PHYSICAL THERAPY | Age: 72
Discharge: HOME | End: 2017-09-20
Payer: MEDICARE

## 2017-09-20 PROCEDURE — 3331090002 HH PPS REVENUE DEBIT

## 2017-09-20 PROCEDURE — 97110 THERAPEUTIC EXERCISES: CPT

## 2017-09-20 PROCEDURE — 97140 MANUAL THERAPY 1/> REGIONS: CPT

## 2017-09-20 PROCEDURE — 97016 VASOPNEUMATIC DEVICE THERAPY: CPT

## 2017-09-20 PROCEDURE — 3331090001 HH PPS REVENUE CREDIT

## 2017-09-20 NOTE — PROGRESS NOTES
PT DAILY TREATMENT NOTE - [facility]     Patient Name: Isai Abdalla  Date:2017  : 1945  [x]  Patient  Verified  Payor: [place] MEDICARE / Plan: [place] MEDICARE PART A & B / Product Type: Medicare /    In time:2:00  Out time:2:45  Total Treatment Time (min): 45  Total Timed Codes (min): 45  1:1 Treatment Time ( [address] only): 30   Visit #: 4 of 12    Treatment Area: Left knee pain [M25.562]    SUBJECTIVE  Pain Level (0-10 scale): 3  Any medication changes, allergies to medications, adverse drug reactions, diagnosis change, or new procedure performed?: [x] No    [] Yes (see summary sheet for update)  Subjective functional status/changes:   [] No changes reported  Pt reports she would rather not be here. She reports she does not like to have pain. Reports partial compliance with HEP \"here and there\". She reports she did okay after last session. Reports LBP is present. OBJECTIVE    Modality rationale: decrease edema and decrease pain to improve the patients ability to complete ADLs   Min Type Additional Details   10 [x]  Vasopneumatic Device Pressure:       [x] lo [x] med [] hi   Temperature: [x] lo [] med [] hi   [x] Skin assessment post-treatment:  [x]intact [x]redness- no adverse reaction      27 min Therapeutic Exercise:  [x] See flow sheet :   Rationale: increase ROM, increase strength and decrease pain to improve the patients ability to complete ADLs    8 min Manual Therapy:  L knee PROM, patellar mobs, knee joint mobs for extension   Rationale: decrease pain and increase ROM to complete ADLs        With   [] TE   [] TA   [] neuro   [] other: Patient Education: [x] Review HEP    [] Progressed/Changed HEP based on:   [] positioning   [] body mechanics   [] transfers   [] heat/ice application    [] other:      Other Objective/Functional Measures: lack 7 degrees extension. Gait with use of cane and '.      Pain Level (0-10 scale) post treatment: 2    ASSESSMENT/Changes in Function: pt progressing gradually with PT. Gait improving with use of cane. Suspect she will be ready to fully progress to cane over the next week. Patient will continue to benefit from skilled PT services to modify and progress therapeutic interventions, address functional mobility deficits, address ROM deficits, address strength deficits and analyze and address soft tissue restrictions to attain remaining goals. []  See Plan of Care  []  See progress note/recertification  []  See Discharge Summary         Progress towards goals / Updated goals:  Short Term Goals: To be accomplished in 2 weeks:                         3. The patient will be independent and compliant with HEP to maximize therapeutic benefit. - limited 9-15-17                         2. The patient will improve knee extension to lacking 5 degrees to improve ease of ambulation. Long Term Goals: To be accomplished in 4 weeks:                         1. The patient will improve FOTO score to 47 to maximize quality of life.                         2. The patient will ambulate void of antalgia utilizing SPC to maximize ease of community ambulation.                                          3. The patient will improve knee extension to lacking 2 degrees to improve ease of ambulation.                         4. The patient will improve stair negotiation to using 1 HR without compensation up ascent and descent.     PLAN  []  Upgrade activities as tolerated     [x]  Continue plan of care  []  Update interventions per flow sheet       []  Discharge due to:_  []  Other:_      Kitty Winchester, PT 9/20/2017  2:11 PM    Future Appointments  Date Time Provider Yojana Cintron   9/21/2017 1:00 PM José Miguel Dunn, PT [facility]   9/25/2017 2:00 PM Angel Sutherland, PT [facility]   9/27/2017 2:00 PM José Miguel Dunn, PT [facility]   9/29/2017 2:00 PM Angel Sutherland, PT [facility]   10/2/2017 2:00 PM [facility]   10/4/2017 3:00 PM Angel Sutherland, PT [facility]   10/6/2017 2:00 PM [facility]   10/9/2017 2:00 PM [facility]   10/11/2017 9:15 AM Twan Torres  [address]    10/11/2017 2:00 PM Mimi Arteaga, PT [facility]

## 2017-09-21 ENCOUNTER — APPOINTMENT (OUTPATIENT)
Dept: PHYSICAL THERAPY | Age: 72
End: 2017-09-21
Payer: MEDICARE

## 2017-09-21 PROCEDURE — 3331090001 HH PPS REVENUE CREDIT

## 2017-09-21 PROCEDURE — 3331090002 HH PPS REVENUE DEBIT

## 2017-09-22 ENCOUNTER — APPOINTMENT (OUTPATIENT)
Dept: PHYSICAL THERAPY | Age: 72
End: 2017-09-22
Payer: MEDICARE

## 2017-09-22 PROCEDURE — 3331090001 HH PPS REVENUE CREDIT

## 2017-09-22 PROCEDURE — 3331090002 HH PPS REVENUE DEBIT

## 2017-09-23 PROCEDURE — 3331090001 HH PPS REVENUE CREDIT

## 2017-09-23 PROCEDURE — 3331090002 HH PPS REVENUE DEBIT

## 2017-09-24 PROCEDURE — 3331090002 HH PPS REVENUE DEBIT

## 2017-09-24 PROCEDURE — 3331090001 HH PPS REVENUE CREDIT

## 2017-09-25 ENCOUNTER — APPOINTMENT (OUTPATIENT)
Dept: PHYSICAL THERAPY | Age: 72
End: 2017-09-25
Payer: MEDICARE

## 2017-09-27 ENCOUNTER — HOSPITAL ENCOUNTER (OUTPATIENT)
Dept: PHYSICAL THERAPY | Age: 72
Discharge: HOME | End: 2017-09-27
Payer: MEDICARE

## 2017-09-27 PROCEDURE — 97140 MANUAL THERAPY 1/> REGIONS: CPT

## 2017-09-27 PROCEDURE — 97116 GAIT TRAINING THERAPY: CPT

## 2017-09-27 PROCEDURE — 97016 VASOPNEUMATIC DEVICE THERAPY: CPT

## 2017-09-29 ENCOUNTER — HOSPITAL ENCOUNTER (OUTPATIENT)
Dept: PHYSICAL THERAPY | Age: 72
Discharge: HOME | End: 2017-09-29
Payer: MEDICARE

## 2017-09-29 PROCEDURE — 97140 MANUAL THERAPY 1/> REGIONS: CPT

## 2017-09-29 PROCEDURE — 97112 NEUROMUSCULAR REEDUCATION: CPT

## 2017-09-29 PROCEDURE — 97016 VASOPNEUMATIC DEVICE THERAPY: CPT

## 2017-09-29 NOTE — PROGRESS NOTES
PT DAILY TREATMENT NOTE - [facility] 3-16    Patient Name: Calderon Ariza  Date:2017  : 1945  [x]  Patient  Verified  Payor: [place] MEDICARE / Plan: VA MEDICARE PART A & B / Product Type: Medicare /    In time:2:10  Out time:3:09  Total Treatment Time (min): 59  Total Timed Codes (min): 49  1:1 Treatment Time ( [address] only): 32   Visit #: 6 of     Treatment Area: Left knee pain [M25.562]    SUBJECTIVE  Pain Level (0-10 scale): 2/10  Any medication changes, allergies to medications, adverse drug reactions, diagnosis change, or new procedure performed?: [x] No    [] Yes (see summary sheet for update)  Subjective functional status/changes:   [] No changes reported  The patient states that she can tell her knee is getting better, and she intends to attend a festival at a [employer] near her this weekend. OBJECTIVE  Modality rationale: decrease edema, decrease inflammation and decrease pain to improve the patients ability to improve ADL ease. Min Type Additional Details   10 [x]  Vasopneumatic Device Pressure:       [x] lo [] med [] hi   Temperature: [x] lo [] med [] hi   [] Skin assessment post-treatment:  []intact []redness- no adverse reaction    []redness - adverse reaction:     29 min Therapeutic Exercise:  [x] See flow sheet :   Rationale: increase ROM and increase strength to improve the patients ability to improve ADL ease. 10 min Neuromuscular Re-education:  [x]  See flow sheet :   Rationale: increase ROM and increase strength  to improve the patients ability to improve ADL ease. 10 min Manual Therapy:  L knee - extension emphasis mobs with scar massage and patellar mobs. Rationale: decrease pain, increase ROM and increase tissue extensibility to improve ADL ease.         With   [] TE   [] TA   [] neuro   [] other: Patient Education: [x] Review HEP    [] Progressed/Changed HEP based on:   [] positioning   [] body mechanics   [] transfers   [] heat/ice application    [] other:      Other Objective/Functional Measures:   4 degrees lacking in extension. Negotiated hurdles (small void of AD)    Pain Level (0-10 scale) post treatment: 2/10    ASSESSMENT/Changes in Function: Good progress with dynamic stability as noted during small hemanth negotiation, step ups 6\" step up progression and improved to lacking 4 degrees extension. Patient will continue to benefit from skilled PT services to modify and progress therapeutic interventions, address functional mobility deficits, address ROM deficits, address strength deficits, analyze and address soft tissue restrictions, analyze and cue movement patterns, analyze and modify body mechanics/ergonomics, assess and modify postural abnormalities and instruct in home and community integration to attain remaining goals. []  See Plan of Care  []  See progress note/recertification  []  See Discharge Summary         Progress towards goals / Updated goals:  Short Term Goals: To be accomplished in 2 weeks:                         1. The patient will be independent and compliant with HEP to maximize therapeutic benefit. - limited 9-15-17                         2. The patient will improve knee extension to lacking 5 degrees to improve ease of ambulation. Lacking 7 degrees 9/27/2017. Met - lacking 4 degrees 9/29/2017. Long Term Goals: To be accomplished in 4 weeks:                         1. The patient will improve FOTO score to 47 to maximize quality of life.                         2. The patient will ambulate void of antalgia utilizing SPC to maximize ease of community ambulation. Progressing - nearly met 9/29/2017.                          3. The patient will improve knee extension to lacking 2 degrees to improve ease of ambulation.                         4. The patient will improve stair negotiation to using 1 HR without compensation up ascent and descent.     PLAN  []  Upgrade activities as tolerated     [x]  Continue plan of care  []  Update interventions per flow sheet       []  Discharge due to:_  []  Other:_      Teo Gilbert, JUAN 9/29/2017  3:52 PM

## 2017-10-02 ENCOUNTER — HOSPITAL ENCOUNTER (OUTPATIENT)
Dept: PHYSICAL THERAPY | Age: 72
Discharge: HOME | End: 2017-10-02
Payer: MEDICARE

## 2017-10-02 PROCEDURE — 97016 VASOPNEUMATIC DEVICE THERAPY: CPT

## 2017-10-02 PROCEDURE — 97140 MANUAL THERAPY 1/> REGIONS: CPT

## 2017-10-02 PROCEDURE — 97116 GAIT TRAINING THERAPY: CPT

## 2017-10-02 NOTE — PROGRESS NOTES
PT DAILY TREATMENT NOTE - [facility]    Patient Name: Marylene Malay  Date:10/2/2017  : 1945  [x]  Patient  Verified  Payor: VA MEDICARE / Plan: VA MEDICARE PART A & B / Product Type: Medicare /    In time:2:00  Out time:3:03  Total Treatment Time (min): 63  Total Timed Codes (min): 53  1:1 Treatment Time ([address] only): 33   Visit #: 7 of     Treatment Area: Left knee pain [M25.562]    SUBJECTIVE  Pain Level (0-10 scale): 3/10  Any medication changes, allergies to medications, adverse drug reactions, diagnosis change, or new procedure performed?: [x] No    [] Yes (see summary sheet for update)  Subjective functional status/changes:   [] No changes reported  The patient states that she had quite a bit of activity going to a [employer] this weekend. She notes she was sore from this. OBJECTIVE  Modality rationale: decrease edema, decrease inflammation and decrease pain to improve the patients ability to improve ADL ease. Min Type Additional Details   10 []  Vasopneumatic Device Pressure:       [x] lo [] med [] hi   Temperature: [x] lo [] med [] hi   [] Skin assessment post-treatment:  []intact []redness- no adverse reaction    []redness - adverse reaction:     35 min Therapeutic Exercise:  [x] See flow sheet :   Rationale: increase ROM and increase strength to improve the patients ability to improve ADL ease. 8 min Gait Training:  _200__ feet with _no__ device on level surfaces with _supervision level of assist   Rationale: to maximize ease of patients ambulation efficiency. 8 min Manual Therapy:  PROM - emphasis L knee extension mobs. Rationale: decrease pain, increase ROM and increase tissue extensibility to improve ADL ease.        With   [] TE   [] TA   [] neuro   [] other: Patient Education: [x] Review HEP    [] Progressed/Changed HEP based on:   [] positioning   [] body mechanics   [] transfers   [] heat/ice application    [] other:      Other Objective/Functional Measures:   Progressed to 3 stair negotiations using B HR. No SPC during ambulation,  FOTO: 48     Pain Level (0-10 scale) post treatment: 2/10    ASSESSMENT/Changes in Function: The patient is progress with ambulation, ROM, and FOTO score indicating improved quality of life. Patient will continue to benefit from skilled PT services to modify and progress therapeutic interventions, address functional mobility deficits, address ROM deficits, address strength deficits, analyze and address soft tissue restrictions, analyze and cue movement patterns, analyze and modify body mechanics/ergonomics, assess and modify postural abnormalities, address imbalance/dizziness and instruct in home and community integration to attain remaining goals. []  See Plan of Care  []  See progress note/recertification  []  See Discharge Summary         Progress towards goals / Updated goals:  Short Term Goals: To be accomplished in 2 weeks:                         8. The patient will be independent and compliant with HEP to maximize therapeutic benefit. - limited 9-15-17                         2. The patient will improve knee extension to lacking 5 degrees to improve ease of ambulation. Lacking 7 degrees 9/27/2017. Met - lacking 4 degrees 9/29/2017. Long Term Goals: To be accomplished in 4 weeks:                         1. The patient will improve FOTO score to 47 to maximize quality of life.                         2. The patient will ambulate void of antalgia utilizing SPC to maximize ease of community ambulation. Progressing - nearly met 9/29/2017.                          3. The patient will improve knee extension to lacking 2 degrees to improve ease of ambulation. Progressed to lacking 3 degrees 10/02/2017.                         4. The patient will improve stair negotiation to using 1 HR without compensation up ascent and descent.  Initiated stair negotiation with B HR today 10/02/2017       PLAN  []  Upgrade activities as tolerated     [x]  Continue plan of care  []  Update interventions per flow sheet       []  Discharge due to:_  []  Other:_      Alayne Osler, PT 10/2/2017  2:07 PM

## 2017-10-04 ENCOUNTER — HOSPITAL ENCOUNTER (OUTPATIENT)
Dept: PHYSICAL THERAPY | Age: 72
Discharge: HOME | End: 2017-10-04
Payer: MEDICARE

## 2017-10-04 PROCEDURE — 97140 MANUAL THERAPY 1/> REGIONS: CPT

## 2017-10-04 PROCEDURE — 97016 VASOPNEUMATIC DEVICE THERAPY: CPT

## 2017-10-04 PROCEDURE — 97116 GAIT TRAINING THERAPY: CPT

## 2017-10-04 PROCEDURE — 97110 THERAPEUTIC EXERCISES: CPT

## 2017-10-04 NOTE — PROGRESS NOTES
PT DAILY TREATMENT NOTE - [facility]     Patient Name: All Jackson  Date:10/4/2017  : 1945  [x]  Patient  Verified  Payor: [place] MEDICARE / Plan: VA MEDICARE PART A & B / Product Type: Medicare /    In time:258  Out time:347  Total Treatment Time (min): 49  Total Timed Codes (min): 39  1:1 Treatment Time ([facility] only): 44   Visit #: 8 of     Treatment Area: Left knee pain [M25.562]    SUBJECTIVE  Pain Level (0-10 scale): 3  Any medication changes, allergies to medications, adverse drug reactions, diagnosis change, or new procedure performed?: [x] No    [] Yes (see summary sheet for update)  Subjective functional status/changes:   [x] No changes reported      OBJECTIVE    Modality rationale: decrease pain to improve the patients ability to tolerate post exercise soreness   Min Type Additional Details    [] Estim:  []Unatt       []IFC  []Premod                        []Other:  []w/ice   []w/heat  Position:  Location:    [] Estim: []Att    []TENS instruct  []NMES                    []Other:  []w/US   []w/ice   []w/heat  Position:  Location:    []  Traction: [] Cervical       []Lumbar                       [] Prone          []Supine                       []Intermittent   []Continuous Lbs:  [] before manual  [] after manual    []  Ultrasound: []Continuous   [] Pulsed                           []1MHz   []3MHz W/cm2:  Location:    []  Iontophoresis with dexamethasone         Location: [] Take home patch   [] In clinic    []  Ice     []  heat  []  Ice massage  []  Laser   []  Anodyne Position:  Location:    []  Laser with stim  []  Other:  Position:  Location:   10 [x]  Vasopneumatic Device Pressure:       [x] lo [] med [] hi   Temperature: [x] lo [] med [] hi   [x] Skin assessment post-treatment:  []intact []redness- no adverse reaction      20 min Therapeutic Exercise:  [] See flow sheet :   Rationale: increase ROM and increase strength to improve the patients ability to perform daily activities    8 min Manual Therapy:  Per flow sheet   Rationale: decrease pain, increase ROM and increase tissue extensibility to increase ease with daily activitie    8 min Gait Training:  _200__ feet with _NO__ device on level surfaces with __s_ level of assist   Rationale: With   [] TE   [] TA   [] neuro   [] other: Patient Education: [x] Review HEP    [] Progressed/Changed HEP based on:   [] positioning   [] body mechanics   [] transfers   [] heat/ice application    [] other:      Other Objective/Functional Measures: FOTO 48     Pain Level (0-10 scale) post treatment: 2    ASSESSMENT/Changes in Function: Continue to lack TKE; popliteus remains tight. Patient will continue to benefit from skilled PT services to modify and progress therapeutic interventions, address functional mobility deficits, address ROM deficits, address strength deficits, analyze and address soft tissue restrictions and analyze and cue movement patterns to attain remaining goals. []  See Plan of Care  []  See progress note/recertification  []  See Discharge Summary         Progress towards goals / Updated goals:  Short Term Goals: To be accomplished in 2 weeks:                         8. The patient will be independent and compliant with HEP to maximize therapeutic benefit. - limited 9-15-17                         2. The patient will improve knee extension to lacking 5 degrees to improve ease of ambulation. Lacking 7 degrees 9/27/2017. Met - lacking 4 degrees 9/29/2017. Long Term Goals: To be accomplished in 4 weeks:                         1. The patient will improve FOTO score to 47 to maximize quality of life.goal met 10/4/17                         2. The patient will ambulate void of antalgia utilizing SPC to maximize ease of community ambulation. Progressing - nearly met 9/29/2017.                          3. The patient will improve knee extension to lacking 2 degrees to improve ease of ambulation.  Progressed to lacking 3 degrees 10/02/2017.                         4. The patient will improve stair negotiation to using 1 HR without compensation up ascent and descent.  Initiated stair negotiation with B HR today 10/02/2017    PLAN  []  Upgrade activities as tolerated     [x]  Continue plan of care  []  Update interventions per flow sheet       []  Discharge due to:_  []  Other:_      Lauri Adams, PT 10/4/2017  3:01 PM    Future Appointments  Date Time Provider Yojana Cintron   10/6/2017 2:00 PM Gerald Doran, PT [facility]   10/9/2017 2:00 PM Gerald Doran, PT [facility]   10/11/2017 9:15 AM Jonnie Gonzalez  [address]   10/11/2017 2:00 PM Jorge Alberto Meza, PT [facility]

## 2017-10-06 ENCOUNTER — HOSPITAL ENCOUNTER (OUTPATIENT)
Dept: PHYSICAL THERAPY | Age: 72
Discharge: HOME | End: 2017-10-06
Payer: MEDICARE

## 2017-10-06 PROCEDURE — 97016 VASOPNEUMATIC DEVICE THERAPY: CPT

## 2017-10-06 PROCEDURE — 97140 MANUAL THERAPY 1/> REGIONS: CPT

## 2017-10-06 PROCEDURE — 97110 THERAPEUTIC EXERCISES: CPT

## 2017-10-06 NOTE — PROGRESS NOTES
PT DAILY TREATMENT NOTE - [facility] 3-16    Patient Name: Nikko Graves  Date:10/6/2017  : 1945  [x]  Patient  Verified  Payor: [place] MEDICARE / Plan: VA MEDICARE PART A & B / Product Type: Medicare /    In time:2:00  Out time:2:52  Total Treatment Time (min): 52  Total Timed Codes (min): 42  1:1 Treatment Time ( [address] only): 25   Visit #: 9 of 10    Treatment Area: Left knee pain [M25.562]    SUBJECTIVE  Pain Level (0-10 scale): 3/10  Any medication changes, allergies to medications, adverse drug reactions, diagnosis change, or new procedure performed?: [x] No    [] Yes (see summary sheet for update)  Subjective functional status/changes:   [] No changes reported  The patient states she has been doing fairly well. OBJECTIVE  Modality rationale: decrease edema, decrease inflammation and decrease pain to improve the patients ability to improve ADL ease. Min Type Additional Details   10 [x]  Vasopneumatic Device Pressure:       [x] lo [] med [] hi   Temperature: [x] lo [] med [] hi   [] Skin assessment post-treatment:  []intact []redness- no adverse reaction    []redness - adverse reaction:     26 min Therapeutic Exercise:  [x] See flow sheet :   Rationale: increase ROM and increase strength to improve the patients ability to improve ADL ease. 8 min Manual Therapy:  PROM - flexion/extension mobs L knee   Rationale: decrease pain, increase ROM and increase tissue extensibility to improve ADL ease . 8 min Gait Training:  __400_ feet with _no__ device on level surfaces with _no__ level of assist   Rationale: With   [] TE   [] TA   [] neuro   [] other: Patient Education: [x] Review HEP    [] Progressed/Changed HEP based on:   [] positioning   [] body mechanics   [] transfers   [] heat/ice application    [] other:      Other Objective/Functional Measures: The patient ambulated 400' without AD stumbling once but able to self correct.   Negotiated stairs with 1 HR  Lacking 3 degrees extension L knee.     Pain Level (0-10 scale) post treatment: 3/10    ASSESSMENT/Changes in Function: Good progress with mobility, strength, and ROM. Anticipated D/C next visit. Patient will continue to benefit from skilled PT services to modify and progress therapeutic interventions, address functional mobility deficits, address ROM deficits, address strength deficits, analyze and address soft tissue restrictions, analyze and cue movement patterns, analyze and modify body mechanics/ergonomics, assess and modify postural abnormalities and instruct in home and community integration to attain remaining goals. []  See Plan of Care  []  See progress note/recertification  []  See Discharge Summary         Progress towards goals / Updated goals:  Short Term Goals: To be accomplished in 2 weeks:                         7. The patient will be independent and compliant with HEP to maximize therapeutic benefit. - limited 9-15-17                         2. The patient will improve knee extension to lacking 5 degrees to improve ease of ambulation. Lacking 7 degrees 9/27/2017. Met - lacking 4 degrees 9/29/2017. Long Term Goals: To be accomplished in 4 weeks:                         1. The patient will improve FOTO score to 47 to maximize quality of life.goal met 10/4/17                         2. The patient will ambulate void of antalgia utilizing SPC to maximize ease of community ambulation. Progressing - nearly met 9/29/2017.                          3. The patient will improve knee extension to lacking 2 degrees to improve ease of ambulation. Progressed to lacking 3 degrees 10/02/2017.                         4. The patient will improve stair negotiation to using 1 HR without compensation up ascent and descent.  Initiated stair negotiation with B HR today 10/02/2017; Progressed to 1 HR, slight compensations upon descent 10/06/2017    PLAN  []  Upgrade activities as tolerated     [x]  Continue plan of care  []  Update interventions per flow sheet       []  Discharge due to:_  []  Other:_      Luz Abdalla, PT 10/6/2017  4:00 PM

## 2017-10-09 ENCOUNTER — APPOINTMENT (OUTPATIENT)
Dept: PHYSICAL THERAPY | Age: 72
End: 2017-10-09
Payer: MEDICARE

## 2017-10-11 ENCOUNTER — OFFICE VISIT (OUTPATIENT)
Dept: ORTHOPEDIC SURGERY | Age: 72
End: 2017-10-11

## 2017-10-11 ENCOUNTER — HOSPITAL ENCOUNTER (OUTPATIENT)
Dept: PHYSICAL THERAPY | Age: 72
Discharge: HOME | End: 2017-10-11
Payer: MEDICARE

## 2017-10-11 VITALS
HEIGHT: 61 IN | WEIGHT: 209.8 LBS | SYSTOLIC BLOOD PRESSURE: 152 MMHG | BODY MASS INDEX: 39.61 KG/M2 | OXYGEN SATURATION: 94 % | TEMPERATURE: 97.7 F | RESPIRATION RATE: 16 BRPM | DIASTOLIC BLOOD PRESSURE: 49 MMHG | HEART RATE: 72 BPM

## 2017-10-11 DIAGNOSIS — Z79.891 USE OF OPIATES FOR THERAPEUTIC PURPOSES: ICD-10-CM

## 2017-10-11 DIAGNOSIS — G89.29 OTHER CHRONIC PAIN: Primary | ICD-10-CM

## 2017-10-11 DIAGNOSIS — G89.29 OTHER CHRONIC PAIN: ICD-10-CM

## 2017-10-11 DIAGNOSIS — M47.816 LUMBAR FACET ARTHROPATHY: ICD-10-CM

## 2017-10-11 DIAGNOSIS — Z96.653 STATUS POST TOTAL BILATERAL KNEE REPLACEMENT: ICD-10-CM

## 2017-10-11 DIAGNOSIS — M62.830 MUSCLE SPASM OF BACK: ICD-10-CM

## 2017-10-11 DIAGNOSIS — R26.9 GAIT ABNORMALITY: ICD-10-CM

## 2017-10-11 DIAGNOSIS — M79.2 NEURITIS: ICD-10-CM

## 2017-10-11 PROCEDURE — G8980 MOBILITY D/C STATUS: HCPCS | Performed by: PHYSICAL THERAPIST

## 2017-10-11 PROCEDURE — 97110 THERAPEUTIC EXERCISES: CPT | Performed by: PHYSICAL THERAPIST

## 2017-10-11 PROCEDURE — G8979 MOBILITY GOAL STATUS: HCPCS | Performed by: PHYSICAL THERAPIST

## 2017-10-11 PROCEDURE — 97016 VASOPNEUMATIC DEVICE THERAPY: CPT | Performed by: PHYSICAL THERAPIST

## 2017-10-11 RX ORDER — TRAMADOL HYDROCHLORIDE 50 MG/1
TABLET ORAL
Qty: 60 TAB | Refills: 2 | Status: SHIPPED | OUTPATIENT
Start: 2017-10-11 | End: 2018-04-11 | Stop reason: SDUPTHER

## 2017-10-11 RX ORDER — PANTOPRAZOLE SODIUM 40 MG/1
40 TABLET, DELAYED RELEASE ORAL 2 TIMES DAILY
COMMUNITY

## 2017-10-11 RX ORDER — FOLIC ACID 1 MG/1
TABLET ORAL DAILY
COMMUNITY

## 2017-10-11 RX ORDER — MONTELUKAST SODIUM 10 MG/1
10 TABLET ORAL DAILY
COMMUNITY

## 2017-10-11 RX ORDER — ASPIRIN 81 MG/1
TABLET ORAL DAILY
COMMUNITY

## 2017-10-11 NOTE — PATIENT INSTRUCTIONS

## 2017-10-11 NOTE — PROGRESS NOTES
[facility]  [address], [facility] [address]  Phone: [phone]  Fax: [phone]      ASSESSMENT   Diagnoses and all orders for this visit:    1. Other chronic pain  -     DRUG SCREEN UR - W/ CONFIRM; Future  -     traMADol (ULTRAM) 50 mg tablet; 1-2 po daily as needed for severe pain  Indications: NEUROPATHIC PAIN, Pain    2. Use of opiates for therapeutic purposes  -     DRUG SCREEN UR - W/ CONFIRM; Future  -     traMADol (ULTRAM) 50 mg tablet; 1-2 po daily as needed for severe pain  Indications: NEUROPATHIC PAIN, Pain    3. Lumbar facet arthropathy    4. Neuritis    5. Status post total bilateral knee replacement    6. Muscle spasm of back    7. Gait abnormality         IMPRESSION AND PLAN:  Charlene Garcia is a 67 y.o. female with history of chronic lumbar pain. She had a left total knee replacement with Dr. Funmi Schofield on 08/25/2017 and continues to attend physical therapy at this time. She discontinued Lyrica 150 mg and was placed on Gabitril 2 mg 1 tab daily since her last office visit. Pt experienced relief when taking Ultram 50 mg 2 tabs QHS in the past.    1) Pt was given information on lumbar arthritis exercises. 2) She will continue taking Gabitril 2 mg 1 tab daily and does not need a refill at this time. Mliss Sarah 3) Pt received a refill of Ultram 50 mg 1-2 tab daily prn severe pain. 4) A UDS was obtained. 5) Ms. Kamron Malin has a reminder for a \"due or due soon\" health maintenance. I have asked that she contact her primary care provider, Barbra Beltran MD, for follow-up on this health maintenance. 6)  demonstrated consistency with prescribing. 7) Pt will follow-up in 3 months. HISTORY OF PRESENT ILLNESS:  Benjy Collins is a 67 y.o. female with history of chronic lumbar pain. Pt reports improvement since her last office visit. She admits that she occasionally drags her feet when walking and uses a single point cane with benefit.  Pt states that since her last office visit she had a CHF episode, experienced shortness of breath, and had an oxygen saturation of 72%. Pt states that she did not go on her trip to [place] since she experienced significant fluid retention. She had a left total knee replacement with Dr. Kwame Hatfield on 08/25/2017 and continues to attend physical therapy at this time. Pt plans to restart water exercise in the near future. She discontinued Lyrica 150 mg and was placed on Gabitril 2 mg 1 tab daily since her last office visit. Pt admits to improvement when taking the Gabitril and reports that she no longer takes Celebrex. She is no longer taking the pain medication prescribed to her after her surgery. Pt experienced relief when taking Ultram 50 mg 2 tabs QHS in the past. She is diabetic and states that her blood sugars are well controlled. Pt at this time desires to continue with current care. Pain Scale: 5/10    PCP: Esequiel Lopez MD       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    1973-asymptomatic    Hypertension     Psychiatric disorder     Depression    Unspecified sleep apnea     Bipap        Social History     Social History    Marital status: UNKNOWN     Spouse name: N/A    Number of children: N/A    Years of education: N/A     Occupational History    Not on file.      Social History Main Topics    Smoking status: Never Smoker    Smokeless tobacco: Never Used    Alcohol use Yes      Comment: wine occasionally    Drug use: No    Sexual activity: Not on file     Other Topics Concern    Not on file     Social History Narrative       Current Outpatient Prescriptions   Medication Sig Dispense Refill    traMADol (ULTRAM) 50 mg tablet 1-2 po daily as needed for severe pain  Indications: NEUROPATHIC PAIN, Pain 60 Tab 2    pantoprazole (PROTONIX) 40 mg tablet Take 40 mg by mouth daily.  montelukast (SINGULAIR) 10 mg tablet Take 10 mg by mouth daily.  vit a,c & e-lutein-minerals (OCUVITE) tablet daily.  folic acid (FOLVITE) 1 mg tablet Take  by mouth daily.  aspirin delayed-release 81 mg tablet Take  by mouth daily.  SITagliptin (JANUVIA) 100 mg tablet Take 100 mg by mouth daily.  docusate sodium (COLACE) 100 mg capsule Take 100 mg by mouth two (2) times a day.  acetaminophen 500 mg cap Take 1 Cap by mouth three (3) times daily. take three times a day for 7 days      ARIPiprazole (ABILIFY) 2 mg tablet Take 2 mg by mouth daily.  Armodafinil (NUVIGIL) 250 mg tab tablet Take 250 mg by mouth Daily (before breakfast).  metoprolol succinate (TOPROL-XL) 100 mg tablet Take 100 mg by mouth daily.  ferrous sulfate (IRON) 325 mg (65 mg iron) tablet Take 325 mg by mouth Daily (before breakfast).  cholecalciferol (VITAMIN D3) 1,000 unit tablet Take 1,000 Units by mouth daily.  multivitamin (ONE A DAY) tablet Take 1 Tab by mouth daily.  VIT C/VIT E/LUTEIN/MIN/OMEGA-3 (OCUVITE PO) Take 1 Cap by mouth daily.  GLUCOSAMINE/CHONDRO CARLIN A/C/MN (GLUCOSAMINE-CHONDROITIN COMPLX PO) Take 1 Cap by mouth daily.  amlodipine (NORVASC) 10 mg tablet Take 10 mg by mouth nightly. Indications: hypertension      lisinopril (PRINIVIL, ZESTRIL) 40 mg tablet Take 40 mg by mouth nightly. Indications: HYPERTENSION      chlorthalidone (HYGROTEN) 25 mg tablet Take 25 mg by mouth nightly. Indications: hypertension      FLAXSEED PO Take 1,000 mg by mouth two (2) times a day.  glyBURIDE-metFORMIN (GLUCOVANCE) 5-500 mg per tablet Take 2 Tabs by mouth two (2) times daily (with meals).  atorvastatin (LIPITOR) 40 mg tablet Take 40 mg by mouth nightly.       sertraline (ZOLOFT) 100 mg tablet Take 200 mg by mouth daily.  tiaGABine (GABITRIL) 2 mg tablet Take 1 Tab by mouth every evening. 90 Tab 0    fexofenadine (ALLER-FEX) 180 mg tablet Take 180 mg by mouth daily.  ipratropium-albuterol (COMBIVENT RESPIMAT)  mcg/actuation inhaler Take 2 Puffs by inhalation two (2) times a day.  olopatadine (PATANASE) 0.6 % spry 2 Squirts by Both Nostrils route two (2) times a day.  polyethylene glycol (MIRALAX) 17 gram packet Take 1 Packet by mouth daily. (Patient taking differently: Take 1 Packet by mouth daily as needed (Constipation). ) 15 Packet 0    allergy injection every seven (7) days.  mometasone (NASONEX) 50 mcg/actuation nasal spray 2 Sprays by Both Nostrils route two (2) times a day. Allergies   Allergen Reactions    Other Medication Other (comments)     SEASONAL ALLERGIES    Topamax [Topiramate] Other (comments)     \"irritable\"           REVIEW OF SYSTEMS    Constitutional: Negative for fever, chills, or weight change. Respiratory: Negative for cough or shortness of breath. Cardiovascular: Negative for chest pain or palpitations. Gastrointestinal: Negative for acid reflux, change in bowel habits, or constipation. Genitourinary: Negative for dysuria and flank pain. Musculoskeletal: Positive for lumbar pain. Skin: Negative for rash. Neurological: Negative for headaches, dizziness, or numbness. Endo/Heme/Allergies: Negative for increased bruising. Psychiatric/Behavioral: Negative for difficulty with sleep. PHYSICAL EXAMINATION  Visit Vitals    /49    Pulse 72    Temp 97.7 °F (36.5 °C) (Oral)    Resp 16    Ht 5' 1\" (1.549 m)    Wt 209 lb 12.8 oz (95.2 kg)    SpO2 94%    BMI 39.64 kg/m2       Constitutional: Awake, alert, and in no acute distress. Neurological: 1+ symmetrical DTRs in the lower extremities. Sensation to light touch is intact. Skin: warm, dry, and intact. ; well healed surgical scar left knee; mild lower extremity edema  Musculoskeletal: Pt has good ROM in the left knee. Tenderness to palpation in the lower lumbar region. Moderate pain with extension and axial loading. No pain with internal or external rotation of her hips. Negative straight leg raise bilaterally. Patient ambulates with the assistance of a single point cane. Hip Flex  Quads Hamstrings Ankle DF EHL Ankle PF   Right +4/5 +4/5 +4/5 +4/5 +4/5 +4/5   Left +4/5 +4/5 +4/5 +4/5 +4/5 +4/5     IMAGING:    Lumbar spine MRI from 03/27/2017 was  personally reviewed with the Pt and demonstrated:  Results from [facility] on 03/27/17   MRI LUMB SPINE WO CONT    Narrative MRI LUMBAR SPINE     CPT CODE: 59892    INDICATION: Chronic low back pain. Radiating right leg pain. .    TECHNIQUE: MRI of the lumbar spine performed consisting of sagittal T1, T2 and  inversion recovery sequences with additional axial T2 sequence and oblique axial  T1 sequence. COMPARISON: None. FINDINGS:     Sagittal images show moderate to severe intervertebral disc space narrowing  L5-S1, with anterior listhesis of approximately 6 mm. There is mild overall disc  space narrowing L4-5 with minimal grade 1 L4 anterior listhesis, less than 2 mm. Alignment preserved otherwise. No vertebral body edema or compression deformity. Cauda equina tapers at inferior L1. There is a broad-based central disc protrusion or herniation at T12-L1, which  effaces the ventral thecal sac, without cord distortion. This extends  approximately 5 mm into the canal. There is additionally a probable small disc  protrusion centrally at T11-12. Correlation with axial images shows:    L1-2:  Asymmetric mild diffuse disc bulge results in mild central canal  narrowing to 9.5 mm. There is no significant foraminal stenosis. L2-3:  Asymmetric mild diffuse disc bulge results in slight central canal  narrowing to 10.2 mm. No significant foraminal stenosis.  There is a 2 cm high T2  signal lesion posterior left renal cortex, likely a cyst. 5 mm high T2 signal  lesion posterior right renal cortex, likely a cyst.    L3-4:  Asymmetric diffuse disc bulge. There is mild degenerative facet  hypertrophy and posterior ligamentous thickening. Central canal is triangulated,  mildly narrowed down to 10.5 mm midline. There is mild left-sided foraminal  narrowing due to disc bulge and facet encroachment. L4-5:  Bilateral facet arthropathy. Mild effective broad-based disc bulge due to  minimal listhesis. There appears to be some slight edema within the left sided  paramedian disc , sagittal image 5, suggesting a small amount of edema and  possible developing annular tear. . Central canal is mildly narrowed to 10.6 mm. Minimal foraminal encroachment. L5-S1:  Severe bilateral degenerative facet hypertrophy. There is posterior  ligamentous thickening. Central canal mildly narrowed to 11 mm at the disc  level. There is severe right-sided foraminal stenosis with disc material lateral  in the exit foramina contacting the undersurface of the exiting nerve root and  compressing it. There is additionally severe left-sided foraminal stenosis due  to similar factors with similar findings.             Impression IMPRESSION:    1. Severe disc space narrowing with anterior listhesis L5 upon S1 apparently on  severe degenerative facet disease basis. -There is mild overall central canal narrowing, but marked severe bilateral  foraminal stenosis as above. 2. Mild to moderate disc space narrowing L5-S1 with trace anterior listhesis,  mild disc bulge/protrusion and some mild edema or questionable annular tear as  above. 3. Mild disc bulge/protrusion at several other lumbar and low thoracic levels,  with mild canal narrowing and foraminal encroachment at several levels as  discussed.        Written by Samy Bertrand, as dictated by Ibeth Johnson MD.  I, Dr. Ibeth Johnson confirm that all documentation is accurate.

## 2017-10-11 NOTE — PROGRESS NOTES
PT DAILY TREATMENT NOTE - [facility]     Patient Name: Kalia Hernandez  Date:10/11/2017  : 1945  [x]  Patient  Verified  Payor: Nhung Wasserman / Plan: [place] & [place] / Product Type: Medicare /    In time:2:00  Out time:2:48  Total Treatment Time (min): 48  Total Timed Codes (min): 48  1:1 Treatment Time ([address] only): 38   Visit #: 10 of 10    Treatment Area: Left knee pain [M25.562]    SUBJECTIVE  Pain Level (0-10 scale): 5  Any medication changes, allergies to medications, adverse drug reactions, diagnosis change, or new procedure performed?: [x] No    [] Yes (see summary sheet for update)  Subjective functional status/changes:   [] No changes reported  Back is bothering her more today. No new issues with the knee. Feels ready to be discharged. OBJECTIVE    Modality rationale: decrease edema and decrease pain to improve the patients ability to complete ADLs   Min Type Additional Details   10 [x]  Vasopneumatic Device Pressure:       [x] lo [] med [] hi   Temperature: [x] lo [] med [] hi   [] Skin assessment post-treatment:  []intact []redness- no adverse reaction    []redness - adverse reaction:     38 min Therapeutic Exercise:  [x] See flow sheet :   Rationale: increase ROM, increase strength and decrease pain to improve the patients ability to complete ADLs      With   [] TE   [] TA   [] neuro   [] other: Patient Education: [x] Review HEP    [] Progressed/Changed HEP based on:   [] positioning   [] body mechanics   [] transfers   [] heat/ice application    [] other:      Other Objective/Functional Measures: 5-120     Pain Level (0-10 scale) post treatment: 5    ASSESSMENT/Changes in Function: Patient responds well to treatment session. Patient has made good progress to date. Meeting all goals, except TKE. Is able to progress to independent HEP at this time.  No adverse effects were noted from today's treatment session        []  See Plan of Care  []  See progress note/recertification  [x]  See Discharge Summary         Progress towards goals / Updated goals:  Short Term Goals: To be accomplished in 2 weeks:                         0. The patient will be independent and compliant with HEP to maximize therapeutic benefit. - limited 9-15-17                         2. The patient will improve knee extension to lacking 5 degrees to improve ease of ambulation. Lacking 7 degrees 9/27/2017. Met - lacking 4 degrees 9/29/2017. Long Term Goals: To be accomplished in 4 weeks:                         1. The patient will improve FOTO score to 47 to maximize quality of life.goal met 10/4/17                         2. The patient will ambulate void of antalgia utilizing SPC to maximize ease of community ambulation. Progressing - nearly met 9/29/2017.                          3. The patient will improve knee extension to lacking 2 degrees to improve ease of ambulation. Progressed to lacking 3 degrees 10/02/2017.                         4. The patient will improve stair negotiation to using 1 HR without compensation up ascent and descent.  Initiated stair negotiation with B HR today 10/02/2017; Progressed to 1 HR, slight compensations upon descent 10/06/2017    PLAN  []  Upgrade activities as tolerated     []  Continue plan of care  []  Update interventions per flow sheet       [x]  Discharge due to:_  []  Other:_      Justin Jamison PT, DPT 10/11/2017  2:28 PM    Future Appointments  Date Time Provider Yojana Cintron   1/10/2018 9:30 AM Dionte Feldman  [address]

## 2017-10-11 NOTE — PROGRESS NOTES
[facility]  [address] [place] [address]  [place], [address]  [phone] fax    Physical Therapy Discharge Summary  Patient name: Dilcia Ji Start of Care: 2017   Referral source: Joanna Moran MD : 1945   Medical/Treatment Diagnosis: Left knee pain [M25.562] Onset Date:2017     Prior Hospitalization: see medical history Provider#: [id number]   Medications: Verified on Patient Summary List    Comorbidities: Heart Disease, Depression, Diabetes, Arthritis, HTN, Visual Impaired, Hearing impaired, L TKR, R TKR  Prior Level of Function:independent with ADLs, pain with ambulation, uses [facility]  Visits from Start of Care: 10    Missed Visits: 10  Reporting Period : 2017 to 10/11/2017      Summary of Care:Patient responds well to treatment session. Patient has made good progress to date. Meeting all goals, except TKE. Is able to progress to independent HEP at this time. No adverse effects were noted from today's treatment session    Short Term Goals: To be accomplished in 2 weeks:                         1. The patient will be independent and compliant with HEP to maximize therapeutic benefit. - limited 9-15-17                         2. The patient will improve knee extension to lacking 5 degrees to improve ease of ambulation. Lacking 7 degrees 2017. Met - lacking 4 degrees 2017. Long Term Goals: To be accomplished in 4 weeks:                         1. The patient will improve FOTO score to 47 to maximize quality of life.goal met 10/4/17                         2. The patient will ambulate void of antalgia utilizing SPC to maximize ease of community ambulation. Progressing - nearly met 2017.                          3. The patient will improve knee extension to lacking 2 degrees to improve ease of ambulation. Progressed to lacking 3 degrees 10/02/2017. Not met 10/11/2017                         4.  The patient will improve stair negotiation to using 1 HR without compensation up ascent and descent. Met 10/11/2017    ASSESSMENT/RECOMMENDATIONS:  [x]Discontinue therapy: [x]Patient has reached or is progressing toward set goals      []Patient is non-compliant or has abdicated      []Due to lack of appreciable progress towards set goals    Onel Yates, PT, DPT 10/11/2017 3:10 PM    NOTE TO PHYSICIAN:  Please complete the following and fax to: [facility] at [facility] at [phone]  . Retain this original for your records. If you are unable to process this request in   24 hours, please contact our office.      [] I have read the above report and request that my patient continue therapy with the following changes/special instructions:  [] I have read the above report and request that my patient be discharged from therapy    Physician's Signature:_____________________ Date:___________Time:__________

## 2017-10-11 NOTE — MR AVS SNAPSHOT
Visit Information Date & Time Provider Department Dept. Phone Encounter #  
 10/11/2017  9:15 AM Francisco Cagle MD [facility] [phone] [id number] Follow-up Instructions Return in about 3 months (around 1/11/2018) for Medication follow up. Upcoming Health Maintenance Date Due  
 LIPID PANEL Q1 1945 FOOT EXAM Q1 1/2/1954 MICROALBUMIN Q1 1/2/1954 EYE EXAM RETINAL OR DILATED Q1 1/2/1954 DTaP/Tdap/Td series (1 - Tdap) 1/2/1965 FOBT Q 1 YEAR AGE 50-75 1/2/1994 ZOSTER VACCINE AGE 60> 11/2/2003 GLAUCOMA SCREENING Q2Y 1/2/2009 Pneumococcal 65+ Low/Medium Risk (1 of 2 - PCV13) 1/2/2009 MEDICARE YEARLY EXAM 1/2/2009 INFLUENZA AGE 9 TO ADULT 8/1/2017 HEMOGLOBIN A1C Q6M 2/14/2018 BREAST CANCER SCRN MAMMOGRAM 11/4/2018 Allergies as of 10/11/2017  Review Complete On: 10/11/2017 By: Maria De Jesus Zarate LPN Severity Noted Reaction Type Reactions Other Medication  01/21/2013    Other (comments) SEASONAL ALLERGIES Topamax [Topiramate]  07/13/2017    Other (comments) \"irritable\" Current Immunizations  Never Reviewed No immunizations on file. Not reviewed this visit You Were Diagnosed With   
  
 Codes Comments Other chronic pain    -  Primary ICD-10-CM: G89.29 ICD-9-CM: 338.29 Use of opiates for therapeutic purposes     ICD-10-CM: Z79.891 ICD-9-CM: V58.69 Lumbar facet arthropathy     ICD-10-CM: M12.88 ICD-9-CM: 721.3 Neuritis     ICD-10-CM: M79.2 ICD-9-CM: 729.2 Status post total bilateral knee replacement     ICD-10-CM: Z20.640 ICD-9-CM: V43.65 Muscle spasm of back     ICD-10-CM: P18.642 ICD-9-CM: 724.8 Gait abnormality     ICD-10-CM: R26.9 ICD-9-CM: 853. 2 Vitals BP Pulse Temp Resp Height(growth percentile) Weight(growth percentile) 152/49 72 97.7 °F (36.5 °C) (Oral) 16 5' 1\" (1.549 m) 209 lb 12.8 oz (95.2 kg) SpO2 BMI OB Status Smoking Status 94% 39.64 kg/m2 Postmenopausal Never Smoker BMI and BSA Data Body Mass Index Body Surface Area  
 39.64 kg/m 2 2.02 m 2 Preferred Pharmacy Pharmacy Name Phone [facility] [phone] Your Updated Medication List  
  
   
This list is accurate as of: 10/11/17 10:21 AM.  Always use your most recent med list.  
  
  
  
  
 ABILIFY 2 mg tablet Generic drug:  ARIPiprazole Take 2 mg by mouth daily. acetaminophen 500 mg Cap Take 1 Cap by mouth three (3) times daily. take three times a day for 7 days ALLER- mg tablet Generic drug:  fexofenadine Take 180 mg by mouth daily. allergy injection  
every seven (7) days. amLODIPine 10 mg tablet Commonly known as:  Trupti Croon Take 10 mg by mouth nightly. Indications: hypertension  
  
 chlorthalidone 25 mg tablet Commonly known as:  Joanna Bolk Take 25 mg by mouth nightly. Indications: hypertension COLACE 100 mg capsule Generic drug:  docusate sodium Take 100 mg by mouth two (2) times a day. FLAXSEED PO Take 1,000 mg by mouth two (2) times a day. GLUCOSAMINE-CHONDROITIN COMPLX PO Take 1 Cap by mouth daily. glyBURIDE-metFORMIN 5-500 mg per tablet Commonly known as:  Donn Pat Take 2 Tabs by mouth two (2) times daily (with meals). ipratropium-albuterol  mcg/actuation inhaler Commonly known as:  Waunhayes Doctor Take 2 Puffs by inhalation two (2) times a day. Iron 325 mg (65 mg iron) tablet Generic drug:  ferrous sulfate Take 325 mg by mouth Daily (before breakfast). LIPITOR 40 mg tablet Generic drug:  atorvastatin Take 40 mg by mouth nightly. lisinopril 40 mg tablet Commonly known as:  Ashok Economy Take 40 mg by mouth nightly. Indications: HYPERTENSION  
  
 metoprolol succinate 100 mg tablet Commonly known as:  TOPROL-XL Take 100 mg by mouth daily. multivitamin tablet Commonly known as:  ONE A DAY Take 1 Tab by mouth daily. NASONEX 50 mcg/actuation nasal spray Generic drug:  mometasone 2 Sprays by Both Nostrils route two (2) times a day. NUVIGIL 250 mg Tab tablet Generic drug:  Armodafinil Take 250 mg by mouth Daily (before breakfast). OCUVITE PO Take 1 Cap by mouth daily. olopatadine 0.6 % Spry Commonly known as:  PATANASE 2 Squirts by Both Nostrils route two (2) times a day. polyethylene glycol 17 gram packet Commonly known as:  Chuyita Mt Take 1 Packet by mouth daily. tiaGABine 2 mg tablet Commonly known as:  GABITRIL Take 1 Tab by mouth every evening. traMADol 50 mg tablet Commonly known as:  ULTRAM  
1-2 po daily as needed for severe pain  Indications: NEUROPATHIC PAIN, Pain VITAMIN D3 1,000 unit tablet Generic drug:  cholecalciferol Take 1,000 Units by mouth daily. ZOLOFT 100 mg tablet Generic drug:  sertraline Take 200 mg by mouth daily. Prescriptions Printed Refills  
 traMADol (ULTRAM) 50 mg tablet 2 Si-2 po daily as needed for severe pain  Indications: NEUROPATHIC PAIN, Pain Class: Print Follow-up Instructions Return in about 3 months (around 2018) for Medication follow up. To-Do List   
 10/11/2017 Lab:  DRUG SCREEN UR - W/ CONFIRM   
  
 10/11/2017 2:00 PM  
  Appointment with Liu Mayo PT at [facility] ([phone]) Patient Instructions Low Back Arthritis: Exercises Your Care Instructions Here are some examples of typical rehabilitation exercises for your condition. Start each exercise slowly. Ease off the exercise if you start to have pain. Your doctor or physical therapist will tell you when you can start these exercises and which ones will work best for you. When you are not being active, find a comfortable position for rest. Some people are comfortable on the floor or a medium-firm bed with a small pillow under their head and another under their knees. Some people prefer to lie on their side with a pillow between their knees. Don't stay in one position for too long. Take short walks (10 to 20 minutes) every 2 to 3 hours. Avoid slopes, hills, and stairs until you feel better. Walk only distances you can manage without pain, especially leg pain. How to do the exercises Pelvic tilt 1. Lie on your back with your knees bent. 2. \"Brace\" your stomachtighten your muscles by pulling in and imagining your belly button moving toward your spine. 3. Press your lower back into the floor. You should feel your hips and pelvis rock back. 4. Hold for 6 seconds while breathing smoothly. 5. Relax and allow your pelvis and hips to rock forward. 6. Repeat 8 to 12 times. Back stretches 1. Get down on your hands and knees on the floor. 2. Relax your head and allow it to droop. Round your back up toward the ceiling until you feel a nice stretch in your upper, middle, and lower back. Hold this stretch for as long as it feels comfortable, or about 15 to 30 seconds. 3. Return to the starting position with a flat back while you are on your hands and knees. 4. Let your back sway by pressing your stomach toward the floor. Lift your buttocks toward the ceiling. 5. Hold this position for 15 to 30 seconds. 6. Repeat 2 to 4 times. Follow-up care is a key part of your treatment and safety. Be sure to make and go to all appointments, and call your doctor if you are having problems. It's also a good idea to know your test results and keep a list of the medicines you take. Where can you learn more? Go to http://reza-isauro.info/. Enter U591 in the search box to learn more about \"Low Back Arthritis: Exercises. \" Current as of: March 21, 2017 Content Version: 11.3 © 1514-2197 [employer]. Care instructions adapted under license by [employer] (which disclaims liability or warranty for this information). If you have questions about a medical condition or this instruction, always ask your healthcare professional. [address] any warranty or liability for your use of this information. Please provide this summary of care documentation to your next provider. Your primary care clinician is listed as Sinda Part. If you have any questions after today's visit, please call [phone].

## 2017-11-14 RX ORDER — TIAGABINE HYDROCHLORIDE 2 MG/1
2 TABLET, FILM COATED ORAL EVERY EVENING
Qty: 90 TAB | Refills: 0 | Status: SHIPPED | OUTPATIENT
Start: 2017-11-14 | End: 2018-01-10 | Stop reason: ALTCHOICE

## 2017-11-14 NOTE — TELEPHONE ENCOUNTER
Patient requests refill early to allow [facility] enough time to receive, process, fill and mail the order. Last Visit: 10/11/2017 with MD Jane Grant    Next Appointment: 01/10/2018 with LONDON Astorga   Previous Refill Encounters: 09/18/2017 per LONDON Astorga #[id number]     Requested Prescriptions     Pending Prescriptions Disp Refills    tiaGABine (GABITRIL) 2 mg tablet 90 Tab 0     Sig: Take 1 Tab by mouth every evening.

## 2018-01-10 ENCOUNTER — OFFICE VISIT (OUTPATIENT)
Dept: ORTHOPEDIC SURGERY | Age: 73
End: 2018-01-10

## 2018-01-10 VITALS
HEART RATE: 63 BPM | TEMPERATURE: 97.6 F | WEIGHT: 213.2 LBS | SYSTOLIC BLOOD PRESSURE: 141 MMHG | OXYGEN SATURATION: 99 % | DIASTOLIC BLOOD PRESSURE: 59 MMHG | BODY MASS INDEX: 40.25 KG/M2 | HEIGHT: 61 IN

## 2018-01-10 DIAGNOSIS — M53.87 SCIATICA OF RIGHT SIDE ASSOCIATED WITH DISORDER OF LUMBOSACRAL SPINE: ICD-10-CM

## 2018-01-10 DIAGNOSIS — Z79.891 USE OF OPIATES FOR THERAPEUTIC PURPOSES: ICD-10-CM

## 2018-01-10 DIAGNOSIS — M79.2 NEURITIS: ICD-10-CM

## 2018-01-10 DIAGNOSIS — M47.816 LUMBAR FACET ARTHROPATHY: ICD-10-CM

## 2018-01-10 DIAGNOSIS — M47.816 LUMBAR FACET ARTHROPATHY: Primary | ICD-10-CM

## 2018-01-10 PROBLEM — E66.01 OBESITY, MORBID (HCC): Status: ACTIVE | Noted: 2018-01-10

## 2018-01-10 RX ORDER — BLOOD-GLUCOSE METER
KIT MISCELLANEOUS
COMMUNITY
Start: 2018-01-01 | End: 2018-04-26

## 2018-01-10 RX ORDER — LEVOCETIRIZINE DIHYDROCHLORIDE 5 MG/1
TABLET, FILM COATED ORAL
COMMUNITY
Start: 2017-11-28 | End: 2018-04-11 | Stop reason: ALTCHOICE

## 2018-01-10 RX ORDER — TIAGABINE HYDROCHLORIDE 2 MG/1
2 TABLET, FILM COATED ORAL 2 TIMES DAILY WITH MEALS
Qty: 180 TAB | Refills: 0 | Status: SHIPPED | OUTPATIENT
Start: 2018-01-10 | End: 2018-04-11 | Stop reason: SDUPTHER

## 2018-01-10 NOTE — MR AVS SNAPSHOT
Visit Information Date & Time Provider Department Dept. Phone Encounter #  
 1/10/2018  9:30 AM Jo Ann Moise NP [facility] [phone] [id number] Follow-up Instructions Return in about 3 months (around 4/10/2018). Upcoming Health Maintenance Date Due  
 LIPID PANEL Q1 1945 FOOT EXAM Q1 1/2/1954 MICROALBUMIN Q1 1/2/1954 EYE EXAM RETINAL OR DILATED Q1 1/2/1954 DTaP/Tdap/Td series (1 - Tdap) 1/2/1965 FOBT Q 1 YEAR AGE 50-75 1/2/1994 ZOSTER VACCINE AGE 60> 11/2/2003 GLAUCOMA SCREENING Q2Y 1/2/2009 Pneumococcal 65+ Low/Medium Risk (1 of 2 - PCV13) 1/2/2009 MEDICARE YEARLY EXAM 1/2/2009 Influenza Age 5 to Adult 8/1/2017 HEMOGLOBIN A1C Q6M 2/14/2018 BREAST CANCER SCRN MAMMOGRAM 11/4/2018 Allergies as of 1/10/2018  Review Complete On: 1/10/2018 By: Jo Ann Moise NP Severity Noted Reaction Type Reactions Other Medication  01/21/2013    Other (comments) SEASONAL ALLERGIES Topamax [Topiramate]  07/13/2017    Other (comments) \"irritable\" Current Immunizations  Never Reviewed No immunizations on file. Not reviewed this visit You Were Diagnosed With   
  
 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  
 141/59 63 97.6 °F (36.4 °C) (Oral) 5' 1\" (1.549 m) 213 lb 3.2 oz (96.7 kg) 99% BMI OB Status Smoking Status 40.28 kg/m2 Postmenopausal Never Smoker Vitals History BMI and BSA Data Body Mass Index Body Surface Area  
 40.28 kg/m 2 2.04 m 2 Preferred Pharmacy Pharmacy Name Phone [address], [facility] [phone] Your Updated Medication List  
  
   
This list is accurate as of: 1/10/18 10:25 AM.  Always use your most recent med list.  
  
  
  
  
 ABILIFY 2 mg tablet Generic drug:  ARIPiprazole Take 2 mg by mouth daily. acetaminophen 500 mg Cap Take 1 Cap by mouth three (3) times daily. take three times a day for 7 days ALLER- mg tablet Generic drug:  fexofenadine Take 180 mg by mouth daily. allergy injection  
every seven (7) days. amLODIPine 10 mg tablet Commonly known as:  Frannie Marquez Take 10 mg by mouth nightly. Indications: hypertension  
  
 aspirin delayed-release 81 mg tablet Take  by mouth daily. chlorthalidone 25 mg tablet Commonly known as:  [place] Arteaga Take 25 mg by mouth nightly. Indications: hypertension COLACE 100 mg capsule Generic drug:  docusate sodium Take 100 mg by mouth two (2) times a day. FLAXSEED PO Take 1,000 mg by mouth two (2) times a day. folic acid 1 mg tablet Commonly known as:  Jessy Take  by mouth daily. FREESTYLE LITE STRIPS strip Generic drug:  glucose blood VI test strips GLUCOSAMINE-CHONDROITIN COMPLX PO Take 1 Cap by mouth daily. glyBURIDE-metFORMIN 5-500 mg per tablet Commonly known as:  Favian Mackenzie Take 2 Tabs by mouth two (2) times daily (with meals). ipratropium-albuterol  mcg/actuation inhaler Commonly known as:  Renata Knowles Take 2 Puffs by inhalation two (2) times a day. Iron 325 mg (65 mg iron) tablet Generic drug:  ferrous sulfate Take 325 mg by mouth Daily (before breakfast). JANUVIA 100 mg tablet Generic drug:  SITagliptin Take 100 mg by mouth daily. levocetirizine 5 mg tablet Commonly known as:  XYZAL  
  
 LIPITOR 40 mg tablet Generic drug:  atorvastatin Take 40 mg by mouth nightly. lisinopril 40 mg tablet Commonly known as:  Coulter Adalid Take 40 mg by mouth nightly. Indications: HYPERTENSION  
  
 metoprolol succinate 100 mg tablet Commonly known as:  TOPROL-XL Take 100 mg by mouth daily. montelukast 10 mg tablet Commonly known as:  SINGULAIR Take 10 mg by mouth daily. multivitamin tablet Commonly known as:  ONE A DAY Take 1 Tab by mouth daily. NASONEX 50 mcg/actuation nasal spray Generic drug:  mometasone 2 Sprays by Both Nostrils route two (2) times a day. NUVIGIL 250 mg Tab tablet Generic drug:  Armodafinil Take 250 mg by mouth Daily (before breakfast). OCUVITE PO Take 1 Cap by mouth daily. olopatadine 0.6 % Spry Commonly known as:  PATANASE 2 Squirts by Both Nostrils route two (2) times a day. polyethylene glycol 17 gram packet Commonly known as:  Verlon Books Take 1 Packet by mouth daily. PROTONIX 40 mg tablet Generic drug:  pantoprazole Take 40 mg by mouth daily. tiaGABine 2 mg tablet Commonly known as:  GABITRIL Take 1 Tab by mouth two (2) times daily (with meals). traMADol 50 mg tablet Commonly known as:  ULTRAM  
1-2 po daily as needed for severe pain  Indications: NEUROPATHIC PAIN, Pain  
  
 vit a,c & e-lutein-minerals tablet Commonly known as:  OCUVITE  
daily. VITAMIN D3 1,000 unit tablet Generic drug:  cholecalciferol Take 1,000 Units by mouth daily. ZOLOFT 100 mg tablet Generic drug:  sertraline Take 200 mg by mouth daily. Prescriptions Sent to Pharmacy Refills  
 tiaGABine (GABITRIL) 2 mg tablet 0 Sig: Take 1 Tab by mouth two (2) times daily (with meals). Class: Normal  
 Pharmacy: [address], [address] [place] [address] #: [phone] Route: Oral  
  
We Performed the Following AMB POC XRAY, SPINE, LUMBOSACRAL; 4+ [id number] CPT(R)] REFERRAL TO PHYSICAL THERAPY [FOK36 Custom] Comments:  
 Evaluate and treat for low-back pain w/ BLE sciatica Follow-up Instructions Return in about 3 months (around 4/10/2018). To-Do List   
 01/10/2018 Lab:  DRUG SCREEN UR - W/ CONFIRM Referral Information Referral ID Referred By Referred To  
  
 [id number] Wily Barrera Not Available Visits Status Start Date End Date 1 New Request 1/10/18 1/10/19 If your referral has a status of pending review or denied, additional information will be sent to support the outcome of this decision. Patient Instructions Back Pain, Emergency or Urgent Symptoms: Care Instructions Your Care Instructions Many people have back pain at one time or another. In most cases, pain gets better with self-care that includes over-the-counter pain medicine, ice, heat, and exercises. Unless you have symptoms of a severe injury or heart attack, you may be able to give yourself a few days before you call a doctor. But some back problems are very serious. Do not ignore symptoms that need to be checked right away. Follow-up care is a key part of your treatment and safety. Be sure to make and go to all appointments, and call your doctor if you are having problems. It's also a good idea to know your test results and keep a list of the medicines you take. How can you care for yourself at home? · Sit or lie in positions that are most comfortable and that reduce your pain. Try one of these positions when you lie down: ¨ Lie on your back with your knees bent and supported by large pillows. ¨ Lie on the floor with your legs on the seat of a sofa or chair. Azul Speck on your side with your knees and hips bent and a pillow between your legs. ¨ Lie on your stomach if it does not make pain worse. · Do not sit up in bed, and avoid soft couches and twisted positions. Bed rest can help relieve pain at first, but it delays healing. Avoid bed rest after the first day. · Change positions every 30 minutes. If you must sit for long periods of time, take breaks from sitting. Get up and walk around, or lie flat. · Try using a heating pad on a low or medium setting, for 15 to 20 minutes every 2 or 3 hours. Try a warm shower in place of one session with the heating pad. You can also buy single-use heat wraps that last up to 8 hours. You can also try ice or cold packs on your back for 10 to 20 minutes at a time, several times a day. (Put a thin cloth between the ice pack and your skin.) This reduces pain and makes it easier to be active and exercise. · Take pain medicines exactly as directed. ¨ If the doctor gave you a prescription medicine for pain, take it as prescribed. ¨ If you are not taking a prescription pain medicine, ask your doctor if you can take an over-the-counter medicine. When should you call for help? Call 911 anytime you think you may need emergency care. For example, call if: 
? · You are unable to move a leg at all. ? · You have back pain with severe belly pain. ? · You have symptoms of a heart attack. These may include: ¨ Chest pain or pressure, or a strange feeling in the chest. 
¨ Sweating. ¨ Shortness of breath. ¨ Nausea or vomiting. ¨ Pain, pressure, or a strange feeling in the back, neck, jaw, or upper belly or in one or both shoulders or arms. ¨ Lightheadedness or sudden weakness. ¨ A fast or irregular heartbeat. After you call 911, the  may tell you to chew 1 adult-strength or 2 to 4 low-dose aspirin. Wait for an ambulance. Do not try to drive yourself. ?Call your doctor now or seek immediate medical care if: 
? · You have new or worse symptoms in your arms, legs, chest, belly, or buttocks. Symptoms may include: ¨ Numbness or tingling. ¨ Weakness. ¨ Pain. ? · You lose bladder or bowel control. ? · You have back pain and: 
¨ You have injured your back while lifting or doing some other activity. Call if the pain is severe, has not gone away after 1 or 2 days, and you cannot do your normal daily activities. ¨ You have had a back injury before that needed treatment. ¨ Your pain has lasted longer than 4 weeks. ¨ You have had weight loss you cannot explain. ¨ You are age 48 or older. ¨ You have cancer now or have had it before. ? Watch closely for changes in your health, and be sure to contact your doctor if you are not getting better as expected. Where can you learn more? Go to http://reza-isauro.info/. Enter C439 in the search box to learn more about \"Back Pain, Emergency or Urgent Symptoms: Care Instructions. \" Current as of: March 20, 2017 Content Version: 11.4 © 7367-6418 [employer]. Care instructions adapted under license by [employer] (which disclaims liability or warranty for this information). If you have questions about a medical condition or this instruction, always ask your healthcare professional. [address] any warranty or liability for your use of this information. Please provide this summary of care documentation to your next provider. Your primary care clinician is listed as Melissa Juárez. If you have any questions after today's visit, please call [phone].

## 2018-01-10 NOTE — PATIENT INSTRUCTIONS
Back Pain, Emergency or Urgent Symptoms: Care Instructions  Your Care Instructions    Many people have back pain at one time or another. In most cases, pain gets better with self-care that includes over-the-counter pain medicine, ice, heat, and exercises. Unless you have symptoms of a severe injury or heart attack, you may be able to give yourself a few days before you call a doctor. But some back problems are very serious. Do not ignore symptoms that need to be checked right away. Follow-up care is a key part of your treatment and safety. Be sure to make and go to all appointments, and call your doctor if you are having problems. It's also a good idea to know your test results and keep a list of the medicines you take. How can you care for yourself at home? · Sit or lie in positions that are most comfortable and that reduce your pain. Try one of these positions when you lie down:  ¨ Lie on your back with your knees bent and supported by large pillows. ¨ Lie on the floor with your legs on the seat of a sofa or chair. Donnel Seller on your side with your knees and hips bent and a pillow between your legs. ¨ Lie on your stomach if it does not make pain worse. · Do not sit up in bed, and avoid soft couches and twisted positions. Bed rest can help relieve pain at first, but it delays healing. Avoid bed rest after the first day. · Change positions every 30 minutes. If you must sit for long periods of time, take breaks from sitting. Get up and walk around, or lie flat. · Try using a heating pad on a low or medium setting, for 15 to 20 minutes every 2 or 3 hours. Try a warm shower in place of one session with the heating pad. You can also buy single-use heat wraps that last up to 8 hours. You can also try ice or cold packs on your back for 10 to 20 minutes at a time, several times a day. (Put a thin cloth between the ice pack and your skin.) This reduces pain and makes it easier to be active and exercise.   · Take pain medicines exactly as directed. ¨ If the doctor gave you a prescription medicine for pain, take it as prescribed. ¨ If you are not taking a prescription pain medicine, ask your doctor if you can take an over-the-counter medicine. When should you call for help? Call 911 anytime you think you may need emergency care. For example, call if:  ? · You are unable to move a leg at all. ? · You have back pain with severe belly pain. ? · You have symptoms of a heart attack. These may include:  ¨ Chest pain or pressure, or a strange feeling in the chest.  ¨ Sweating. ¨ Shortness of breath. ¨ Nausea or vomiting. ¨ Pain, pressure, or a strange feeling in the back, neck, jaw, or upper belly or in one or both shoulders or arms. ¨ Lightheadedness or sudden weakness. ¨ A fast or irregular heartbeat. After you call 911, the  may tell you to chew 1 adult-strength or 2 to 4 low-dose aspirin. Wait for an ambulance. Do not try to drive yourself. ?Call your doctor now or seek immediate medical care if:  ? · You have new or worse symptoms in your arms, legs, chest, belly, or buttocks. Symptoms may include:  ¨ Numbness or tingling. ¨ Weakness. ¨ Pain. ? · You lose bladder or bowel control. ? · You have back pain and:  ¨ You have injured your back while lifting or doing some other activity. Call if the pain is severe, has not gone away after 1 or 2 days, and you cannot do your normal daily activities. ¨ You have had a back injury before that needed treatment. ¨ Your pain has lasted longer than 4 weeks. ¨ You have had weight loss you cannot explain. ¨ You are age 48 or older. ¨ You have cancer now or have had it before. ? Watch closely for changes in your health, and be sure to contact your doctor if you are not getting better as expected. Where can you learn more? Go to http://reza-isauro.info/.   Enter Z017 in the search box to learn more about \"Back Pain, Emergency or Urgent Symptoms: Care Instructions. \"  Current as of: March 20, 2017  Content Version: 11.4  © 6448-7120 [employer], [employer]. Care instructions adapted under license by [employer] (which disclaims liability or warranty for this information). If you have questions about a medical condition or this instruction, always ask your healthcare professional. [place] [address] any warranty or liability for your use of this information.
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
## 2018-02-06 ENCOUNTER — HOSPITAL ENCOUNTER (OUTPATIENT)
Dept: MAMMOGRAPHY | Age: 73
Discharge: HOME | End: 2018-02-06
Attending: FAMILY MEDICINE
Payer: MEDICARE

## 2018-02-06 DIAGNOSIS — Z12.31 VISIT FOR SCREENING MAMMOGRAM: ICD-10-CM

## 2018-02-06 PROCEDURE — 77063 BREAST TOMOSYNTHESIS BI: CPT

## 2018-03-13 ENCOUNTER — HOSPITAL ENCOUNTER (OUTPATIENT)
Age: 73
Discharge: HOME | End: 2018-03-13
Attending: ORTHOPAEDIC SURGERY
Payer: MEDICARE

## 2018-03-13 DIAGNOSIS — M75.52 SUBDELTOID BURSITIS OF LEFT SHOULDER JOINT: ICD-10-CM

## 2018-03-13 PROCEDURE — 73221 MRI JOINT UPR EXTREM W/O DYE: CPT

## 2018-04-11 ENCOUNTER — OFFICE VISIT (OUTPATIENT)
Dept: ORTHOPEDIC SURGERY | Age: 73
End: 2018-04-11

## 2018-04-11 VITALS
TEMPERATURE: 97.9 F | BODY MASS INDEX: 39.49 KG/M2 | RESPIRATION RATE: 18 BRPM | WEIGHT: 214.6 LBS | DIASTOLIC BLOOD PRESSURE: 64 MMHG | OXYGEN SATURATION: 97 % | HEIGHT: 62 IN | HEART RATE: 57 BPM | SYSTOLIC BLOOD PRESSURE: 116 MMHG

## 2018-04-11 DIAGNOSIS — M54.42 CHRONIC BILATERAL LOW BACK PAIN WITH BILATERAL SCIATICA: ICD-10-CM

## 2018-04-11 DIAGNOSIS — M43.17 SPONDYLOLISTHESIS OF LUMBOSACRAL REGION: Primary | ICD-10-CM

## 2018-04-11 DIAGNOSIS — M54.41 CHRONIC BILATERAL LOW BACK PAIN WITH BILATERAL SCIATICA: ICD-10-CM

## 2018-04-11 DIAGNOSIS — G89.29 OTHER CHRONIC PAIN: ICD-10-CM

## 2018-04-11 DIAGNOSIS — Z79.891 USE OF OPIATES FOR THERAPEUTIC PURPOSES: ICD-10-CM

## 2018-04-11 DIAGNOSIS — M43.17 SPONDYLOLISTHESIS OF LUMBOSACRAL REGION: ICD-10-CM

## 2018-04-11 DIAGNOSIS — G89.29 CHRONIC BILATERAL LOW BACK PAIN WITH BILATERAL SCIATICA: ICD-10-CM

## 2018-04-11 PROBLEM — M54.40 CHRONIC BILATERAL LOW BACK PAIN WITH SCIATICA: Status: ACTIVE | Noted: 2018-04-11

## 2018-04-11 RX ORDER — CETIRIZINE HCL 10 MG
10 TABLET ORAL DAILY
COMMUNITY
End: 2019-06-27

## 2018-04-11 RX ORDER — TRAMADOL HYDROCHLORIDE 50 MG/1
TABLET ORAL
Qty: 60 TAB | Refills: 2 | Status: SHIPPED | OUTPATIENT
Start: 2018-04-11 | End: 2018-07-23 | Stop reason: SDUPTHER

## 2018-04-11 RX ORDER — TIAGABINE HYDROCHLORIDE 2 MG/1
2 TABLET, FILM COATED ORAL 2 TIMES DAILY WITH MEALS
Qty: 180 TAB | Refills: 1 | Status: SHIPPED | OUTPATIENT
Start: 2018-04-11 | End: 2018-07-23 | Stop reason: ALTCHOICE

## 2018-04-11 NOTE — PATIENT INSTRUCTIONS
Back Pain, Emergency or Urgent Symptoms: Care Instructions  Your Care Instructions    Many people have back pain at one time or another. In most cases, pain gets better with self-care that includes over-the-counter pain medicine, ice, heat, and exercises. Unless you have symptoms of a severe injury or heart attack, you may be able to give yourself a few days before you call a doctor. But some back problems are very serious. Do not ignore symptoms that need to be checked right away. Follow-up care is a key part of your treatment and safety. Be sure to make and go to all appointments, and call your doctor if you are having problems. It's also a good idea to know your test results and keep a list of the medicines you take. How can you care for yourself at home? · Sit or lie in positions that are most comfortable and that reduce your pain. Try one of these positions when you lie down:  ¨ Lie on your back with your knees bent and supported by large pillows. ¨ Lie on the floor with your legs on the seat of a sofa or chair. [place] on your side with your knees and hips bent and a pillow between your legs. ¨ Lie on your stomach if it does not make pain worse. · Do not sit up in bed, and avoid soft couches and twisted positions. Bed rest can help relieve pain at first, but it delays healing. Avoid bed rest after the first day. · Change positions every 30 minutes. If you must sit for long periods of time, take breaks from sitting. Get up and walk around, or lie flat. · Try using a heating pad on a low or medium setting, for 15 to 20 minutes every 2 or 3 hours. Try a warm shower in place of one session with the heating pad. You can also buy single-use heat wraps that last up to 8 hours. You can also try ice or cold packs on your back for 10 to 20 minutes at a time, several times a day. (Put a thin cloth between the ice pack and your skin.) This reduces pain and makes it easier to be active and exercise.   · Take pain medicines exactly as directed. ¨ If the doctor gave you a prescription medicine for pain, take it as prescribed. ¨ If you are not taking a prescription pain medicine, ask your doctor if you can take an over-the-counter medicine. When should you call for help? Call 911 anytime you think you may need emergency care. For example, call if:  ? · You are unable to move a leg at all. ? · You have back pain with severe belly pain. ? · You have symptoms of a heart attack. These may include:  ¨ Chest pain or pressure, or a strange feeling in the chest.  ¨ Sweating. ¨ Shortness of breath. ¨ Nausea or vomiting. ¨ Pain, pressure, or a strange feeling in the back, neck, jaw, or upper belly or in one or both shoulders or arms. ¨ Lightheadedness or sudden weakness. ¨ A fast or irregular heartbeat. After you call 911, the  may tell you to chew 1 adult-strength or 2 to 4 low-dose aspirin. Wait for an ambulance. Do not try to drive yourself. ?Call your doctor now or seek immediate medical care if:  ? · You have new or worse symptoms in your arms, legs, chest, belly, or buttocks. Symptoms may include:  ¨ Numbness or tingling. ¨ Weakness. ¨ Pain. ? · You lose bladder or bowel control. ? · You have back pain and:  ¨ You have injured your back while lifting or doing some other activity. Call if the pain is severe, has not gone away after 1 or 2 days, and you cannot do your normal daily activities. ¨ You have had a back injury before that needed treatment. ¨ Your pain has lasted longer than 4 weeks. ¨ You have had weight loss you cannot explain. ¨ You are age 48 or older. ¨ You have cancer now or have had it before. ? Watch closely for changes in your health, and be sure to contact your doctor if you are not getting better as expected. Where can you learn more? Go to http://reza-isauro.info/.   Enter O583 in the search box to learn more about \"Back Pain, Emergency or Urgent Symptoms: Care Instructions. \"  Current as of: March 20, 2017  Content Version: 11.4  © 2945-0458 [employer], [employer]. Care instructions adapted under license by [employer] (which disclaims liability or warranty for this information). If you have questions about a medical condition or this instruction, always ask your healthcare professional. [place] [address] any warranty or liability for your use of this information.

## 2018-04-11 NOTE — MR AVS SNAPSHOT
Bart Castañeda. [facility] [address] [place] [address] 
[phone] Patient: Duncan Shea MRN: [id number] KWL:[id number] Visit Information Date & Time Provider Department Dept. Phone Encounter #  
 4/11/2018  9:30 AM Elsa Lawton NP [facility] [phone] [id number] Follow-up Instructions Return in about 3 months (around 7/11/2018). Upcoming Health Maintenance Date Due  
 LIPID PANEL Q1 1945 FOOT EXAM Q1 1/2/1954 MICROALBUMIN Q1 1/2/1954 EYE EXAM RETINAL OR DILATED Q1 1/2/1954 DTaP/Tdap/Td series (1 - Tdap) 1/2/1965 FOBT Q 1 YEAR AGE 50-75 1/2/1994 ZOSTER VACCINE AGE 60> 11/2/2003 GLAUCOMA SCREENING Q2Y 1/2/2009 Pneumococcal 65+ Low/Medium Risk (1 of 2 - PCV13) 1/2/2009 Influenza Age 5 to Adult 8/1/2017 HEMOGLOBIN A1C Q6M 2/14/2018 MEDICARE YEARLY EXAM 3/14/2018 BREAST CANCER SCRN MAMMOGRAM 2/6/2020 Allergies as of 4/11/2018  Review Complete On: 4/11/2018 By: Placido Pulido LPN Severity Noted Reaction Type Reactions Other Medication  01/21/2013    Other (comments) SEASONAL ALLERGIES Topamax [Topiramate]  07/13/2017    Other (comments) \"irritable\" Current Immunizations  Never Reviewed No immunizations on file. Not reviewed this visit You Were Diagnosed With   
  
 Codes Comments Spondylolisthesis of lumbosacral region    -  Primary ICD-10-CM: M43.17 ICD-9-CM: 738.4 Other chronic pain     ICD-10-CM: G89.29 ICD-9-CM: 338.29 Use of opiates for therapeutic purposes     ICD-10-CM: Z79.891 ICD-9-CM: V58.69 Chronic bilateral low back pain with bilateral sciatica     ICD-10-CM: M54.42, M54.41, G89.29 ICD-9-CM: 724.2, 724.3, 338.29 Vitals BP Pulse Temp Resp Height(growth percentile) Weight(growth percentile) 116/64 (!) 57 97.9 °F (36.6 °C) (Oral) 18 5' 2\" (1.575 m) 214 lb 9.6 oz (97.3 kg) SpO2 BMI OB Status Smoking Status 97% 39.25 kg/m2 Postmenopausal Never Smoker Vitals History BMI and BSA Data Body Mass Index Body Surface Area  
 39.25 kg/m 2 2.06 m 2 Preferred Pharmacy Pharmacy Name Phone [facility], [facility] [phone] Your Updated Medication List  
  
   
This list is accurate as of 4/11/18 10:13 AM.  Always use your most recent med list.  
  
  
  
  
 ABILIFY 2 mg tablet Generic drug:  ARIPiprazole Take 2 mg by mouth daily. acetaminophen 500 mg Cap Take 1 Cap by mouth three (3) times daily. take three times a day for 7 days  
  
 allergy injection  
every seven (7) days. amLODIPine 10 mg tablet Commonly known as:  Tank Kaur Take 10 mg by mouth nightly. Indications: hypertension  
  
 aspirin delayed-release 81 mg tablet Take  by mouth daily. chlorthalidone 25 mg tablet Commonly known as:  Bora Sepulveda Take 25 mg by mouth nightly. Indications: hypertension COLACE 100 mg capsule Generic drug:  docusate sodium Take 100 mg by mouth two (2) times a day. FLAXSEED PO Take 1,000 mg by mouth two (2) times a day. folic acid 1 mg tablet Commonly known as:  Google Take  by mouth daily. FREESTYLE LITE STRIPS strip Generic drug:  glucose blood VI test strips GLUCOSAMINE-CHONDROITIN COMPLX PO Take 1 Cap by mouth two (2) times a day. glyBURIDE-metFORMIN 5-500 mg per tablet Commonly known as:  Redge Cheeks Take 2 Tabs by mouth two (2) times daily (with meals). ipratropium-albuterol  mcg/actuation inhaler Commonly known as:  Patricia Pesa Take 2 Puffs by inhalation two (2) times a day. Iron 325 mg (65 mg iron) tablet Generic drug:  ferrous sulfate Take 325 mg by mouth Daily (before breakfast). JANUVIA 100 mg tablet Generic drug:  SITagliptin Take 100 mg by mouth daily. LIPITOR 40 mg tablet Generic drug:  atorvastatin Take 40 mg by mouth nightly. lisinopril 40 mg tablet Commonly known as:  Paulino Garrett Take 40 mg by mouth nightly. Indications: HYPERTENSION  
  
 metoprolol succinate 100 mg tablet Commonly known as:  TOPROL-XL Take 100 mg by mouth daily. montelukast 10 mg tablet Commonly known as:  SINGULAIR Take 10 mg by mouth daily. multivitamin tablet Commonly known as:  ONE A DAY Take 1 Tab by mouth daily. NASONEX 50 mcg/actuation nasal spray Generic drug:  mometasone 2 Sprays by Both Nostrils route two (2) times a day. NUVIGIL 250 mg Tab tablet Generic drug:  Armodafinil Take 250 mg by mouth Daily (before breakfast). OCUVITE PO Take 1 Cap by mouth daily. olopatadine 0.6 % Spry Commonly known as:  PATANASE 2 Squirts by Both Nostrils route two (2) times a day. polyethylene glycol 17 gram packet Commonly known as:  Mehreen Mule Take 1 Packet by mouth daily. PROTONIX 40 mg tablet Generic drug:  pantoprazole Take 40 mg by mouth two (2) times a day. tiaGABine 2 mg tablet Commonly known as:  GABITRIL Take 1 Tab by mouth two (2) times daily (with meals). traMADol 50 mg tablet Commonly known as:  ULTRAM  
1-2 po daily as needed for severe pain  Indications: NEUROPATHIC PAIN, Pain VITAMIN D3 1,000 unit tablet Generic drug:  cholecalciferol Take 1,000 Units by mouth daily. ZOLOFT 100 mg tablet Generic drug:  sertraline Take 150 mg by mouth daily. ZyrTEC 10 mg tablet Generic drug:  cetirizine Take 10 mg by mouth daily. Prescriptions Printed Refills  
 traMADol (ULTRAM) 50 mg tablet 2 Si-2 po daily as needed for severe pain  Indications: NEUROPATHIC PAIN, Pain Class: Print Prescriptions Sent to Pharmacy  Refills  
 tiaGABine (GABITRIL) 2 mg tablet 1  
 Sig: Take 1 Tab by mouth two (2) times daily (with meals). Class: Normal  
 Pharmacy: [address], [address] [place] [address] #: [phone] Route: Oral  
  
Follow-up Instructions Return in about 3 months (around 7/11/2018). To-Do List   
 04/11/2018 Lab:  DRUG SCREEN UR - W/ CONFIRM Patient Instructions Back Pain, Emergency or Urgent Symptoms: Care Instructions Your Care Instructions Many people have back pain at one time or another. In most cases, pain gets better with self-care that includes over-the-counter pain medicine, ice, heat, and exercises. Unless you have symptoms of a severe injury or heart attack, you may be able to give yourself a few days before you call a doctor. But some back problems are very serious. Do not ignore symptoms that need to be checked right away. Follow-up care is a key part of your treatment and safety. Be sure to make and go to all appointments, and call your doctor if you are having problems. It's also a good idea to know your test results and keep a list of the medicines you take. How can you care for yourself at home? · Sit or lie in positions that are most comfortable and that reduce your pain. Try one of these positions when you lie down: ¨ Lie on your back with your knees bent and supported by large pillows. ¨ Lie on the floor with your legs on the seat of a sofa or chair. Helane Gitelman on your side with your knees and hips bent and a pillow between your legs. ¨ Lie on your stomach if it does not make pain worse. · Do not sit up in bed, and avoid soft couches and twisted positions. Bed rest can help relieve pain at first, but it delays healing. Avoid bed rest after the first day. · Change positions every 30 minutes. If you must sit for long periods of time, take breaks from sitting. Get up and walk around, or lie flat.  
· Try using a heating pad on a low or medium setting, for 15 to 20 minutes every 2 or 3 hours. Try a warm shower in place of one session with the heating pad. You can also buy single-use heat wraps that last up to 8 hours. You can also try ice or cold packs on your back for 10 to 20 minutes at a time, several times a day. (Put a thin cloth between the ice pack and your skin.) This reduces pain and makes it easier to be active and exercise. · Take pain medicines exactly as directed. ¨ If the doctor gave you a prescription medicine for pain, take it as prescribed. ¨ If you are not taking a prescription pain medicine, ask your doctor if you can take an over-the-counter medicine. When should you call for help? Call 911 anytime you think you may need emergency care. For example, call if: 
? · You are unable to move a leg at all. ? · You have back pain with severe belly pain. ? · You have symptoms of a heart attack. These may include: ¨ Chest pain or pressure, or a strange feeling in the chest. 
¨ Sweating. ¨ Shortness of breath. ¨ Nausea or vomiting. ¨ Pain, pressure, or a strange feeling in the back, neck, jaw, or upper belly or in one or both shoulders or arms. ¨ Lightheadedness or sudden weakness. ¨ A fast or irregular heartbeat. After you call 911, the  may tell you to chew 1 adult-strength or 2 to 4 low-dose aspirin. Wait for an ambulance. Do not try to drive yourself. ?Call your doctor now or seek immediate medical care if: 
? · You have new or worse symptoms in your arms, legs, chest, belly, or buttocks. Symptoms may include: ¨ Numbness or tingling. ¨ Weakness. ¨ Pain. ? · You lose bladder or bowel control. ? · You have back pain and: 
¨ You have injured your back while lifting or doing some other activity. Call if the pain is severe, has not gone away after 1 or 2 days, and you cannot do your normal daily activities. ¨ You have had a back injury before that needed treatment. ¨ Your pain has lasted longer than 4 weeks. ¨ You have had weight loss you cannot explain. ¨ You are age 48 or older. ¨ You have cancer now or have had it before. ? Watch closely for changes in your health, and be sure to contact your doctor if you are not getting better as expected. Where can you learn more? Go to http://reza-isauro.info/. Enter V968 in the search box to learn more about \"Back Pain, Emergency or Urgent Symptoms: Care Instructions. \" Current as of: March 20, 2017 Content Version: 11.4 © 2940-5202 [employer]. Care instructions adapted under license by [employer] (which disclaims liability or warranty for this information). If you have questions about a medical condition or this instruction, always ask your healthcare professional. [address] any warranty or liability for your use of this information. Please provide this summary of care documentation to your next provider. Your primary care clinician is listed as Faiza Noble. If you have any questions after today's visit, please call [phone].
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
## 2018-05-25 ENCOUNTER — HOSPITAL ENCOUNTER (OUTPATIENT)
Dept: PREADMISSION TESTING | Age: 73
Discharge: HOME | End: 2018-05-25
Payer: MEDICARE

## 2018-05-25 ENCOUNTER — HOSPITAL ENCOUNTER (OUTPATIENT)
Dept: GENERAL RADIOLOGY | Age: 73
Discharge: HOME | End: 2018-05-25
Payer: MEDICARE

## 2018-05-25 DIAGNOSIS — Z01.811 PRE-OP CHEST EXAM: ICD-10-CM

## 2018-05-25 DIAGNOSIS — I10 ESSENTIAL HYPERTENSION, MALIGNANT: ICD-10-CM

## 2018-05-25 DIAGNOSIS — Z01.818 PRE-OP EXAM: ICD-10-CM

## 2018-05-25 DIAGNOSIS — E11.9 DIABETES MELLITUS (HCC): ICD-10-CM

## 2018-05-25 LAB
ABO + RH BLD: NORMAL
ALBUMIN SERPL-MCNC: 3.9 G/DL (ref 3.4–5)
ALBUMIN/GLOB SERPL: 1.1 {RATIO} (ref 0.8–1.7)
ALP SERPL-CCNC: 123 U/L (ref 45–117)
ALT SERPL-CCNC: 46 U/L (ref 13–56)
ANION GAP SERPL CALC-SCNC: 11 MMOL/L (ref 3–18)
APPEARANCE UR: CLEAR
APTT PPP: 36.2 SEC (ref 23–36.4)
AST SERPL-CCNC: 41 U/L (ref 15–37)
BASOPHILS # BLD: 0 K/UL (ref 0–0.06)
BASOPHILS NFR BLD: 0 % (ref 0–2)
BILIRUB SERPL-MCNC: 0.2 MG/DL (ref 0.2–1)
BILIRUB UR QL: NEGATIVE
BLOOD GROUP ANTIBODIES SERPL: NORMAL
BUN SERPL-MCNC: 33 MG/DL (ref 7–18)
BUN/CREAT SERPL: 33 (ref 12–20)
CALCIUM SERPL-MCNC: 9.4 MG/DL (ref 8.5–10.1)
CHLORIDE SERPL-SCNC: 102 MMOL/L (ref 100–108)
CO2 SERPL-SCNC: 26 MMOL/L (ref 21–32)
COLOR UR: YELLOW
CREAT SERPL-MCNC: 1.01 MG/DL (ref 0.6–1.3)
DIFFERENTIAL METHOD BLD: NORMAL
EOSINOPHIL # BLD: 0.2 K/UL (ref 0–0.4)
EOSINOPHIL NFR BLD: 2 % (ref 0–5)
ERYTHROCYTE [DISTWIDTH] IN BLOOD BY AUTOMATED COUNT: 13.4 % (ref 11.6–14.5)
EST. AVERAGE GLUCOSE BLD GHB EST-MCNC: 166 MG/DL
GLOBULIN SER CALC-MCNC: 3.6 G/DL (ref 2–4)
GLUCOSE SERPL-MCNC: 91 MG/DL (ref 74–99)
GLUCOSE UR STRIP.AUTO-MCNC: NEGATIVE MG/DL
HBA1C MFR BLD: 7.4 % (ref 4.2–5.6)
HCT VFR BLD AUTO: 39.9 % (ref 35–45)
HGB BLD-MCNC: 12.9 G/DL (ref 12–16)
HGB UR QL STRIP: NEGATIVE
INR PPP: 1 (ref 0.8–1.2)
KETONES UR QL STRIP.AUTO: NEGATIVE MG/DL
LEUKOCYTE ESTERASE UR QL STRIP.AUTO: NEGATIVE
LYMPHOCYTES # BLD: 3.1 K/UL (ref 0.9–3.6)
LYMPHOCYTES NFR BLD: 40 % (ref 21–52)
MCH RBC QN AUTO: 28.1 PG (ref 24–34)
MCHC RBC AUTO-ENTMCNC: 32.3 G/DL (ref 31–37)
MCV RBC AUTO: 86.9 FL (ref 74–97)
MONOCYTES # BLD: 0.5 K/UL (ref 0.05–1.2)
MONOCYTES NFR BLD: 7 % (ref 3–10)
NEUTS SEG # BLD: 3.9 K/UL (ref 1.8–8)
NEUTS SEG NFR BLD: 51 % (ref 40–73)
NITRITE UR QL STRIP.AUTO: NEGATIVE
PH UR STRIP: 5 [PH] (ref 5–8)
PLATELET # BLD AUTO: 294 K/UL (ref 135–420)
PMV BLD AUTO: 9.3 FL (ref 9.2–11.8)
POTASSIUM SERPL-SCNC: 4.2 MMOL/L (ref 3.5–5.5)
PROT SERPL-MCNC: 7.5 G/DL (ref 6.4–8.2)
PROT UR STRIP-MCNC: NEGATIVE MG/DL
PROTHROMBIN TIME: 13 SEC (ref 11.5–15.2)
RBC # BLD AUTO: 4.59 M/UL (ref 4.2–5.3)
SODIUM SERPL-SCNC: 139 MMOL/L (ref 136–145)
SP GR UR REFRACTOMETRY: 1.03 (ref 1–1.03)
SPECIMEN EXP DATE BLD: NORMAL
UROBILINOGEN UR QL STRIP.AUTO: 0.2 EU/DL (ref 0.2–1)
WBC # BLD AUTO: 7.7 K/UL (ref 4.6–13.2)

## 2018-05-25 PROCEDURE — 93005 ELECTROCARDIOGRAM TRACING: CPT

## 2018-05-25 PROCEDURE — 81003 URINALYSIS AUTO W/O SCOPE: CPT | Performed by: ORTHOPAEDIC SURGERY

## 2018-05-25 PROCEDURE — 86900 BLOOD TYPING SEROLOGIC ABO: CPT | Performed by: ORTHOPAEDIC SURGERY

## 2018-05-25 PROCEDURE — 87086 URINE CULTURE/COLONY COUNT: CPT | Performed by: ORTHOPAEDIC SURGERY

## 2018-05-25 PROCEDURE — 71046 X-RAY EXAM CHEST 2 VIEWS: CPT

## 2018-05-25 PROCEDURE — 85610 PROTHROMBIN TIME: CPT | Performed by: ORTHOPAEDIC SURGERY

## 2018-05-25 PROCEDURE — 85025 COMPLETE CBC W/AUTO DIFF WBC: CPT | Performed by: ORTHOPAEDIC SURGERY

## 2018-05-25 PROCEDURE — 83036 HEMOGLOBIN GLYCOSYLATED A1C: CPT | Performed by: ORTHOPAEDIC SURGERY

## 2018-05-25 PROCEDURE — 80053 COMPREHEN METABOLIC PANEL: CPT | Performed by: ORTHOPAEDIC SURGERY

## 2018-05-25 PROCEDURE — 36415 COLL VENOUS BLD VENIPUNCTURE: CPT | Performed by: ORTHOPAEDIC SURGERY

## 2018-05-25 PROCEDURE — 85730 THROMBOPLASTIN TIME PARTIAL: CPT | Performed by: ORTHOPAEDIC SURGERY

## 2018-05-25 RX ORDER — DOXYCYCLINE 100 MG/1
CAPSULE ORAL
COMMUNITY
Start: 2018-05-16 | End: 2018-06-05

## 2018-05-26 LAB
ATRIAL RATE: 59 BPM
CALCULATED P AXIS, ECG09: 59 DEGREES
CALCULATED R AXIS, ECG10: 4 DEGREES
CALCULATED T AXIS, ECG11: 41 DEGREES
DIAGNOSIS, 93000: NORMAL
P-R INTERVAL, ECG05: 170 MS
Q-T INTERVAL, ECG07: 416 MS
QRS DURATION, ECG06: 80 MS
QTC CALCULATION (BEZET), ECG08: 411 MS
VENTRICULAR RATE, ECG03: 59 BPM

## 2018-05-27 LAB
BACTERIA SPEC CULT: ABNORMAL
BACTERIA SPEC CULT: ABNORMAL
SERVICE CMNT-IMP: ABNORMAL

## 2018-06-07 ENCOUNTER — ANESTHESIA EVENT (OUTPATIENT)
Dept: SURGERY | Age: 73
DRG: 483 | End: 2018-06-07
Payer: MEDICARE

## 2018-06-08 ENCOUNTER — ANESTHESIA (OUTPATIENT)
Dept: SURGERY | Age: 73
DRG: 483 | End: 2018-06-08
Payer: MEDICARE

## 2018-06-08 ENCOUNTER — HOSPITAL ENCOUNTER (INPATIENT)
Age: 73
LOS: 1 days | Discharge: HOME | DRG: 483 | End: 2018-06-09
Attending: ORTHOPAEDIC SURGERY | Admitting: ORTHOPAEDIC SURGERY
Payer: MEDICARE

## 2018-06-08 ENCOUNTER — APPOINTMENT (OUTPATIENT)
Dept: GENERAL RADIOLOGY | Age: 73
DRG: 483 | End: 2018-06-08
Attending: ORTHOPAEDIC SURGERY
Payer: MEDICARE

## 2018-06-08 DIAGNOSIS — M12.812 ROTATOR CUFF ARTHROPATHY OF LEFT SHOULDER: Primary | ICD-10-CM

## 2018-06-08 PROBLEM — M12.819 ROTATOR CUFF ARTHROPATHY: Status: ACTIVE | Noted: 2018-06-08

## 2018-06-08 LAB
ABO + RH BLD: NORMAL
BLOOD GROUP ANTIBODIES SERPL: NORMAL
GLUCOSE BLD STRIP.AUTO-MCNC: 119 MG/DL (ref 70–110)
GLUCOSE BLD STRIP.AUTO-MCNC: 139 MG/DL (ref 70–110)
SPECIMEN EXP DATE BLD: NORMAL

## 2018-06-08 PROCEDURE — 74011250636 HC RX REV CODE- 250/636: Performed by: NURSE ANESTHETIST, CERTIFIED REGISTERED

## 2018-06-08 PROCEDURE — 76060000036 HC ANESTHESIA 2.5 TO 3 HR: Performed by: ORTHOPAEDIC SURGERY

## 2018-06-08 PROCEDURE — 74011250636 HC RX REV CODE- 250/636: Performed by: ORTHOPAEDIC SURGERY

## 2018-06-08 PROCEDURE — C9290 INJ, BUPIVACAINE LIPOSOME: HCPCS | Performed by: ORTHOPAEDIC SURGERY

## 2018-06-08 PROCEDURE — 76010000172 HC OR TIME 2.5 TO 3 HR INTENSV-TIER 1: Performed by: ORTHOPAEDIC SURGERY

## 2018-06-08 PROCEDURE — 94760 N-INVAS EAR/PLS OXIMETRY 1: CPT

## 2018-06-08 PROCEDURE — 74011250636 HC RX REV CODE- 250/636

## 2018-06-08 PROCEDURE — 77030002933 HC SUT MCRYL J&J -A: Performed by: ORTHOPAEDIC SURGERY

## 2018-06-08 PROCEDURE — 77030018074 HC RTVR SUT ARTH4 S&N -B: Performed by: ORTHOPAEDIC SURGERY

## 2018-06-08 PROCEDURE — 82962 GLUCOSE BLOOD TEST: CPT

## 2018-06-08 PROCEDURE — 77030012890: Performed by: ORTHOPAEDIC SURGERY

## 2018-06-08 PROCEDURE — 77030003601 HC NDL NRV BLK BBMI -A: Performed by: ORTHOPAEDIC SURGERY

## 2018-06-08 PROCEDURE — 77030011266 HC ELECTRD BLD INSL COVD -A: Performed by: ORTHOPAEDIC SURGERY

## 2018-06-08 PROCEDURE — 77030015366 HC IMMOB SHLDR BREG -B: Performed by: ORTHOPAEDIC SURGERY

## 2018-06-08 PROCEDURE — 74011000258 HC RX REV CODE- 258

## 2018-06-08 PROCEDURE — 64415 NJX AA&/STRD BRCH PLXS IMG: CPT | Performed by: ANESTHESIOLOGY

## 2018-06-08 PROCEDURE — 77030032490 HC SLV COMPR SCD KNE COVD -B: Performed by: ORTHOPAEDIC SURGERY

## 2018-06-08 PROCEDURE — 73020 X-RAY EXAM OF SHOULDER: CPT

## 2018-06-08 PROCEDURE — 65270000029 HC RM PRIVATE

## 2018-06-08 PROCEDURE — 77030008683 HC TU ET CUF COVD -A: Performed by: ANESTHESIOLOGY

## 2018-06-08 PROCEDURE — 86901 BLOOD TYPING SEROLOGIC RH(D): CPT

## 2018-06-08 PROCEDURE — 77030031139 HC SUT VCRL2 J&J -A: Performed by: ORTHOPAEDIC SURGERY

## 2018-06-08 PROCEDURE — 77030012406 HC DRN WND PENRS BARD -A: Performed by: ORTHOPAEDIC SURGERY

## 2018-06-08 PROCEDURE — 76210000006 HC OR PH I REC 0.5 TO 1 HR: Performed by: ORTHOPAEDIC SURGERY

## 2018-06-08 PROCEDURE — 77030012012 HC AIRWY OP SFT TELE -A: Performed by: ANESTHESIOLOGY

## 2018-06-08 PROCEDURE — 77030026188 HC BN CANC CHP CRSH PR LIFV -E: Performed by: ORTHOPAEDIC SURGERY

## 2018-06-08 PROCEDURE — 74011000258 HC RX REV CODE- 258: Performed by: ORTHOPAEDIC SURGERY

## 2018-06-08 PROCEDURE — 0RRK00Z REPLACEMENT OF LEFT SHOULDER JOINT WITH REVERSE BALL AND SOCKET SYNTHETIC SUBSTITUTE, OPEN APPROACH: ICD-10-PCS | Performed by: ORTHOPAEDIC SURGERY

## 2018-06-08 PROCEDURE — 77030027138 HC INCENT SPIROMETER -A

## 2018-06-08 PROCEDURE — 77030031410 HC IMMOB SHLDR SLNG SUP BREG -B: Performed by: ORTHOPAEDIC SURGERY

## 2018-06-08 PROCEDURE — 77030011265 HC ELECTRD BLD HEX COVD -A: Performed by: ORTHOPAEDIC SURGERY

## 2018-06-08 PROCEDURE — C1713 ANCHOR/SCREW BN/BN,TIS/BN: HCPCS | Performed by: ORTHOPAEDIC SURGERY

## 2018-06-08 PROCEDURE — 77010033678 HC OXYGEN DAILY

## 2018-06-08 PROCEDURE — 76942 ECHO GUIDE FOR BIOPSY: CPT | Performed by: ANESTHESIOLOGY

## 2018-06-08 PROCEDURE — 77030019908 HC STETH ESOPH SIMS -A: Performed by: ANESTHESIOLOGY

## 2018-06-08 PROCEDURE — 77030011640 HC PAD GRND REM COVD -A: Performed by: ORTHOPAEDIC SURGERY

## 2018-06-08 PROCEDURE — C1776 JOINT DEVICE (IMPLANTABLE): HCPCS | Performed by: ORTHOPAEDIC SURGERY

## 2018-06-08 PROCEDURE — 74011000250 HC RX REV CODE- 250: Performed by: ORTHOPAEDIC SURGERY

## 2018-06-08 PROCEDURE — 77030020782 HC GWN BAIR PAWS FLX 3M -B: Performed by: ORTHOPAEDIC SURGERY

## 2018-06-08 PROCEDURE — 77030002922 HC SUT FBRWRE ARTH -B: Performed by: ORTHOPAEDIC SURGERY

## 2018-06-08 PROCEDURE — 77030018836 HC SOL IRR NACL ICUM -A: Performed by: ORTHOPAEDIC SURGERY

## 2018-06-08 PROCEDURE — 77030003806 HC BIT DRL EXAC -E: Performed by: ORTHOPAEDIC SURGERY

## 2018-06-08 PROCEDURE — 74011250637 HC RX REV CODE- 250/637: Performed by: ORTHOPAEDIC SURGERY

## 2018-06-08 PROCEDURE — 77030002912 HC SUT ETHBND J&J -A: Performed by: ORTHOPAEDIC SURGERY

## 2018-06-08 PROCEDURE — 74011000250 HC RX REV CODE- 250

## 2018-06-08 PROCEDURE — 74011250637 HC RX REV CODE- 250/637: Performed by: NURSE ANESTHETIST, CERTIFIED REGISTERED

## 2018-06-08 DEVICE — SHOULDER REV IMPL -- S4 BSV SC: Type: IMPLANTABLE DEVICE | Site: SHOULDER | Status: FUNCTIONAL

## 2018-06-08 DEVICE — SCR LCK CAP KIT 4.5X30MM BLU -- EQUINOXE: Type: IMPLANTABLE DEVICE | Site: SHOULDER | Status: FUNCTIONAL

## 2018-06-08 DEVICE — STEM HUM DIA11MM SHLDR PRI PRESSFIT EQUINOXE: Type: IMPLANTABLE DEVICE | Site: SHOULDER | Status: FUNCTIONAL

## 2018-06-08 DEVICE — SPHERE GLEN DIA38MM REG STD SHLDR CO CHROM LOK SCR PRI REV: Type: IMPLANTABLE DEVICE | Site: SHOULDER | Status: FUNCTIONAL

## 2018-06-08 DEVICE — SCR TORQUE DEFINING KIT -- EQUINOXE: Type: IMPLANTABLE DEVICE | Site: SHOULDER | Status: FUNCTIONAL

## 2018-06-08 DEVICE — KIT BONE SCR L38MM DIA4.5MM GLEN SHLDR GRN COMPR LCK CAP RVS: Type: IMPLANTABLE DEVICE | Site: SHOULDER | Status: FUNCTIONAL

## 2018-06-08 DEVICE — SCR BNE LCK GLENOSPHERE -- EQUINOXE: Type: IMPLANTABLE DEVICE | Site: SHOULDER | Status: FUNCTIONAL

## 2018-06-08 DEVICE — TRAY HUM ADPT REV +0 -- EQUINOXE: Type: IMPLANTABLE DEVICE | Site: SHOULDER | Status: FUNCTIONAL

## 2018-06-08 DEVICE — BONE CHIP CANC CRSH 1-8MM 30ML --: Type: IMPLANTABLE DEVICE | Site: SHOULDER | Status: FUNCTIONAL

## 2018-06-08 DEVICE — IMPLANTABLE DEVICE: Type: IMPLANTABLE DEVICE | Site: SHOULDER | Status: FUNCTIONAL

## 2018-06-08 DEVICE — KIT BNE SCR L26MM DIA4.5MM GLEN SHLDR ORNG COMPR LOK CAP: Type: IMPLANTABLE DEVICE | Site: SHOULDER | Status: FUNCTIONAL

## 2018-06-08 DEVICE — KIT BONE SCR L18MM DIA4.5MM GLEN SHLDR WHT COMPR LCK CAP RVS: Type: IMPLANTABLE DEVICE | Site: SHOULDER | Status: FUNCTIONAL

## 2018-06-08 DEVICE — CEMENT BNE 20ML 41GM FULL DOSE PMMA W/ TOBRA M VISC RADPQ: Type: IMPLANTABLE DEVICE | Site: SHOULDER | Status: FUNCTIONAL

## 2018-06-08 RX ORDER — DEXTROSE 50 % IN WATER (D50W) INTRAVENOUS SYRINGE
25-50 AS NEEDED
Status: DISCONTINUED | OUTPATIENT
Start: 2018-06-08 | End: 2018-06-08 | Stop reason: HOSPADM

## 2018-06-08 RX ORDER — DEXTROSE 50 % IN WATER (D50W) INTRAVENOUS SYRINGE
25-50 AS NEEDED
Status: DISCONTINUED | OUTPATIENT
Start: 2018-06-08 | End: 2018-06-09 | Stop reason: HOSPADM

## 2018-06-08 RX ORDER — TIAGABINE HYDROCHLORIDE 4 MG/1
2 TABLET, FILM COATED ORAL
Status: DISCONTINUED | OUTPATIENT
Start: 2018-06-09 | End: 2018-06-08

## 2018-06-08 RX ORDER — SODIUM CHLORIDE 0.9 % (FLUSH) 0.9 %
5-10 SYRINGE (ML) INJECTION EVERY 8 HOURS
Status: DISCONTINUED | OUTPATIENT
Start: 2018-06-08 | End: 2018-06-08 | Stop reason: HOSPADM

## 2018-06-08 RX ORDER — MAGNESIUM SULFATE 100 %
4 CRYSTALS MISCELLANEOUS AS NEEDED
Status: DISCONTINUED | OUTPATIENT
Start: 2018-06-08 | End: 2018-06-08 | Stop reason: HOSPADM

## 2018-06-08 RX ORDER — ONDANSETRON 4 MG/1
4-8 TABLET, ORALLY DISINTEGRATING ORAL
Status: DISCONTINUED | OUTPATIENT
Start: 2018-06-08 | End: 2018-06-09 | Stop reason: HOSPADM

## 2018-06-08 RX ORDER — MAGNESIUM SULFATE 100 %
16 CRYSTALS MISCELLANEOUS AS NEEDED
Status: DISCONTINUED | OUTPATIENT
Start: 2018-06-08 | End: 2018-06-09 | Stop reason: HOSPADM

## 2018-06-08 RX ORDER — SODIUM CHLORIDE 0.9 % (FLUSH) 0.9 %
5-10 SYRINGE (ML) INJECTION AS NEEDED
Status: DISCONTINUED | OUTPATIENT
Start: 2018-06-08 | End: 2018-06-08 | Stop reason: HOSPADM

## 2018-06-08 RX ORDER — ACETAMINOPHEN 500 MG
1000 TABLET ORAL EVERY 8 HOURS
Status: DISCONTINUED | OUTPATIENT
Start: 2018-06-08 | End: 2018-06-09 | Stop reason: HOSPADM

## 2018-06-08 RX ORDER — EPHEDRINE SULFATE/0.9% NACL/PF 25 MG/5 ML
SYRINGE (ML) INTRAVENOUS AS NEEDED
Status: DISCONTINUED | OUTPATIENT
Start: 2018-06-08 | End: 2018-06-08 | Stop reason: HOSPADM

## 2018-06-08 RX ORDER — INSULIN LISPRO 100 [IU]/ML
INJECTION, SOLUTION INTRAVENOUS; SUBCUTANEOUS
Status: DISCONTINUED | OUTPATIENT
Start: 2018-06-09 | End: 2018-06-09 | Stop reason: HOSPADM

## 2018-06-08 RX ORDER — PROMETHAZINE HYDROCHLORIDE 25 MG/ML
25 INJECTION, SOLUTION INTRAMUSCULAR; INTRAVENOUS
Status: COMPLETED | OUTPATIENT
Start: 2018-06-08 | End: 2018-06-08

## 2018-06-08 RX ORDER — ROCURONIUM BROMIDE 10 MG/ML
INJECTION, SOLUTION INTRAVENOUS AS NEEDED
Status: DISCONTINUED | OUTPATIENT
Start: 2018-06-08 | End: 2018-06-08 | Stop reason: HOSPADM

## 2018-06-08 RX ORDER — ROPIVACAINE HYDROCHLORIDE 5 MG/ML
30 INJECTION, SOLUTION EPIDURAL; INFILTRATION; PERINEURAL
Status: COMPLETED | OUTPATIENT
Start: 2018-06-08 | End: 2018-06-08

## 2018-06-08 RX ORDER — FAMOTIDINE 20 MG/1
20 TABLET, FILM COATED ORAL ONCE
Status: COMPLETED | OUTPATIENT
Start: 2018-06-08 | End: 2018-06-08

## 2018-06-08 RX ORDER — ARIPIPRAZOLE 2 MG/1
2 TABLET ORAL DAILY
Status: DISCONTINUED | OUTPATIENT
Start: 2018-06-09 | End: 2018-06-09 | Stop reason: HOSPADM

## 2018-06-08 RX ORDER — OXYCODONE HYDROCHLORIDE 5 MG/1
5-10 TABLET ORAL
Status: DISCONTINUED | OUTPATIENT
Start: 2018-06-08 | End: 2018-06-09 | Stop reason: HOSPADM

## 2018-06-08 RX ORDER — SODIUM CHLORIDE 0.9 % (FLUSH) 0.9 %
5-10 SYRINGE (ML) INJECTION EVERY 8 HOURS
Status: DISCONTINUED | OUTPATIENT
Start: 2018-06-08 | End: 2018-06-09 | Stop reason: HOSPADM

## 2018-06-08 RX ORDER — CHLORTHALIDONE 25 MG/1
25 TABLET ORAL DAILY
Status: DISCONTINUED | OUTPATIENT
Start: 2018-06-09 | End: 2018-06-09 | Stop reason: HOSPADM

## 2018-06-08 RX ORDER — FENTANYL CITRATE 50 UG/ML
INJECTION, SOLUTION INTRAMUSCULAR; INTRAVENOUS AS NEEDED
Status: DISCONTINUED | OUTPATIENT
Start: 2018-06-08 | End: 2018-06-08 | Stop reason: HOSPADM

## 2018-06-08 RX ORDER — FAMOTIDINE 20 MG/1
20 TABLET, FILM COATED ORAL 2 TIMES DAILY
Status: DISCONTINUED | OUTPATIENT
Start: 2018-06-08 | End: 2018-06-09 | Stop reason: HOSPADM

## 2018-06-08 RX ORDER — FENTANYL CITRATE 50 UG/ML
50 INJECTION, SOLUTION INTRAMUSCULAR; INTRAVENOUS AS NEEDED
Status: DISCONTINUED | OUTPATIENT
Start: 2018-06-08 | End: 2018-06-08 | Stop reason: HOSPADM

## 2018-06-08 RX ORDER — GLYCOPYRROLATE 0.2 MG/ML
INJECTION INTRAMUSCULAR; INTRAVENOUS AS NEEDED
Status: DISCONTINUED | OUTPATIENT
Start: 2018-06-08 | End: 2018-06-08 | Stop reason: HOSPADM

## 2018-06-08 RX ORDER — PROPOFOL 10 MG/ML
INJECTION, EMULSION INTRAVENOUS AS NEEDED
Status: DISCONTINUED | OUTPATIENT
Start: 2018-06-08 | End: 2018-06-08 | Stop reason: HOSPADM

## 2018-06-08 RX ORDER — ONDANSETRON 2 MG/ML
4 INJECTION INTRAMUSCULAR; INTRAVENOUS ONCE
Status: DISCONTINUED | OUTPATIENT
Start: 2018-06-08 | End: 2018-06-08 | Stop reason: HOSPADM

## 2018-06-08 RX ORDER — LIDOCAINE HYDROCHLORIDE 20 MG/ML
INJECTION, SOLUTION EPIDURAL; INFILTRATION; INTRACAUDAL; PERINEURAL AS NEEDED
Status: DISCONTINUED | OUTPATIENT
Start: 2018-06-08 | End: 2018-06-08 | Stop reason: HOSPADM

## 2018-06-08 RX ORDER — CEFAZOLIN SODIUM IN 0.9 % NACL 2 G/100 ML
PLASTIC BAG, INJECTION (ML) INTRAVENOUS AS NEEDED
Status: DISCONTINUED | OUTPATIENT
Start: 2018-06-08 | End: 2018-06-08 | Stop reason: HOSPADM

## 2018-06-08 RX ORDER — LISINOPRIL 40 MG/1
40 TABLET ORAL DAILY
Status: DISCONTINUED | OUTPATIENT
Start: 2018-06-09 | End: 2018-06-09 | Stop reason: HOSPADM

## 2018-06-08 RX ORDER — NEOSTIGMINE METHYLSULFATE 5 MG/5 ML
SYRINGE (ML) INTRAVENOUS AS NEEDED
Status: DISCONTINUED | OUTPATIENT
Start: 2018-06-08 | End: 2018-06-08 | Stop reason: HOSPADM

## 2018-06-08 RX ORDER — NALOXONE HYDROCHLORIDE 0.4 MG/ML
0.4 INJECTION, SOLUTION INTRAMUSCULAR; INTRAVENOUS; SUBCUTANEOUS AS NEEDED
Status: DISCONTINUED | OUTPATIENT
Start: 2018-06-08 | End: 2018-06-09 | Stop reason: HOSPADM

## 2018-06-08 RX ORDER — CEFAZOLIN SODIUM 2 G/50ML
2 SOLUTION INTRAVENOUS EVERY 8 HOURS
Status: COMPLETED | OUTPATIENT
Start: 2018-06-08 | End: 2018-06-09

## 2018-06-08 RX ORDER — SODIUM CHLORIDE 0.9 % (FLUSH) 0.9 %
5-10 SYRINGE (ML) INJECTION AS NEEDED
Status: DISCONTINUED | OUTPATIENT
Start: 2018-06-08 | End: 2018-06-09 | Stop reason: HOSPADM

## 2018-06-08 RX ORDER — CEFAZOLIN SODIUM 2 G/50ML
2 SOLUTION INTRAVENOUS ONCE
Status: DISCONTINUED | OUTPATIENT
Start: 2018-06-08 | End: 2018-06-08 | Stop reason: HOSPADM

## 2018-06-08 RX ORDER — TRAMADOL HYDROCHLORIDE 50 MG/1
50-100 TABLET ORAL
Status: DISCONTINUED | OUTPATIENT
Start: 2018-06-08 | End: 2018-06-09 | Stop reason: HOSPADM

## 2018-06-08 RX ORDER — SUCCINYLCHOLINE CHLORIDE 20 MG/ML
INJECTION INTRAMUSCULAR; INTRAVENOUS AS NEEDED
Status: DISCONTINUED | OUTPATIENT
Start: 2018-06-08 | End: 2018-06-08 | Stop reason: HOSPADM

## 2018-06-08 RX ORDER — METOPROLOL TARTRATE 50 MG/1
50 TABLET ORAL 2 TIMES DAILY
Status: DISCONTINUED | OUTPATIENT
Start: 2018-06-08 | End: 2018-06-09 | Stop reason: HOSPADM

## 2018-06-08 RX ORDER — FLUCONAZOLE 200 MG/1
200 TABLET ORAL DAILY
Status: DISCONTINUED | OUTPATIENT
Start: 2018-06-09 | End: 2018-06-09 | Stop reason: HOSPADM

## 2018-06-08 RX ORDER — FENTANYL CITRATE 50 UG/ML
100 INJECTION, SOLUTION INTRAMUSCULAR; INTRAVENOUS ONCE
Status: COMPLETED | OUTPATIENT
Start: 2018-06-08 | End: 2018-06-08

## 2018-06-08 RX ORDER — SERTRALINE HYDROCHLORIDE 50 MG/1
100 TABLET, FILM COATED ORAL DAILY
Status: DISCONTINUED | OUTPATIENT
Start: 2018-06-09 | End: 2018-06-09 | Stop reason: HOSPADM

## 2018-06-08 RX ORDER — ONDANSETRON 2 MG/ML
INJECTION INTRAMUSCULAR; INTRAVENOUS AS NEEDED
Status: DISCONTINUED | OUTPATIENT
Start: 2018-06-08 | End: 2018-06-08 | Stop reason: HOSPADM

## 2018-06-08 RX ORDER — TIAGABINE HYDROCHLORIDE 4 MG/1
2 TABLET, FILM COATED ORAL 2 TIMES DAILY
Status: DISCONTINUED | OUTPATIENT
Start: 2018-06-08 | End: 2018-06-09 | Stop reason: HOSPADM

## 2018-06-08 RX ORDER — INSULIN LISPRO 100 [IU]/ML
INJECTION, SOLUTION INTRAVENOUS; SUBCUTANEOUS ONCE
Status: DISCONTINUED | OUTPATIENT
Start: 2018-06-08 | End: 2018-06-08 | Stop reason: HOSPADM

## 2018-06-08 RX ORDER — MIDAZOLAM HYDROCHLORIDE 1 MG/ML
2 INJECTION, SOLUTION INTRAMUSCULAR; INTRAVENOUS ONCE
Status: COMPLETED | OUTPATIENT
Start: 2018-06-08 | End: 2018-06-08

## 2018-06-08 RX ORDER — POLYETHYLENE GLYCOL 3350 17 G/17G
17 POWDER, FOR SOLUTION ORAL DAILY
Status: DISCONTINUED | OUTPATIENT
Start: 2018-06-09 | End: 2018-06-09 | Stop reason: HOSPADM

## 2018-06-08 RX ORDER — PHENYLEPHRINE HCL IN 0.9% NACL 1 MG/10 ML
SYRINGE (ML) INTRAVENOUS AS NEEDED
Status: DISCONTINUED | OUTPATIENT
Start: 2018-06-08 | End: 2018-06-08 | Stop reason: HOSPADM

## 2018-06-08 RX ORDER — SODIUM CHLORIDE, SODIUM LACTATE, POTASSIUM CHLORIDE, CALCIUM CHLORIDE 600; 310; 30; 20 MG/100ML; MG/100ML; MG/100ML; MG/100ML
75 INJECTION, SOLUTION INTRAVENOUS CONTINUOUS
Status: DISCONTINUED | OUTPATIENT
Start: 2018-06-08 | End: 2018-06-08 | Stop reason: HOSPADM

## 2018-06-08 RX ADMIN — ROPIVACAINE HYDROCHLORIDE 150 MG: 5 INJECTION, SOLUTION EPIDURAL; INFILTRATION; PERINEURAL at 13:53

## 2018-06-08 RX ADMIN — SUCCINYLCHOLINE CHLORIDE 120 MG: 20 INJECTION INTRAMUSCULAR; INTRAVENOUS at 14:47

## 2018-06-08 RX ADMIN — ROCURONIUM BROMIDE 20 MG: 10 INJECTION, SOLUTION INTRAVENOUS at 14:54

## 2018-06-08 RX ADMIN — Medication 100 MCG: at 15:03

## 2018-06-08 RX ADMIN — METOPROLOL TARTRATE 50 MG: 50 TABLET ORAL at 22:50

## 2018-06-08 RX ADMIN — CEFAZOLIN SODIUM 2 G: 2 SOLUTION INTRAVENOUS at 22:50

## 2018-06-08 RX ADMIN — SODIUM CHLORIDE, SODIUM LACTATE, POTASSIUM CHLORIDE, AND CALCIUM CHLORIDE: 600; 310; 30; 20 INJECTION, SOLUTION INTRAVENOUS at 14:37

## 2018-06-08 RX ADMIN — Medication 10 MG: at 15:12

## 2018-06-08 RX ADMIN — PROMETHAZINE HYDROCHLORIDE 25 MG: 25 INJECTION INTRAMUSCULAR; INTRAVENOUS at 17:54

## 2018-06-08 RX ADMIN — FAMOTIDINE 20 MG: 20 TABLET ORAL at 20:07

## 2018-06-08 RX ADMIN — OXYCODONE HYDROCHLORIDE 5 MG: 5 TABLET ORAL at 20:07

## 2018-06-08 RX ADMIN — TIAGABINE HYDROCHLORIDE 2 MG: 4 TABLET, FILM COATED ORAL at 22:49

## 2018-06-08 RX ADMIN — SUCCINYLCHOLINE CHLORIDE 80 MG: 20 INJECTION INTRAMUSCULAR; INTRAVENOUS at 14:49

## 2018-06-08 RX ADMIN — TRANEXAMIC ACID 1 G: 100 INJECTION, SOLUTION INTRAVENOUS at 16:45

## 2018-06-08 RX ADMIN — ONDANSETRON 4 MG: 2 INJECTION INTRAMUSCULAR; INTRAVENOUS at 17:15

## 2018-06-08 RX ADMIN — ROCURONIUM BROMIDE 5 MG: 10 INJECTION, SOLUTION INTRAVENOUS at 14:45

## 2018-06-08 RX ADMIN — LIDOCAINE HYDROCHLORIDE 40 MG: 20 INJECTION, SOLUTION EPIDURAL; INFILTRATION; INTRACAUDAL; PERINEURAL at 14:45

## 2018-06-08 RX ADMIN — Medication 3 MG: at 16:45

## 2018-06-08 RX ADMIN — PROPOFOL 150 MG: 10 INJECTION, EMULSION INTRAVENOUS at 14:48

## 2018-06-08 RX ADMIN — ACETAMINOPHEN 1000 MG: 500 TABLET, FILM COATED ORAL at 22:50

## 2018-06-08 RX ADMIN — GLYCOPYRROLATE 0.2 MG: 0.2 INJECTION INTRAMUSCULAR; INTRAVENOUS at 15:07

## 2018-06-08 RX ADMIN — FAMOTIDINE 20 MG: 20 TABLET ORAL at 12:44

## 2018-06-08 RX ADMIN — ROCURONIUM BROMIDE 10 MG: 10 INJECTION, SOLUTION INTRAVENOUS at 16:15

## 2018-06-08 RX ADMIN — FENTANYL CITRATE 50 MCG: 50 INJECTION, SOLUTION INTRAMUSCULAR; INTRAVENOUS at 14:45

## 2018-06-08 RX ADMIN — PROPOFOL 50 MG: 10 INJECTION, EMULSION INTRAVENOUS at 14:51

## 2018-06-08 RX ADMIN — MIDAZOLAM HYDROCHLORIDE 2 MG: 1 INJECTION, SOLUTION INTRAMUSCULAR; INTRAVENOUS at 13:50

## 2018-06-08 RX ADMIN — ROCURONIUM BROMIDE 15 MG: 10 INJECTION, SOLUTION INTRAVENOUS at 15:30

## 2018-06-08 RX ADMIN — SODIUM CHLORIDE, SODIUM LACTATE, POTASSIUM CHLORIDE, AND CALCIUM CHLORIDE 75 ML/HR: 600; 310; 30; 20 INJECTION, SOLUTION INTRAVENOUS at 12:48

## 2018-06-08 RX ADMIN — Medication 2 G: at 14:37

## 2018-06-08 RX ADMIN — Medication 10 ML: at 22:50

## 2018-06-08 RX ADMIN — TRANEXAMIC ACID 1 G: 100 INJECTION, SOLUTION INTRAVENOUS at 14:40

## 2018-06-08 RX ADMIN — Medication 5 MG: at 15:30

## 2018-06-08 RX ADMIN — FENTANYL CITRATE 50 MCG: 50 INJECTION INTRAMUSCULAR; INTRAVENOUS at 13:50

## 2018-06-08 RX ADMIN — GLYCOPYRROLATE 0.6 MG: 0.2 INJECTION INTRAMUSCULAR; INTRAVENOUS at 16:45

## 2018-06-08 NOTE — ANESTHESIA POSTPROCEDURE EVALUATION
Post-Anesthesia Evaluation and Assessment    Patient: Bailey Conner MRN: [id number]  SSN: [id number]    YOB: 1945  Age: 75 y.o. Sex: female      Data from PACU flowsheet    Cardiovascular Function/Vital Signs  Visit Vitals    /51    Pulse 70    Temp 36.6 °C (97.8 °F)    Resp 15    Ht 5' 2\" (1.575 m)    Wt 92.5 kg (204 lb)    SpO2 97%    BMI 37.31 kg/m2       Patient is status post general, regional anesthesia for Procedure(s):  REVERSE LEFT TOTAL SHOULDER REPLACEMENT. Nausea/Vomiting: controlled    Postoperative hydration reviewed and adequate. Pain:  Pain Scale 1: Numeric (0 - 10) (06/08/18 1813)  Pain Intensity 1: 0 (06/08/18 1813)   Managed      Mental Status and Level of Consciousness: Alert and oriented     Pulmonary Status:   O2 Device: Nasal cannula (06/08/18 1743)   Adequate oxygenation and airway patent    Complications related to anesthesia: None    Post-anesthesia assessment completed.  No concerns    Signed By: Derik Rodriguez MD     June 8, 2018

## 2018-06-08 NOTE — ANESTHESIA PROCEDURE NOTES
Peripheral Block    Start time: 6/8/2018 1:47 PM  End time: 6/8/2018 1:52 PM  Performed by: Lina Huggins  Authorized by: Lina Huggins       Pre-procedure: Indications: at surgeon's request, post-op pain management and procedure for pain    Preanesthetic Checklist: patient identified, risks and benefits discussed, site marked, timeout performed, anesthesia consent given and patient being monitored      Block Type:   Block Type:  Brachial plexus  Laterality:  Left  Monitoring:  Standard ASA monitoring, continuous pulse ox, frequent vital sign checks, oxygen, responsive to questions and heart rate  Injection Technique:  Single shot  Procedures: ultrasound guided    Prep: chlorhexidine    Needle Type:  Stimuplex  Needle Gauge:  22 G  Needle Localization:  Ultrasound guidance  Medication Injected:  0.5%  ropivacaine  Volume (mL):  30    Assessment:  Number of attempts:  1  Injection Assessment:  No intravascular symptoms, negative aspiration for blood, local visualized surrounding nerve on ultrasound, ultrasound image on chart, no paresthesia and incremental injection every 5 mL  Patient tolerance:  Patient tolerated the procedure well with no immediate complications  Location:  PREOP HOLDING    Patient given 2 mg IV Versed and 50 mcg IV Fentanyl for sedation.     6/8/2018     1:54 PM     Lolita Elizabeth MD

## 2018-06-08 NOTE — IP AVS SNAPSHOT
[address] Patient: Mariah Kehr MRN: [id number] WIO:[birth year] About your hospitalization You were admitted on:  June 8, 2018 You last received care in the:  [facility] 1981 You were discharged on:  June 9, 2018 Why you were hospitalized Your primary diagnosis was:  Not on File Your diagnoses also included:  Rotator Cuff Arthropathy Follow-up Information Follow up With Details Comments Contact Info Demarco Suazo MD On 6/18/2018 Appointment at 1:20pm [address] [facility] 
[phone] Mariya Waddell MD On 6/18/2018 Appointment at 2:45pm [address] 200 [facility] 
[phone] Your Scheduled Appointments Monday July 23, 2018 10:00 AM EDT Follow Up with Leidy Dawn NP  
[address] [facility], [address] [facility] ([facility]) [address] [facility] [address]  
[phone] Discharge Orders None A check summer indicates which time of day the medication should be taken. My Medications START taking these medications Instructions Each Dose to Equal  
 Morning Noon Evening Bedtime  
 acetaminophen 500 mg tablet Commonly known as:  TYLENOL Replaces:  acetaminophen 500 mg Cap Your last dose was: Your next dose is: Take 2 Tabs by mouth every eight (8) hours for 7 days. Indications: Pain  
 1000 mg  
    
   
   
   
  
 ondansetron 4 mg disintegrating tablet Commonly known as:  ZOFRAN ODT Your last dose was: Your next dose is: Take 1-2 Tabs by mouth every eight (8) hours as needed. Indications: PREVENTION OF POST-OPERATIVE NAUSEA AND VOMITING  
 4-8 mg  
    
   
   
   
  
 oxyCODONE IR 5 mg immediate release tablet Commonly known as:  Barbara Parekh Your last dose was: Your next dose is: Take 1-2 Tabs by mouth every six (6) hours as needed. Max Daily Amount: 40 mg.  
 5-10 mg CHANGE how you take these medications Instructions Each Dose to Equal  
 Morning Noon Evening Bedtime  
 polyethylene glycol 17 gram packet Commonly known as:  Rena Manrique What changed:   
- when to take this 
- reasons to take this Your last dose was: Your next dose is: Take 1 Packet by mouth daily. 17 g CONTINUE taking these medications Instructions Each Dose to Equal  
 Morning Noon Evening Bedtime ABILIFY 2 mg tablet Generic drug:  ARIPiprazole Your last dose was: Your next dose is: Take 2 mg by mouth daily. 2 mg  
    
   
   
   
  
 allergy injection Your last dose was: Your next dose is: Once every 2 weeks. amLODIPine 10 mg tablet Commonly known as:  Carey Pall Your last dose was: Your next dose is: Take 10 mg by mouth nightly. Indications: hypertension 10 mg  
    
   
   
   
  
 aspirin delayed-release 81 mg tablet Your last dose was: Your next dose is: Take  by mouth daily. chlorthalidone 25 mg tablet Commonly known as:  Camelia Space Your last dose was: Your next dose is: Take 25 mg by mouth nightly. Indications: hypertension 25 mg  
    
   
   
   
  
 COLACE 100 mg capsule Generic drug:  docusate sodium Your last dose was: Your next dose is: Take 100 mg by mouth two (2) times a day. 100 mg FARXIGA 5 mg Tab tablet Generic drug:  dapagliflozin Your last dose was: Your next dose is: Take 5 mg by mouth daily. 5 mg FLAXSEED PO Your last dose was: Your next dose is: Take 1,000 mg by mouth two (2) times a day. 1000 mg  
    
   
   
   
  
 folic acid 1 mg tablet Commonly known as:  Google Your last dose was: Your next dose is: Take  by mouth daily. GLUCOSAMINE-CHONDROITIN COMPLX PO Your last dose was: Your next dose is: Take 1 Cap by mouth two (2) times a day. 1 Cap  
    
   
   
   
  
 glyBURIDE-metFORMIN 5-500 mg per tablet Commonly known as:  Marika Jha Your last dose was: Your next dose is: Take 2 Tabs by mouth two (2) times daily (with meals). 2 Tab Iron 325 mg (65 mg iron) tablet Generic drug:  ferrous sulfate Your last dose was: Your next dose is: Take 325 mg by mouth Daily (before breakfast). 325 mg  
    
   
   
   
  
 JANUVIA 100 mg tablet Generic drug:  SITagliptin Your last dose was: Your next dose is: Take 100 mg by mouth daily. 100 mg  
    
   
   
   
  
 LIPITOR 40 mg tablet Generic drug:  atorvastatin Your last dose was: Your next dose is: Take 40 mg by mouth nightly. 40 mg  
    
   
   
   
  
 lisinopril 40 mg tablet Commonly known as:  Robe Mcgee Your last dose was: Your next dose is: Take 40 mg by mouth nightly. Indications: HYPERTENSION 40 mg  
    
   
   
   
  
 metoprolol succinate 100 mg tablet Commonly known as:  TOPROL-XL Your last dose was: Your next dose is: Take 100 mg by mouth nightly. 100 mg  
    
   
   
   
  
 montelukast 10 mg tablet Commonly known as:  SINGULAIR Your last dose was: Your next dose is: Take 10 mg by mouth daily. 10 mg  
    
   
   
   
  
 multivitamin tablet Commonly known as:  ONE A DAY Your last dose was: Your next dose is: Take 1 Tab by mouth daily. 1 Tab NASONEX 50 mcg/actuation nasal spray Generic drug:  mometasone Your last dose was: Your next dose is: 2 Sprays by Both Nostrils route two (2) times a day. 2 Spray NUVIGIL 250 mg Tab tablet Generic drug:  Armodafinil Your last dose was: Your next dose is: Take 250 mg by mouth Daily (before breakfast). 250 mg  
    
   
   
   
  
 OCUVITE PO Your last dose was: Your next dose is: Take 1 Cap by mouth daily. 1 Cap  
    
   
   
   
  
 olopatadine 0.6 % Spry Commonly known as:  PATANASE Your last dose was: Your next dose is:    
   
   
 2 Squirts by Both Nostrils route two (2) times a day. 2 Squirt PROTONIX 40 mg tablet Generic drug:  pantoprazole Your last dose was: Your next dose is: Take 40 mg by mouth two (2) times a day. 40 mg  
    
   
   
   
  
 tiaGABine 2 mg tablet Commonly known as:  GABITRIL Your last dose was: Your next dose is: Take 1 Tab by mouth two (2) times daily (with meals). 2 mg  
    
   
   
   
  
 traMADol 50 mg tablet Commonly known as:  ULTRAM  
   
Your last dose was: Your next dose is:    
   
   
 1-2 po daily as needed for severe pain  Indications: NEUROPATHIC PAIN, Pain VITAMIN D3 1,000 unit tablet Generic drug:  cholecalciferol Your last dose was: Your next dose is: Take 1,000 Units by mouth daily. 1000 Units ZOLOFT 100 mg tablet Generic drug:  sertraline Your last dose was: Your next dose is: Take 150 mg by mouth daily. 150 mg  
    
   
   
   
  
 ZyrTEC 10 mg tablet Generic drug:  cetirizine Your last dose was: Your next dose is: Take 10 mg by mouth daily. 10 mg  
    
   
   
   
  
  
STOP taking these medications   
 acetaminophen 500 mg Cap Replaced by:  acetaminophen 500 mg tablet Where to Get Your Medications Information on where to get these meds will be given to you by the nurse or doctor. ! Ask your nurse or doctor about these medications  
  acetaminophen 500 mg tablet  
 ondansetron 4 mg disintegrating tablet  
 oxyCODONE IR 5 mg immediate release tablet  
 polyethylene glycol 17 gram packet Opioid Education Prescription Opioids: What You Need to Know: 
 
 
 
F-face looks uneven A-arms unable to move or move unevenly S-speech slurred or non-existent T-time-call 911 as soon as signs and symptoms begin-DO NOT go Back to bed or wait to see if you get better-TIME IS BRAIN. Warning Signs of HEART ATTACK Call 911 if you have these symptoms: 
? Chest discomfort. Most heart attacks involve discomfort in the center of the chest that lasts more than a few minutes, or that goes away and comes back. It can feel like uncomfortable pressure, squeezing, fullness, or pain. ? Discomfort in other areas of the upper body. Symptoms can include pain or discomfort in one or both arms, the back, neck, jaw, or stomach. ? Shortness of breath with or without chest discomfort. ? Other signs may include breaking out in a cold sweat, nausea, or lightheadedness. Don't wait more than five minutes to call [address]! Fast action can save your life. Calling 911 is almost always the fastest way to get lifesaving treatment. Emergency Medical Services staff can begin treatment when they arrive  up to an hour sooner than if someone gets to the hospital by car. The discharge information has been reviewed with the patient and spouse. The patient and spouse verbalized understanding. Discharge medications reviewed with the patient and spouse and appropriate educational materials and side effects teaching were provided. ___________________________________________________________________________________________________________________________________ Discharge Instructions for Shoulder Surgery Patients · The dressing on your shoulder can stay on for 1 week. Keep your incision clean and dry. Do not apply any ointments to the incision. · You may shower as long as you keep your incision dry. When showering, leave your dressing on. The dressing is waterproof as long as the edges are sealed. A gauze dressing is not waterproof and will need to be covered by plastic when showering. · Notify your surgeon if: 
· Your temperature is greater than 100.5 · You have pain not controlled by your pain medication · You have increased drainage from your incision · You have increased redness or swelling in your arm · You have chest pain, shortness of breath, or any other problems · Do your arm exercises as instructed by your surgeon. · Wear the arm sling as instructed by your surgeon. · You may use ice to your shoulder for 20-30 minutes after exercise and as needed. Do not apply the ice pack directly to your skin. Use a barrier such as a thin towel. · If you have TOMASA hose (the white support stockings), remove them at bedtime and reapply the hose in the morning for the next 2 weeks. Best of luck with your recovery and THANK YOU for choosing [facility] [place]! Introducing Hal Carrasco As a [facility] patient, I wanted to make you aware of our electronic visit tool called Hal Carrasco. [facility] 24/7 allows you to connect within minutes with a medical provider 24 hours a day, seven days a week via a mobile device or tablet or logging into a secure website from your computer. You can access [employer] from anywhere in the [place]. A virtual visit might be right for you when you have a simple condition and feel like you just dont want to get out of bed, or cant get away from work for an appointment, when your regular [employer] provider is not available (evenings, weekends or holidays), or when youre out of town and need minor care. Electronic visits cost only $49 and if the [employer] 24/7 provider determines a prescription is needed to treat your condition, one can be electronically transmitted to a nearby pharmacy*. Please take a moment to enroll today if you have not already done so. The enrollment process is free and takes just a few minutes. To enroll, please download the [employer] deanna to your tablet or phone, or visit [email or web address] to enroll on your computer. And, as an [address] [place] [address] patient with a [employer] account, the results of your visits will be scanned into your electronic medical record and your primary care provider will be able to view the scanned results. We urge you to continue to see your regular [employer] provider for your ongoing medical care. And while your primary care provider may not be the one available when you seek a [employer] virtual visit, the peace of mind you get from getting a real diagnosis real time can be priceless. For more information on [employer], view our Frequently Asked Questions (FAQs) at [email or web address]. Sincerely, 
 
Dianah Sicard, MD 
Chief Medical Officer Debi Fonseca *:  certain medications cannot be prescribed via [employer] Providers Seen During Your Hospitalization Provider Specialty Primary office phone Amish Lala MD Orthopedic Surgery [phone] Your Primary Care Physician (PCP) Primary Care Physician Office Phone Office Fax Conchita Fisher [phone] You are allergic to the following Allergen Reactions Other Medication Other (comments) SEASONAL ALLERGIES Topamax (Topiramate) Other (comments) \"irritable\" Recent Documentation Height Weight BMI OB Status Smoking Status 1.575 m 92.5 kg 37.31 kg/m2 Postmenopausal Never Smoker Emergency Contacts Name Discharge Info Relation Home Work Mobile Sarai Codyy DISCHARGE CAREGIVER [3] Spouse [3] [phone] Patient Belongings The following personal items are in your possession at time of discharge: 
  Dental Appliances: None  Visual Aid: None      Home Medications: None   Jewelry: None  Clothing: Shirt, Footwear, Undergarments, Shorts    Other Valuables: None Please provide this summary of care documentation to your next provider. Signatures-by signing, you are acknowledging that this After Visit Summary has been reviewed with you and you have received a copy. Patient Signature:  ____________________________________________________________ Date:  ____________________________________________________________  
  
[facility] Provider Signature:  ____________________________________________________________ Date:  ____________________________________________________________

## 2018-06-08 NOTE — IP AVS SNAPSHOT
[address] Patient: Alfa Calderon MRN: [id number] BAR:9/7/1399 A check summer indicates which time of day the medication should be taken. My Medications START taking these medications Instructions Each Dose to Equal  
 Morning Noon Evening Bedtime  
 acetaminophen 500 mg tablet Commonly known as:  TYLENOL Replaces:  acetaminophen 500 mg Cap Your last dose was: Your next dose is: Take 2 Tabs by mouth every eight (8) hours for 7 days. Indications: Pain  
 1000 mg  
    
   
   
   
  
 ondansetron 4 mg disintegrating tablet Commonly known as:  ZOFRAN ODT Your last dose was: Your next dose is: Take 1-2 Tabs by mouth every eight (8) hours as needed. Indications: PREVENTION OF POST-OPERATIVE NAUSEA AND VOMITING  
 4-8 mg  
    
   
   
   
  
 oxyCODONE IR 5 mg immediate release tablet Commonly known as:  Damir Au Your last dose was: Your next dose is: Take 1-2 Tabs by mouth every six (6) hours as needed. Max Daily Amount: 40 mg.  
 5-10 mg CHANGE how you take these medications Instructions Each Dose to Equal  
 Morning Noon Evening Bedtime  
 polyethylene glycol 17 gram packet Commonly known as:  Mehreen Shelton What changed:   
- when to take this 
- reasons to take this Your last dose was: Your next dose is: Take 1 Packet by mouth daily. 17 g CONTINUE taking these medications Instructions Each Dose to Equal  
 Morning Noon Evening Bedtime ABILIFY 2 mg tablet Generic drug:  ARIPiprazole Your last dose was: Your next dose is: Take 2 mg by mouth daily. 2 mg  
    
   
   
   
  
 allergy injection Your last dose was: Your next dose is: Once every 2 weeks. amLODIPine 10 mg tablet Commonly known as:  Tiana Lind Your last dose was: Your next dose is: Take 10 mg by mouth nightly. Indications: hypertension 10 mg  
    
   
   
   
  
 aspirin delayed-release 81 mg tablet Your last dose was: Your next dose is: Take  by mouth daily. chlorthalidone 25 mg tablet Commonly known as:  Nancy Patel Your last dose was: Your next dose is: Take 25 mg by mouth nightly. Indications: hypertension 25 mg  
    
   
   
   
  
 COLACE 100 mg capsule Generic drug:  docusate sodium Your last dose was: Your next dose is: Take 100 mg by mouth two (2) times a day. 100 mg FARXIGA 5 mg Tab tablet Generic drug:  dapagliflozin Your last dose was: Your next dose is: Take 5 mg by mouth daily. 5 mg FLAXSEED PO Your last dose was: Your next dose is: Take 1,000 mg by mouth two (2) times a day. 1000 mg  
    
   
   
   
  
 folic acid 1 mg tablet Commonly known as:  Jessy Your last dose was: Your next dose is: Take  by mouth daily. GLUCOSAMINE-CHONDROITIN COMPLX PO Your last dose was: Your next dose is: Take 1 Cap by mouth two (2) times a day. 1 Cap  
    
   
   
   
  
 glyBURIDE-metFORMIN 5-500 mg per tablet Commonly known as:  Knute Citizen Your last dose was: Your next dose is: Take 2 Tabs by mouth two (2) times daily (with meals). 2 Tab Iron 325 mg (65 mg iron) tablet Generic drug:  ferrous sulfate Your last dose was: Your next dose is: Take 325 mg by mouth Daily (before breakfast). 325 mg  
    
   
   
   
  
 JANUVIA 100 mg tablet Generic drug:  SITagliptin Your last dose was: Your next dose is: Take 100 mg by mouth daily. 100 mg  
    
   
   
   
  
 LIPITOR 40 mg tablet Generic drug:  atorvastatin Your last dose was: Your next dose is: Take 40 mg by mouth nightly. 40 mg  
    
   
   
   
  
 lisinopril 40 mg tablet Commonly known as:  Helon Estrin Your last dose was: Your next dose is: Take 40 mg by mouth nightly. Indications: HYPERTENSION 40 mg  
    
   
   
   
  
 metoprolol succinate 100 mg tablet Commonly known as:  TOPROL-XL Your last dose was: Your next dose is: Take 100 mg by mouth nightly. 100 mg  
    
   
   
   
  
 montelukast 10 mg tablet Commonly known as:  SINGULAIR Your last dose was: Your next dose is: Take 10 mg by mouth daily. 10 mg  
    
   
   
   
  
 multivitamin tablet Commonly known as:  ONE A DAY Your last dose was: Your next dose is: Take 1 Tab by mouth daily. 1 Tab NASONEX 50 mcg/actuation nasal spray Generic drug:  mometasone Your last dose was: Your next dose is: 2 Sprays by Both Nostrils route two (2) times a day. 2 Spray NUVIGIL 250 mg Tab tablet Generic drug:  Armodafinil Your last dose was: Your next dose is: Take 250 mg by mouth Daily (before breakfast). 250 mg  
    
   
   
   
  
 OCUVITE PO Your last dose was: Your next dose is: Take 1 Cap by mouth daily. 1 Cap  
    
   
   
   
  
 olopatadine 0.6 % Spry Commonly known as:  PATANASE Your last dose was: Your next dose is:    
   
   
 2 Squirts by Both Nostrils route two (2) times a day. 2 Squirt PROTONIX 40 mg tablet Generic drug:  pantoprazole Your last dose was: Your next dose is: Take 40 mg by mouth two (2) times a day. 40 mg  
    
   
   
   
  
 tiaGABine 2 mg tablet Commonly known as:  GABITRIL Your last dose was: Your next dose is: Take 1 Tab by mouth two (2) times daily (with meals). 2 mg  
    
   
   
   
  
 traMADol 50 mg tablet Commonly known as:  ULTRAM  
   
Your last dose was: Your next dose is:    
   
   
 1-2 po daily as needed for severe pain  Indications: NEUROPATHIC PAIN, Pain VITAMIN D3 1,000 unit tablet Generic drug:  cholecalciferol Your last dose was: Your next dose is: Take 1,000 Units by mouth daily. 1000 Units ZOLOFT 100 mg tablet Generic drug:  sertraline Your last dose was: Your next dose is: Take 150 mg by mouth daily. 150 mg  
    
   
   
   
  
 ZyrTEC 10 mg tablet Generic drug:  cetirizine Your last dose was: Your next dose is: Take 10 mg by mouth daily. 10 mg  
    
   
   
   
  
  
STOP taking these medications   
 acetaminophen 500 mg Cap Replaced by:  acetaminophen 500 mg tablet Where to Get Your Medications Information on where to get these meds will be given to you by the nurse or doctor. ! Ask your nurse or doctor about these medications  
  acetaminophen 500 mg tablet  
 ondansetron 4 mg disintegrating tablet  
 oxyCODONE IR 5 mg immediate release tablet  
 polyethylene glycol 17 gram packet

## 2018-06-08 NOTE — PERIOP NOTES
Report to ANJALI Mehta RN reported 2mg versed [id number]  Fentanyl locked in top [address] [place] [address] cart for nerve block.

## 2018-06-08 NOTE — ANESTHESIA PREPROCEDURE EVALUATION
Anesthetic History   No history of anesthetic complications            Review of Systems / Medical History  Patient summary reviewed and pertinent labs reviewed    Pulmonary        Sleep apnea           Neuro/Psych   Within defined limits           Cardiovascular    Hypertension              Exercise tolerance: >4 METS  Comments: CHF POST-OP DAY 3 AFTER TKR, AT HOME,  AS PER PT. LASIX. GI/Hepatic/Renal     GERD           Endo/Other    Diabetes    Morbid obesity and arthritis     Other Findings   Comments: Documentation of current medication  Current medications obtained, documented and obtained? YES      Risk Factors for Postoperative nausea/vomiting:       History of postoperative nausea/vomiting? NO       Female? YES       Motion sickness? YES       Intended opioid administration for postoperative analgesia? NO      Smoking Abstinence:  Current Smoker? NO  Elective Surgery? YES  Seen preoperatively by anesthesiologist or proxy prior to day of surgery? YES  Pt abstained from smoking 24 hours prior to anesthesia?  N/A    Preventive care/screening for High Blood Pressure:  Aged 18 years and older: YES  Screened for high blood pressure: YES  Patients with high blood pressure referred to primary care provider   for BP management: YES           Physical Exam    Airway  Mallampati: III  TM Distance: < 4 cm  Neck ROM: decreased range of motion, short neck   Mouth opening: Normal     Cardiovascular  Regular rate and rhythm,  S1 and S2 normal,  no murmur, click, rub, or gallop  Rhythm: regular  Rate: normal         Dental    Dentition: Lower dentition intact, Upper dentition intact and Full lower dentures     Pulmonary  Breath sounds clear to auscultation               Abdominal  GI exam deferred       Other Findings            Anesthetic Plan    ASA: 3  Anesthesia type: general and regional          Induction: Intravenous  Anesthetic plan and risks discussed with: Patient

## 2018-06-08 NOTE — ROUTINE PROCESS
TRANSFER - OUT REPORT:    Verbal report given to Amie Mcclellan on Bailey Connre  being transferred to room 512 for routine progression of care       Report consisted of patients Situation, Background, Assessment and   Recommendations(SBAR). Information from the following report(s) SBAR, OR Summary, Intake/Output and MAR was reviewed with the receiving nurse. Opportunity for questions and clarification was provided.       Patient transported with:   O2 @ 3 liters  Registered Nurse  [facility]

## 2018-06-08 NOTE — H&P
History and Physical    Subjective:     Alhaji Rosenthal is a 75 y.o. Presenting for elective L reverse total shoulder arthroplasty    Past Medical History:   Diagnosis Date    Adverse effect of anesthesia     hard time waking up    Allergic rhinitis     weekly allergy injections    Arthritis     Chronic pain     lower back, shoulders    Diabetes ([address]     Essential hypertension     GERD (gastroesophageal reflux disease)     Heart failure (HCC)     Hiatal hernia     History of positive PPD, 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    1973-asymptomatic    Hypertension     Psychiatric disorder     Depression    Unspecified sleep apnea     Bipap      Past Surgical History:   Procedure Laterality Date    COLONOSCOPY N/A 4/27/2018    COLONOSCOPY, SCREENING,/c Polypectomies,/c Hot Snared Polypectomies performed by Steve Okeefe MD at [facility] ENDOSCOPY    HX APPENDECTOMY      HX BLADDER SUSPENSION      4x    HX CHOLECYSTECTOMY      HX HEENT      Sx for droopy eyes    HX ORTHOPAEDIC  10/2013    RIGHT TKA, Left knee replacement 2015    HX SEPTOPLASTY      HX SHOULDER REPLACEMENT Right     reverse    HX TONSILLECTOMY      HX TUBAL LIGATION       Family History   Problem Relation Age of Onset    Cancer Father 61     lung      Social History   Substance Use Topics    Smoking status: Never Smoker    Smokeless tobacco: Never Used    Alcohol use Yes      Comment: wine occasionally         Allergies   Allergen Reactions    Other Medication Other (comments)     SEASONAL ALLERGIES    Topamax [Topiramate] Other (comments)     \"irritable\"          Review of Systems:  As per HPI    Objective:     Temp: 97.8 °F (36.6 °C) (06/08/18 1225) Pulse (Heart Rate): 60 (06/08/18 1225) Resp Rate: 16 (06/08/18 1225) BP: 143/60 (06/08/18 1225) O2 Sat (%): 95 % (06/08/18 1225) Weight: 92.5 kg (204 lb) (06/08/18 1225)     Physical Exam:   Left shoulder skin c/d/i.   Distal neurovascular examination intact    Assessment:     Active Problems:    Rotator cuff arthropathy (6/8/2018)        Plan:      Will plan for L total shoulder arthroplasty as scheduled    Signed By: Magda West MD     June 8, 2018

## 2018-06-08 NOTE — PROCEDURES
Pre-op DX:  L shoulder arthritis with rotator cuff tear  Post-op DX: same  Procedure: Left reverse total shoulder arthroplasty  Anesthesia: GETA, exparel, block  Surgeon: Bebeto Espitia  Antibiotics: cefazolin  EBL: 50mL  Tourniquet Time: not used  Implants:  -Exactech equinoxe size 11 stem, cemented  -38mm glenosphere  -+0 tray with +0 humeral liner    Plan:  -Antibiotics: cefazolin x 23 hours  -DVT prophylaxis: SCD's  -Weight Bearing: as tolerated  -Pain control: Block, oxycodone  -routine post-op care  -Dispo: home      Op note: [id number]

## 2018-06-09 VITALS
OXYGEN SATURATION: 93 % | RESPIRATION RATE: 16 BRPM | TEMPERATURE: 98.6 F | HEIGHT: 62 IN | HEART RATE: 62 BPM | BODY MASS INDEX: 37.54 KG/M2 | DIASTOLIC BLOOD PRESSURE: 60 MMHG | SYSTOLIC BLOOD PRESSURE: 123 MMHG | WEIGHT: 204 LBS

## 2018-06-09 LAB
GLUCOSE BLD STRIP.AUTO-MCNC: 172 MG/DL (ref 70–110)
GLUCOSE BLD STRIP.AUTO-MCNC: 178 MG/DL (ref 70–110)
HCT VFR BLD AUTO: 35.3 % (ref 35–45)
HGB BLD-MCNC: 11.4 G/DL (ref 12–16)

## 2018-06-09 PROCEDURE — 82962 GLUCOSE BLOOD TEST: CPT

## 2018-06-09 PROCEDURE — 85018 HEMOGLOBIN: CPT | Performed by: ORTHOPAEDIC SURGERY

## 2018-06-09 PROCEDURE — 74011250637 HC RX REV CODE- 250/637: Performed by: ORTHOPAEDIC SURGERY

## 2018-06-09 PROCEDURE — 36415 COLL VENOUS BLD VENIPUNCTURE: CPT | Performed by: ORTHOPAEDIC SURGERY

## 2018-06-09 PROCEDURE — 74011636637 HC RX REV CODE- 636/637: Performed by: ORTHOPAEDIC SURGERY

## 2018-06-09 PROCEDURE — 97165 OT EVAL LOW COMPLEX 30 MIN: CPT

## 2018-06-09 PROCEDURE — 74011250636 HC RX REV CODE- 250/636: Performed by: ORTHOPAEDIC SURGERY

## 2018-06-09 PROCEDURE — 97161 PT EVAL LOW COMPLEX 20 MIN: CPT

## 2018-06-09 PROCEDURE — 97530 THERAPEUTIC ACTIVITIES: CPT

## 2018-06-09 RX ORDER — ACETAMINOPHEN 500 MG
1000 TABLET ORAL EVERY 8 HOURS
Qty: 42 TAB | Refills: 0 | Status: SHIPPED | OUTPATIENT
Start: 2018-06-09 | End: 2018-06-16

## 2018-06-09 RX ORDER — OXYCODONE HYDROCHLORIDE 5 MG/1
5-10 TABLET ORAL
Qty: 40 TAB | Refills: 0 | Status: SHIPPED | OUTPATIENT
Start: 2018-06-09 | End: 2018-07-23 | Stop reason: ALTCHOICE

## 2018-06-09 RX ORDER — ONDANSETRON 4 MG/1
4-8 TABLET, ORALLY DISINTEGRATING ORAL
Qty: 15 TAB | Refills: 0 | Status: SHIPPED | OUTPATIENT
Start: 2018-06-09 | End: 2018-07-23 | Stop reason: ALTCHOICE

## 2018-06-09 RX ORDER — POLYETHYLENE GLYCOL 3350 17 G/17G
17 POWDER, FOR SOLUTION ORAL DAILY
Qty: 15 PACKET | Refills: 0 | Status: SHIPPED | OUTPATIENT
Start: 2018-06-10 | End: 2022-09-26

## 2018-06-09 RX ADMIN — TRAMADOL HYDROCHLORIDE 50 MG: 50 TABLET, FILM COATED ORAL at 06:33

## 2018-06-09 RX ADMIN — ACETAMINOPHEN 1000 MG: 500 TABLET, FILM COATED ORAL at 13:21

## 2018-06-09 RX ADMIN — METOPROLOL TARTRATE 50 MG: 50 TABLET ORAL at 08:13

## 2018-06-09 RX ADMIN — CEFAZOLIN SODIUM 2 G: 2 SOLUTION INTRAVENOUS at 13:21

## 2018-06-09 RX ADMIN — POLYETHYLENE GLYCOL 3350 17 G: 17 POWDER, FOR SOLUTION ORAL at 08:12

## 2018-06-09 RX ADMIN — ACETAMINOPHEN 1000 MG: 500 TABLET, FILM COATED ORAL at 05:38

## 2018-06-09 RX ADMIN — CEFAZOLIN SODIUM 2 G: 2 SOLUTION INTRAVENOUS at 05:38

## 2018-06-09 RX ADMIN — OXYCODONE HYDROCHLORIDE 5 MG: 5 TABLET ORAL at 08:14

## 2018-06-09 RX ADMIN — Medication 10 ML: at 05:39

## 2018-06-09 RX ADMIN — TIAGABINE HYDROCHLORIDE 2 MG: 4 TABLET, FILM COATED ORAL at 08:13

## 2018-06-09 RX ADMIN — ARIPIPRAZOLE 2 MG: 2 TABLET ORAL at 08:13

## 2018-06-09 RX ADMIN — OXYCODONE HYDROCHLORIDE 10 MG: 5 TABLET ORAL at 16:06

## 2018-06-09 RX ADMIN — FLUCONAZOLE 200 MG: 200 TABLET ORAL at 08:13

## 2018-06-09 RX ADMIN — OXYCODONE HYDROCHLORIDE 10 MG: 5 TABLET ORAL at 03:02

## 2018-06-09 RX ADMIN — SERTRALINE HYDROCHLORIDE 100 MG: 50 TABLET ORAL at 08:13

## 2018-06-09 RX ADMIN — INSULIN LISPRO 3 UNITS: 100 INJECTION, SOLUTION INTRAVENOUS; SUBCUTANEOUS at 06:53

## 2018-06-09 RX ADMIN — INSULIN LISPRO 3 UNITS: 100 INJECTION, SOLUTION INTRAVENOUS; SUBCUTANEOUS at 13:36

## 2018-06-09 RX ADMIN — FAMOTIDINE 20 MG: 20 TABLET ORAL at 08:13

## 2018-06-09 RX ADMIN — OXYCODONE HYDROCHLORIDE 10 MG: 5 TABLET ORAL at 12:00

## 2018-06-09 NOTE — PROGRESS NOTES
Problem: Mobility Impaired (Adult and Pediatric)  Goal: *Acute Goals and Plan of Care (Insert Text)  Outcome: Resolved/Met Date Met: 06/09/18  physical Therapy EVALUATION & Discharge    Patient: Tonya Mota (45 y.o. female)  Date: 6/9/2018  Primary Diagnosis: Osteoarthritis of left shoulder, unspecified osteoarthritis type [M19.012]  Complete tear of left rotator cuff [M75.122]  Procedure(s) (LRB):  REVERSE LEFT TOTAL SHOULDER REPLACEMENT (Left) 1 Day Post-Op   Precautions:    Fall, Other (comment) (sling Left UE)    ASSESSMENT AND RECOMMENDATIONS:  Based on the objective data described below, the patient presents to PT. She is P/O left reverse TSA per her reports. She notes she has been out of bed already multiple times to the bathroom. She is agreeable for PT consult with mobility assessment. She was SBA for all bed mobility, transfers and gait was  Supervision to the bathroom and then to the chair. She was educated on distal UE AROM and sling positioning. She anticipates DC today or tomorrow and notes her  is at home to help with her care. Her balance and safety with transfers and in room ambulation with no AD use is safe at this time and high functioning . Skilled physical therapy is not indicated at this time. Discharge Recommendations: Home Health  Further Equipment Recommendations for Discharge: N/A      G-CODES:     Mobility  Current  CI= 1-19%   Goal  CI= 1-19%  D/C  CI= 1-19%. The severity rating is based on the Level of Assistance required for Functional Mobility and ADLs.   Eval Complexity: History: MEDIUM  Complexity : 1-2 comorbidities / personal factors will impact the outcome/ POC Exam:MEDIUM Complexity : 3 Standardized tests and measures addressing body structure, function, activity limitation and / or participation in recreation  Presentation: LOW Complexity : Stable, uncomplicated  Clinical Decision Making:Other outcome measures level of assistance with functional mobility  LOW Overall Complexity:LOW     SUBJECTIVE:   Patient stated My pain is about a 7. I hope to go home today. My  will be there to help care for me.     OBJECTIVE DATA SUMMARY:     Past Medical History:   Diagnosis Date    Adverse effect of anesthesia     hard time waking up    Allergic rhinitis     weekly allergy injections    Arthritis     Chronic pain     lower back, shoulders    Diabetes ([address]     Essential hypertension     GERD (gastroesophageal reflux disease)     Heart failure (HCC)     Hiatal hernia     History of positive PPD, 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    1973-asymptomatic    Hypertension     Psychiatric disorder     Depression    Unspecified sleep apnea     Bipap     Past Surgical History:   Procedure Laterality Date    COLONOSCOPY N/A 4/27/2018    COLONOSCOPY, SCREENING,/c Polypectomies,/c Hot Snared Polypectomies performed by David Armijo MD at [address] [place] [address] HX APPENDECTOMY      HX BLADDER SUSPENSION      4x    HX CHOLECYSTECTOMY      HX HEENT      Sx for droopy eyes    HX ORTHOPAEDIC  10/2013    RIGHT TKA, Left knee replacement 2015    HX SEPTOPLASTY      HX SHOULDER REPLACEMENT Right     reverse    HX TONSILLECTOMY      HX TUBAL LIGATION       Barriers to Learning/Limitations: None  Compensate with: visual, verbal, tactile, kinesthetic cues/model  Prior Level of Function/Home Situation: SC use, lives with spouse  Home Situation  Home Environment: Private residence  # Steps to Enter: 4  One/Two Story Residence: One story  Living Alone: No  Support Systems: Family member(s), Friends \ neighbors, Spouse/Significant Other/Partner  Patient Expects to be Discharged to[de-identified] Private residence  Current DME Used/Available at Home: Filipe Oris, straight  Critical Behavior:  Neurologic State: Alert  Orientation Level: Oriented X4  Cognition: Appropriate decision making; Appropriate for age attention/concentration; Appropriate safety awareness; Follows commands  Strength:    Strength:  Within functional limits  Tone & Sensation:   Tone: Normal  Sensation: Intact    Range Of Motion:  AROM: Within functional limits  Functional Mobility:  Bed Mobility:     Supine to Sit: Stand-by assistance    Transfers:  Sit to Stand: Stand-by assistance  Stand to Sit: Stand-by assistance    Bed to Chair: Stand-by assistance  Balance:   Sitting: Intact  Standing: Intact  Ambulation/Gait Training:  Distance (ft): 20 Feet (ft) (to bathroom and then to chair)     Ambulation - Level of Assistance: Supervision     Gait Description (WDL): Exceptions to [facility]     Base of Support: Widened   Therapeutic Exercises:   Distal UE Left wrist, hand , forearm AROM  Pain:  Pt reports 7/10 pain or discomfort prior to treatment.    Pt reports 7/10 pain or discomfort post treatment. Activity Tolerance:      Please refer to the flowsheet for vital signs taken during this treatment. After treatment:   [x]         Patient left in no apparent distress sitting up in chair  []         Patient left in no apparent distress in bed  [x]         Call bell left within reach  []         Nursing notified  []         Caregiver present  []         Bed alarm activated    COMMUNICATION/EDUCATION:   [x]         Fall prevention education was provided and the patient/caregiver indicated understanding. [x]         Patient/family have participated as able in goal setting and plan of care. [x]         Patient/family agree to work toward stated goals and plan of care. []         Patient understands intent and goals of therapy, but is neutral about his/her participation. []         Patient is unable to participate in goal setting and plan of care.     Thank you for this referral.  Makayla Valentine, PT   Time Calculation: 10 mins

## 2018-06-09 NOTE — PROGRESS NOTES
Bedside and Verbal shift change report given to Dylan Lynn RN (oncoming nurse) by Fabrizio Jeff RN (offgoing nurse). Report included the following information SBAR, Kardex, OR Summary, Intake/Output and MAR.

## 2018-06-09 NOTE — PROGRESS NOTES
Post operative block assessment performed. Pt voices no complaints, full return of sensation and movement noted.

## 2018-06-09 NOTE — PROGRESS NOTES
Mobility Intervention:       [] Pt dangled at edge of bed    [x] Pt assisted OOB to bedside commode    [] Pt assisted OOB to chair    [] Pt ambulated to bathroom    [] Patient was ambulated in room/hallway    Assistive Device Utilized:       [] Rolling walker   [] Crutches   [] Straight Cane   [] Knee immobilizer   [] IV pole    After Mobilization:     [] Patient left in no apparent distress sitting up in chair  [x] Patient left in no apparent distress in bed  [x] Call bell left within reach  [x] SCDs on & machine turned on  [x] Ice applied  [x] pain med  [] Caregiver present  [] Bed alarm activated    Reason patient not mobilized:      [] Patient refused   [] Nausea/vomiting   [] Low blood pressure   [] Drowsy/lethargic    Pain Rating:     [] 0  [] 1  Assistive Device:        [] 2  [] 3  [] 4  [x] 5  [] 6  Assistive Device:        [] 7  [] 8  [] 9  [] 10    Comments:

## 2018-06-09 NOTE — PROGRESS NOTES
Problem: Falls - Risk of  Goal: *Absence of Falls  Document Jeremiah Fall Risk and appropriate interventions in the flowsheet.    Outcome: Progressing Towards Goal  Fall Risk Interventions:  Mobility Interventions: Patient to call before getting OOB         Medication Interventions: Patient to call before getting OOB, Teach patient to arise slowly    Elimination Interventions: Call light in reach, Patient to call for help with toileting needs

## 2018-06-09 NOTE — PROGRESS NOTES
Mobility Intervention:       [] Pt dangled at edge of bed    [] Pt assisted OOB to bedside commode    [] Pt assisted OOB to chair    [x] Pt ambulated to bathroom    [] Patient was ambulated in room/hallway    Assistive Device Utilized:       [] Rolling walker   [] Crutches   [] Straight Cane   [] Knee immobilizer   [] IV pole    After Mobilization:     [] Patient left in no apparent distress sitting up in chair  [x] Patient left in no apparent distress in bed  [x] Call bell left within reach  [x] SCDs on & machine turned on  [x] Ice applied  [x] pain med given  [] Caregiver present  [] Bed alarm activated    Reason patient not mobilized:      [] Patient refused   [] Nausea/vomiting   [] Low blood pressure   [] Drowsy/lethargic    Pain Rating:     [] 0  [] 1  Assistive Device:        [] 2  [] 3  [] 4  [] 5  [x] 6  Assistive Device:        [] 7  [] 8  [] 9  [] 10    Comments:

## 2018-06-09 NOTE — OP NOTES
[facility] REPORT    Kizzy Lawton  MR#: [id number]  : 1945  ACCOUNT #: [de-identified]   DATE OF SERVICE: 2018    PREOPERATIVE DIAGNOSIS:  Left shoulder chronic rotator cuff tear with arthritis. POSTOPERATIVE DIAGNOSIS:  Left shoulder chronic rotator cuff tear with arthritis. PROCEDURE PERFORMED:  Left shoulder reverse total shoulder arthroplasty. ANESTHESIA:  1. General.  2.  Block. 3.  Exparel. SURGEON:  Paradise Back MD    ASSISTANT:  Juli Coffey. ESTIMATED BLOOD LOSS:  50 mL. ANTIBIOTICS:  Cefazolin 2 g. IMPLANTS:  1. Exactech Equinoxe size 11 stem. 2.  Exactech Equinoxe +0 humeral tray with +0 humeral liner. 3.  Exactech Equinoxe 38 mm glenosphere. 4.  Glenoid baseplate with 4 locking screws. SPECIMENS REMOVED:  None. COMPLICATIONS:  None. PROCEDURE AND FINDINGS:  The patient identified in preop holding area, left arm was marked with indelible marker, I reviewed the informed consent, block was placed. She was transported to the operative theater, SCDs were placed, antibiotics administered, anesthesia was induced. She was placed in the beach chair position, all bony prominences were padded. Examination under anesthesia of left shoulder demonstrated passive forward flexion to 150, external rotation to 40 degrees with the arm at the side, 90 degrees with humerus abducted. Left arm was prepped and draped in usual sterile fashion. A robust timeout was performed. A deltopectoral approach to left shoulder was performed, skin and subcutaneous tissues were dissected. Deep dissection was carried to clavipectoral fascia. There was an upper subscapularis tear. The subscapularis was tenotomized and released. The supraspinatus was evaluated and there was found to be a supraspinatus tear. Since we were proceeding with a reverse total shoulder arthroplasty, the supraspinatus tendon was actually excised.   The proximal humerus was exposed and found to be degenerative. Proximal humeral neck cut was performed. The humeral canal was sequentially reamed to 11 mm and then broached for an 11 mm implant, 20 degrees of retroversion. Attention was then turned to the glenoid. Anterior, superior and posterior glenoid retractors were placed. The labral excision and biceps excision were performed. The biceps was tenotomized. An anterior, posterior and inferior capsulotomy was completed. A central guide pin was placed. This was then reamed for a 38 mm glenosphere. A central guide peg was drilled. The glenosphere was impacted into place with cancellous bone graft. Four peripheral screws were then placed with excellent stability. Locking caps were applied. The wounds were thoroughly irrigated and the 38 mm glenosphere was impacted in place. Attention was then turned to the humerus. The humerus was trialed. We had to remove two additional millimeters of proximal humeral bone due to tightness. The inferior capsular release was also completed. The axillary nerve was identified and protected at all times of the case. The shoulder was then trialed with a +0 liner, +0 tray. We had excellent stability of the shoulder including forward flexion to 150 degrees, external rotation with the arm at the side to 60 degrees and humerus abducted to 90 degrees. Trial implants were removed. The final implants were cemented in 20 degrees of humeral retroversion. Once the cement had cured completely, the prosthesis was again trialed. We selected a +0 tray with +0 liner. The final components were assembled and the shoulder was reduced. Thorough irrigation was performed. Periarticular tissues were injected with Exparel local anesthetic. Layered closure was performed. Sterile dressings were applied incorporating a cooling device. The patient was awoken from anesthesia and transported to postanesthesia care unit in stable condition.   All sponge and instrument counts correct at the end of procedure.       MD RAQUEL Herrera / [place]  D: 06/08/2018 17:11     T: 06/09/2018 00:16  JOB #: [id number]

## 2018-06-09 NOTE — PROGRESS NOTES
Problem: Self Care Deficits Care Plan (Adult)  Goal: *Acute Goals and Plan of Care (Insert Text)  Occupational Therapy EVALUATION/discharge    Patient: Johnathan Mcdermott (79 y.o. female)  Date: 6/9/2018  Primary Diagnosis: Osteoarthritis of left shoulder, unspecified osteoarthritis type [M19.012]  Complete tear of left rotator cuff [M75.122]  Procedure(s) (LRB):  REVERSE LEFT TOTAL SHOULDER REPLACEMENT (Left) 1 Day Post-Op   Precautions: Fall, Other (comment) (sling Left UE)    ASSESSMENT AND RECOMMENDATIONS:  Based on the objective data described below, the patient presents without functional deficits as she has had shoulder surgery before and she and her  are retired RN's. Her sling was adjusted for comfort and ADL program was reviewed. Skilled occupational therapy is not indicated at this time. Discharge Recommendations: Home Health  Further Equipment Recommendations for Discharge: N/A      Barriers to Learning/Limitations: None    COMPLEXITY     Eval Complexity: History: LOW Complexity : Brief history review ; Examination: LOW Complexity : 1-3 performance deficits relating to physical, cognitive , or psychosocial skils that result in activity limitations and / or participation restrictions ; Decision Making:LOW Complexity : No comorbidities that affect functional and no verbal or physical assistance needed to complete eval tasks  Assessment: LOW Complexity        G-CODES:     Self Care  Current  CJ= 20-39%   Goal  CJ= 20-39%   D/C  CJ= 20-39%. The severity rating is based on the Level of Assistance required for Functional Mobility and ADLs. SUBJECTIVE:   Patient stated My  will help me like he did before.  her  is in agreement with that    OBJECTIVE DATA SUMMARY:     Past Medical History:   Diagnosis Date    Adverse effect of anesthesia     hard time waking up    Allergic rhinitis     weekly allergy injections    Arthritis     Chronic pain     lower back, shoulders  Diabetes ([facility] [address]     Essential hypertension     GERD (gastroesophageal reflux disease)     Heart failure (HCC)     Hiatal hernia     History of positive PPD, 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    1973-asymptomatic    Hypertension     Psychiatric disorder     Depression    Unspecified sleep apnea     Bipap     Past Surgical History:   Procedure Laterality Date    COLONOSCOPY N/A 4/27/2018    COLONOSCOPY, SCREENING,/c Polypectomies,/c Hot Snared Polypectomies performed by Joy Malin MD at [facility] HX APPENDECTOMY      HX BLADDER SUSPENSION      4x    HX CHOLECYSTECTOMY      HX HEENT      Sx for droopy eyes    HX ORTHOPAEDIC  10/2013    RIGHT TKA, Left knee replacement 2015    HX SEPTOPLASTY      HX SHOULDER REPLACEMENT Right     reverse    HX TONSILLECTOMY      HX TUBAL LIGATION       Prior Level of Function/Home Situation: she lives with her  who is a retired RN; he can assist her as she needs it  Home Situation  Home Environment: Private residence  # Steps to Enter: 4  One/Two Story Residence: One story  Living Alone: No  Support Systems: Family member(s), Friends \ neighbors, Spouse/Significant Other/Partner  Patient Expects to be Discharged to[de-identified] Private residence  Current DME Used/Available at Home: Cane, straight  []     Right hand dominant   []     Left hand dominant  Cognitive/Behavioral Status:  Neurologic State: Alert  Orientation Level: Oriented X4  Skin: no skin integrity issues noted during OT evaluation (surgical site was not observed)  Edema: no extremity edema is noted  Vision/Perceptual:      tracking is [facility]    Coordination:  Coordination: Within functional limits (with the exception of left shoulder)   Balance:  Sitting: Intact  Standing: Intact  Strength:  RUE: 4/5, left  is 4-/5  Tone & Sensation:  WFL BUE's in available range  Range of Motion:  RUE AROM is WFL  LUE AROM is WFL with the exception of shoulder which has no ROM orders  Functional Mobility and Transfers for ADLs:  Bed Mobility: per PT as she was up in the chair upon entering room  Supine to Sit: Stand-by assistance   Transfers:  Sit to Stand: Stand-by assistance  Bed to Chair: Stand-by assistance   ADL Assessment:   self feeding: set up  Grooming: set up and occasional min assist (simulated)  UB bathing/dressing: min assist (they have modified her shirts to account for her sling) and following training, they are able to simulate UB dressing  ADL Intervention:   as noted above including one handed cutting technes for her meals as well as the importance of doing as much for herself as possible within her activity perimeters  Pain:  Pt reports 3/10 pain or discomfort prior to treatment.    Pt reports 3/10 pain or discomfort post treatment. Activity Tolerance:   No SOB noted; she is reporting generalized fatigue  Please refer to the flowsheet for vital signs taken during this treatment. After treatment:   [x]  Patient left in no apparent distress sitting up in chair  []  Patient left in no apparent distress in bed  []  Call bell left within reach  []  Nursing notified  [x]   is present  []  Bed alarm activated    COMMUNICATION/EDUCATION:   Communication/Collaboration:  []      Home safety education was provided and the patient/caregiver indicated understanding. [x]      Patient/family have participated as able and agree with findings and recommendations. []      Patient is unable to participate in plan of care at this time.     Samara Summers OTR/L  Time Calculation: 13 mins

## 2018-06-09 NOTE — PROGRESS NOTES
2014  Received pt in stable condition,   General: lying in bed in supine, not apparent distress, family at bed side  Neuro: AOx4, slightly numb to left arm, able to wiggle fingers  Cardio: radial pulse palpable, cap refill <3sec, denies chest pain  Respiratory: denies shortness of breath  Skin: Dressing to LUE clean, dry and intact, sling in placed  GI: voiding  : denies nausea and vomiting  Mus: ambulates c assistance  Bed in low position and call bell within reach. [id number]  Alert, NAD, stable.

## 2018-06-14 NOTE — DISCHARGE SUMMARY
Total Joint Discharge Summary      Patient ID:  Renita Guy  [id number]  43 y.o.  1945    Allergies: Other medication and Topamax [topiramate]    Admit date: 6/8/2018    Discharge date and time: 6/9/2018  4:22 PM     Admitting Physician: Ingris Means MD     Discharge Physician: Nurys Sullivan    * Admission Diagnoses: Osteoarthritis of left shoulder, unspecified osteoarthritis type [M19.012]  Complete tear of left rotator cuff [M75.122]    * Discharge Diagnoses:   Hospital Problems as of 6/9/2018  Date Reviewed: 6/7/2018          Codes Class Noted - Resolved POA    Rotator cuff arthropathy ICD-10-CM: M12.819  ICD-9-CM: 716.81  6/8/2018 - Present Unknown              Surgeon: Thelma Paris    Preoperative Medical Clearance: dotty    * Procedure: Procedure(s):  REVERSE LEFT TOTAL SHOULDER REPLACEMENT           Perioperative Antibiotics: Ancef     Postoperative Pain Management:  Oxycodone,exparel    DVT Prophylaxis:  SCD's    Postoperative transfusions:     none    Post Op complications: none    Hemoglobin at discharge: _11.4__    * Discharge Condition: stable     Discharge Exam    Temp: 98.6 °F (37 °C) (06/09/18 1202) Pulse (Heart Rate): 62 (06/09/18 1202) Resp Rate: 16 (06/09/18 1202) BP: 123/60 (06/09/18 1202) O2 Sat (%): 93 % (06/09/18 1202) Weight: 92.5 kg (204 lb) (06/08/18 1225)     Wound appears to be healing without any evidence of infection. Axillary, median, radial, ulnar sensation intact  AIN, PIN, ulnar motor neve function intact  Radial pulse palpable    Physical Therapy started on the day following surgery and progressed to independent ambulation with the aid of a walker. At the time of discharge, able to go up and down stairs and had understanding of precautions needed following surgery.         * Discharged to: Home    * Follow-up Care/Discharge instructions:  - Resume pre hospital diet            - Resume home medications per medical continuation form     - Ambulate with sling at all times except exercise and hygeine  - Follow up in office as scheduled       Signed:  Ingris Means MD  6/14/2018  10:48 AM

## 2018-06-20 ENCOUNTER — PATIENT OUTREACH (OUTPATIENT)
Dept: CASE MANAGEMENT | Age: 73
End: 2018-06-20

## 2018-07-02 ENCOUNTER — PATIENT OUTREACH (OUTPATIENT)
Dept: CASE MANAGEMENT | Age: 73
End: 2018-07-02

## 2018-07-02 NOTE — PROGRESS NOTES
Shoulder Bundle follow up progress notes. Surgery date: 6/8/18    Hospital d/c: 6/9/18    POD # 24    Current dispo: home    Spoke with patient via phone, confirmed with 2 identifiers. Next scheduled ortho f/u: 7/23/18    Outpatient PT -  [facility]. Appointments twice this wee, 3x next week and \"then I see if they need to make more appointments depending upon how high I can raise my arm\". Pain level  - 4-6/10    Performed medication reconciliation with patient, and patient verbalizes understanding of administration of home medications. There were no barriers to obtaining medications identified at this time. Patient reports that FSBS was 115 today and she is now down to 198 lbs. Michela Sheppard is really helping to keep my blood sugar down and I am slowly losing some weight\".

## 2018-07-23 ENCOUNTER — OFFICE VISIT (OUTPATIENT)
Dept: ORTHOPEDIC SURGERY | Age: 73
End: 2018-07-23

## 2018-07-23 VITALS
HEIGHT: 62 IN | BODY MASS INDEX: 35.51 KG/M2 | WEIGHT: 193 LBS | RESPIRATION RATE: 16 BRPM | SYSTOLIC BLOOD PRESSURE: 148 MMHG | DIASTOLIC BLOOD PRESSURE: 67 MMHG | HEART RATE: 64 BPM | TEMPERATURE: 98.9 F | OXYGEN SATURATION: 98 %

## 2018-07-23 DIAGNOSIS — M54.41 CHRONIC BILATERAL LOW BACK PAIN WITH BILATERAL SCIATICA: ICD-10-CM

## 2018-07-23 DIAGNOSIS — G89.29 OTHER CHRONIC PAIN: ICD-10-CM

## 2018-07-23 DIAGNOSIS — Z79.891 USE OF OPIATES FOR THERAPEUTIC PURPOSES: ICD-10-CM

## 2018-07-23 DIAGNOSIS — M43.17 SPONDYLOLISTHESIS OF LUMBOSACRAL REGION: ICD-10-CM

## 2018-07-23 DIAGNOSIS — M54.42 CHRONIC BILATERAL LOW BACK PAIN WITH BILATERAL SCIATICA: ICD-10-CM

## 2018-07-23 DIAGNOSIS — M43.17 SPONDYLOLISTHESIS OF LUMBOSACRAL REGION: Primary | ICD-10-CM

## 2018-07-23 DIAGNOSIS — G89.29 CHRONIC BILATERAL LOW BACK PAIN WITH BILATERAL SCIATICA: ICD-10-CM

## 2018-07-23 RX ORDER — TIAGABINE HYDROCHLORIDE 4 MG/1
4 TABLET, FILM COATED ORAL 2 TIMES DAILY WITH MEALS
Qty: 60 TAB | Refills: 2 | Status: SHIPPED | OUTPATIENT
Start: 2018-07-23 | End: 2018-07-30 | Stop reason: SDUPTHER

## 2018-07-23 RX ORDER — TRAMADOL HYDROCHLORIDE 50 MG/1
TABLET ORAL
Qty: 60 TAB | Refills: 0 | Status: SHIPPED | OUTPATIENT
Start: 2018-07-23 | End: 2018-10-22 | Stop reason: SDUPTHER

## 2018-07-23 NOTE — PROGRESS NOTES
[address]  [address], [address]  [place], [address] [place] [address] Street  Phone: [phone]  Fax: [phone]  PROGRESS NOTE  Patient: Marshall Cates                MRN: [id number]       SSN: [id number]  YOB: 1945        AGE: 75 y.o. SEX: female  Body mass index is 35.3 kg/(m^2). PCP: Natty Newby MD  07/23/18    Chief Complaint   Patient presents with    Follow-up     3 month follow up       HISTORY OF PRESENT ILLNESS:  Moriah Matta a 75 y. o.  female with history of chronic LBP and BLE sciatica pain for several years and radiation of pain to BLE in the lower distribution L4-S1 that is intermittent, primarily on the right that resolves with exercises. Her sciatica pain switches from leg to leg. Prior history of back problems: recurrent self limited episodes of low back pain in the past and previous osteoarthritis of lumbar spine, DDD, and Grade 2-3 Spondy at L5-S1.  She has a hx of DM and heart failure. She has a hx of arthritis, and bilateral knee replacements, the last being the left in August, 2017 that contributes to her pain. She had a left shoulder repair on 06/08/18. She has tried; PT-Yes,  Non-opioid medications Yes, and spinal injections Yes. Pain is aching, burning, numbing and throbbing. Pain is worse with manual/sedentary work, walking, using stairs, and standing and affects sleep and recreational activities  and her ability to perform ADLs. Pain is better with relaxation, pain medication and lying down. At her last OV. We discussed back surgery, she wanted to avoid this  She comes in today with continuing LLE L5-S1 radicular pain. She reports that her left shoulder is improving from the surgery and she is no longer on pain medication from that. She reports that her RLE feels weaker with increased activity, she has no focal weakness on exam. We discussed red flag symptoms and her back condition.  She continues to want to avoid surgery.      States she has been using Tramadol 100mg QHS PRN, Gabitril 2mg BID, and Lidoderm patches OTC PRN with moderate, relief. Reports that pain would be a 9/10 w/out medication and that it goes down to a 5/10 after medication. This enables the pt to be functional, achieve ADLs and engage in social activities. Denies saddle paresthesia, new gait disturbance, or new neuropathic pain. Denies chills, fever,night sweats, unexplained weight loss/weight gain, chest pain, sob or anxiety. Pt at this time desires to  continue with current care/proceed with medication evaluation/proceed with evaluation of LBP     Opioid Assessment    Least pain over the last week has been 4/10. Worst pain over the last week has been 8/10. Opioid Risk Tool Reviewed: YES, score: 5  Aberrant behaviors: None. Urine Drug Screen: due - order placed. Controlled substance agreement on file: YES.  reviewed:yes, Note pt got some Percocet after her recent shoulder surgery, she has been off of that for two weeks now. This is consistent with her   Pill count is consistent with her prescription: n/a  Concomitant use of a benzodiazepine: no  MME is 10-20  Narcan not warranted   Also,  abstinence syndrome was reviewed and discussed with her today N/A    Risks and benefits of ongoing opiate therapy have been reviewed with the patient. No pain behaviors. Denies thoughts of harming self or others. Pt has a good risk to benefit ratio which allows the pt to function in a home environment without side effects      ASSESSMENT   Diagnoses and all orders for this visit:    1. Spondylolisthesis of lumbosacral region  -     DRUG SCREEN UR - W/ CONFIRM; Future    2. Chronic bilateral low back pain with bilateral sciatica    3. Use of opiates for therapeutic purposes  -     DRUG SCREEN UR - W/ CONFIRM; Future  -     traMADol (ULTRAM) 50 mg tablet; 1-2 po daily as needed for severe pain  Indications: NEUROPATHIC PAIN, Pain    4.  Other chronic pain  -     DRUG SCREEN UR - W/ CONFIRM; Future  -     traMADol (ULTRAM) 50 mg tablet; 1-2 po daily as needed for severe pain  Indications: NEUROPATHIC PAIN, Pain    Other orders  -     tiaGABine (GABITRIL) 4 mg tablet; Take 1 Tab by mouth two (2) times daily (with meals). IMPRESSION AND PLAN:  This is a chronic problem that is not changed, stable. Per review of available records and patients , there are not sign of overuse, misuse, diversion, or concerning side effects. Today we reviewed: proper storage and disposal of medications the goals of treatment (improve functionality, quality of life, and pain) alternative treatment options including non-narcotic modalities the risks and benefits of continuing with a narcotic based pain regimen  The following changes were made to the patients current treatment plan: nothing, medications refilled. 1) Pt was given information on spondylolisthesis exercises   2) UDS today  3) Increase Gabitril  4) Continue Tramadol  4) Ms. Vinod Horner has a reminder for a \"due or due soon\" health maintenance. I have asked that she contact her primary care provider, Edward Castillo MD, for follow-up on this health maintenance. 5) We have informed patient to notify us for immediate appointment if he has any worsening neurogical symptoms or if an emergency situation presents, then call 911  6) Pt will follow-up in 3 mo w/ me. Subjective    Work retired    Smoking Status non-smoker    Pain Scale: 5/10    Pain Assessment  7/23/2018   Location of Pain Shoulder   Location Modifiers Left   Severity of Pain 5   Quality of Pain Dull;Aching   Quality of Pain Comment -   Duration of Pain Persistent   Frequency of Pain Intermittent   Aggravating Factors Bending   Aggravating Factors Comment -   Limiting Behavior No   Relieving Factors Rest   Relieving Factors Comment -   Result of Injury No         REVIEW OF SYSTEMS  Constitutional: Negative for fever, chills, or weight change. Obesity  Respiratory: Negative for cough or shortness of breath. Cardiovascular: Negative for chest pain or palpitations. Gastrointestinal: Negative for incontinence, acid reflux, change in bowel habits, or constipation. Genitourinary: Negative for incontinence, dysuria and flank pain. Musculoskeletal: Positive for left shoulder, bilateral knee, LBP and BLE pain. See HPI. Skin: Negative for rash. Neurological:LLE L5-S1  radiculopathy. See HPI. Endo/Heme/Allergies: Negative. Psychiatric/Behavioral: Negative. PHYSICAL EXAMINATION  Visit Vitals    /67 (BP 1 Location: Left arm, BP Patient Position: Sitting)    Pulse 64    Temp 98.9 °F (37.2 °C) (Oral)    Resp 16    Ht 5' 2\" (1.575 m)    Wt 193 lb (87.5 kg)    SpO2 98%    BMI 35.3 kg/m2         Accompanied by Self. Constitutional:  Well developed, well nourished, in no acute distress. Psychiatric: Affect and mood are appropriate. Integumentary: No rashes or abrasions noted on exposed areas. Cardiovascular/Peripheral Vascular: +2 radial & pedal pulses. No peripheral edema is noted. Lymphatic:  No evidence of lymphedema. No cervical lymphadenopathy. SPINE/MUSCULOSKELETAL EXAM     Lumbar spine:  No rash, ecchymosis, or gross obliquity. No fasciculations. No focal atrophy is noted. Range of motion is discomfort with extension. Tenderness to palpation bilateral low back and left sciatic notch. SI joints non-tender. Trochanters non tender. Straight leg raise negative    Sensation grossly intact to light touch. MOTOR:     Hip Flex Quads Hamstrings Ankle DF EHL Ankle PF   Right +4/5 +4/5 +4/5 +4/5 +4/5 +4/5   Left +4/5 +4/5 +4/5 +4/5 +4/5 +4/5         DTRs are 1+ patella, and Achilles. Ambulation with single point cane.  FWB.    + 's cart        PAST MEDICAL HISTORY   Past Medical History:   Diagnosis Date    Adverse effect of anesthesia     hard time waking up    Allergic rhinitis     weekly allergy injections    Arthritis     Chronic pain     lower back, shoulders    Diabetes (HCC)     Essential hypertension     GERD (gastroesophageal reflux disease)     Heart failure (HCC)     Hiatal hernia     History of positive PPD, 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    1973-asymptomatic    Hypertension     Psychiatric disorder     Depression    Unspecified sleep apnea     Bipap       Past Surgical History:   Procedure Laterality Date    COLONOSCOPY N/A 4/27/2018    COLONOSCOPY, SCREENING,/c Polypectomies,/c Hot Snared Polypectomies performed by Manoj Hazel MD at [address] [place] [address] HX APPENDECTOMY      HX BLADDER SUSPENSION      4x    HX CHOLECYSTECTOMY      HX HEENT      Sx for droopy eyes    HX ORTHOPAEDIC  10/2013    RIGHT TKA, Left knee replacement 2015    HX SEPTOPLASTY      HX SHOULDER REPLACEMENT Right     reverse    HX TONSILLECTOMY      HX TUBAL LIGATION     . MEDICATIONS      Current Outpatient Prescriptions   Medication Sig Dispense Refill    tiaGABine (GABITRIL) 4 mg tablet Take 1 Tab by mouth two (2) times daily (with meals). 60 Tab 2    traMADol (ULTRAM) 50 mg tablet 1-2 po daily as needed for severe pain  Indications: NEUROPATHIC PAIN, Pain 60 Tab 0    dapagliflozin (FARXIGA) 5 mg tab tablet Take 5 mg by mouth daily.  cetirizine (ZYRTEC) 10 mg tablet Take 10 mg by mouth daily.  pantoprazole (PROTONIX) 40 mg tablet Take 40 mg by mouth two (2) times a day.  montelukast (SINGULAIR) 10 mg tablet Take 10 mg by mouth daily.  folic acid (FOLVITE) 1 mg tablet Take  by mouth daily.  aspirin delayed-release 81 mg tablet Take  by mouth daily.  SITagliptin (JANUVIA) 100 mg tablet Take 100 mg by mouth daily.  docusate sodium (COLACE) 100 mg capsule Take 100 mg by mouth two (2) times a day.  olopatadine (PATANASE) 0.6 % spry 2 Squirts by Both Nostrils route two (2) times a day.  ARIPiprazole (ABILIFY) 2 mg tablet Take 2 mg by mouth daily.       Armodafinil (NUVIGIL) 250 mg tab tablet Take 250 mg by mouth Daily (before breakfast).  metoprolol succinate (TOPROL-XL) 100 mg tablet Take 100 mg by mouth nightly.  ferrous sulfate (IRON) 325 mg (65 mg iron) tablet Take 325 mg by mouth Daily (before breakfast).  cholecalciferol (VITAMIN D3) 1,000 unit tablet Take 1,000 Units by mouth daily.  multivitamin (ONE A DAY) tablet Take 1 Tab by mouth daily.  VIT C/VIT E/LUTEIN/MIN/OMEGA-3 (OCUVITE PO) Take 1 Cap by mouth daily.  GLUCOSAMINE/CHONDRO CARLIN A/C/MN (GLUCOSAMINE-CHONDROITIN COMPLX PO) Take 1 Cap by mouth two (2) times a day.  amlodipine (NORVASC) 10 mg tablet Take 10 mg by mouth nightly. Indications: hypertension      lisinopril (PRINIVIL, ZESTRIL) 40 mg tablet Take 40 mg by mouth nightly. Indications: HYPERTENSION      chlorthalidone (HYGROTEN) 25 mg tablet Take 25 mg by mouth nightly. Indications: hypertension      mometasone (NASONEX) 50 mcg/actuation nasal spray 2 Sprays by Both Nostrils route two (2) times a day.  FLAXSEED PO Take 1,000 mg by mouth two (2) times a day.  glyBURIDE-metFORMIN (GLUCOVANCE) 5-500 mg per tablet Take 2 Tabs by mouth two (2) times daily (with meals).  atorvastatin (LIPITOR) 40 mg tablet Take 40 mg by mouth nightly.  sertraline (ZOLOFT) 100 mg tablet Take 150 mg by mouth daily.  polyethylene glycol (MIRALAX) 17 gram packet Take 1 Packet by mouth daily. 15 Packet 0    allergy injection Once every 2 weeks. ALLERGIES    Allergies   Allergen Reactions    Other Medication Other (comments)     SEASONAL ALLERGIES    Topamax [Topiramate] Other (comments)     \"irritable\"            SOCIAL HISTORY    Social History     Social History    Marital status: UNKNOWN     Spouse name: N/A    Number of children: N/A    Years of education: N/A     Occupational History    Not on file.      Social History Main Topics    Smoking status: Never Smoker    Smokeless tobacco: Never Used    Alcohol use Yes      Comment: wine occasionally    Drug use: No    Sexual activity: Not on file     Other Topics Concern    Not on file     Social History Narrative       FAMILY HISTORY    Family History   Problem Relation Age of Onset    Cancer Father 61     lung         Elizabeth Esparza NP

## 2018-07-23 NOTE — MR AVS SNAPSHOT
[address] [place] [address] [address] [place] [address] 
[phone] Patient: Awais Flores MRN: [id number] QWM:[id number]/[birth year]77 Visit Information Date & Time Provider Department Dept. Phone Encounter #  
 7/23/2018 10:00 AM Garfield Su NP [facility] [phone] Follow-up Instructions Return in about 3 months (around 10/23/2018). Upcoming Health Maintenance Date Due  
 LIPID PANEL Q1 1945 FOOT EXAM Q1 1/2/1954 MICROALBUMIN Q1 1/2/1954 EYE EXAM RETINAL OR DILATED Q1 1/2/1954 DTaP/Tdap/Td series (1 - Tdap) 1/2/1965 FOBT Q 1 YEAR AGE 50-75 1/2/1994 ZOSTER VACCINE AGE 60> 11/2/2003 GLAUCOMA SCREENING Q2Y 1/2/2009 Pneumococcal 65+ Low/Medium Risk (1 of 2 - PCV13) 1/2/2009 MEDICARE YEARLY EXAM 3/14/2018 Influenza Age 5 to Adult 8/1/2018 HEMOGLOBIN A1C Q6M 11/25/2018 BREAST CANCER SCRN MAMMOGRAM 2/6/2020 Allergies as of 7/23/2018  Review Complete On: 7/23/2018 By: Patricia Mascorro LPN Severity Noted Reaction Type Reactions Other Medication  01/21/2013    Other (comments) SEASONAL ALLERGIES Topamax [Topiramate]  07/13/2017    Other (comments) \"irritable\" Current Immunizations  Never Reviewed No immunizations on file. Not reviewed this visit You Were Diagnosed With   
  
 Codes Comments Spondylolisthesis of lumbosacral region    -  Primary ICD-10-CM: M43.17 ICD-9-CM: 987. 4 Chronic bilateral low back pain with bilateral sciatica     ICD-10-CM: M54.42, M54.41, G89.29 ICD-9-CM: 724.2, 724.3, 338.29 Use of opiates for therapeutic purposes     ICD-10-CM: Z79.891 ICD-9-CM: V58.69 Other chronic pain     ICD-10-CM: G89.29 ICD-9-CM: 338.29 Vitals BP Pulse Temp Resp Height(growth percentile) Weight(growth percentile)  148/67 (BP 1 Location: Left arm, BP Patient Position: Sitting) 64 98.9 °F (37.2 °C) (Oral) 16 5' 2\" (1.575 m) 193 lb (87.5 kg) SpO2 BMI OB Status Smoking Status 98% 35.3 kg/m2 Postmenopausal Never Smoker BMI and BSA Data Body Mass Index Body Surface Area  
 35.3 kg/m 2 1.96 m 2 Preferred Pharmacy Pharmacy Name Phone [facility] [phone] Your Updated Medication List  
  
   
This list is accurate as of 7/23/18 10:53 AM.  Always use your most recent med list.  
  
  
  
  
 ABILIFY 2 mg tablet Generic drug:  ARIPiprazole Take 2 mg by mouth daily. allergy injection Once every 2 weeks. amLODIPine 10 mg tablet Commonly known as:  Herbert Alstrom Take 10 mg by mouth nightly. Indications: hypertension  
  
 aspirin delayed-release 81 mg tablet Take  by mouth daily. chlorthalidone 25 mg tablet Commonly known as:  Roetta Magic Take 25 mg by mouth nightly. Indications: hypertension COLACE 100 mg capsule Generic drug:  docusate sodium Take 100 mg by mouth two (2) times a day. FARXIGA 5 mg Tab tablet Generic drug:  dapagliflozin Take 5 mg by mouth daily. FLAXSEED PO Take 1,000 mg by mouth two (2) times a day. folic acid 1 mg tablet Commonly known as:  Google Take  by mouth daily. GLUCOSAMINE-CHONDROITIN COMPLX PO Take 1 Cap by mouth two (2) times a day. glyBURIDE-metFORMIN 5-500 mg per tablet Commonly known as:  Hale Lacy Take 2 Tabs by mouth two (2) times daily (with meals). Iron 325 mg (65 mg iron) tablet Generic drug:  ferrous sulfate Take 325 mg by mouth Daily (before breakfast). JANUVIA 100 mg tablet Generic drug:  SITagliptin Take 100 mg by mouth daily. LIPITOR 40 mg tablet Generic drug:  atorvastatin Take 40 mg by mouth nightly. lisinopril 40 mg tablet Commonly known as:  Roro Loss Take 40 mg by mouth nightly.  Indications: HYPERTENSION  
  
 metoprolol succinate 100 mg tablet Commonly known as:  TOPROL-XL Take 100 mg by mouth nightly. montelukast 10 mg tablet Commonly known as:  SINGULAIR Take 10 mg by mouth daily. multivitamin tablet Commonly known as:  ONE A DAY Take 1 Tab by mouth daily. NASONEX 50 mcg/actuation nasal spray Generic drug:  mometasone 2 Sprays by Both Nostrils route two (2) times a day. NUVIGIL 250 mg Tab tablet Generic drug:  Armodafinil Take 250 mg by mouth Daily (before breakfast). OCUVITE PO Take 1 Cap by mouth daily. olopatadine 0.6 % Spry Commonly known as:  PATANASE 2 Squirts by Both Nostrils route two (2) times a day. polyethylene glycol 17 gram packet Commonly known as:  Reginia Crazier Take 1 Packet by mouth daily. PROTONIX 40 mg tablet Generic drug:  pantoprazole Take 40 mg by mouth two (2) times a day. tiaGABine 4 mg tablet Commonly known as:  GABITRIL Take 1 Tab by mouth two (2) times daily (with meals). traMADol 50 mg tablet Commonly known as:  ULTRAM  
1-2 po daily as needed for severe pain  Indications: NEUROPATHIC PAIN, Pain VITAMIN D3 1,000 unit tablet Generic drug:  cholecalciferol Take 1,000 Units by mouth daily. ZOLOFT 100 mg tablet Generic drug:  sertraline Take 150 mg by mouth daily. ZyrTEC 10 mg tablet Generic drug:  cetirizine Take 10 mg by mouth daily. Prescriptions Printed Refills  
 traMADol (ULTRAM) 50 mg tablet 0 Si-2 po daily as needed for severe pain  Indications: NEUROPATHIC PAIN, Pain Class: Print Prescriptions Sent to Pharmacy Refills  
 tiaGABine (GABITRIL) 4 mg tablet 2 Sig: Take 1 Tab by mouth two (2) times daily (with meals). Class: Normal  
 Pharmacy: [address] [place] [address], [address] [place] [address] #: [phone] Route: Oral  
  
Follow-up Instructions Return in about 3 months (around 10/23/2018). To-Do List   
 07/23/2018 Lab:  DRUG SCREEN UR - W/ CONFIRM Patient Instructions Spondylolysis and Spondylolisthesis: Exercises Your Care Instructions Here are some examples of typical rehabilitation exercises for your condition. Start each exercise slowly. Ease off the exercise if you start to have pain. Your doctor or physical therapist will tell you when you can start these exercises and which ones will work best for you. How to do the exercises Single knee-to-chest 
 
1. Lie on your back with your knees bent and your feet flat on the floor. You can put a small pillow under your head and neck if it is more comfortable. 2. Bring one knee to your chest, keeping the other foot flat on the floor. 3. Keep your lower back pressed to the floor. Hold for 15 to 30 seconds. 4. Relax, and lower the knee to the starting position. 5. Repeat with the other leg. Repeat 2 to 4 times with each leg. 6. To get more stretch, put your other leg flat on the floor while pulling your knee to your chest. 
Double knee-to-chest 
 
1. Lie on your back with your knees bent and your feet flat on the floor. You can put a small pillow under your head and neck if it is more comfortable. 2. Bring both knees to your chest. 
3. Keep your lower back pressed to the floor. Hold for 15 to 30 seconds. 4. Relax, and lower your knees to the starting position. 5. Repeat 2 to 4 times. Alternate arm and leg (bird dog) exercise Do this exercise slowly. Try to keep your body straight at all times. 1. Start on the floor, on your hands and knees. 2. Tighten your belly muscles by pulling your belly button in toward your spine. Be sure you continue to breathe normally and do not hold your breath. 3. Raise one arm off the floor, and hold it straight out in front of you. Be careful not to let your shoulder drop down, because that will twist your trunk. 4. Hold for about 6 seconds, then lower your arm and switch to your other arm. 5. Repeat 8 to 12 times on each arm. 6. When you can do this exercise with ease and no pain, repeat steps 1 through 5. But this time do it with one leg raised off the floor, holding your leg straight out behind you. Be careful not to let your hip drop down, because that will twist your trunk. 7. When holding your leg straight out becomes easier, try raising your opposite arm at the same time, and repeat steps 1 through 5. Bridging 1. Lie on your back with both knees bent. Your knees should be bent about 90 degrees. 2. Then push your feet into the floor, squeeze your buttocks, and lift your hips off the floor until your shoulders, hips, and knees are all in a straight line. 3. Hold for about 6 seconds as you continue to breathe normally, and then slowly lower your hips back down to the floor and rest for up to 10 seconds. 4. Repeat 8 to 12 times. Curl-ups 1. Lie on the floor on your back with your knees bent at a 90-degree angle. Your feet should be flat on the floor, about 12 inches from your buttocks. 2. Cross your arms over your chest. If this bothers your neck, try putting your hands behind your neck (not your head), with your elbows spread apart. 3. Slowly tighten your belly muscles and raise your shoulder blades off the floor. 4. Keep your head in line with your body, and do not press your chin to your chest. 
5. Hold this position for 1 or 2 seconds, then slowly lower yourself back down to the floor. 6. Repeat 8 to 12 times. Yumiko Found Do this exercise slowly. Try to keep your body straight at all times, and do not let one hip drop lower than the other. 1. Lie on your stomach, resting your upper body on your forearms. 2. Tighten your belly muscles by pulling your belly button in toward your spine. 3. Keeping your knees on the floor, press down with your forearms to lift your upper body off the floor. 4. Hold for about 6 seconds, then lower your body to the floor. Rest for up to 10 seconds. 5. Repeat 8 to 12 times. 6. Over time, work up to holding for 15 to 30 seconds each time. 7. If this exercise is easy to do with your knees on the floor, try doing this exercise with your knees and legs straight, supported by your toes on the floor. Follow-up care is a key part of your treatment and safety. Be sure to make and go to all appointments, and call your doctor if you are having problems. It's also a good idea to know your test results and keep a list of the medicines you take. Where can you learn more? Go to http://reza-isauro.info/. Enter [phone] in the search box to learn more about \"Spondylolysis and Spondylolisthesis: Exercises. \" Current as of: November 29, 2017 Content Version: 11.7 © 0676-3863 [employer]. Care instructions adapted under license by [employer] (which disclaims liability or warranty for this information). If you have questions about a medical condition or this instruction, always ask your healthcare professional. [place] [address] any warranty or liability for your use of this information. Please provide this summary of care documentation to your next provider. Your primary care clinician is listed as Windy Cope. If you have any questions after today's visit, please call [phone].

## 2018-07-23 NOTE — PATIENT INSTRUCTIONS
Spondylolysis and Spondylolisthesis: Exercises  Your Care Instructions  Here are some examples of typical rehabilitation exercises for your condition. Start each exercise slowly. Ease off the exercise if you start to have pain. Your doctor or physical therapist will tell you when you can start these exercises and which ones will work best for you. How to do the exercises  Single knee-to-chest    1. Lie on your back with your knees bent and your feet flat on the floor. You can put a small pillow under your head and neck if it is more comfortable. 2. Bring one knee to your chest, keeping the other foot flat on the floor. 3. Keep your lower back pressed to the floor. Hold for 15 to 30 seconds. 4. Relax, and lower the knee to the starting position. 5. Repeat with the other leg. Repeat 2 to 4 times with each leg. 6. To get more stretch, put your other leg flat on the floor while pulling your knee to your chest.  Double knee-to-chest    1. Lie on your back with your knees bent and your feet flat on the floor. You can put a small pillow under your head and neck if it is more comfortable. 2. Bring both knees to your chest.  3. Keep your lower back pressed to the floor. Hold for 15 to 30 seconds. 4. Relax, and lower your knees to the starting position. 5. Repeat 2 to 4 times. Alternate arm and leg (bird dog) exercise    Do this exercise slowly. Try to keep your body straight at all times. 1. Start on the floor, on your hands and knees. 2. Tighten your belly muscles by pulling your belly button in toward your spine. Be sure you continue to breathe normally and do not hold your breath. 3. Raise one arm off the floor, and hold it straight out in front of you. Be careful not to let your shoulder drop down, because that will twist your trunk. 4. Hold for about 6 seconds, then lower your arm and switch to your other arm. 5. Repeat 8 to 12 times on each arm.   6. When you can do this exercise with ease and no pain, repeat steps 1 through 5. But this time do it with one leg raised off the floor, holding your leg straight out behind you. Be careful not to let your hip drop down, because that will twist your trunk. 7. When holding your leg straight out becomes easier, try raising your opposite arm at the same time, and repeat steps 1 through 5. Bridging    1. Lie on your back with both knees bent. Your knees should be bent about 90 degrees. 2. Then push your feet into the floor, squeeze your buttocks, and lift your hips off the floor until your shoulders, hips, and knees are all in a straight line. 3. Hold for about 6 seconds as you continue to breathe normally, and then slowly lower your hips back down to the floor and rest for up to 10 seconds. 4. Repeat 8 to 12 times. Curl-ups    1. Lie on the floor on your back with your knees bent at a 90-degree angle. Your feet should be flat on the floor, about 12 inches from your buttocks. 2. Cross your arms over your chest. If this bothers your neck, try putting your hands behind your neck (not your head), with your elbows spread apart. 3. Slowly tighten your belly muscles and raise your shoulder blades off the floor. 4. Keep your head in line with your body, and do not press your chin to your chest.  5. Hold this position for 1 or 2 seconds, then slowly lower yourself back down to the floor. 6. Repeat 8 to 12 times. Plank    Do this exercise slowly. Try to keep your body straight at all times, and do not let one hip drop lower than the other. 1. Lie on your stomach, resting your upper body on your forearms. 2. Tighten your belly muscles by pulling your belly button in toward your spine. 3. Keeping your knees on the floor, press down with your forearms to lift your upper body off the floor. 4. Hold for about 6 seconds, then lower your body to the floor. Rest for up to 10 seconds. 5. Repeat 8 to 12 times.   6. Over time, work up to holding for 15 to 30 seconds each time.  7. If this exercise is easy to do with your knees on the floor, try doing this exercise with your knees and legs straight, supported by your toes on the floor. Follow-up care is a key part of your treatment and safety. Be sure to make and go to all appointments, and call your doctor if you are having problems. It's also a good idea to know your test results and keep a list of the medicines you take. Where can you learn more? Go to http://reza-isauro.info/. Enter [phone] in the search box to learn more about \"Spondylolysis and Spondylolisthesis: Exercises. \"  Current as of: November 29, 2017  Content Version: 11.7  © 8272-7371 [employer]. Care instructions adapted under license by [employer] (which disclaims liability or warranty for this information). If you have questions about a medical condition or this instruction, always ask your healthcare professional. [place] [address] any warranty or liability for your use of this information.

## 2018-07-24 ENCOUNTER — PATIENT OUTREACH (OUTPATIENT)
Dept: CASE MANAGEMENT | Age: 73
End: 2018-07-24

## 2018-07-24 NOTE — PROGRESS NOTES
Shoulder Bundle follow up progress notes. Surgery date: 6/8/18    Hospital d/c: 6/9/18    POD #  46    Current dispo: home    Spoke with patient via phone, confirmed with 2 identifiers. Next scheduled ortho f/u: patient states she can't remember date but next appointment is in 2 months    Outpatient PT - [facility] 2x week. Patient states she is now released to drive [facility]    Pain level  - 4/10. Patient states that she is always at this pain level because of her arthritis. Performed medication reconciliation with patient, and patient verbalizes understanding of administration of home medications. There were no barriers to obtaining medications identified at this time.     Patient reports that since starting [facility]le her blood sugar has been staying steady in the 120's and that she now weighs 195 lbs

## 2018-08-09 ENCOUNTER — PATIENT OUTREACH (OUTPATIENT)
Dept: CASE MANAGEMENT | Age: 73
End: 2018-08-09

## 2018-08-09 NOTE — PROGRESS NOTES
Shoulder Bundle follow up progress notes. Surgery date: 6/8/18    Hospital d/c: 6/9/18    POD # 62    Current dispo: home    Spoke with patient via phone, confirmed with 2 identifiers. Next scheduled ortho f/u: 9/12/18    Outpatient PT - Alejandrina Perez. Patient states she is scheduled for therapy through \"end of month\". Pain level  - 4-6/10      Performed medication reconciliation with patient, and patient verbalizes understanding of administration of home medications. There were no barriers to obtaining medications identified at this time. Patient states she will be out of town on vacation 9/4-9/11/18. NN will call her when she returns to perform discharge,patient is in agreement with this plan.

## 2018-10-04 ENCOUNTER — PATIENT OUTREACH (OUTPATIENT)
Dept: CASE MANAGEMENT | Age: 73
End: 2018-10-04

## 2018-10-04 NOTE — PROGRESS NOTES
Shoulder Bundle follow up progress notes. Surgery date: 6/8/18 Hospital d/c: 6/9/18 Current dispo: home NN attempted to contact patient at listed number. VM left identifying self, direct contact information given. Patient has attended all follow up appointments and has not had any ED visits/hospitalizations since [facility] discharge 6/9/18. NN will resolve this episode.

## 2018-10-22 ENCOUNTER — OFFICE VISIT (OUTPATIENT)
Dept: ORTHOPEDIC SURGERY | Age: 73
End: 2018-10-22

## 2018-10-22 VITALS
TEMPERATURE: 97.8 F | RESPIRATION RATE: 15 BRPM | OXYGEN SATURATION: 97 % | HEIGHT: 62 IN | SYSTOLIC BLOOD PRESSURE: 157 MMHG | BODY MASS INDEX: 36.73 KG/M2 | DIASTOLIC BLOOD PRESSURE: 82 MMHG | WEIGHT: 199.6 LBS | HEART RATE: 51 BPM

## 2018-10-22 DIAGNOSIS — M47.816 LUMBAR FACET ARTHROPATHY: ICD-10-CM

## 2018-10-22 DIAGNOSIS — M43.17 SPONDYLOLISTHESIS OF LUMBOSACRAL REGION: Primary | ICD-10-CM

## 2018-10-22 DIAGNOSIS — G89.29 OTHER CHRONIC PAIN: ICD-10-CM

## 2018-10-22 DIAGNOSIS — Z79.891 USE OF OPIATES FOR THERAPEUTIC PURPOSES: ICD-10-CM

## 2018-10-22 RX ORDER — TIAGABINE HYDROCHLORIDE 2 MG/1
2 TABLET, FILM COATED ORAL 2 TIMES DAILY WITH MEALS
Qty: 180 TAB | Refills: 0 | Status: SHIPPED | OUTPATIENT
Start: 2018-10-22 | End: 2019-02-04 | Stop reason: SDUPTHER

## 2018-10-22 RX ORDER — TRAMADOL HYDROCHLORIDE 50 MG/1
TABLET ORAL
Qty: 45 TAB | Refills: 0 | Status: SHIPPED | OUTPATIENT
Start: 2018-10-22

## 2018-10-22 NOTE — PROGRESS NOTES
[address]  [address], [address]  [place], [address] [place] [address] Street  Phone: [phone]  Fax: [phone]  PROGRESS NOTE  Patient: Niels Kat                MRN: [id number]       SSN: [id number]  YOB: 1945        AGE: 75 y.o. SEX: female  Body mass index is 36.51 kg/m². PCP: Ree Dalton MD  10/22/18    Chief Complaint   Patient presents with    Back Pain     Lower     Leg Pain     Bilateral     Follow-up     3 MO        HISTORY OF PRESENT ILLNESS:  Humaira Culver a 75 y. o.  female with history of chronic LBP and BLE sciatica pain for several years and radiation of pain to BLE in the lower distribution L4-S1 that is intermittent, primarily on the right that resolves with exercises. Her sciatica pain switches from leg to leg. Prior history of back problems: recurrent self limited episodes of low back pain in the past and previous osteoarthritis of lumbar spine, DDD, severe facet arthropathy, and foraminal stenosis and Spondy at L5-S1. She has tried; PT-Yes,  Non-opioid medications Yes, and spinal injections Yes. Pain is aching, burning, numbing and throbbing. Pain is worse with manual/sedentary work, walking, using stairs, and standing and affects sleep and recreational activities  and her ability to perform ADLs. Pain is better with relaxation, pain medication and lying down.      At her last OV. We discussed back surgery, she wanted to avoid this  I also increased her Gabitril. She comes in today with now more right-sided back pain. Her LLE sciatica from her last visit resolved. Her right sided back pain is non-radicular. She has severe facet arthropathy at L4-5-S1. She reports that her RLE feels weaker with increased activity, she has no focal weakness on exam. We discussed red flag symptoms and her back condition. She continues to want to avoid surgery. She is interested in trying an injection.  She had a SNRB R L5 two years ago w/ benefit      States she has been using Tramadol 100mg QHS PRN (somedays she skips it), Gabitril 2mg BID, and Lidoderm patches OTC PRN with moderate, relief. Reports that pain would be a 9/10 w/out medication and that it goes down to a 5/10 after medication. This enables the pt to be functional, achieve ADLs and engage in social activities. Denies saddle paresthesia, new gait disturbance, or new neuropathic pain. Denies chills, fever,night sweats, unexplained weight loss/weight gain, chest pain, sob or anxiety. Pt at this time desires to  continue with current care/proceed with medication evaluation/proceed with evaluation of LBP        PMHx: She has a hx of DM and heart failure. She has a hx of arthritis, and bilateral knee replacements, the last being the left in  that contributes to her pain. She had a left shoulder repair on 18.       Opioid Assessment    Least pain over the last week has been 4/10. Worst pain over the last week has been 8/10. Opioid Risk Tool Reviewed: YES, score: 5  Aberrant behaviors: None. Urine Drug Screen: reviewed and up to date. Controlled substance agreement on file: YES.  reviewed:yes  Concomitant use of a benzodiazepine: no  MME is 10-20  Narcan is not waranted   Also,  abstinence syndrome was reviewed and discussed with her today N/A    Risks and benefits of ongoing opiate therapy have been reviewed with the patient. No pain behaviors. Denies thoughts of harming self or others.  Pt has a good risk to benefit ratio which allows the pt to function in a home environment without side effects      ASSESSMENT   Diagnoses and all orders for this visit:    Spondylolisthesis of lumbosacral region    Lumbar facet arthropathy  -     SCHEDULE SURGERY    Use of opiates for therapeutic purposes  -     traMADol (ULTRAM) 50 mg tablet; 1-2 po daily as needed for severe chronic pain    Other chronic pain  -     traMADol (ULTRAM) 50 mg tablet; 1-2 po daily as needed for severe chronic pain    Other orders  -     tiaGABine (GABITRIL) 2 mg tablet; Take 1 Tab by mouth two (2) times daily (with meals). IMPRESSION AND PLAN:  This is a chronic problem that is not changed. Per review of available records and patients , there are not sign of overuse, misuse, diversion, or concerning side effects. Today we reviewed: the risk of overdose, addiction, and dependency proper storage and disposal of medications the goals of treatment (improve functionality, quality of life, and pain) alternative treatment options including non-narcotic modalities the risks and benefits of continuing with a narcotic based pain regimen  The following changes were made to the patients current treatment plan: medications adjusted, see orders. Note: Per  pt has not filled Tramadol since 07/23/18 #60. She reports that she had some Tramadol left over from before and took that. She doesn't take the Tramadol everyday, some days she skips it. She isn't sure how she takes it. I educated her that she needs to keep track on how she takes it so that I can Rx it to her correctly. She will take note of how she takes it and will call for a refill. 1) Pt was given information on facet block   2) Decrease Gabitril, higher dose causes dizziness  3) Continue Tramadol, pt is to take note how much she is taking  4) Right L4-5 Facet block  4) Ms. Karen Haro has a reminder for a \"due or due soon\" health maintenance. I have asked that she contact her primary care provider, Miah Coley MD, for follow-up on this health maintenance. 5) We have informed patient to notify us for immediate appointment if he has any worsening neurogical symptoms or if an emergency situation presents, then call 911  6) Pt will follow-up in 3 months.     Subjective    Work retired    Smoking Status non-smoker    Pain Scale: 8/10    Pain Assessment  10/22/2018   Location of Pain Back;Leg   Location Modifiers Left;Right Severity of Pain 8   Quality of Pain Dull;Aching   Quality of Pain Comment numbness, tingling   Duration of Pain -   Frequency of Pain Constant   Aggravating Factors Standing;Walking   Aggravating Factors Comment -   Limiting Behavior -   Relieving Factors (No Data)   Relieving Factors Comment tramadol, tylenol   Result of Injury -         REVIEW OF SYSTEMS  Constitutional: Negative for fever, chills, or weight change. Respiratory: Negative for cough or shortness of breath. Cardiovascular: Negative for chest pain or palpitations. Gastrointestinal: Negative for incontinence, acid reflux, change in bowel habits, or constipation. Genitourinary: Negative for incontinence, dysuria and flank pain. Musculoskeletal: Positive for back pain. See HPI. Skin: Negative for rash. Neurological:no  radiculopathy. See HPI. Endo/Heme/Allergies: Negative. Psychiatric/Behavioral: Negative. PHYSICAL EXAMINATION  Visit Vitals  /82   Pulse (!) 51   Temp 97.8 °F (36.6 °C)   Resp 15   Ht 5' 2\" (1.575 m)   Wt 199 lb 9.6 oz (90.5 kg)   SpO2 97%   BMI 36.51 kg/m²         Accompanied by self. Constitutional:  Well developed, well nourished, in no acute distress. Psychiatric: Affect and mood are appropriate. Integumentary: No rashes or abrasions noted on exposed areas. Cardiovascular/Peripheral Vascular: +2 radial & pedal pulses. No peripheral edema is noted. Lymphatic:  No evidence of lymphedema. No cervical lymphadenopathy. SPINE/MUSCULOSKELETAL EXAM     Lumbar spine:  No rash, ecchymosis, or gross obliquity. No fasciculations. No focal atrophy is noted. Range of motion is discomfort with extension. Tenderness to palpation right low back L4-5. No tenderness to palpation at the sciatic notch. SI joints non-tender. Trochanters non tender. Straight leg raise Negative    Sensation grossly intact to light touch.       MOTOR:     Hip Flex Quads Hamstrings Ankle DF EHL Ankle PF   Right +4/5 +4/5 +4/5 +4/5 +4/5 +4/5   Left +4/5 +4/5 +4/5 +4/5 +4/5 +4/5   . Ambulation with single point cane. FWB. Non-antalgic gait        PAST MEDICAL HISTORY   Past Medical History:   Diagnosis Date    Adverse effect of anesthesia     hard time waking up    Allergic rhinitis     weekly allergy injections    Arthritis     Chronic pain     lower back, shoulders    Diabetes ([address]     Essential hypertension     GERD (gastroesophageal reflux disease)     Heart failure (HCC)     Hiatal hernia     History of positive PPD, 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 1973-asymptomatic    Hypertension     Psychiatric disorder     Depression    Unspecified sleep apnea     Bipap       Past Surgical History:   Procedure Laterality Date    HX APPENDECTOMY      HX BLADDER SUSPENSION      4x    HX CHOLECYSTECTOMY      HX HEENT      Sx for droopy eyes    HX ORTHOPAEDIC  10/2013    RIGHT TKA, Left knee replacement 2015    HX SEPTOPLASTY      HX SHOULDER REPLACEMENT Right     reverse    HX TONSILLECTOMY      HX TUBAL LIGATION     . MEDICATIONS      Current Outpatient Medications   Medication Sig Dispense Refill    traMADol (ULTRAM) 50 mg tablet 1-2 po daily as needed for severe chronic pain 45 Tab 0    tiaGABine (GABITRIL) 2 mg tablet Take 1 Tab by mouth two (2) times daily (with meals). 180 Tab 0    polyethylene glycol (MIRALAX) 17 gram packet Take 1 Packet by mouth daily. 15 Packet 0    dapagliflozin (FARXIGA) 5 mg tab tablet Take 5 mg by mouth daily.  cetirizine (ZYRTEC) 10 mg tablet Take 10 mg by mouth daily.  pantoprazole (PROTONIX) 40 mg tablet Take 40 mg by mouth two (2) times a day.  montelukast (SINGULAIR) 10 mg tablet Take 10 mg by mouth daily.  folic acid (FOLVITE) 1 mg tablet Take  by mouth daily.  aspirin delayed-release 81 mg tablet Take  by mouth daily.  SITagliptin (JANUVIA) 100 mg tablet Take 100 mg by mouth daily.       docusate sodium (COLACE) 100 mg capsule Take 100 mg by mouth two (2) times a day.  olopatadine (PATANASE) 0.6 % spry 2 Squirts by Both Nostrils route two (2) times a day.  allergy injection Once every 2 weeks.  ARIPiprazole (ABILIFY) 2 mg tablet Take 2 mg by mouth daily.  Armodafinil (NUVIGIL) 250 mg tab tablet Take 250 mg by mouth Daily (before breakfast).  metoprolol succinate (TOPROL-XL) 100 mg tablet Take 100 mg by mouth nightly.  ferrous sulfate (IRON) 325 mg (65 mg iron) tablet Take 325 mg by mouth Daily (before breakfast).  cholecalciferol (VITAMIN D3) 1,000 unit tablet Take 1,000 Units by mouth daily.  multivitamin (ONE A DAY) tablet Take 1 Tab by mouth daily.  VIT C/VIT E/LUTEIN/MIN/OMEGA-3 (OCUVITE PO) Take 1 Cap by mouth daily.  GLUCOSAMINE/CHONDRO CARLIN A/C/MN (GLUCOSAMINE-CHONDROITIN COMPLX PO) Take 1 Cap by mouth two (2) times a day.  amlodipine (NORVASC) 10 mg tablet Take 10 mg by mouth nightly. Indications: hypertension      lisinopril (PRINIVIL, ZESTRIL) 40 mg tablet Take 40 mg by mouth nightly. Indications: HYPERTENSION      chlorthalidone (HYGROTEN) 25 mg tablet Take 25 mg by mouth nightly. Indications: hypertension      mometasone (NASONEX) 50 mcg/actuation nasal spray 2 Sprays by Both Nostrils route two (2) times a day.  FLAXSEED PO Take 1,000 mg by mouth two (2) times a day.  glyBURIDE-metFORMIN (GLUCOVANCE) 5-500 mg per tablet Take 2 Tabs by mouth two (2) times daily (with meals).  atorvastatin (LIPITOR) 40 mg tablet Take 40 mg by mouth nightly.  sertraline (ZOLOFT) 100 mg tablet Take 150 mg by mouth daily.           ALLERGIES    Allergies   Allergen Reactions    Other Medication Other (comments)     SEASONAL ALLERGIES    Topamax [Topiramate] Other (comments)     \"irritable\"            SOCIAL HISTORY    Social History     Socioeconomic History    Marital status: UNKNOWN     Spouse name: Not on file    Number of children: Not on file    Years of education: Not on file    Highest education level: Not on file   Social Needs    Financial resource strain: Not on file    Food insecurity - worry: Not on file    Food insecurity - inability: Not on file    Transportation needs - medical: Not on file   [employer] needs - non-medical: Not on file   Occupational History    Not on file   Tobacco Use    Smoking status: Never Smoker    Smokeless tobacco: Never Used   Substance and Sexual Activity    Alcohol use: Yes     Comment: wine occasionally    Drug use: No    Sexual activity: Not on file   Other Topics Concern    Not on file   Social History Narrative    Not on file       FAMILY HISTORY    Family History   Problem Relation Age of Onset    Cancer Father 61        lung         Pa Roman, NP

## 2018-10-22 NOTE — PATIENT INSTRUCTIONS
Facet Joint Injection: Before Your Procedure  What is a facet joint injection? A facet joint injection is a shot of medicine to help with pain from arthritis. The injection goes into your neck or back. Where you get your shot depends on where your pain is. Facet joints connect your vertebrae to each other along the back of your spine. Problems in these joints can cause long-term (chronic) pain in the neck or back. Sometimes the pain is in the shoulders, arms, buttocks, or legs. Numbing medicine is injected into the facet joint to see if that is the cause of your pain. If it does help your pain, your doctor may add a steroid medicine to the injection. Steroids reduce swelling and pain, but they don't always work. Follow-up care is a key part of your treatment and safety. Be sure to make and go to all appointments, and call your doctor if you are having problems. It's also a good idea to know your test results and keep a list of the medicines you take. What happens before the procedure?   Preparing for the procedure    · Understand exactly what procedure is planned, along with the risks, benefits, and other options. · Tell your doctors ALL the medicines, vitamins, supplements, and herbal remedies you take. Some of these can increase the risk of bleeding or interact with anesthesia.     · If you take blood thinners, such as warfarin (Coumadin), clopidogrel (Plavix), or aspirin, be sure to talk to your doctor. He or she will tell you if you should stop taking these medicines before your procedure. Make sure that you understand exactly what your doctor wants you to do.     · Your doctor will tell you which medicines to take or stop before your procedure. You may need to stop taking certain medicines a week or more before the procedure. So talk to your doctor as soon as you can.     · If you have an advance directive, let your doctor know.  It may include a living will and a durable power of  for health care. Bring a copy to the hospital. If you don't have one, you may want to prepare one. It lets your doctor and loved ones know your health care wishes. Doctors advise that everyone prepare these papers before any type of surgery or procedure. Procedures can be stressful. This information will help you understand what you can expect. And it will help you safely prepare for your procedure. What happens on the day of the procedure? · Follow the instructions exactly about when to stop eating and drinking. If you don't, your procedure may be canceled. If your doctor told you to take your medicines on the day of the procedure, take them with only a sip of water.     · Take a bath or shower before you come in for your procedure. Do not apply lotions, perfumes, deodorants, or nail polish.     · Take off all jewelry and piercings. And take out contact lenses, if you wear them.    At the hospital or surgery center   · Bring a picture ID.     · You may get medicine that relaxes you or puts you in a light sleep. The area being worked on will be numb.     · The procedure will take 10 to 30 minutes. Going home   · Be sure you have someone to drive you home. Anesthesia and pain medicine make it unsafe for you to drive.     · You will be given more specific instructions about recovering from your procedure. They will cover things like diet, follow-up care, driving, and getting back to your normal routine. When should you call your doctor? · You have questions or concerns.     · You don't understand how to prepare for your procedure.     · You become ill before the procedure (such as fever, flu, or a cold).     · You need to reschedule or have changed your mind about having the procedure. Where can you learn more? Go to http://reza-isauro.info/. Enter [phone] in the search box to learn more about \"Facet Joint Injection: Before Your Procedure. \"  Current as of: November 29, 2017  Content Version: 11.8  © 7218-7863 [employer]. Care instructions adapted under license by [employer] (which disclaims liability or warranty for this information). If you have questions about a medical condition or this instruction, always ask your healthcare professional. [address] any warranty or liability for your use of this information.

## 2018-11-07 ENCOUNTER — HOSPITAL ENCOUNTER (OUTPATIENT)
Age: 73
Setting detail: OUTPATIENT SURGERY
Discharge: HOME | End: 2018-11-07
Attending: PHYSICAL MEDICINE & REHABILITATION | Admitting: PHYSICAL MEDICINE & REHABILITATION
Payer: MEDICARE

## 2018-11-07 ENCOUNTER — APPOINTMENT (OUTPATIENT)
Dept: GENERAL RADIOLOGY | Age: 73
End: 2018-11-07
Attending: PHYSICAL MEDICINE & REHABILITATION
Payer: MEDICARE

## 2018-11-07 VITALS
BODY MASS INDEX: 36.62 KG/M2 | TEMPERATURE: 98.2 F | HEIGHT: 62 IN | DIASTOLIC BLOOD PRESSURE: 58 MMHG | WEIGHT: 199 LBS | HEART RATE: 65 BPM | SYSTOLIC BLOOD PRESSURE: 128 MMHG | OXYGEN SATURATION: 91 % | RESPIRATION RATE: 16 BRPM

## 2018-11-07 LAB — GLUCOSE BLD STRIP.AUTO-MCNC: 191 MG/DL (ref 70–110)

## 2018-11-07 PROCEDURE — 77030039433 HC TY MYLEOGRAM BD -B: Performed by: PHYSICAL MEDICINE & REHABILITATION

## 2018-11-07 PROCEDURE — 74011250636 HC RX REV CODE- 250/636: Performed by: PHYSICAL MEDICINE & REHABILITATION

## 2018-11-07 PROCEDURE — 82962 GLUCOSE BLOOD TEST: CPT

## 2018-11-07 PROCEDURE — 74011250637 HC RX REV CODE- 250/637: Performed by: PHYSICAL MEDICINE & REHABILITATION

## 2018-11-07 PROCEDURE — 76010000009 HC PAIN MGT 0 TO 30 MIN PROC: Performed by: PHYSICAL MEDICINE & REHABILITATION

## 2018-11-07 PROCEDURE — 74011250636 HC RX REV CODE- 250/636

## 2018-11-07 RX ORDER — DEXAMETHASONE SODIUM PHOSPHATE 100 MG/10ML
INJECTION INTRAMUSCULAR; INTRAVENOUS AS NEEDED
Status: DISCONTINUED | OUTPATIENT
Start: 2018-11-07 | End: 2018-11-07 | Stop reason: HOSPADM

## 2018-11-07 RX ORDER — LIDOCAINE HYDROCHLORIDE 10 MG/ML
INJECTION, SOLUTION EPIDURAL; INFILTRATION; INTRACAUDAL; PERINEURAL AS NEEDED
Status: DISCONTINUED | OUTPATIENT
Start: 2018-11-07 | End: 2018-11-07 | Stop reason: HOSPADM

## 2018-11-07 RX ORDER — DIAZEPAM 5 MG/1
5-20 TABLET ORAL ONCE
Status: COMPLETED | OUTPATIENT
Start: 2018-11-07 | End: 2018-11-07

## 2018-11-07 RX ADMIN — DIAZEPAM 10 MG: 5 TABLET ORAL at 12:59

## 2018-11-07 NOTE — PROCEDURES
Facet Joint Block Procedure Note    Patient Name: Earnest Brar    Date of Procedure: November 7, 2018    Preoperative Diagnosis: Acquired spondylolisthesis of lumbosacral region [M43.17]  Acute neuritis [M79.2]  Arthropathy of lumbar facet joint [M47.812]  Back muscle spasm [M62.830]    Post Operative Diagnosis:Acquired spondylolisthesis of lumbosacral region [M43.17]  Acute neuritis [M79.2]  Arthropathy of lumbar facet joint [M47.812]  Back muscle spasm [M62.830]     Procedure:  Right  L4-L5 Facet Joint Block    Consent: Informed consent was obtained prior to the procedure. The patient was given the opportunity to ask questions regarding the procedure and its associated risks. In addition to the potential risks associated with the procedure itself, the patient was informed both verbally and in writing of potential side effects of the use of glucocorticoids. The patient appeared to comprehend the informed consent and desired to have the procedure performed. Procedure: The patient was placed in the prone position on the flouroscopy table and the back was prepped and draped in the usual sterile manner. At each blocked level, the exact location of the facet joint was identified with flouroscopy, and after local Lidocaine 1% injection, a #22 gauge spinal needle was then advanced toward the joint. A total of 30 mg of preservative free Dexamethasone and 5 cc of Lidocaine was injected around and equally divided among all of the sites. The patient tolerated the procedure well. The injection area was cleaned and bandaids applied. No excessive bleeding was noted. Patient dressed and was discharged to home with instructions.        Garrick Franco MD  November 7, 2018

## 2018-11-07 NOTE — H&P
Date of Surgery Update: 
Ofelia Quiñones was seen and examined. History and physical has been reviewed. The patient has been examined. There have been no significant clinical changes since the last office visit.  
 
 
Signed By: Yanira Hatfield MD   
 November 7, 2018 1:25 PM

## 2018-11-07 NOTE — DISCHARGE INSTRUCTIONS
[facility]   (PATRICIA)  Dr. Magdalena Nunez, Dr. Tasneem Blanco, Dr. Eli Angelo not drive a car, operate heavy machinery or dangerous equipment for 24 hours. * Activity as tolerated; rest for the remainder of the day. * Resume pre-block medications including those for your family doctor. * Do not drink alcoholic beverages for 24 hours. Alcohol and the medications you have received may interact and cause an adverse reaction. * You may feel better this evening and worse tomorrow, as the numbing medications wears off and the steroid has yet to begin to work. After 48 hrs the steroid should begin to release bringing you relief. * You may shower this evening and remove any bandages. * Avoid hot tubs and heating pads for 24 hours. You may use cold packs on the procedure site as tolerated for the first 24 hours. * If a headache develops, drink plenty of fluids and rest.  Take over the counter medications for headache if needed. If the headache continues longer than 24 hours, call MD at the [address] [place] [address]. [phone]    * Continue taking pain medications as needed. * You may resume your regular diet if tolerated. Otherwise, start with sips of water and advance slowly. * If Diabetic: check your blood sugar three times a day for the next 3 days. If your sugar is greater than 300 call your family doctor. If your sugar is greater than 400, have someone transport you to the nearest Emergency Room. * If you experience any of the following problems, Please Call the [address] [place] [address] at [phone].         * Shortness of Breath    * Fever of 101 or higher    * Nausea / Vomiting    * Severe Headache    * Weakness or numbness in arms or legs that is not      resolving    * Prolonged increase in pain greater than 4 days      DISCHARGE SUMMARY from Nurse      PATIENT INSTRUCTIONS:    After oral sedation, for 24 hours or while taking prescription Narcotics:  · Limit your activities  · Do not drive and operate hazardous machinery  · Do not make important personal or business decisions  · Do  not drink alcoholic beverages  · If you have not urinated within 8 hours after discharge, please contact your surgeon on call. Report the following to your surgeon:  · Excessive pain, swelling, redness or odor of or around the surgical area  · Temperature over 101  · Nausea and vomiting lasting longer than 4 hours or if unable to take medications  · Any signs of decreased circulation or nerve impairment to extremity: change in color, persistent  numbness, tingling, coldness or increase pain  · Any questions            What to do at Home:  Recommended activity: Activity as tolerated, NO DRIVING FOR 12 Hours post injection          *  Please give a list of your current medications to your Primary Care Provider. *  Please update this list whenever your medications are discontinued, doses are      changed, or new medications (including over-the-counter products) are added. *  Please carry medication information at all times in case of emergency situations. These are general instructions for a healthy lifestyle:    No smoking/ No tobacco products/ Avoid exposure to second hand smoke    Surgeon General's Warning:  Quitting smoking now greatly reduces serious risk to your health. Obesity, smoking, and sedentary lifestyle greatly increases your risk for illness    A healthy diet, regular physical exercise & weight monitoring are important for maintaining a healthy lifestyle    You may be retaining fluid if you have a history of heart failure or if you experience any of the following symptoms:  Weight gain of 3 pounds or more overnight or 5 pounds in a week, increased swelling in our hands or feet or shortness of breath while lying flat in bed.   Please call your doctor as soon as you notice any of these symptoms; do not wait until your next office visit. Recognize signs and symptoms of STROKE:    F-face looks uneven    A-arms unable to move or move unevenly    S-speech slurred or non-existent    T-time-call 911 as soon as signs and symptoms begin-DO NOT go       Back to bed or wait to see if you get better-TIME IS BRAIN.

## 2018-11-13 ENCOUNTER — TELEPHONE (OUTPATIENT)
Dept: ORTHOPEDIC SURGERY | Age: 73
End: 2018-11-13

## 2018-11-13 NOTE — TELEPHONE ENCOUNTER
Patient returned call to office, verified , informed patient of below. Patient verbalized agreement/understanding. No further action required at this time.

## 2018-11-13 NOTE — TELEPHONE ENCOUNTER
Noted, once she changes over, we will no longer prescribe pain medication nor will we take back over in the future.

## 2018-11-13 NOTE — TELEPHONE ENCOUNTER
Patient called to advise Dr. Alen Alvarez office that she's been seeing Dr. Andreas Cotto office for her shoulder and they want to take over her pain meds. She's seeing them again next week and wanted Dr. Aeln Alvarez office to know. Patient can be reached at [phone] if needed.

## 2018-11-13 NOTE — TELEPHONE ENCOUNTER
Reached unidentified voicemail, left message, identified myself/facility/callback number, requested return call to facility.

## 2019-02-04 ENCOUNTER — OFFICE VISIT (OUTPATIENT)
Dept: ORTHOPEDIC SURGERY | Age: 74
End: 2019-02-04

## 2019-02-04 VITALS
HEIGHT: 62 IN | WEIGHT: 206 LBS | OXYGEN SATURATION: 98 % | DIASTOLIC BLOOD PRESSURE: 62 MMHG | HEART RATE: 52 BPM | SYSTOLIC BLOOD PRESSURE: 142 MMHG | TEMPERATURE: 98.2 F | BODY MASS INDEX: 37.91 KG/M2 | RESPIRATION RATE: 16 BRPM

## 2019-02-04 DIAGNOSIS — M54.42 CHRONIC BILATERAL LOW BACK PAIN WITH BILATERAL SCIATICA: ICD-10-CM

## 2019-02-04 DIAGNOSIS — M47.816 LUMBAR FACET ARTHROPATHY: ICD-10-CM

## 2019-02-04 DIAGNOSIS — G89.29 CHRONIC BILATERAL LOW BACK PAIN WITH BILATERAL SCIATICA: ICD-10-CM

## 2019-02-04 DIAGNOSIS — M54.41 CHRONIC BILATERAL LOW BACK PAIN WITH BILATERAL SCIATICA: ICD-10-CM

## 2019-02-04 DIAGNOSIS — M43.17 SPONDYLOLISTHESIS OF LUMBOSACRAL REGION: Primary | ICD-10-CM

## 2019-02-04 DIAGNOSIS — M79.2 NEURITIS: ICD-10-CM

## 2019-02-04 RX ORDER — TIAGABINE HYDROCHLORIDE 2 MG/1
2 TABLET, FILM COATED ORAL 2 TIMES DAILY WITH MEALS
Qty: 180 TAB | Refills: 1 | Status: SHIPPED | COMMUNITY
Start: 2019-02-04 | End: 2019-08-05 | Stop reason: SINTOL

## 2019-02-04 RX ORDER — HYDROCODONE BITARTRATE AND ACETAMINOPHEN 7.5; 325 MG/1; MG/1
1 TABLET ORAL AS NEEDED
COMMUNITY
Start: 2018-11-13 | End: 2022-09-26

## 2019-02-04 NOTE — PROGRESS NOTES
Chief complaint/History of Present Illness:  Chief Complaint   Patient presents with    Back Pain     FU      SABAS Montiel is a  75 y.o.  female      HISTORY OF PRESENT ILLNESS:  The patient comes in today for followup of chronic low back pain with bilateral lower extremity pain. She is now getting OxyContin 5 mg number 60 and tramadol 50 mg number 61 through Dr. Tran Corbett office. She had a left shoulder repair there back in 06/2018. States it does good, but she still has some pain, so he has taken over her pain management regimen. She used to be on tramadol 100 mg at bedtime through us, no longer taking that. She does take Gabitril 2 mg b.i.d. If she takes higher doses, it gives her too much dizziness. She reports she does get some electrical shocking down into her feet. She is diabetic. Her blood sugar runs between 118 and 130. She uses over-the-counter Lidoderm sometimes. She is intolerant of gabapentin, Lyrica, Topamax, Cymbalta. She is on Zoloft and Abilify through her psychiatrist.  She denies fever, bowel or bladder dysfunction. PHYSICAL EXAMINATION:  Ms. Kailyn Crespo is a 50-year-old female. She is alert and oriented. Normal mood and affect. She has a full weightbearing nonantalgic gait. No assistive device; 4/5 strength, bilateral lower extremities. Negative straight leg raise. ASSESSMENT/PLAN:  This is a patient with lumbar back pain with bilateral sciatica. She has spondylolisthesis, facet arthropathy that gives her chronic pain. We are going to continue her with the Gabitril 2 mg b.i.d. She seems to be able to tolerate that. It does help some except for these occasional shocking sensations in her feet that could be diabetic related. She will continue her pain management with Dr. Micki Tmaez. We will see her back in 6 months, sooner if needed. We did refill the Gabitril.          Review of systems:    Past Medical History:   Diagnosis Date    Adverse effect of anesthesia hard time waking up    Allergic rhinitis     weekly allergy injections    Arthritis     Chronic pain     lower back, shoulders    Diabetes (HCC)     Essential hypertension     GERD (gastroesophageal reflux disease)     [facility] failure ([facility] [address]     Hiatal hernia     History of positive PPD, 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    1973-asymptomatic    Hypertension     Psychiatric disorder     Depression    Unspecified sleep apnea     Bipap     Past Surgical History:   Procedure Laterality Date    COLONOSCOPY N/A 4/27/2018    COLONOSCOPY, SCREENING,/c Polypectomies,/c Hot Snared Polypectomies performed by Merlinda Lamas, MD at [address] [place] [address] HX APPENDECTOMY      HX BLADDER SUSPENSION      4x    HX CHOLECYSTECTOMY      HX HEENT      Sx for droopy eyes    HX ORTHOPAEDIC  10/2013    RIGHT TKA, Left knee replacement 2015    HX SEPTOPLASTY      HX SHOULDER REPLACEMENT Right     reverse    HX SHOULDER REPLACEMENT Left 06/2018    HX TONSILLECTOMY      HX TUBAL LIGATION       Social History     Socioeconomic History    Marital status: UNKNOWN     Spouse name: Not on file    Number of children: Not on file    Years of education: Not on file    Highest education level: Not on file   Social Needs    Financial resource strain: Not on file    Food insecurity - worry: Not on file    Food insecurity - inability: Not on file   [employer] needs - medical: Not on file   [employer] needs - non-medical: Not on file   Occupational History    Not on file   Tobacco Use    Smoking status: Never Smoker    Smokeless tobacco: Never Used   Substance and Sexual Activity    Alcohol use: Yes     Comment: wine occasionally    Drug use: No    Sexual activity: Not on file   Other Topics Concern     Service Not Asked    Blood Transfusions Not Asked    Caffeine Concern Not Asked    Occupational Exposure Not Asked    Hobby Hazards Not Asked    Sleep Concern Not Asked    Stress Concern Not Asked    Weight Concern Not Asked    Special Diet Not Asked    Back Care Not Asked    Exercise Not Asked    Bike Helmet Not Asked   [address] Not Asked    Self-Exams Not Asked   Social History Narrative    Not on file     Family History   Problem Relation Age of Onset    Cancer Father 61        lung       Physical Exam:  Visit Vitals  /62   Pulse (!) 52   Temp 98.2 °F (36.8 °C) (Oral)   Resp 16   Ht 5' 2\" (1.575 m)   Wt 206 lb (93.4 kg)   SpO2 98%   BMI 37.68 kg/m²     Pain Scale: 1/10       has been . reviewed and is appropriate          Diagnoses and all orders for this visit:    1. Spondylolisthesis of lumbosacral region    2. Lumbar facet arthropathy    3. Neuritis    4. Chronic bilateral low back pain with bilateral sciatica    Other orders  -     tiaGABine (GABITRIL) 2 mg tablet; Take 1 Tab by mouth two (2) times daily (with meals). Follow-up Disposition:  Return in about 6 months (around 8/4/2019) for with NP.         We have informed Renita Gilliamclarice to notify us for immediate appointment if she has any worsening neurogical symptoms or if an emergency situation presents, then call [phone]

## 2019-02-07 ENCOUNTER — HOSPITAL ENCOUNTER (OUTPATIENT)
Dept: MAMMOGRAPHY | Age: 74
Discharge: HOME | End: 2019-02-07
Payer: MEDICARE

## 2019-02-07 DIAGNOSIS — Z12.31 VISIT FOR SCREENING MAMMOGRAM: ICD-10-CM

## 2019-02-07 PROCEDURE — 77063 BREAST TOMOSYNTHESIS BI: CPT

## 2019-08-05 ENCOUNTER — OFFICE VISIT (OUTPATIENT)
Dept: ORTHOPEDIC SURGERY | Age: 74
End: 2019-08-05

## 2019-08-05 VITALS
BODY MASS INDEX: 38.76 KG/M2 | WEIGHT: 210.6 LBS | SYSTOLIC BLOOD PRESSURE: 146 MMHG | TEMPERATURE: 98.7 F | RESPIRATION RATE: 18 BRPM | DIASTOLIC BLOOD PRESSURE: 63 MMHG | HEIGHT: 62 IN | HEART RATE: 49 BPM

## 2019-08-05 DIAGNOSIS — M47.816 LUMBAR FACET ARTHROPATHY: ICD-10-CM

## 2019-08-05 DIAGNOSIS — M79.2 NEURITIS: ICD-10-CM

## 2019-08-05 DIAGNOSIS — M43.17 SPONDYLOLISTHESIS OF LUMBOSACRAL REGION: Primary | ICD-10-CM

## 2019-08-05 DIAGNOSIS — E11.42 DIABETIC PERIPHERAL NEUROPATHY (HCC): ICD-10-CM

## 2019-08-05 RX ORDER — OXYCODONE AND ACETAMINOPHEN TABLETS 5; 300 MG/1; MG/1
TABLET ORAL
COMMUNITY
End: 2022-09-26

## 2019-08-05 RX ORDER — PREGABALIN 75 MG/1
75 CAPSULE ORAL 2 TIMES DAILY
Qty: 180 CAP | Refills: 1 | Status: SHIPPED | OUTPATIENT
Start: 2019-08-05 | End: 2022-09-26

## 2019-08-05 RX ORDER — PREGABALIN 75 MG/1
75 CAPSULE ORAL 2 TIMES DAILY
Qty: 60 CAP | Refills: 5 | Status: SHIPPED | OUTPATIENT
Start: 2019-08-05 | End: 2019-08-05 | Stop reason: SDUPTHER

## 2019-08-05 NOTE — PROGRESS NOTES
Chief complaint/History of Present Illness:  Chief Complaint   Patient presents with    Back Pain     6 month follow up     Leg Pain     ELO Oakes is a  75 y.o.  female      HISTORY OF PRESENT ILLNESS:  The patient comes in today for followup of her chronic low back pain. She gets bilateral lower extremity pain that radiates down to her toes. She also has diabetic peripheral neuropathy and is seeing Dr. Love Jose for that. She states she was started on a new weight loss injection through Dr. Love Jose, but she does not know the name of it. She takes Abilify and Zoloft through her psychiatrist.  From us, she is taking Gabitril 2 mg twice a day, but she states it is making her dizzy, and she would like to go back to try Lyrica. Lyrica apparently made her a little sleepy when she was taking 150 mg twice a day, but she states it did work better for her. She rates her pain as an 8/10. She is using [employer] patches on her back, which does help some. She is still getting her pain management through Dr. Warren Mascorro. She is on OxyContin and Tramadol. She denies fever and bowel or bladder dysfunction. PHYSICAL EXAM:  Ms. Chanelle Haley is a 65-year-old female. She is alert and oriented. She has a normal mood and affect. She has a full weightbearing, non-antalgic gait utilizing a single-point cane. She has 4/5 strength of the bilateral lower extremities and negative straight leg raise. She has pain with hyperextension of the lumbar spine. ASSESSMENT/PLAN:  We will discontinue the Gabitril and restart her on Lyrica 75 mg twice a day. She has had this in the past.  She will call us if she is intolerant to it. We will see her back in six months or sooner if needed. She will continue her pain management with Dr. Lane Wing.        Review of systems:    Past Medical History:   Diagnosis Date    Adverse effect of anesthesia     hard time waking up    Allergic rhinitis     weekly allergy injections    Arthritis     Chronic pain     lower back, shoulders    Diabetes ([facility] [address]     Essential hypertension     GERD (gastroesophageal reflux disease)     Heart failure ([facility] [address] 2018    after shoulder replacement    Hiatal hernia     History of positive PPD, 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    1973-asymptomatic    Hypertension     Psychiatric disorder     Depression    Unspecified sleep apnea     bi pap; advise pt to bring     Past Surgical History:   Procedure Laterality Date    COLONOSCOPY N/A 4/27/2018    COLONOSCOPY, SCREENING,/c Polypectomies,/c Hot Snared Polypectomies performed by Monserrat Carroll MD at [address] [place] [address] COLONOSCOPY N/A 6/28/2019    COLONOSCOPY, SURVEILLANCE w/ Cold Snared Polypectomy performed by Yajaira Rodriguez MD at [address] [place] [address] HX APPENDECTOMY      HX BLADDER SUSPENSION      4x    HX CHOLECYSTECTOMY      HX HEENT      Sx for droopy eyes    HX ORTHOPAEDIC  10/2013    RIGHT TKA, Left knee replacement 2015    HX SEPTOPLASTY      HX SHOULDER REPLACEMENT Right     reverse    HX SHOULDER REPLACEMENT Left 06/2018    HX TONSILLECTOMY      HX TUBAL LIGATION       Social History     Socioeconomic History    Marital status: UNKNOWN     Spouse name: Not on file    Number of children: Not on file    Years of education: Not on file    Highest education level: Not on file   Occupational History    Not on file   Social Needs    Financial resource strain: Not on file    Food insecurity:     Worry: Not on file     Inability: Not on file    Transportation needs:     Medical: Not on file     Non-medical: Not on file   Tobacco Use    Smoking status: Never Smoker    Smokeless tobacco: Never Used   Substance and Sexual Activity    Alcohol use:  Yes     Alcohol/week: 1.0 standard drinks     Types: 1 Glasses of wine per week    Drug use: No    Sexual activity: Not on file   Lifestyle    Physical activity:     Days per week: Not on file     Minutes per session: Not on file    Stress: Not on file   Relationships    Social connections:     Talks on phone: Not on file     Gets together: Not on file     Attends Rastafarian service: Not on file     Active member of club or organization: Not on file     Attends meetings of clubs or organizations: Not on file     Relationship status: Not on file    Intimate partner violence:     Fear of current or ex partner: Not on file     Emotionally abused: Not on file     Physically abused: Not on file     Forced sexual activity: Not on file   Other Topics Concern     Service Not Asked    Blood Transfusions Not Asked    Caffeine Concern Not Asked    Occupational Exposure Not Asked   Jason Guile Hazards Not Asked    Sleep Concern Not Asked    Stress Concern Not Asked    Weight Concern Not Asked    Special Diet Not Asked    Back Care Not Asked    Exercise Not Asked    Bike Helmet Not Asked   [address] Not Asked    Self-Exams Not Asked   Social History Narrative    Not on file     Family History   Problem Relation Age of Onset    Cancer Father 61        lung    Cancer Maternal Aunt         lung       Physical Exam:  Visit Vitals  Resp 18   Ht 5' 2\" (1.575 m)   Wt 210 lb 9.6 oz (95.5 kg)   BMI 38.52 kg/m²     Pain Scale: 8/10       has been . reviewed and is appropriate    Pt has a consistent UDS in the record      Diagnoses and all orders for this visit:    1. Spondylolisthesis of lumbosacral region  -     pregabalin (LYRICA) 75 mg capsule; Take 1 Cap by mouth two (2) times a day. Max Daily Amount: 150 mg. Indications: Diabetic Complication causing Injury to some Body Nerves    2. Lumbar facet arthropathy  -     pregabalin (LYRICA) 75 mg capsule; Take 1 Cap by mouth two (2) times a day. Max Daily Amount: 150 mg. Indications: Diabetic Complication causing Injury to some Body Nerves    3. Neuritis  -     pregabalin (LYRICA) 75 mg capsule; Take 1 Cap by mouth two (2) times a day. Max Daily Amount: 150 mg.  Indications: Diabetic Complication causing Injury to some Body Nerves    4. Diabetic peripheral neuropathy (HCC)  -     pregabalin (LYRICA) 75 mg capsule; Take 1 Cap by mouth two (2) times a day. Max Daily Amount: 150 mg. Indications: Diabetic Complication causing Injury to some Body Nerves            Follow-up and Dispositions    · Return in about 6 months (around 2/5/2020) for with NP.              We have informed Domenic Overall to notify us for immediate appointment if she has any worsening neurogical symptoms or if an emergency situation presents, then call [phone]

## 2019-08-27 ENCOUNTER — TELEPHONE (OUTPATIENT)
Dept: ORTHOPEDIC SURGERY | Age: 74
End: 2019-08-27

## 2019-08-27 NOTE — TELEPHONE ENCOUNTER
Patient called and said the pharmacy is requesting a Prior Auth for the Lyrica Medication that was prescribed on 8/5/19 by LONDON Sy. Patient said she tried to get the Refill and they told her they need the Prior Auth. Patient said [employer] told the patient that we needed to call [phone] for Prior Auth. [facility] tel. [phone]. Patient tel. [phone]. Note: patient has Medicare and  for [facility] [employer].

## 2019-09-03 NOTE — TELEPHONE ENCOUNTER
Patient is checking status of prior authorization.   She states they told her nothing has be received from our office

## 2019-09-04 NOTE — TELEPHONE ENCOUNTER
Why was this denied? This pt has Diabetic Peripheral Neuropathy which is an approved indication. Was this put on the PA?

## 2019-09-13 NOTE — TELEPHONE ENCOUNTER
Patient called and stated that since PA denied would like to start taking Gabitril.      Confirmed Pharmacy on file [facility] [phone]     Best contact number for patient [phone]

## 2019-09-13 NOTE — TELEPHONE ENCOUNTER
Per Amirah's note, From us, she is taking Gabitril 2 mg twice a day, but she states it is making her dizzy. How was she doing on gabitril 2 mg daily? This would be where we would start her as BID made her dizzy. If she did ok, VO for gabitril 2 mg daily, #30, 1 RGF.

## 2019-09-20 RX ORDER — TIAGABINE HYDROCHLORIDE 2 MG/1
2 TABLET, FILM COATED ORAL 2 TIMES DAILY
Qty: 180 TAB | Refills: 1 | Status: SHIPPED | OUTPATIENT
Start: 2019-09-20 | End: 2020-02-13 | Stop reason: SDUPTHER

## 2019-09-20 NOTE — TELEPHONE ENCOUNTER
Pt states the dizziness has subsided. She has been taking the Gabitril BID. She would like a 90 day supply sent to [facility].

## 2020-01-24 ENCOUNTER — OFFICE VISIT (OUTPATIENT)
Dept: ORTHOPEDIC SURGERY | Age: 75
End: 2020-01-24

## 2020-01-24 VITALS
OXYGEN SATURATION: 96 % | SYSTOLIC BLOOD PRESSURE: 131 MMHG | BODY MASS INDEX: 40.09 KG/M2 | DIASTOLIC BLOOD PRESSURE: 69 MMHG | HEART RATE: 64 BPM | HEIGHT: 60 IN | WEIGHT: 204.2 LBS | RESPIRATION RATE: 16 BRPM | TEMPERATURE: 98.7 F

## 2020-01-24 DIAGNOSIS — M43.17 SPONDYLOLISTHESIS OF LUMBOSACRAL REGION: Primary | ICD-10-CM

## 2020-01-24 DIAGNOSIS — M47.816 LUMBAR FACET ARTHROPATHY: ICD-10-CM

## 2020-01-24 RX ORDER — BETAMETHASONE SODIUM PHOSPHATE AND BETAMETHASONE ACETATE 3; 3 MG/ML; MG/ML
6 INJECTION, SUSPENSION INTRA-ARTICULAR; INTRALESIONAL; INTRAMUSCULAR; SOFT TISSUE ONCE
Qty: 1 ML | Refills: 0
Start: 2020-01-24 | End: 2020-01-24

## 2020-01-24 RX ORDER — BUPIVACAINE HYDROCHLORIDE 2.5 MG/ML
1 INJECTION, SOLUTION INFILTRATION; PERINEURAL ONCE
Qty: 1 ML | Refills: 0
Start: 2020-01-24 | End: 2020-01-24

## 2020-01-24 RX ORDER — LIDOCAINE HYDROCHLORIDE 20 MG/ML
1 INJECTION, SOLUTION EPIDURAL; INFILTRATION; INTRACAUDAL; PERINEURAL ONCE
Qty: 1 ML | Refills: 0
Start: 2020-01-24 | End: 2020-01-24

## 2020-01-24 NOTE — PROGRESS NOTES
[address]  [address]  [place]  Phone: [phone]  Fax: [phone]  PROGRESS NOTE  Patient: Chepe Moya                MRN: [id number]       SSN: [id number]  YOB: 1945        AGE: 67 y.o. SEX: female  Body mass index is 39.88 kg/m². PCP: Gerry Lepe MD  01/24/20    Chief Complaint   Patient presents with    LOW BACK PAIN    Buttocks pain     right    Leg Pain     right    Follow-up       HISTORY OF PRESENT ILLNESS:  Chepe Moya is a 67 y.o.  female with history of chronic LBP and BLE sciatica pain for several years and radiation of pain to BLE in the lower distribution L4-S1 that is intermittent, primarily on the right that resolves with exercises. Her sciatica pain switches from leg to leg. Prior history of back problems: recurrent self limited episodes of low back pain in the past: osteoarthritis of lumbar spine, DDD, severe facet arthropathy, and foraminal stenosis and Spondy at L5-S1 per L MRI 2017. Her last L block was a bilateral facet block L4-5 in 2018 w/ no benefit. At last OV 8/2019, Abbey Woodward NP. Today, she reports some sciatica that resolved since we saw her last, but continues to have R-sided low back pain that feels like a \"knot that is hot\" She has a TP. pain is aching, stabbing and throbbing, pain is worse with lifting, twisting and affects recreational activities. Pain is better with massage, relaxation and pain medication. Denies bladder/bowel dysfunction, saddle paresthesia, weakness, gait disturbance, or other neurological deficits. Medications: Gabitril 2mg BID with moderate, relief. We tried to get her David Vizcarra She is on Tramadol thru Dr Tom Cedillo office, uses Meclizine PRN    PMHx: -150s,       ASSESSMENT   Diagnoses and all orders for this visit:    1.  Spondylolisthesis of lumbosacral region  -     REFERRAL TO PHYSICAL THERAPY  -     bupivacaine (MARCAINE) 0.25 % (2.5 mg/mL) soln injection; 1 mL by IntraMUSCular route once for 1 dose. -     INJECTION, BUPIVICAINE HYDRO  -     LIDOCAINE INJECTION  -     lidocaine, PF, (XYLOCAINE) 20 mg/mL (2 %) injection; 1 mL by Other route once for 1 dose. -     betamethasone (CELESTONE SOLUSPAN) 6 mg/mL injection; 1 mL by IntraMUSCular route once for 1 dose.  -     BETAMETHASONE ACETATE & SODIUM PHOSPHATE INJECTION 3 MG EA.  -     SD INJECT TRIGGER POINT, 1 OR 2    2. Lumbar facet arthropathy  -     REFERRAL TO PHYSICAL THERAPY  -     bupivacaine (MARCAINE) 0.25 % (2.5 mg/mL) soln injection; 1 mL by IntraMUSCular route once for 1 dose. -     INJECTION, BUPIVICAINE HYDRO  -     LIDOCAINE INJECTION  -     lidocaine, PF, (XYLOCAINE) 20 mg/mL (2 %) injection; 1 mL by Other route once for 1 dose. -     betamethasone (CELESTONE SOLUSPAN) 6 mg/mL injection; 1 mL by IntraMUSCular route once for 1 dose.  -     BETAMETHASONE ACETATE & SODIUM PHOSPHATE INJECTION 3 MG EA.  -     SD INJECT TRIGGER POINT, 1 OR 2         IMPRESSION AND PLAN:  This is a pt with spinal stenosis who is doing is unchanged since last OV. We discussed Tx, she would like to avoid surgery. She has some myofascial pain w/ a TP, she would like to try a TPI and some PT w/ dry needling    > Pt was given information on TPI today, back stretches   > TPI today, some benefit  > PT w/ dry needling  > Continue Gabitril  > Ms. Gloria Oneal has a reminder for a \"due or due soon\" health maintenance. I have asked that she contact her primary care provider, Les Mackenzie MD, for follow-up on this health maintenance.  > We have informed patient to notify us for immediate appointment if he has any worsening neurogical symptoms or if an emergency situation presents, then call 911  >  has been reviewed and is appropriate  > Pt will follow-up in 2 mo w/ me.     Subjective    Pain Scale: 8/10    Pain Assessment  1/24/2020   Location of Pain Back;Buttocks;Leg   Location Modifiers Inferior;Right   Severity of Pain 8   Quality of Pain (No Data)   Quality of Pain Comment \"intense, weakness in right leg\"\"   Duration of Pain -   Frequency of Pain Intermittent   Aggravating Factors -   Aggravating Factors Comment -   Limiting Behavior -   Relieving Factors -   Relieving Factors Comment -   Result of Injury No         REVIEW OF SYSTEMS  Constitutional: Negative for fever, chills, or weight change. Respiratory: Negative for cough or shortness of breath. Cardiovascular: Negative for chest pain or palpitations. Gastrointestinal: Negative for incontinence, acid reflux, change in bowel habits, or constipation. Genitourinary: Negative for incontinence, dysuria and flank pain. Musculoskeletal: Positive for back pain. See HPI. Skin: Negative for rash. Neurological:intermittent BLE L5-S1  radiculopathy. See HPI. Endo/Heme/Allergies: Negative. Psychiatric/Behavioral: Negative. PHYSICAL EXAMINATION  Visit Vitals  /69 (BP 1 Location: Right arm, BP Patient Position: Sitting)   Pulse 64   Temp 98.7 °F (37.1 °C) (Oral)   Resp 16   Ht 5' (1.524 m)   Wt 204 lb 3.2 oz (92.6 kg)   SpO2 96%   BMI 39.88 kg/m²         Accompanied by Self. Constitutional:  Well developed, well nourished, in no acute distress. Psychiatric: Affect and mood are appropriate. Integumentary: No rashes or abrasions noted on exposed areas. Cardiovascular/Peripheral Vascular: +2 radial & pedal pulses. No peripheral edema is noted. Lymphatic:  No evidence of lymphedema. No cervical lymphadenopathy. SPINE/MUSCULOSKELETAL EXAM     Lumbar spine:  No rash, ecchymosis, or gross obliquity. No fasciculations. No focal atrophy is noted. Range of motion is decreased with flexion, extension. Tenderness to palpation R low back L5-S1 w/ TPs and muscular tension. No tenderness to palpation at the sciatic notch. SI joints non-tender. Trochanters non tender.       Straight leg raise neg  Hip Impingement neg    Sensation grossly intact to light touch. MOTOR:     Hip Flex Quads Hamstrings Ankle DF EHL Ankle PF   Right +4/5 +4/5 +4/5 +4/5 +4/5 +4/5   Left +4/5 +4/5 +4/5 +4/5 +4/5 +4/5           Ambulation with single point cane. FWB.    + 's cart        PAST MEDICAL HISTORY   Past Medical History:   Diagnosis Date    Adverse effect of anesthesia     hard time waking up    Allergic rhinitis     weekly allergy injections    Arthritis     Chronic pain     lower back, shoulders    Diabetes ([address]     Essential hypertension     GERD (gastroesophageal reflux disease)     Heart failure ([address] 2018    after shoulder replacement    Hiatal hernia     History of positive PPD, 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    1973-asymptomatic    Hypertension     Psychiatric disorder     Depression    Unspecified sleep apnea     bi pap; advise pt to bring       Past Surgical History:   Procedure Laterality Date    COLONOSCOPY N/A 4/27/2018    COLONOSCOPY, SCREENING,/c Polypectomies,/c Hot Snared Polypectomies performed by Madan Merida MD at [address] [place] [address] COLONOSCOPY N/A 6/28/2019    COLONOSCOPY, SURVEILLANCE w/ Cold Snared Polypectomy performed by Pati Anderson MD at [address] [place] [address] HX APPENDECTOMY      HX BLADDER SUSPENSION      4x    HX CHOLECYSTECTOMY      HX HEENT      Sx for droopy eyes    HX ORTHOPAEDIC  10/2013    RIGHT TKA, Left knee replacement 2015    HX SEPTOPLASTY      HX SHOULDER REPLACEMENT Right     reverse    HX SHOULDER REPLACEMENT Left 06/2018    HX TONSILLECTOMY      HX TUBAL LIGATION     . MEDICATIONS      Current Outpatient Medications   Medication Sig Dispense Refill    exenatide microspheres (BYDUREON) 2 mg/0.65 mL pnij 2 mg by SubCUTAneous route every seven (7) days.  bupivacaine (MARCAINE) 0.25 % (2.5 mg/mL) soln injection 1 mL by IntraMUSCular route once for 1 dose. 1 mL 0    lidocaine, PF, (XYLOCAINE) 20 mg/mL (2 %) injection 1 mL by Other route once for 1 dose.  1 mL 0    betamethasone (CELESTONE SOLUSPAN) 6 mg/mL injection 1 mL by IntraMUSCular route once for 1 dose. 1 mL 0    tiaGABine (GABITRIL) 2 mg tablet Take 1 Tab by mouth two (2) times a day. 180 Tab 1    traMADol (ULTRAM) 50 mg tablet 1-2 po daily as needed for severe chronic pain 45 Tab 0    polyethylene glycol (MIRALAX) 17 gram packet Take 1 Packet by mouth daily. (Patient taking differently: Take 17 g by mouth as needed.) 15 Packet 0    dapagliflozin (FARXIGA) 5 mg tab tablet Take 5 mg by mouth daily.  pantoprazole (PROTONIX) 40 mg tablet Take 40 mg by mouth two (2) times a day.  montelukast (SINGULAIR) 10 mg tablet Take 10 mg by mouth daily.  folic acid (FOLVITE) 1 mg tablet Take  by mouth daily.  aspirin delayed-release 81 mg tablet Take  by mouth daily.  docusate sodium (COLACE) 100 mg capsule Take 100 mg by mouth two (2) times a day.  olopatadine (PATANASE) 0.6 % spry 2 Squirts by Both Nostrils route two (2) times a day.  allergy injection Indications: every 3 weeks      ARIPiprazole (ABILIFY) 2 mg tablet Take 2 mg by mouth nightly.  Armodafinil (NUVIGIL) 250 mg tab tablet Take 250 mg by mouth Daily (before breakfast).  metoprolol succinate (TOPROL-XL) 100 mg tablet Take 100 mg by mouth nightly.  ferrous sulfate (IRON) 325 mg (65 mg iron) tablet Take 325 mg by mouth Daily (before breakfast).  cholecalciferol (VITAMIN D3) 1,000 unit tablet Take 1,000 Units by mouth daily.  multivitamin (ONE A DAY) tablet Take 1 Tab by mouth daily.  VIT C/VIT E/LUTEIN/MIN/OMEGA-3 (OCUVITE PO) Take 1 Cap by mouth nightly.  GLUCOSAMINE/CHONDRO CARLIN A/C/MN (GLUCOSAMINE-CHONDROITIN COMPLX PO) Take 1 Cap by mouth two (2) times a day.  amlodipine (NORVASC) 10 mg tablet Take 10 mg by mouth nightly. Indications: hypertension      lisinopril (PRINIVIL, ZESTRIL) 40 mg tablet Take 40 mg by mouth nightly.  Indications: HYPERTENSION      chlorthalidone (HYGROTEN) 25 mg tablet Take 25 mg by mouth nightly. Indications: hypertension      FLAXSEED PO Take 1,000 mg by mouth two (2) times a day.  glyBURIDE-metFORMIN (GLUCOVANCE) 5-500 mg per tablet Take 2 Tabs by mouth nightly.  atorvastatin (LIPITOR) 40 mg tablet Take 40 mg by mouth nightly.  sertraline (ZOLOFT) 100 mg tablet Take 150 mg by mouth nightly.  oxyCODONE-acetaminophen 5-300 mg tab oxycodone 5 mg tablet      pregabalin (LYRICA) 75 mg capsule Take 1 Cap by mouth two (2) times a day. Max Daily Amount: 150 mg. Indications: Diabetic Complication causing Injury to some Body Nerves 180 Cap 1    fluconazole (DIFLUCAN) 150 mg tablet Take 150 mg by mouth as needed (prior to a procedure (hx of joint replacement)). FDA advises cautious prescribing of oral fluconazole in pregnancy.  HYDROcodone-acetaminophen (NORCO) 7.5-325 mg per tablet 1 Tab as needed.  SITagliptin (JANUVIA) 100 mg tablet Take 100 mg by mouth nightly. ALLERGIES    Allergies   Allergen Reactions    Other Medication Other (comments)     SEASONAL ALLERGIES    Topamax [Topiramate] Other (comments)     \"irritable\"            SOCIAL HISTORY    Social History     Socioeconomic History    Marital status: UNKNOWN     Spouse name: Not on file    Number of children: Not on file    Years of education: Not on file    Highest education level: Not on file   Occupational History    Not on file   Social Needs    Financial resource strain: Not on file    Food insecurity:     Worry: Not on file     Inability: Not on file    Transportation needs:     Medical: Not on file     Non-medical: Not on file   Tobacco Use    Smoking status: Never Smoker    Smokeless tobacco: Never Used   Substance and Sexual Activity    Alcohol use:  Yes     Alcohol/week: 1.0 standard drinks     Types: 1 Glasses of wine per week    Drug use: No    Sexual activity: Not on file   Lifestyle    Physical activity:     Days per week: Not on file     Minutes per session: Not on file    Stress: Not on file   Relationships    Social connections:     Talks on phone: Not on file     Gets together: Not on file     Attends Hindu service: Not on file     Active member of club or organization: Not on file     Attends meetings of clubs or organizations: Not on file     Relationship status: Not on file    Intimate partner violence:     Fear of current or ex partner: Not on file     Emotionally abused: Not on file     Physically abused: Not on file     Forced sexual activity: Not on file   Other Topics Concern     Service Not Asked    Blood Transfusions Not Asked    Caffeine Concern Not Asked    Occupational Exposure Not Asked   [place] Hazards Not Asked    Sleep Concern Not Asked    Stress Concern Not Asked    Weight Concern Not Asked    Special Diet Not Asked    Back Care Not Asked    Exercise Not Asked    Bike Helmet Not Asked   [address] Not Asked    Self-Exams Not Asked   Social History Narrative    Not on file       FAMILY HISTORY    Family History   Problem Relation Age of Onset    Cancer Father 61        lung    Cancer Maternal Aunt         lung         Nat Oseguera NP     [facility]  OFFICE PROCEDURE PROGRESS NOTE      PROCEDURE: In the office today after informed consent using aseptic technique, the patient was injected with a total of 1 cc of each of Celestone, Lidocaine 2% and Marcaine 0.25% into her r ilio-lumbar TP x2.      Chart reviewed for the following:   Nat DAVENPORT NP-BC, have reviewed the History, Physical and updated the Allergic reactions for [employer].       TIME OUT performed immediately prior to start of procedure:   Nat DAVENPORT NP-BC, have performed the following reviews on Julia Hand prior to the start of the procedure:            * Patient was identified by name and date of birth   * Agreement on procedure being performed was verified  * Risks and Benefits explained to the patient  * Procedure site verified and marked as necessary  * Patient was positioned for comfort  * Consent was signed and verified    Time: 1130    Date of procedure: 1/24/20    Procedure performed by:  KYLEIGH Zamarripa    Provider assisted by: none    Patient assisted by: Self    How tolerated by patient: Pt tolerated the procedure well with no complications.

## 2020-01-24 NOTE — PROGRESS NOTES
Benjamin Wade presents today for   Chief Complaint   Patient presents with    LOW BACK PAIN    Buttocks pain     right    Leg Pain     right    Follow-up       Is someone accompanying this pt? no    Is the patient using any DME equipment during OV? Yes, cane    Depression Screening:  3 most recent PHQ Screens 1/24/2020   PHQ Not Done -   Little interest or pleasure in doing things Not at all   Feeling down, depressed, irritable, or hopeless Not at all   Total Score PHQ 2 0       Learning Assessment:  No flowsheet data found. Abuse Screening:  No flowsheet data found. Fall Risk  Fall Risk Assessment, last 12 mths 1/24/2020   Able to walk? Yes   Fall in past 12 months? No       OPIOID RISK TOOL  Opioid Risk Tool 1/10/2018   Family history of alcohol abuse? 0   Family history of illegal drug abuse? 0   Family history of prescription drug abuse? 0   Personal history of alcohol abuse? 0   Personal history of illegal drug abuse? 0   Personal history of prescription drug abuse? 0   Age range between 17-45? 0   History of preadolescent sexual abuse? 3   ADD, OCD, bipolar, schizophrenia? 2   Depression? 0   Opioid Risk Total Score 5       Coordination of Care:  1. Have you been to the ER, urgent care clinic since your last visit? Yes, pt went to urgent care for sinus infection  Hospitalized since your last visit? no    2. Have you seen or consulted any other health care providers outside of the [address] [place] [address] since your last visit? Yes, pcp for rash. Include any pap smears or colon screening.  None     Last  Checked 1/24/2020

## 2020-01-24 NOTE — PATIENT INSTRUCTIONS
Back Stretches: Exercises Introduction Here are some examples of exercises for stretching your back. Start each exercise slowly. Ease off the exercise if you start to have pain. Your doctor or physical therapist will tell you when you can start these exercises and which ones will work best for you. How to do the exercises Overhead stretch 1. Stand comfortably with your feet shoulder-width apart. 2. Looking straight ahead, raise both arms over your head and reach toward the ceiling. Do not allow your head to tilt back. 3. Hold for 15 to 30 seconds, then lower your arms to your sides. 4. Repeat 2 to 4 times. Side stretch 1. Stand comfortably with your feet shoulder-width apart. 2. Raise one arm over your head, and then lean to the other side. 3. Slide your hand down your leg as you let the weight of your arm gently stretch your side muscles. Hold for 15 to 30 seconds. 4. Repeat 2 to 4 times on each side. Press-up 1. Lie on your stomach, supporting your body with your forearms. 2. Press your elbows down into the floor to raise your upper back. As you do this, relax your stomach muscles and allow your back to arch without using your back muscles. As your press up, do not let your hips or pelvis come off the floor. 3. Hold for 15 to 30 seconds, then relax. 4. Repeat 2 to 4 times. Relax and rest 
 
1. Lie on your back with a rolled towel under your neck and a pillow under your knees. Extend your arms comfortably to your sides. 2. Relax and breathe normally. 3. Remain in this position for about 10 minutes. 4. If you can, do this 2 or 3 times each day. Follow-up care is a key part of your treatment and safety. Be sure to make and go to all appointments, and call your doctor if you are having problems. It's also a good idea to know your test results and keep a list of the medicines you take. Where can you learn more? Go to http://reza-isauro.info/. Enter G748 in the search box to learn more about \"Back Stretches: Exercises. \" Current as of: June 26, 2019 Content Version: 12.2 © 2883-6667 [employer]. Care instructions adapted under license by [employer] (which disclaims liability or warranty for this information). If you have questions about a medical condition or this instruction, always ask your healthcare professional. [place] [address] any warranty or liability for your use of this information.

## 2020-02-04 ENCOUNTER — HOSPITAL ENCOUNTER (OUTPATIENT)
Dept: PHYSICAL THERAPY | Age: 75
Discharge: HOME | End: 2020-02-04
Payer: MEDICARE

## 2020-02-04 PROCEDURE — 97162 PT EVAL MOD COMPLEX 30 MIN: CPT

## 2020-02-04 PROCEDURE — 97110 THERAPEUTIC EXERCISES: CPT

## 2020-02-04 NOTE — PROGRESS NOTES
PT DAILY TREATMENT NOTE 10-18    Patient Name: Taylor Ann  Date:2020  : 1945  [x]  Patient  Verified  Payor: Linder Cheadle / Plan: [place] & B / Product Type: Medicare /    In time:10:39  Out time:11:20  Total Treatment Time (min): 41  Visit #: 1 of 8    Medicare/BCBS Only   Total Timed Codes (min):  10 1:1 Treatment Time:  10       Treatment Area: [address]54.5    SUBJECTIVE  Pain Level (0-10 scale): 6/10  Any medication changes, allergies to medications, adverse drug reactions, diagnosis change, or new procedure performed?: [x] No    [] Yes (see summary sheet for update)  Subjective functional status/changes:   [] No changes reported  The patient reports a chief complaint of right low back and glute pain that has been going on since 2019. OBJECTIVE  31 min [x]Eval                  []Re-Eval        10 min Therapeutic Exercise:  [] See flow sheet :   Rationale: increase ROM and increase strength to improve the patients ability to improve ADL ease. With   [] TE   [] TA   [] neuro   [] other: Patient Education: [x] Review HEP    [] Progressed/Changed HEP based on:   [] positioning   [] body mechanics   [] transfers   [] heat/ice application    [] other:      Other Objective/Functional Measures: See IE     Pain Level (0-10 scale) post treatment: 4/10    ASSESSMENT/Changes in Function: See POC. Patient will continue to benefit from skilled PT services to modify and progress therapeutic interventions, address functional mobility deficits, address ROM deficits, address strength deficits, analyze and address soft tissue restrictions, analyze and cue movement patterns, analyze and modify body mechanics/ergonomics, assess and modify postural abnormalities and instruct in home and community integration to attain remaining goals. [x]  See Plan of Care  []  See progress note/recertification  []  See Discharge Summary         Progress towards goals / Updated goals:  Short Term Goals:  To be accomplished in 2 weeks:   1. The patient will demonstrate independent and compliant with HEP to maximize therapeutic benefit. IE: HEP issued   2. The patient will improve piriformis flexibility to negative to improve ease of ADLs. IE: positive piriformis  Long Term Goals: To be accomplished in 4 weeks:   1. The patient will improve FOTO score to 43 to maximize quality of life. IE: FOTO 36   2. The patient will improve hip ABD right hip to 4+/5 MMT improve ease of ambulation. IE: 3+/5 MMT right hip ABD. 3. The patient will report a having only a little bit of difficulty using a broom to improve ease of chore production. IE: Quite a bit of difficulty. 4. The patient will demonstrate left sidebending and right rotation without pain provocation at end range. IE: pain at end ranges of left side-bending and right rotation.      PLAN  []  Upgrade activities as tolerated     [x]  Continue plan of care  []  Update interventions per flow sheet       []  Discharge due to:_  []  Other:_      Mary Carlos PT 2/4/2020  1:26 PM    Future Appointments   Date Time Provider Yojana Cintron   2/7/2020 11:00 AM Vernestine Fret, PT [facility]   2/12/2020 12:30 PM Gerrianne Gowers, PT [facility]   2/14/2020 12:00 PM Vernestine Fret, PT [facility]   2/17/2020 12:00 PM Vernestine Fret, PT [facility]   2/19/2020 12:00 PM Gerrianne Gowers, PT [facility]   2/24/2020 12:00 PM Vernestine Fret, PT [facility]   2/26/2020 12:00 PM Gerrianne Gowers, PT [facility]   3/24/2020 10:30 AM Gregory Tom  [address]

## 2020-02-04 NOTE — PROGRESS NOTES
[facility]  [address]  [place], [address]  [phone] fax    Plan of Care/ Statement of Necessity for Physical Therapy Services    Patient name: Charlene Garcia Start of Care: 2020   Referral source: Mars Tapia NP : 1945    Medical Diagnosis: m54.5  [place]or: [place] MEDICARE / Plan: [place] MEDICARE PART A & B / Product Type: Medicare /  Onset Date: 2019    Treatment Diagnosis: LBP    Prior Hospitalization: see medical history Provider#: [id number]   Medications: Verified on Patient summary List    Comorbidities: Depression, Diabetes, Arthritis, HTN, visual impaired, hearing impaired, B TKR, Reverse TSR, UTI currently being treated. Prior Level of Function: The patient states that she had improved ease of ADLs. The Plan of Care and following information is based on the information from the initial evaluation. Assessment/ key information: The patient is a 67year old female with a chief complaint of right sided low back pain. The patient states that her pain began in the fall of  where her pain worse, noting an insidious onset that has been persistent, and does tend to improve upon standing and walking. The patient has signs and symptoms consistent with mechanical right hip pain and low back pain with impairments consisting of pain, decreased ROM, decreased flexibility, decreased strength, and limited ambulation ease. The patient will be independent and compliant with HEP to maximize therapeutic benefit. Evaluation Complexity History MEDIUM  Complexity : 1-2 comorbidities / personal factors will impact the outcome/ POC ; Examination MEDIUM Complexity : 3 Standardized tests and measures addressing body structure, function, activity limitation and / or participation in recreation  ;Presentation MEDIUM Complexity : Evolving with changing characteristics  ; Clinical Decision Making MEDIUM Complexity : FOTO score of 26-74  Overall Complexity Rating: MEDIUM  Problem List: pain affecting function, decrease ROM, decrease strength, impaired gait/ balance, decrease ADL/ functional abilitiies, decrease activity tolerance, decrease flexibility/ joint mobility and decrease transfer abilities   Treatment Plan may include any combination of the following: Therapeutic exercise, Therapeutic activities, Neuromuscular re-education, Physical agent/modality, Manual therapy, Patient education, Functional mobility training, Home safety training and Stair training and DN. DN was discussed that it may be added after 2 weeks, as patient wanted to emphasis PT without it first.   Patient / Family readiness to learn indicated by: asking questions, trying to perform skills and interest  Persons(s) to be included in education: patient (P)  Barriers to Learning/Limitations: None  Patient Goal (s): decrease the happening of my pain.   Patient Self Reported Health Status: fair  Rehabilitation Potential: fair    Short Term Goals: To be accomplished in 2 weeks:   1. The patient will demonstrate independent and compliant with HEP to maximize therapeutic benefit. 2. The patient will improve piriformis flexibility to negative to improve ease of ADLs. Long Term Goals: To be accomplished in 4 weeks:   1. The patient will improve FOTO score to 43 to maximize quality of life. 2. The patient will improve hip ABD right hip to improve ease of ambulation. 3. The patient will report a having only a little bit of difficulty using a broom to improve ease of chore production. 4. The patient will demonstrate left sidebending and right rotation without pain provocation at end range. Frequency / Duration: Patient to be seen 2 times per week for 4 weeks.     Patient/ Caregiver education and instruction: Diagnosis, prognosis, self care, activity modification and exercises   [x]  Plan of care has been reviewed with PTA    Certification Period: 2/04/2020 - 3/04/2020  Ailynnaheed Breanna, PT 2/4/2020 11:53 AM    ________________________________________________________________________    I certify that the above Therapy Services are being furnished while the patient is under my care. I agree with the treatment plan and certify that this therapy is necessary.     [de-identified] Signature:____________________  Date:____________Time: _________    Please sign and return to [facility]  [address]  [place], [address]  [phone] fax

## 2020-02-07 ENCOUNTER — HOSPITAL ENCOUNTER (OUTPATIENT)
Dept: PHYSICAL THERAPY | Age: 75
Discharge: HOME | End: 2020-02-07
Payer: MEDICARE

## 2020-02-07 PROCEDURE — 97110 THERAPEUTIC EXERCISES: CPT

## 2020-02-07 PROCEDURE — 97140 MANUAL THERAPY 1/> REGIONS: CPT

## 2020-02-07 NOTE — PROGRESS NOTES
PT DAILY TREATMENT NOTE 10-18    Patient Name: Hoang Cleveland  Date:2020  : 1945  [x]  Patient  Verified  Payor: [place] MEDICARE / Plan: VA MEDICARE PART A & B / Product Type: Medicare /    In time:11:00  Out time:11:45  Total Treatment Time (min): 45  Visit #: 2 of 8    Medicare/BCBS Only   Total Timed Codes (min):  35 1:1 Treatment Time:  27      Treatment Area: m54.5    SUBJECTIVE  Pain Level (0-10 scale): 5/10  Any medication changes, allergies to medications, adverse drug reactions, diagnosis change, or new procedure performed?: [x] No    [] Yes (see summary sheet for update)  Subjective functional status/changes:   [] No changes reported  The patient states that her back is doing ok. She indicates that her right leg on her outer thigh felt like it was burning this morning when she woke up. She indicated she was sleeping on her back upon questioning and denies have anything under her knees. OBJECTIVE  Modality rationale: decrease edema, decrease inflammation and decrease pain to improve the patients ability to improve ADL ease. Min Type Additional Details   10 [x]  Ice     []  heat   Position: supine  Location: right glute/lumbar spine   [] Skin assessment post-treatment:  []intact []redness- no adverse reaction    []redness - adverse reaction:     17 min Therapeutic Exercise:  [x] See flow sheet :   Rationale: increase ROM and increase strength to improve the patients ability to improve ADL ease. 10 min Therapeutic Activity:  [x]  See flow sheet : recommended neutral sleeping position to position back with neutral spine. Rationale: sleeping position  to improve the patients ability to improve ease of ADLs post      8 min Manual Therapy:  Right piriformis TPR with the patient in prone, 1 pillow beneath hips. Rationale: decrease pain, increase ROM, increase tissue extensibility and decrease trigger points to improve ADL ease.       With   [] TE   [] TA   [] neuro   [] other: Patient Education: [x] Review HEP    [] Progressed/Changed HEP based on:   [] positioning   [] body mechanics   [] transfers   [] heat/ice application    [] other:      Other Objective/Functional Measures:    Pain Level (0-10 scale) post treatment: 0/10    ASSESSMENT/Changes in Function: No increase in pain throughout session. The patient does report compliance with HEP. Reivewed sleeping position and made recommendations. Patient will continue to benefit from skilled PT services to modify and progress therapeutic interventions, address functional mobility deficits, address ROM deficits, address strength deficits, analyze and address soft tissue restrictions, analyze and cue movement patterns, analyze and modify body mechanics/ergonomics, assess and modify postural abnormalities and instruct in home and community integration to attain remaining goals. []  See Plan of Care  []  See progress note/recertification  []  See Discharge Summary         Progress towards goals / Updated goals:  Short Term Goals: To be accomplished in 2 weeks:              1. The patient will demonstrate independent and compliant with HEP to maximize therapeutic benefit. IE: HEP issued   Current: reports compliance MET 2/07/2020              2. The patient will improve piriformis flexibility to negative to improve ease of ADLs. IE: positive piriformis  Long Term Goals: To be accomplished in 4 weeks:              1. The patient will improve FOTO score to 43 to maximize quality of life. IE: FOTO 42              2. The patient will improve hip ABD right hip to 4+/5 MMT improve ease of ambulation. IE: 3+/5 MMT right hip ABD. 3. The patient will report a having only a little bit of difficulty using a broom to improve ease of chore production. IE: Quite a bit of difficulty.               4. The patient will demonstrate left sidebending and right rotation without pain provocation at end range.               IE: pain at end ranges of left side-bending and right rotation.        PLAN  []  Upgrade activities as tolerated     [x]  Continue plan of care  []  Update interventions per flow sheet       []  Discharge due to:_  []  Other:_      Rich Truong, PT 2/7/2020  11:13 AM    Future Appointments   Date Time Provider Yojana Cintron   2/12/2020 12:30 PM Brooke Wheeler, PT [facility]   2/14/2020 12:00 PM Wilson Stanley, PT [facility]   2/17/2020 12:00 PM Wilson Stanley, PT [facility]   2/19/2020 12:00 PM Brooke Liam, PT [facility]   2/24/2020 12:00 PM Wilson Stanley, PT [facility]   2/26/2020 12:00 PM Brooke Wheeler, PT [facility]   3/24/2020 10:30 AM Deisi Shepherd,  [address]

## 2020-02-12 ENCOUNTER — HOSPITAL ENCOUNTER (OUTPATIENT)
Dept: PHYSICAL THERAPY | Age: 75
Discharge: HOME | End: 2020-02-12
Payer: MEDICARE

## 2020-02-12 PROCEDURE — 97140 MANUAL THERAPY 1/> REGIONS: CPT

## 2020-02-12 PROCEDURE — 97110 THERAPEUTIC EXERCISES: CPT

## 2020-02-12 NOTE — PROGRESS NOTES
PT DAILY TREATMENT NOTE 10-18    Patient Name: Zack Rivera  Date:2020  : 1945  [x]  Patient  Verified  Payor: VA MEDICARE / Plan: VA MEDICARE PART A & B / Product Type: Medicare /    In time:5  Out time:1:03  Total Treatment Time (min): 48  Visit #: 3 of 8    Medicare/BCBS Only   Total Timed Codes (min):  38 1:1 Treatment Time:  28       Treatment Area: m54.5    SUBJECTIVE  Pain Level (0-10 scale): 4  Any medication changes, allergies to medications, adverse drug reactions, diagnosis change, or new procedure performed?: [x] No    [] Yes (see summary sheet for update)  Subjective functional status/changes:   [] No changes reported  Pt reports fair HEP compliance. OBJECTIVE    Modality rationale: decrease inflammation and decrease pain to improve the patients ability to perform ADL   Min Type Additional Details    [] Estim:  []Unatt       []IFC  []Premod                        []Other:  []w/ice   []w/heat  Position:  Location:    [] Estim: []Att    []TENS instruct  []NMES                    []Other:  []w/US   []w/ice   []w/heat  Position:  Location:    []  Traction: [] Cervical       []Lumbar                       [] Prone          []Supine                       []Intermittent   []Continuous Lbs:  [] before manual  [] after manual    []  Ultrasound: []Continuous   [] Pulsed                           []1MHz   []3MHz W/cm2:  Location:    []  Iontophoresis with dexamethasone         Location: [] Take home patch   [] In clinic   10 [x]  Ice     []  heat  []  Ice massage  []  Laser   []  Anodyne Position: supine  Location:right lumbar/glute.     []  Laser with stim  []  Other:  Position:  Location:    []  Vasopneumatic Device Pressure:       [] lo [] med [] hi   Temperature: [] lo [] med [] hi   [] Skin assessment post-treatment:  []intact []redness- no adverse reaction    []redness - adverse reaction:     30 min Therapeutic Exercise:  [x]?  See flow sheet :   Rationale: increase ROM and increase strength to improve the patients ability to improve ADL ease.    ve ease of ADLs post      8 min Manual Therapy:  Right piriformis TPR with the patient in prone, 1 pillow beneath hips. Rationale: decrease pain, increase ROM, increase tissue extensibility and decrease trigger points to improve ADL ease. With   [] TE   [] TA   [] neuro   [] other: Patient Education: [x] Review HEP    [] Progressed/Changed HEP based on:   [] positioning   [] body mechanics   [] transfers   [] heat/ice application    [] other:      Other Objective/Functional Measures: added therex per flow sheet     Pain Level (0-10 scale) post treatment: 4    ASSESSMENT/Changes in Function: pt with slow progress with PT. She reports limited HEP compliance and was instructed to perform more consistently for better benefit. Patient will continue to benefit from skilled PT services to modify and progress therapeutic interventions, address functional mobility deficits, address ROM deficits, address strength deficits, analyze and address soft tissue restrictions, analyze and cue movement patterns, analyze and modify body mechanics/ergonomics and assess and modify postural abnormalities to attain remaining goals. []  See Plan of Care  []  See progress note/recertification  []  See Discharge Summary         Progress towards goals / Updated goals:  Short Term Goals: To be accomplished in 2 weeks:              9. The patient will demonstrate independent and compliant with HEP to maximize therapeutic benefit.              CA: HEP issued              Current: reports compliance MET 2/07/2020              2. The patient will improve piriformis flexibility to negative to improve ease of ADLs.             WW: positive piriformis   Current:+piriformis on the right on 2-12-20  Long Term Goals: To be accomplished in 4 weeks:              1.  The patient will improve FOTO score to 43 to maximize quality of life.              IE: FOTO 39              2. The patient will improve hip ABD right hip to 4+/5 MMT improve ease of ambulation.              IE: 3+/5 MMT right hip ABD.             3. The patient will report a having only a little bit of difficulty using a broom to improve ease of chore production.              IE: Quite a bit of difficulty.             4.  The patient will demonstrate left sidebending and right rotation without pain provocation at end range.              IE: pain at end ranges of left side-bending and right rotation.     PLAN  []  Upgrade activities as tolerated     [x]  Continue plan of care  []  Update interventions per flow sheet       []  Discharge due to:_  []  Other:_      Zhen Officer, PT 2/12/2020  12:18 PM    Future Appointments   Date Time Provider Yojana Cintron   2/12/2020 12:30 PM Luiza Theodosia, PT [facility]   2/14/2020 12:00 PM Trinidad Alex, PT [facility]   2/17/2020 12:00 PM Trinidad Alex, PT [facility]   2/24/2020 12:00 PM Trinidad Alex, PT [facility]   2/26/2020 12:00 PM Luiza Theodosia, PT [facility]   3/2/2020  1:00 PM Luiza Theodosia, PT [facility]   3/24/2020 10:30 AM Lord Alisson  [address]

## 2020-02-13 RX ORDER — TIAGABINE HYDROCHLORIDE 2 MG/1
2 TABLET, FILM COATED ORAL 2 TIMES DAILY
Qty: 180 TAB | Refills: 1 | Status: SHIPPED | OUTPATIENT
Start: 2020-02-13 | End: 2022-09-26

## 2020-02-13 NOTE — TELEPHONE ENCOUNTER
Last Visit: 1/24/20 with LONDON Astorga  Next Appointment: 3/24/20 with LONDON Astorga  Previous Refill Encounter(s): 9/20/19 #180 with 1 refill    Requested Prescriptions     Pending Prescriptions Disp Refills    tiaGABine (GABITRIL) 2 mg tablet 180 Tab 1     Sig: Take 1 Tab by mouth two (2) times a day.

## 2020-02-14 ENCOUNTER — HOSPITAL ENCOUNTER (OUTPATIENT)
Dept: PHYSICAL THERAPY | Age: 75
Discharge: HOME | End: 2020-02-14
Payer: MEDICARE

## 2020-02-14 PROCEDURE — 97110 THERAPEUTIC EXERCISES: CPT

## 2020-02-14 PROCEDURE — 97140 MANUAL THERAPY 1/> REGIONS: CPT

## 2020-02-14 NOTE — PROGRESS NOTES
PT DAILY TREATMENT NOTE 10-18    Patient Name: Chepe Moya  Date:2020  : 1945  [x]  Patient  Verified  Payor: Camelia Yadav / Plan: [place] MEDICARE PART A & B / Product Type: Medicare /    In time:12:00  Out time:12:54  Total Treatment Time (min): 54  Visit #: 4 of 8    Medicare/BCBS Only   Total Timed Codes (min):  44 1:1 Treatment Time:  34       Treatment Area: m54.5    SUBJECTIVE  Pain Level (0-10 scale): 4/10  Any medication changes, allergies to medications, adverse drug reactions, diagnosis change, or new procedure performed?: [x] No    [] Yes (see summary sheet for update)  Subjective functional status/changes:   [] No changes reported  The patient reports that her pain has been fairly well controlled, and denies awakening with a cramp into her leg since sleeping with a pillow beneath her knees. OBJECTIVE    Modality rationale: decrease edema, decrease inflammation and decrease pain to improve the patients ability to improve ADL ease. Min Type Additional Details   10 [x]  Ice     []  heat  []  Ice massage  []  Laser   []  Anodyne Position: supine  Location: lumbar spine   [] Skin assessment post-treatment:  []intact []redness- no adverse reaction    []redness - adverse reaction:     36 min Therapeutic Exercise:  [x] See flow sheet :   Rationale: increase ROM and increase strength to improve the patients ability to improve ADL ease. 8 min Manual Therapy:  TPR of right glute with patient in prone with pillow beneath her hips. Rationale: decrease pain, increase ROM and increase tissue extensibility to improve ADL ease.          With   [] TE   [] TA   [] neuro   [] other: Patient Education: [x] Review HEP    [] Progressed/Changed HEP based on:   [] positioning   [] body mechanics   [] transfers   [] heat/ice application    [] other:      Other Objective/Functional Measures:   Hip ABD: 4-/5 MMT right hip      Pain Level (0-10 scale) post treatment: 0/10    ASSESSMENT/Changes in Function: Progressing well with glute strength and decrease in pain of right hip. She left the clinic in the same pain as her arrival and in no apparent signs of distress. Patient will continue to benefit from skilled PT services to modify and progress therapeutic interventions, address functional mobility deficits, address ROM deficits, address strength deficits, analyze and address soft tissue restrictions, analyze and cue movement patterns, analyze and modify body mechanics/ergonomics, assess and modify postural abnormalities and instruct in home and community integration to attain remaining goals. []  See Plan of Care  []  See progress note/recertification  []  See Discharge Summary         Progress towards goals / Updated goals:  Short Term Goals: To be accomplished in 2 weeks:              7. The patient will demonstrate independent and compliant with HEP to maximize therapeutic benefit.              OZ: HEP issued              RBGTTXL: reports compliance MET 2/07/2020              2. The patient will improve piriformis flexibility to negative to improve ease of ADLs.             RA: positive piriformis              Current:+piriformis on the right on 2-12-20  Long Term Goals: To be accomplished in 4 weeks:              1. The patient will improve FOTO score to 43 to maximize quality of life.              IE: FOTO 36              2. The patient will improve hip ABD right hip to 4+/5 MMT improve ease of ambulation.              IE: 3+/5 MMT right hip ABD. Current: 4-/5 MMT 2/14/2020              3. The patient will report a having only a little bit of difficulty using a broom to improve ease of chore production.              IE: Quite a bit of difficulty.             4.  The patient will demonstrate left sidebending and right rotation without pain provocation at end range.              IE: pain at end ranges of left side-bending and right rotation.     PLAN  []  Upgrade activities as tolerated     [] Continue plan of care  []  Update interventions per flow sheet       []  Discharge due to:_  []  Other:_      Jose Farias, PT 2/14/2020  1:15 PM    Future Appointments   Date Time Provider Yojana Cintron   2/17/2020 12:00 PM Poly Kim, PT [facility]   2/24/2020 12:00 PM Poly Kim, PT [facility]   2/26/2020 12:00 PM Luiz Dockery, PT [facility]   3/2/2020  1:00 PM Luiz Dockery, PT [facility]   3/24/2020 10:30 AM Rodney Magdaleno  [address]

## 2020-02-17 ENCOUNTER — HOSPITAL ENCOUNTER (OUTPATIENT)
Dept: PHYSICAL THERAPY | Age: 75
Discharge: HOME | End: 2020-02-17
Payer: MEDICARE

## 2020-02-17 PROCEDURE — 97140 MANUAL THERAPY 1/> REGIONS: CPT

## 2020-02-17 PROCEDURE — 97110 THERAPEUTIC EXERCISES: CPT

## 2020-02-17 NOTE — PROGRESS NOTES
PT DAILY TREATMENT NOTE 10-18    Patient Name: Brice Whatley  Date:2020  : 1945  [x]  Patient  Verified  Payor: VA MEDICARE / Plan: VA MEDICARE PART A & B / Product Type: Medicare /    In time:12:01  Out time:12:50  Total Treatment Time (min): 49  Visit #: 5 of 8    Medicare/BCBS Only   Total Timed Codes (min):  39 1:1 Treatment Time:  39       Treatment Area: m54.5    SUBJECTIVE  Pain Level (0-10 scale): 5/10  Any medication changes, allergies to medications, adverse drug reactions, diagnosis change, or new procedure performed?: [x] No    [] Yes (see summary sheet for update)  Subjective functional status/changes:   [] No changes reported  The patient states that her left hip started becoming sore, her right is doing a little better, mid grade pain reported upon arrival.     OBJECTIVE    Modality rationale: decrease edema, decrease inflammation and decrease pain to improve the patients ability to improve ADL ease. Min Type Additional Details   10 [x]  Ice     []  heat  []  Ice massage  []  Laser   []  Anodyne Position: supine  Location: right hip/low back   [] Skin assessment post-treatment:  []intact []redness- no adverse reaction    []redness - adverse reaction:     31 min Therapeutic Exercise:  [] See flow sheet :   Rationale: increase ROM and increase strength to improve the patients ability to improve ADL ease. 8 min Manual Therapy:  TPR of right glute with patient in prone with pillow beneath her hips. Rationale: decrease pain, increase ROM and increase tissue extensibility to improve ADL ease. With   [] TE   [] TA   [] neuro   [] other: Patient Education: [x] Review HEP    [] Progressed/Changed HEP based on:   [] positioning   [] body mechanics   [] transfers   [] heat/ice application    [] other:      Other Objective/Functional Measures:   Able to add resistance to hip ABD in sidelying (clams) orange TB. Good pain control post session.      Pain Level (0-10 scale) post treatment: 0/10    ASSESSMENT/Changes in Function:     Patient will continue to benefit from skilled PT services to modify and progress therapeutic interventions, address functional mobility deficits, address ROM deficits, address strength deficits, analyze and address soft tissue restrictions, analyze and cue movement patterns, analyze and modify body mechanics/ergonomics, assess and modify postural abnormalities and instruct in home and community integration to attain remaining goals. []  See Plan of Care  []  See progress note/recertification  []  See Discharge Summary         Progress towards goals / Updated goals:  Short Term Goals: To be accomplished in 2 weeks:              1. The patient will demonstrate independent and compliant with HEP to maximize therapeutic benefit.              WE: HEP issued              DTIFHKH: reports compliance MET 2/07/2020              2. The patient will improve piriformis flexibility to negative to improve ease of ADLs.             THOMAS: positive piriformis              Current:+piriformis on the right on 2-12-20  Long Term Goals: To be accomplished in 4 weeks:              1. The patient will improve FOTO score to 43 to maximize quality of life.              IE: FOTO 36              2. The patient will improve hip ABD right hip to 4+/5 MMT improve ease of ambulation.              IE: 3+/5 MMT right hip ABD. Current: 4-/5 MMT 2/14/2020              3. The patient will report a having only a little bit of difficulty using a broom to improve ease of chore production.              IE: Quite a bit of difficulty.             4.  The patient will demonstrate left sidebending and right rotation without pain provocation at end range.              IE: pain at end ranges of left side-bending and right rotation.     PLAN  []  Upgrade activities as tolerated     [x]  Continue plan of care  []  Update interventions per flow sheet       []  Discharge due to:_  []  Other:_ Adalid Schwartz, PT 2/17/2020  1:13 PM    Future Appointments   Date Time Provider Yojana Saida   2/24/2020 12:00 PM Sarita Camarena, PT [facility]   2/26/2020 12:00 PM Darwin French, PT [facility]   3/2/2020  1:00 PM Darwin French, PT [facility]   3/24/2020 10:30 AM Omar Carrasco  [address]

## 2020-02-19 ENCOUNTER — APPOINTMENT (OUTPATIENT)
Dept: PHYSICAL THERAPY | Age: 75
End: 2020-02-19
Payer: MEDICARE

## 2020-02-24 ENCOUNTER — HOSPITAL ENCOUNTER (OUTPATIENT)
Dept: PHYSICAL THERAPY | Age: 75
Discharge: HOME | End: 2020-02-24
Payer: MEDICARE

## 2020-02-24 PROCEDURE — 97140 MANUAL THERAPY 1/> REGIONS: CPT

## 2020-02-24 PROCEDURE — 97110 THERAPEUTIC EXERCISES: CPT
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
## 2020-02-26 ENCOUNTER — HOSPITAL ENCOUNTER (OUTPATIENT)
Dept: PHYSICAL THERAPY | Age: 75
Discharge: HOME | End: 2020-02-26
Payer: MEDICARE

## 2020-02-26 PROCEDURE — 97140 MANUAL THERAPY 1/> REGIONS: CPT

## 2020-02-26 PROCEDURE — 97110 THERAPEUTIC EXERCISES: CPT

## 2020-02-26 NOTE — PROGRESS NOTES
PT DAILY TREATMENT NOTE 10-18    Patient Name: Guilherme Lopez  Date:2020  : 1945  [x]  Patient  Verified  Payor: [place] MEDICARE / Plan: VA MEDICARE PART A & B / Product Type: Medicare /    In time:1200  Out time:1245  Total Treatment Time (min): 45  Visit #: 7 of 8    Medicare/BCBS Only   Total Timed Codes (min):  35 1:1 Treatment Time:  25       Treatment Area: m54.5 LBP/right hip pain    SUBJECTIVE  Pain Level (0-10 scale): 3  Any medication changes, allergies to medications, adverse drug reactions, diagnosis change, or new procedure performed?: [x] No    [] Yes (see summary sheet for update)  Subjective functional status/changes:   [] No changes reported  Pt reports she still has pain but is doing pretty good    OBJECTIVE    Modality rationale: decrease inflammation and decrease pain to improve the patients ability to perform ADL   Min Type Additional Details    [] Estim:  []Unatt       []IFC  []Premod                        []Other:  []w/ice   []w/heat  Position:  Location:    [] Estim: []Att    []TENS instruct  []NMES                    []Other:  []w/US   []w/ice   []w/heat  Position:  Location:    []  Traction: [] Cervical       []Lumbar                       [] Prone          []Supine                       []Intermittent   []Continuous Lbs:  [] before manual  [] after manual    []  Ultrasound: []Continuous   [] Pulsed                           []1MHz   []3MHz W/cm2:  Location:    []  Iontophoresis with dexamethasone         Location: [] Take home patch   [] In clinic   10 [x]  Ice     []  heat  []  Ice massage  []  Laser   []  Anodyne Position: supine with wedge  Location: lumbar / right hip    []  Laser with stim  []  Other:  Position:  Location:    []  Vasopneumatic Device Pressure:       [] lo [] med [] hi   Temperature: [] lo [] med [] hi   [] Skin assessment post-treatment:  []intact []redness- no adverse reaction    []redness - adverse reaction:      27 min Therapeutic Exercise:  [x]? See flow sheet :   Rationale: increase ROM and increase strength to improve the patients ability to improve ADL ease.      8 min Manual Therapy:  Myofascial stick to right glute max with patient in prone    Rationale: decrease pain, increase ROM and increase tissue extensibility to improve ADL ease. With   [] TE   [] TA   [] neuro   [] other: Patient Education: [x] Review HEP    [] Progressed/Changed HEP based on:   [] positioning   [] body mechanics   [] transfers   [] heat/ice application    [] other:      Other Objective/Functional Measures: increased reps with 1/2 prone hip extension     Pain Level (0-10 scale) post treatment: 2    ASSESSMENT/Changes in Function:  Pt with improved overall strength, pain remains. Possible discharge following next session. Patient will continue to benefit from skilled PT services to modify and progress therapeutic interventions, address functional mobility deficits, address ROM deficits, address strength deficits and analyze and address soft tissue restrictions to attain remaining goals. []  See Plan of Care  []  See progress note/recertification  []  See Discharge Summary         Progress towards goals / Updated goals:  Short Term Goals: To be accomplished in 2 weeks:              8. The patient will demonstrate independent and compliant with HEP to maximize therapeutic benefit.              IZ: HEP issued              ESMEXYT: reports compliance MET 2/07/2020              2. The patient will improve piriformis flexibility to negative to improve ease of ADLs.             JJ: positive piriformis               Current:+piriformis on the right on 2-26-20  Long Term Goals: To be accomplished in 4 weeks:              1. The patient will improve FOTO score to 43 to maximize quality of life.              IE: FOTO 36              2.  The patient will improve hip ABD right hip to 4+/5 MMT improve ease of ambulation.              IE: 3+/5 MMT right hip ABD.              CKOUYL-/5 MMT 2020              3. The patient will report a having only a little bit of difficulty using a broom to improve ease of chore production.              IE: Quite a bit of difficulty.             4.  The patient will demonstrate left sidebending and right rotation without pain provocation at end range.              IE: pain at end ranges of left side-bending and right rotation.               Current: Met 2020    PLAN  []  Upgrade activities as tolerated     [x]  Continue plan of care  []  Update interventions per flow sheet       []  Discharge due to:_  []  Other:_      Jayde Garcia, PT 2020  12:14 PM    Future Appointments   Date Time Provider Yojana Pulidoi   3/2/2020  1:00 PM Brooke Wheeler, PT [facility]   3/24/2020 10:30 AM LONDON Green

## 2020-03-02 ENCOUNTER — HOSPITAL ENCOUNTER (OUTPATIENT)
Dept: PHYSICAL THERAPY | Age: 75
Discharge: HOME | End: 2020-03-02
Payer: MEDICARE

## 2020-03-02 PROCEDURE — 97110 THERAPEUTIC EXERCISES: CPT

## 2020-03-02 PROCEDURE — 97140 MANUAL THERAPY 1/> REGIONS: CPT

## 2020-03-02 NOTE — PROGRESS NOTES
PT DISCHARGE DAILY NOTE AND [id number]    Date:3/2/2020  Patient name: Fran David Start of Care: 2020   Referral source: Yesy Singh NP : 1945               Medical Diagnosis: m54.5  Payor: VA MEDICARE / Plan: VA MEDICARE PART A & B / Product Type: Medicare /  Onset Date: 2019               Treatment Diagnosis: LBP    Prior Hospitalization: see medical history Provider#: [id number]   Medications: Verified on Patient summary List    Comorbidities: Depression, Diabetes, Arthritis, HTN, visual impaired, hearing impaired, B TKR, Reverse TSR, UTI currently being treated. Prior Level of Function: The patient states that she had improved ease of ADLs. Visits from Start of Care: 8    Missed Visits: 0    Reporting Period : 20 to 20    [x]  Patient  Verified  Payor: VA MEDICARE / Plan: VA MEDICARE PART A & B / Product Type: Medicare /    In time:1:01  Out time:1:45  Total Treatment Time (min): 44  Visit #: 8 of 8    Medicare/BCBS Only   Total Timed Codes (min):  34 1:1 Treatment Time:  29       SUBJECTIVE  Pain Level (0-10 scale): 4  Any medication changes, allergies to medications, adverse drug reactions, diagnosis change, or new procedure performed?: [x] No    [] Yes (see summary sheet for update)  Subjective functional status/changes:   [] No changes reported  Pt reports she is not sure if PT has helped but does report she is no longer having the high pain.  She is requesting discharge at this time and plans to continue with HEP      OBJECTIVE    Modality rationale: decrease pain to improve the patients ability to perform ADL   Min Type Additional Details    [] Estim:  []Unatt       []IFC  []Premod                        []Other:  []w/ice   []w/heat  Position:  Location:    [] Estim: []Att    []TENS instruct  []NMES                    []Other:  []w/US   []w/ice   []w/heat  Position:  Location:    []  Traction: [] Cervical       []Lumbar [] Prone          []Supine                       []Intermittent   []Continuous Lbs:  [] before manual  [] after manual    []  Ultrasound: []Continuous   [] Pulsed                           []1MHz   []3MHz W/cm2:  Location:    []  Iontophoresis with dexamethasone         Location: [] Take home patch   [] In clinic   10 [x]  Ice     []  heat  []  Ice massage  []  Laser   []  Anodyne Position:supine  Location:right glute/lumbar    []  Laser with stim  []  Other:  Position:  Location:    []  Vasopneumatic Device Pressure:       [] lo [] med [] hi   Temperature: [] lo [] med [] hi   [] Skin assessment post-treatment:  []intact []redness- no adverse reaction    []redness - adverse reaction:     26 min Therapeutic Exercise:  [x]? ? See flow sheet :   Rationale: increase ROM and increase strength to improve the patients ability to improve ADL ease.      8 min Manual Therapy:  Myofascial stick to right glute max with patient in prone    Rationale: decrease pain, increase ROM and increase tissue extensibility to improve ADL ease.             With   [] TE   [] TA   [] neuro   [] other: Patient Education: [x] Review HEP    [] Progressed/Changed HEP based on:   [] positioning   [] body mechanics   [] transfers   [] heat/ice application    [] other:      Other Objective/Functional Measures: FOTO 52      Pain Level (0-10 scale) post treatment: 4    Summary of Care:  Progress towards goals:  Short Term Goals: To be accomplished in 2 weeks:              1. The patient will demonstrate independent and compliant with HEP to maximize therapeutic benefit.              WH: HEP issued              [id number]: reports compliance MET 2/07/2020              2. The patient will improve piriformis flexibility to negative to improve ease of ADLs.             GN: positive piriformis               Current:+piriformis on the right on 3-2-20  Long Term Goals: To be accomplished in 4 weeks:              1.  The patient will improve FOTO score to 43 to maximize quality of life.              IE: FOTO 36   Current: MET 52 on 3-2-20              2. The patient will improve hip ABD right hip to 4+/5 MMT improve ease of ambulation.              IE: 3+/5 MMT right hip ABD.             Current:not met 4-/5 on 3-2-20              3. The patient will report a having only a little bit of difficulty using a broom to improve ease of chore production.              IE: Quite a bit of difficulty. Current: MET, reported a little difficulty              4. The patient will demonstrate left sidebending and right rotation without pain provocation at end range.              IE: pain at end ranges of left side-bending and right rotation.               UEYWAPC: Met 2/24/2020    ASSESSMENT/Changes in Function:  Pt has shown some progress with PT as noted above. She reports she is unsure if PT has been helpful but does report she is no longer having the high pain. The patient is requesting discharge at this time and plans to continue with HEP.      Thank you for this referral!     PLAN  [x]Discontinue therapy: [x]Patient has reached or is progressing toward set goals      []Patient is non-compliant or has abdicated      []Due to lack of appreciable progress towards set [address], PT 3/2/2020  1:16 PM

## 2020-03-20 ENCOUNTER — TELEPHONE (OUTPATIENT)
Dept: ORTHOPEDIC SURGERY | Age: 75
End: 2020-03-20

## 2020-03-20 NOTE — TELEPHONE ENCOUNTER
Called and spoke with pt. The TPI and PT w/ dry needling has helped with her pain and she is doing much better now. She is stable on her Gabitril and has plenty of refills. We discussed re-scheduling her apt due to her age risk group and the current COVID 19 pandemic.  She would like to re-schedule her apt with me on 3/24 to sometime in June, call and re-schedule

## 2020-10-26 ENCOUNTER — HOSPITAL ENCOUNTER (OUTPATIENT)
Dept: PHYSICAL THERAPY | Age: 75
Discharge: HOME | End: 2020-10-26
Payer: MEDICARE

## 2020-10-26 PROCEDURE — 97162 PT EVAL MOD COMPLEX 30 MIN: CPT

## 2020-10-26 PROCEDURE — 97110 THERAPEUTIC EXERCISES: CPT

## 2020-10-26 NOTE — PROGRESS NOTES
[facility]  [facility]  [address]  [phone] fax    Plan of Care/ Statement of Necessity for Physical Therapy Services    Patient name: More Menendez Start of Care: 10/26/2020   Referral source: Dick Castillo : 1945    Medical Diagnosis: Lack of coordination [R27.9]  Dizziness and giddiness [R42]  Payor: VA MEDICARE / Plan: VA MEDICARE PART A & B / Product Type: Medicare /  Onset Date:Ongoing Dizziness and LOB. Aspen Urena 2 weeks ago    Treatment Diagnosis: Dizziness and Balance   Prior Hospitalization: see medical history Provider#: [id number]   Medications: Verified on Patient summary List    Comorbidities: HTN, Arthritis, LBP, Diabetes   Prior Level of Function: Lives with . Pt ambulates with a SPC. Reports recent multiple falls due to LOB. The Plan of Care and following information is based on the information from the initial evaluation. Assessment/ key information: Pt is a 67 yr old female with complaints of multiple recent falls and ongoing dizziness. She reports most recent fall was 2 weeks ago where she bruised her nose. She had dizziness daily but mostly with sudden movement that is intermittent. She has difficulty with Visual tracking and keeping her eyes fixed on a target during head movements in horizontal direction. Pt ambulates with a SPC currently for stability and due to LBP. Romberg ECx 30 sec with increased sway on a stable surface. DGI=14/24 which indicates a Fall Risk. Pt is able to perform transfers,however she sometimes reports dizziness with changes in position. LE strength is grossly 4+/5. SOT will be performed and determined next visit. Pt will benefit form skilled therapy to decrease fall risk, improve dizziness and improve balance.      Evaluation Complexity History MEDIUM  Complexity : 1-2 comorbidities / personal factors will impact the outcome/ POC ; Examination MEDIUM Complexity : 3 Standardized tests and measures addressing body structure, function, activity limitation and / or participation in recreation  ;Presentation MEDIUM Complexity : Evolving with changing characteristics  ; Clinical Decision Making MEDIUM Complexity : FOTO score of 26-74  Overall Complexity Rating: MEDIUM  Problem List: pain affecting function, decrease ROM, decrease strength, impaired gait/ balance, decrease ADL/ functional abilitiies, decrease activity tolerance, decrease flexibility/ joint mobility and decrease transfer abilities   Treatment Plan may include any combination of the following: Therapeutic exercise, Therapeutic activities, Neuromuscular re-education, Physical agent/modality, Gait/balance training, Manual therapy, Patient education, Self Care training, Functional mobility training, Home safety training and Stair training  Patient / Family readiness to learn indicated by: asking questions, trying to perform skills and interest  Persons(s) to be included in education: patient (P)  Barriers to Learning/Limitations: None  Patient Goal (s): No Falls  Patient Self Reported Health Status: poor  Rehabilitation Potential: good    Short Term Goals: To be accomplished in 1 weeks:   Pt will be compliant with a HEP to improve function. Long Term Goals: To be accomplished in 4 weeks:   1. Pt will increase FOTO score by    pts to improve function. 2. Pt will perform SOT to determine Sensory Input to improve with Vestibular Integration and decrease fall risk. 3. Pt report dizziness decreased by 50% to ease with ADL's.   4. Pt will perform Romberg on a compliant surface EC x 30 sec to decrease fall risk. 5. Pt will increase DGI >18/24 to decrease fall risk. Frequency / Duration: Patient to be seen 2 times per week for 6 weeks.     Patient/ Caregiver education and instruction: Diagnosis, prognosis, self care, activity modification and exercises   [x]  Plan of care has been reviewed with PTA    Certification Period: 10/26/20-11/24/20  Tolu Stage, PT 10/26/2020 10:01 AM    ________________________________________________________________________    I certify that the above Therapy Services are being furnished while the patient is under my care. I agree with the treatment plan and certify that this therapy is necessary.     Physician's Signature:____________Date:_________TIME:________    ** Signature, Date and Time must be completed for valid certification **    Please sign and return to [facility]  [facility]  [address] [place] [address]  [phone] fax

## 2020-10-26 NOTE — PROGRESS NOTES
PT DAILY TREATMENT NOTE 10-18    Patient Name: Basia Orlando  Date:10/26/2020  : 1945  [x]  Patient  Verified  Payor: VA MEDICARE / Plan: VA MEDICARE PART A & B / Product Type: Medicare /    In WUJB:[id number]  Out time:1115  Total Treatment Time (min): 44  Visit #: 1 of     Medicare/BCBS Only   Total Timed Codes (min):  44 1:1 Treatment Time:  25       Treatment Area: Lack of coordination [R27.9]  Dizziness and giddiness [R42]    SUBJECTIVE  Pain Level (0-10 scale): 8/10 LBP  Any medication changes, allergies to medications, adverse drug reactions, diagnosis change, or new procedure performed?: [x] No    [] Yes (see summary sheet for update)  Subjective functional status/changes:   [] No changes reported  seeeval    OBJECTIVE    19 min []Eval                  []Re-Eval       25 min Neuromuscular Re-education:  []  See flow sheet :   Rationale: increase ROM, increase strength, improve coordination and improve balance  to improve the patients ability to decrease fall risk          With   [] TE   [] TA   [] neuro   [] other: Patient Education: [x] Review HEP    [] Progressed/Changed HEP based on:   [] positioning   [] body mechanics   [] transfers   [] heat/ice application    [] other:      Other Objective/Functional Measures: see eval     Pain Level (0-10 scale) post treatment: 0/10    ASSESSMENT/Changes in Function: seepoc    Patient will continue to benefit from skilled PT services to address ROM deficits, address strength deficits, analyze and address soft tissue restrictions, analyze and cue movement patterns, analyze and modify body mechanics/ergonomics, assess and modify postural abnormalities and address imbalance/dizziness to attain remaining goals.      [x]  See Plan of Care  []  See progress note/recertification  []  See Discharge Summary         Progress towards goals / Updated goals:  seepo    PLAN  [x]  Upgrade activities as tolerated     [x]  Continue plan of care  []  Update interventions per flow sheet       []  Discharge due to:_  []  Other:_      Carmelita Leroy PT 10/26/2020  10:00 AM    Future Appointments   Date Time Provider Yojana Cintron   10/26/2020 10:30 AM Yue Cash PT [facility]

## 2020-11-04 ENCOUNTER — HOSPITAL ENCOUNTER (OUTPATIENT)
Dept: PHYSICAL THERAPY | Age: 75
Discharge: HOME | End: 2020-11-04
Payer: MEDICARE

## 2020-11-04 PROCEDURE — 97110 THERAPEUTIC EXERCISES: CPT

## 2020-11-04 PROCEDURE — 97112 NEUROMUSCULAR REEDUCATION: CPT

## 2020-11-04 PROCEDURE — 97750 PHYSICAL PERFORMANCE TEST: CPT

## 2020-11-04 NOTE — PROGRESS NOTES
PT DAILY TREATMENT NOTE     Patient Name: Gilberto Rosales  Date:2020  : 1945  [x]  Patient  Verified  Payor: Pati Coffee / Plan: [place] / Product Type: Medicare /    In time: 3:43  Out time: 4:24  Total Treatment Time (min): 41  Visit #: 2 of     Medicare/BCBS Only   Total Timed Codes (min):  41 1:1 Treatment Time:  41       Treatment Area: Lack of coordination [R27.9]  Dizziness and giddiness [R42]    SUBJECTIVE  Pain Level (0-10 scale):  8/10  Any medication changes, allergies to medications, adverse drug reactions, diagnosis change, or new procedure performed?: [x] No    [] Yes (see summary sheet for update)  Subjective functional status/changes:   [] No changes reported  Pt. Reports she is feeling dizzy today. OBJECTIVE    15 min: physical performance testing: SOT      10 min Therapeutic Exercise:  [x] See flow sheet : FOTO   Rationale: increase ROM and increase strength to improve the patients ability to increase ease of ADLs     16 min Neuromuscular Re-education:  [x]  See flow sheet : standing balance activities    Rationale: increase ROM and increase strength  to improve the patients ability to increase ease of ADLs          With   [x] TE   [] TA   [] neuro   [] other: Patient Education: [x] Review HEP    [] Progressed/Changed HEP based on:   [] positioning   [] body mechanics   [] transfers   [] heat/ice application    [] other:      Other Objective/Functional Measures:   Pt. Tolerated PT well with no reports of increased pain  FOTO: 57 points  SOT: 57 points with significant vestibular deficits  She was challenged with eyes closed Romberg on foam     Pain Level (0-10 scale) post treatment:  8/10    ASSESSMENT/Changes in Function:  Pt. Initiated PT and is compliant with her HEP. She is unable to tolerate performing eye exercises for a minute at home.  She has significant vestibular involvement with SOT test which correlates to her difficulty with eyes closed Romberg on foam.     Patient will continue to benefit from skilled PT services to modify and progress therapeutic interventions, address functional mobility deficits, address ROM deficits, address strength deficits, analyze and address soft tissue restrictions, analyze and cue movement patterns, analyze and modify body mechanics/ergonomics, assess and modify postural abnormalities and address imbalance/dizziness to attain remaining goals. Progress towards goals / Updated goals:  Short Term Goals: To be accomplished in 1 weeks:              Pt will be compliant with a HEP to improve function. Met (11/4/20)    Long Term Goals: To be accomplished in 4 weeks:              1. Pt will increase FOTO score by    pts to improve function. 2. Pt will perform SOT to determine Sensory Input to improve with Vestibular Integration and decrease fall risk. 3. Pt report dizziness decreased by 50% to ease with ADL's.              4. Pt will perform Romberg on a compliant surface EC x 30 sec to decrease fall risk. 5. Pt will increase DGI >18/24 to decrease fall risk.      PLAN  []  Upgrade activities as tolerated     [x]  Continue plan of care  []  Update interventions per flow sheet       []  Discharge due to:_  []  Other:_      Tricia Saint, PT 11/4/2020  7:29 AM    Future Appointments   Date Time Provider Yojana Cintron   11/4/2020  3:45 PM Christi Esquivel PT [facility]   11/6/2020  3:00 PM Tenzin Badillo PT [facility]   11/11/2020 12:45 PM Christi Esquivel PT [facility]   11/13/2020 11:15 AM Tenzin Badillo PT [facility]   11/18/2020  4:30 PM Tenzin Badillo PT [facility]   11/20/2020  1:30 PM Christi Esquivel PT [facility]   11/25/2020 11:15 AM Christi Esquivel PT [facility]   11/27/2020 10:30 AM Christi Esquivel PT [facility]

## 2020-11-06 ENCOUNTER — HOSPITAL ENCOUNTER (OUTPATIENT)
Dept: PHYSICAL THERAPY | Age: 75
Discharge: HOME | End: 2020-11-06
Payer: MEDICARE

## 2020-11-06 PROCEDURE — 97110 THERAPEUTIC EXERCISES: CPT

## 2020-11-06 PROCEDURE — 97112 NEUROMUSCULAR REEDUCATION: CPT

## 2020-11-06 NOTE — PROGRESS NOTES
PT DAILY TREATMENT NOTE     Patient Name: Jean Martínez  Date:2020  : 1945  [x]  Patient  Verified  Payor: [place] MEDICARE / Plan: [place] MEDICARE PART A & B / Product Type: Medicare /    In time:300  Out time:342  Total Treatment Time (min): 42  Visit #: 3 of     Medicare/BCBS Only   Total Timed Codes (min):  4 1:1 Treatment Time:  42       Treatment Area: Lack of coordination [R27.9]  Dizziness and giddiness [R42]    SUBJECTIVE  Pain Level (0-10 scale): 7/10 LBP  Any medication changes, allergies to medications, adverse drug reactions, diagnosis change, or new procedure performed?: [x] No    [] Yes (see summary sheet for update)  Subjective functional status/changes:   [] No changes reported  Reports she walks outside her house 2x after coffee every day. OBJECTIVE        20 min Therapeutic Exercise:  [] See flow sheet :   Rationale: increase ROM, increase strength, improve coordination and improve balance to improve the patients ability to decrease fall risk    23 min Neuromuscular Re-education:  []  See flow sheet :   Rationale: increase strength, improve coordination, improve balance and increase proprioception  to improve the patients ability to ease with ADL's      With   [] TE   [] TA   [] neuro   [] other: Patient Education: [x] Review HEP    [] Progressed/Changed HEP based on:   [] positioning   [] body mechanics   [] transfers   [] heat/ice application    [] other:      Other Objective/Functional Measures: Decreased balance strategies throughout session. NO Falls. Pt crosses feet during backward step over hemanth nearly causing LOB. Clipped left foot on 6 in step 2 x during step ups   Challenged with balance/gait during ambulation with head turns. Pain Level (0-10 scale) post treatment: 7/10    ASSESSMENT/Changes in Function: Progressing slowly. Pt educated on being aware of picking up her feet during step overs and hemanth clearance.      Patient will continue to benefit from skilled PT services to modify and progress therapeutic interventions, address functional mobility deficits, address ROM deficits, address strength deficits, analyze and address soft tissue restrictions, analyze and cue movement patterns, analyze and modify body mechanics/ergonomics, assess and modify postural abnormalities and address imbalance/dizziness to attain remaining goals. []  See Plan of Care  []  See progress note/recertification  []  See Discharge Summary         Progress towards goals / Updated goals:  Short Term Goals: To be accomplished in 1 weeks:              Pt will be compliant with a HEP to improve function.   Met (11/4/20)     Long Term Goals: To be accomplished in 4 weeks:              1. Pt will increase FOTO score by    pts to improve function.              2. Pt will perform SOT to determine Sensory Input to improve with Vestibular Integration and decrease fall risk.              3. Pt report dizziness decreased by 50% to ease with ADL's.              4. Pt will perform Romberg on a compliant surface EC x 30 sec to decrease fall risk.               5. Pt will increase DGI >18/24 to decrease fall risk.      PLAN  [x]  Upgrade activities as tolerated     [x]  Continue plan of care  []  Update interventions per flow sheet       []  Discharge due to:_  []  Other:_      Buddy Fritz, PT 11/6/2020  11:32 AM    Future Appointments   Date Time Provider Yojana Saida   11/6/2020  3:00 PM Bianca Valdez, PT [facility]   11/11/2020 12:45 PM Lacey Mayfield, PT [facility]   11/13/2020 11:15 AM Bianca Valdez, PT [facility]   11/18/2020  4:30 PM Bianca Valdez PT [facility]   11/20/2020  1:30 PM Lacey Mayfield, PT [facility]   11/25/2020 11:15 AM Lacey Mayfield, PT [id number]   11/27/2020 10:30 AM Lacey Mayfield, PT [facility]

## 2020-11-11 ENCOUNTER — HOSPITAL ENCOUNTER (OUTPATIENT)
Dept: PHYSICAL THERAPY | Age: 75
Discharge: HOME | End: 2020-11-11
Payer: MEDICARE

## 2020-11-11 PROCEDURE — 97112 NEUROMUSCULAR REEDUCATION: CPT

## 2020-11-11 NOTE — PROGRESS NOTES
PT DAILY TREATMENT NOTE     Patient Name: Elsa Cogan  Date:2020  : 1945  [x]  Patient  Verified  Payor: Saida Hernandez / Plan: [place] MEDICARE PART A & B / Product Type: Medicare /    In time: 12:42  Out time: 1:26  Total Treatment Time (min): 44  Visit #: 4 of     Medicare/BCBS Only   Total Timed Codes (min):  44 1:1 Treatment Time:  42       Treatment Area: Lack of coordination [R27.9]  Dizziness and giddiness [R42]    SUBJECTIVE  Pain Level (0-10 scale): 7/10  Any medication changes, allergies to medications, adverse drug reactions, diagnosis change, or new procedure performed?: [x] No    [] Yes (see summary sheet for update)  Subjective functional status/changes:   [] No changes reported  Pt. Reports she is tired today and thinks it's from her antibiotics     OBJECTIVE     44 min Neuromuscular Re-education:  [x]  See flow sheet : standing balance activities, ambulation with heard turns, slow walking, ambulation with self ball toss   Rationale: increase strength, improve coordination and improve balance  to improve the patients ability to increase ease of ambulation           With   [] TE   [] TA   [x] neuro   [] other: Patient Education: [x] Review HEP    [] Progressed/Changed HEP based on:   [] positioning   [] body mechanics   [] transfers   [] heat/ice application    [] other:      Other Objective/Functional Measures:   Pt. Has mild increase in dizziness with VORx1  She had instability with eyes closed Romberg on foam but was able to perform without LOB  She has decreased balance with ambulation with head turns     Pain Level (0-10 scale) post treatment:  7/10    ASSESSMENT/Changes in Function: pt. Tolerated PT well with no reports of increased pain. She has dizziness with VOR exercises. She does demonstrate improving static standing balance with ability to perform Romberg on foam with eyes closed.      Patient will continue to benefit from skilled PT services to modify and progress therapeutic interventions, address functional mobility deficits, address ROM deficits, address strength deficits, analyze and address soft tissue restrictions, analyze and cue movement patterns, analyze and modify body mechanics/ergonomics and address imbalance/dizziness to attain remaining goals. Progress towards goals / Updated goals:  Short Term Goals: To be accomplished in 1 weeks:              Pt will be compliant with a HEP to improve function.   Met (11/4/20)     Long Term Goals: To be accomplished in 4 weeks:              1. Pt will increase FOTO score by    pts to improve function.              2. Pt will perform SOT to determine Sensory Input to improve with Vestibular Integration and decrease fall risk.              3. Pt report dizziness decreased by 50% to ease with ADL's.              4. Pt will perform Romberg on a compliant surface EC x 30 sec to decrease fall risk. Met (11/11/20)               5.  Pt will increase DGI >18/24 to decrease fall risk.     PLAN  []  Upgrade activities as tolerated     [x]  Continue plan of care  []  Update interventions per flow sheet       []  Discharge due to:_  []  Other:_      Blessing Camacho, JUAN 11/11/2020  7:18 AM    Future Appointments   Date Time Provider Yojana Cintron   11/11/2020 12:45 PM Barb Roberts PT [facility]   11/13/2020 11:15 AM Gray Segal, JUAN [facility]   11/18/2020  4:30 PM Gray Segal PT [id number]   11/20/2020  1:30 PM Barb Roberts PT [facility]   11/25/2020 11:15 AM Barb Roberts PT [id number]   11/27/2020 10:30 AM Barb Roberts PT [facility]

## 2020-11-13 ENCOUNTER — HOSPITAL ENCOUNTER (OUTPATIENT)
Dept: PHYSICAL THERAPY | Age: 75
Discharge: HOME | End: 2020-11-13
Payer: MEDICARE

## 2020-11-13 PROCEDURE — 97112 NEUROMUSCULAR REEDUCATION: CPT

## 2020-11-13 NOTE — PROGRESS NOTES
PT DAILY TREATMENT NOTE     Patient Name: Gloria Valenzuela  Date:2020  : 1945  [x]  Patient  Verified  Payor: Tuan Gaspar / Plan: [place] MEDICARE PART A & B / Product Type: Medicare /    In time: 11:13  Out time: 11:54  Total Treatment Time (min): 41  Visit #: 5 of     Medicare/BCBS Only   Total Timed Codes (min):  41 1:1 Treatment Time:  41       Treatment Area: Lack of coordination [R27.9]  Dizziness and giddiness [R42]    SUBJECTIVE  Pain Level (0-10 scale): 8/10  Any medication changes, allergies to medications, adverse drug reactions, diagnosis change, or new procedure performed?: [x] No    [] Yes (see summary sheet for update)  Subjective functional status/changes:   [] No changes reported  Pt. Reports she was really sore after last visit. She reports she continues to have a lot of dizziness. OBJECTIVE     41 min Neuromuscular Re-education:  [x]  See flow sheet : standing balance activities, saccades, VOR activities, side stepping, backward ambulation, hemanth step overs, ambulation with head turns   Rationale: increase strength, improve coordination and improve balance  to improve the patients ability to increase ease of ADLs          With   [] TE   [] TA   [x] neuro   [] other: Patient Education: [x] Review HEP    [] Progressed/Changed HEP based on:   [] positioning   [] body mechanics   [] transfers   [] heat/ice application    [] other:      Other Objective/Functional Measures:   Pt. Tolerated PT well with no reports of increased pain  She continues to be challenged with eyes closed balance  Improved VOR tolerance to 30 seconds       Pain Level (0-10 scale) post treatment: 8/10    ASSESSMENT/Changes in Function: pt. Is progressing slowly towards goals. She continues to have dizziness symptoms and has reports of being more fatigued lately. She demonstrates improving balance with eyes closed and on compliant surfaces.      Patient will continue to benefit from skilled PT services to modify and progress therapeutic interventions, address functional mobility deficits, address ROM deficits, address strength deficits, analyze and address soft tissue restrictions, analyze and cue movement patterns, analyze and modify body mechanics/ergonomics, assess and modify postural abnormalities and address imbalance/dizziness to attain remaining goals. Progress towards goals / Updated goals:  Short Term Goals: To be accomplished in 1 weeks:              Pt will be compliant with a HEP to improve function.   Met (11/4/20)     Long Term Goals: To be accomplished in 4 weeks:              1. Pt will increase FOTO score by    pts to improve function.              2. Pt will perform SOT to determine Sensory Input to improve with Vestibular Integration and decrease fall risk.              3. Pt report dizziness decreased by 50% to ease with ADL's.              4. Pt will perform Romberg on a compliant surface EC x 30 sec to decrease fall risk. Met (11/11/20)               5.  Pt will increase DGI >18/24 to decrease fall risk.        PLAN  []  Upgrade activities as tolerated     [x]  Continue plan of care  []  Update interventions per flow sheet       []  Discharge due to:_  []  Other:_      Moustapha Layton, PT 11/13/2020  7:57 AM    Future Appointments   Date Time Provider Yojana Cintron   11/13/2020 11:15 AM Deep Dickerson, PT [facility]   11/18/2020  4:30 PM Luna Green PT [facility]   11/20/2020  1:30 PM Deep Dickerson, PT [facility]   11/25/2020 11:15 AM Deep Dickerson, PT [facility]   11/27/2020 10:30 AM Deep Dickerson, PT [facility]

## 2020-11-18 ENCOUNTER — HOSPITAL ENCOUNTER (OUTPATIENT)
Dept: PHYSICAL THERAPY | Age: 75
Discharge: HOME | End: 2020-11-18
Payer: MEDICARE

## 2020-11-18 PROCEDURE — 97112 NEUROMUSCULAR REEDUCATION: CPT

## 2020-11-18 NOTE — PROGRESS NOTES
PT DAILY TREATMENT NOTE     Patient Name: Carey Robertson  Date:2020  : 1945  [x]  Patient  Verified  Payor: Carole Worthington / Plan: [place] MEDICARE PART A & B / Product Type: Medicare /    In time: 4:32  Out time: 5:10  Total Treatment Time (min): 38  Visit #: 6 of     Medicare/BCBS Only   Total Timed Codes (min):  38 1:1 Treatment Time:  38       Treatment Area: Lack of coordination [R27.9]  Dizziness and giddiness [R42]    SUBJECTIVE  Pain Level (0-10 scale):  8/10  Any medication changes, allergies to medications, adverse drug reactions, diagnosis change, or new procedure performed?: [x] No    [] Yes (see summary sheet for update)  Subjective functional status/changes:   [] No changes reported   pt. Reports she is feeling a little better today. She reports she stopped taking Flexeril and that seems to have helped her dizziness. She reports she informed her physician about medication changes. OBJECTIVE     38 min Neuromuscular Re-education:  [x]  See flow sheet : standing balance activities, VORx1, side stepping, backward ambulation, hemanth step overs, obstacle course with hurdles and foam standing with and without carrying her purse   Rationale: increase ROM and increase strength  to improve the patients ability to increase ease of ambulation           With   [] TE   [] TA   [x] neuro   [] other: Patient Education: [x] Review HEP    [] Progressed/Changed HEP based on:   [] positioning   [] body mechanics   [] transfers   [] heat/ice application    [] other:      Other Objective/Functional Measures:   Pt. Tolerated PT well with no rest breaks needed today  She was challenged with 6 inch step ups  Mild dizziness with VORx1 for 30 seconds  She was challenged with backward ambulation     Pain Level (0-10 scale) post treatment: 8/10    ASSESSMENT/Changes in Function:  Pt. Is progressing slowly towards goals. She reports decrease in dizziness with medication change.  She continues to demonstrate decreased balance with eyes closed and on compliant surfaces    Patient will continue to benefit from skilled PT services to modify and progress therapeutic interventions, address functional mobility deficits, address ROM deficits, address strength deficits, analyze and address soft tissue restrictions, analyze and cue movement patterns, analyze and modify body mechanics/ergonomics, assess and modify postural abnormalities and address imbalance/dizziness to attain remaining goals. Progress towards goals / Updated goals:  Short Term Goals: To be accomplished in 1 weeks:              Pt will be compliant with a HEP to improve function.   Met (11/4/20)     Long Term Goals: To be accomplished in 4 weeks:              1. Pt will increase FOTO score by    pts to improve function.              2. Pt will perform SOT to determine Sensory Input to improve with Vestibular Integration and decrease fall risk.              3. Pt report dizziness decreased by 50% to ease with ADL's.              4. Pt will perform Romberg on a compliant surface EC x 30 sec to decrease fall risk. Met (11/11/20)               5.  Pt will increase DGI >18/24 to decrease fall risk.     PLAN  []  Upgrade activities as tolerated     [x]  Continue plan of care  []  Update interventions per flow sheet       []  Discharge due to:_  []  Other:_      Devin Dunham, PT 11/18/2020  7:25 AM    Future Appointments   Date Time Provider Yojana Cintron   11/18/2020  4:30 PM Julia Boweng, PT [facility]   11/20/2020  1:30 PM Julia Pong, PT [facility]   11/25/2020 11:15 AM Julia Pong, PT [facility]   11/27/2020 10:30 AM Masoodli Pong, PT [facility]

## 2020-11-20 ENCOUNTER — HOSPITAL ENCOUNTER (OUTPATIENT)
Dept: PHYSICAL THERAPY | Age: 75
Discharge: HOME | End: 2020-11-20
Payer: MEDICARE

## 2020-11-20 PROCEDURE — 97112 NEUROMUSCULAR REEDUCATION: CPT

## 2020-11-20 NOTE — PROGRESS NOTES
PT DAILY TREATMENT NOTE     Patient Name: Celnia Pedraza  Date:2020  : 1945  [x]  Patient  Verified  Payor: Brett Boyd / Plan: [place] MEDICARE PART A & B / Product Type: Medicare /    In time: 1:28  Out time: 2:07  Total Treatment Time (min): 39  Visit #: 7 of     Medicare/BCBS Only   Total Timed Codes (min):  39 1:1 Treatment Time:  39       Treatment Area: Lack of coordination [R27.9]  Dizziness and giddiness [R42]    SUBJECTIVE  Pain Level (0-10 scale):  8/10  Any medication changes, allergies to medications, adverse drug reactions, diagnosis change, or new procedure performed?: [x] No    [] Yes (see summary sheet for update)  Subjective functional status/changes:   [] No changes reported   pt. Reports she is feeling about the same today. OBJECTIVE     39 min Neuromuscular Re-education:  [x]  See flow sheet :  standing balance activities, VORx1, side stepping, backward ambulation, hemanth step overs, obstacle course with hurdles and foam standing with and without carrying her purse   Rationale: increase strength, improve coordination and improve balance  to improve the patients ability to increase ease of ADLs          With   [] TE   [] TA   [x] neuro   [] other: Patient Education: [x] Review HEP    [] Progressed/Changed HEP based on:   [] positioning   [] body mechanics   [] transfers   [] heat/ice application    [] other:      Other Objective/Functional Measures:   Pt. Tolerated PT well with no reports of increased pain  She has dizziness with VOR and head turn exercises  Improved balance on foam with eyes closed today  Continues to be challenged with balance with ambulation with head turns    Pain Level (0-10 scale) post treatment:  8/10    ASSESSMENT/Changes in Function:  Pt. Is progressing slowly towards goals. She is having less dizziness symptoms and demonstrates improving static standing balance.  She continues to be challenged with VORx1 activities and ambulation with head turns. She demonstrates improving balance with backward ambulation. Patient will continue to benefit from skilled PT services to modify and progress therapeutic interventions, address functional mobility deficits, address ROM deficits, address strength deficits, analyze and address soft tissue restrictions, analyze and cue movement patterns, analyze and modify body mechanics/ergonomics and address imbalance/dizziness to attain remaining goals. Progress towards goals / Updated goals:  Short Term Goals: To be accomplished in 1 weeks:              Pt will be compliant with a HEP to improve function.   Met (11/4/20)     Long Term Goals: To be accomplished in 4 weeks:              1. Pt will increase FOTO score by    pts to improve function.              2. Pt will perform SOT to determine Sensory Input to improve with Vestibular Integration and decrease fall risk.              3. Pt report dizziness decreased by 50% to ease with ADL's.              4. Pt will perform Romberg on a compliant surface EC x 30 sec to decrease fall risk. Met (11/11/20)               5.  Pt will increase DGI >18/24 to decrease fall risk.     PLAN  []  Upgrade activities as tolerated     [x]  Continue plan of care  []  Update interventions per flow sheet       []  Discharge due to:_  []  Other:_      Enriqueta Alvarenga, JUAN 11/20/2020  7:54 AM    Future Appointments   Date Time Provider Yojana Cintron   11/20/2020  1:30 PM Eliot Pina PT [facility]   11/25/2020 11:15 AM Eliot Pina PT [facility]   11/27/2020 10:30 AM Eliot Pina PT [facility]

## 2020-11-25 ENCOUNTER — HOSPITAL ENCOUNTER (OUTPATIENT)
Dept: PHYSICAL THERAPY | Age: 75
Discharge: HOME | End: 2020-11-25
Payer: MEDICARE

## 2020-11-25 PROCEDURE — 97750 PHYSICAL PERFORMANCE TEST: CPT

## 2020-11-25 PROCEDURE — 97110 THERAPEUTIC EXERCISES: CPT

## 2020-11-25 NOTE — PROGRESS NOTES
PT DAILY TREATMENT NOTE     Patient Name: Carey Robertson  Date:2020  : 1945  [x]  Patient  Verified  Payor: Carole Worthington / Plan: [place] MED[place]ARE [place] & B / Product Type: Medicare /    In time: 11:16  Out time: 11:55  Total Treatment Time (min): 39  Visit #: 8 of     Medicare/BCBS Only   Total Timed Codes (min):  39 1:1 Treatment Time:  39       Treatment Area: Lack of coordination [R27.9]  Dizziness and giddiness [R42]    SUBJECTIVE  Pain Level (0-10 scale): 0/10  Any medication changes, allergies to medications, adverse drug reactions, diagnosis change, or new procedure performed?: [x] No    [] Yes (see summary sheet for update)  Subjective functional status/changes:   [] No changes reported  Pt. Reports she is still dizzy but doing better overall. OBJECTIVE    15 min: physical performance testing: SOT    24 min Therapeutic Exercise:  [x] See flow sheet :   Rationale: increase ROM and increase strength to improve the patients ability to increase ease of ADLs          With   [x] TE   [] TA   [] neuro   [] other: Patient Education: [x] Review HEP    [] Progressed/Changed HEP based on:   [] positioning   [] body mechanics   [] transfers   [] heat/ice application    [] other:      Other Objective/Functional Measures:    FOTO: 51  SOT: 68  DGI: 18/24    Pain Level (0-10 scale) post treatment: 0/10    ASSESSMENT/Changes in Function:      []  See Plan of Care  [x]  See progress note/recertification  []  See Discharge Summary         Progress towards goals / Updated goals:  See progress note    PLAN  []  Upgrade activities as tolerated     [x]  Continue plan of care  []  Update interventions per flow sheet       []  Discharge due to:_  []  Other:_      Nikole Poon, PT 2020  7:22 AM    Future Appointments   Date Time Provider Yojana Cintron   2020 11:15 AM Rafa Gibson, PT [facility]   2020 10:30 AM Rafa Gibson PT [facility]

## 2020-11-25 NOTE — PROGRESS NOTES
[facility]  [facility] [employer]  [place]  [address] [place] [address]  [phone] fax    Continued Plan of Care/ Re-certification for Physical Therapy Services    Patient name: Clarice Martinez Start of Care: 10/26/2020   Referral source: Gerri Mix : 1945               Medical Diagnosis: Lack of coordination [R27.9]  Dizziness and giddiness [R42]  Payor: [place] MEDICARE / Plan: [place] MEDICARE PART A & B / Product Type: Medicare /  Onset Date:Ongoing Dizziness and LOB. [facility] Human 2 weeks ago               Treatment Diagnosis: Dizziness and Balance   Prior Hospitalization: see medical history Provider#: [id number]   Medications: Verified on Patient summary List    Comorbidities: HTN, Arthritis, LBP, Diabetes   Prior Level of Function: Lives with . Pt ambulates with a SPC. Reports recent multiple falls due to LOB. Visits from Start of Care: 8    Missed Visits: 0    The Plan of Care and following information is based on the patient's current status:  Goal: Pt will increase FOTO score by    pts to improve function. Status at last note/certification: 57  Current Status: not met    Goal:   Pt will perform SOT to determine Sensory Input to improve with Vestibular Integration and decrease fall risk. Status at last note/certification: 57  Current Status: met    Goal: Pt report dizziness decreased by 50% to ease with ADL's. Status at last note/certification: n/a  Current Status: not met    Goal: Pt will perform Romberg on a compliant surface EC x 30 sec to decrease fall risk. Status at last note/certification: unable  Current Status: met    Goal:  Pt will increase DGI >18/24 to decrease fall risk.    Status at last note/certification:   Current Status: met      Key functional changes: improving DGI and SOT scores      Problems/ barriers to goal attainment: none reported     Problem List: pain affecting function, decrease ROM, decrease strength, impaired gait/ balance, decrease ADL/ functional abilitiies, decrease activity tolerance, decrease flexibility/ joint mobility and decrease transfer abilities    Treatment Plan: Therapeutic exercise, Therapeutic activities, Neuromuscular re-education, Physical agent/modality, Gait/balance training, Manual therapy, Patient education, Self Care training, Functional mobility training and Home safety training     Patient Goal (s) has been updated and includes: to walk better     Goals for this certification period to be accomplished in 4 weeks:  1. Patient will improve FOTO score by 5 points in order to demonstrate a significant improvement in function. 2. Patient will report a 50% improvement in symptoms since [facility] in order to improve quality of life. 3. Patient will improve DGI score to 20/24 in order to increase ease of ambulation. Frequency / Duration: Patient to be seen 2 times per week for 4 weeks:    Assessment / Recommendations: pt. Is progressing well with physical therapy, despite decrease in FOTO score. She decreased some of her medication and reports this reduced some of her dizziness. She does continues to have constant dizziness symptoms. Static standing balance has improved with ability to perform eyes closed balance on compliant surface for 30 seconds without LOB. DGI score improved significantly to 18/24. SOT score also significantly improved to 68 points. Skilled PT is medically necessary in order to improve balance and dizziness symptoms for increased ease of ADLs and improved quality of life. Certification Period: 11/25/20-12/24/20    James Acuña, PT 11/25/2020 1:46 PM    ________________________________________________________________________  I certify that the above Therapy Services are being furnished while the patient is under my care. I agree with the treatment plan and certify that this therapy is necessary.     [] I have read the above and request that my patient continue as recommended.   [] I have read the above report and request that my patient continue therapy with the following changes/special instructions: _______________________________________  [] I have read the above report and request that my patient be discharged from therapy    Physician's Signature:____________Date:_________TIME:________    ** Signature, Date and Time must be completed for valid certification **    Please sign and return to [facility] [employer] [facility]  [address] [place] [address]  [phone] fax

## 2020-11-27 ENCOUNTER — HOSPITAL ENCOUNTER (OUTPATIENT)
Dept: PHYSICAL THERAPY | Age: 75
Discharge: HOME | End: 2020-11-27
Payer: MEDICARE

## 2020-11-27 PROCEDURE — 97112 NEUROMUSCULAR REEDUCATION: CPT

## 2020-11-27 NOTE — PROGRESS NOTES
PT DAILY TREATMENT NOTE     Patient Name: Julián Webb  Date:2020  : 1945  [x]  Patient  Verified  Payor: Lottie Duffy / Plan: [place] MEDICARE PART A & B / Product Type: Medicare /    In time: 10:29  Out time: 11:08  Total Treatment Time (min): 39  Visit #: 1 of 8    Medicare/BCBS Only   Total Timed Codes (min):  39 1:1 Treatment Time:  38       Treatment Area: Lack of coordination [R27.9]  Dizziness and giddiness [R42]    SUBJECTIVE  Pain Level (0-10 scale):  4/10  Any medication changes, allergies to medications, adverse drug reactions, diagnosis change, or new procedure performed?: [x] No    [] Yes (see summary sheet for update)  Subjective functional status/changes:   [] No changes reported  Pt. Reports she still bumps into walls but it is happening less often. OBJECTIVE     39 min Neuromuscular Re-education:  [x]  See flow sheet : standing balance activities, side stepping, backward ambulation, slow walking, ambulation with head turns, ambulation with VOR x1   Rationale: increase ROM and increase strength  to improve the patients ability to increase ease of ADLs          With   [x] TE   [] TA   [] neuro   [] other: Patient Education: [x] Review HEP    [] Progressed/Changed HEP based on:   [] positioning   [] body mechanics   [] transfers   [] heat/ice application    [] other:      Other Objective/Functional Measures:   Pt. Was able to perform MSR on foam with eyes closed without LOB today. Tolerated increased time with VORx1 today  She continues to have difficulty with weight shift to right side  Continues to be challenged with backward ambulation      Pain Level (0-10 scale) post treatment: 4/10    ASSESSMENT/Changes in Function:  Pt. Is progressing slowly with physical therapy. She continues to have dizziness symptoms but does demonstrate improving balance. She has improving tolerance to VOR activities. She demonstrates improving balance on compliant surfaces.      Patient will continue to benefit from skilled PT services to modify and progress therapeutic interventions, address functional mobility deficits, address ROM deficits, address strength deficits, analyze and address soft tissue restrictions, analyze and cue movement patterns, analyze and modify body mechanics/ergonomics, assess and modify postural abnormalities and address imbalance/dizziness to attain remaining goals. Progress towards goals / Updated goals:  1. Patient will improve FOTO score by 5 points in order to demonstrate a significant improvement in function. 2. Patient will report a 50% improvement in symptoms since [facility] in order to improve quality of life. 3. Patient will improve DGI score to 20/24 in order to increase ease of ambulation.      PLAN  []  Upgrade activities as tolerated     [x]  Continue plan of care  []  Update interventions per flow sheet       []  Discharge due to:_  []  Other:_      Bhavin Saravia PT 11/27/2020  7:50 AM    Future Appointments   Date Time Provider Yojana Cintron   11/27/2020 10:30 AM Shari Grossman PT [facility]

## 2020-12-02 ENCOUNTER — HOSPITAL ENCOUNTER (OUTPATIENT)
Dept: PHYSICAL THERAPY | Age: 75
Discharge: HOME | End: 2020-12-02
Payer: MEDICARE

## 2020-12-02 PROCEDURE — 97110 THERAPEUTIC EXERCISES: CPT

## 2020-12-02 NOTE — PROGRESS NOTES
PT DAILY TREATMENT NOTE     Patient Name: Tierra Canada  Date:2020  : 1945  [x]  Patient  Verified  Payor: VA MEDICARE / Plan: VA MEDICARE PART A & B / Product Type: Medicare /    In time:1200  Out time:1242  Total Treatment Time (min): 42  Visit #: 2 of 8    Medicare/BCBS Only   Total Timed Codes (min):  42 1:1 Treatment Time:  42       Treatment Area: Lack of coordination [R27.9]  Dizziness and giddiness [R42]    SUBJECTIVE  Pain Level (0-10 scale): 10 LBP  Any medication changes, allergies to medications, adverse drug reactions, diagnosis change, or new procedure performed?: [x] No    [] Yes (see summary sheet for update)  Subjective functional status/changes:   [] No changes reported  Reports she thinks most of her dizziness comes from medication.  Her balance is improving    OBJECTIVE    Modality rationale:  to improve the patients ability to    Min Type Additional Details    [] Estim:  []Unatt       []IFC  []Premod                        []Other:  []w/ice   []w/heat  Position:  Location:    [] Estim: []Att    []TENS instruct  []NMES                    []Other:  []w/US   []w/ice   []w/heat  Position:  Location:    []  Traction: [] Cervical       []Lumbar                       [] Prone          []Supine                       []Intermittent   []Continuous Lbs:  [] before manual  [] after manual    []  Ultrasound: []Continuous   [] Pulsed                           []1MHz   []3MHz W/cm2:  Location:    []  Iontophoresis with dexamethasone         Location: [] Take home patch   [] In clinic    []  Ice     []  heat  []  Ice massage  []  Laser   []  Anodyne Position:  Location:    []  Laser with stim  []  Other:  Position:  Location:    []  Vasopneumatic Device Pressure:       [] lo [] med [] hi   Temperature: [] lo [] med [] hi   [] Skin assessment post-treatment:  []intact []redness- no adverse reaction      42 min Neuromuscular Re-education:  []  See flow sheet :   Rationale: increase strength, improve coordination, improve balance and increase proprioception  to improve the patients ability to dec fall risk. With   [] TE   [] TA   [] neuro   [] other: Patient Education: [x] Review HEP    [] Progressed/Changed HEP based on:   [] positioning   [] body mechanics   [] transfers   [] heat/ice application    [] other:      Other Objective/Functional Measures: Bolster rolls , 3 cone taps rocker board w/o UE and no LOB, supervision. Ambulation with head turns, Tandem ambulation with CGA. Mild deviations only, No LOB. Pain Level (0-10 scale) post treatment: 0/10    ASSESSMENT/Changes in Function: Progressing well. Pt continues wit walk around her yard 2x. She practices all her ex's while walking. She was recently issued Meclazine by NP to use as needed. Educated on effects of meclazine. Patient will continue to benefit from skilled PT services to modify and progress therapeutic interventions, address functional mobility deficits, address ROM deficits, address strength deficits, analyze and address soft tissue restrictions, analyze and cue movement patterns, analyze and modify body mechanics/ergonomics, assess and modify postural abnormalities and address imbalance/dizziness to attain remaining goals. []  See Plan of Care  [x]  See progress note/recertification  []  See Discharge Summary         Progress towards goals / Updated goals:  1. Patient will improve FOTO score by 5 points in order to demonstrate a significant improvement in function. 2. Patient will report a 50% improvement in symptoms since [facility] in order to improve quality of life.    3. Patient will improve DGI score to 20/24 in order to increase ease of ambulation.        PLAN  [x]  Upgrade activities as tolerated     [x]  Continue plan of care  []  Update interventions per flow sheet       []  Discharge due to:_  []  Other:_      Shun Subramanian, PT 12/2/2020  12:04 PM    Future Appointments   Date Time Provider Department Center   12/9/2020 10:30 AM Federico Sandoval, PT [facility]

## 2020-12-09 ENCOUNTER — HOSPITAL ENCOUNTER (OUTPATIENT)
Dept: PHYSICAL THERAPY | Age: 75
Discharge: HOME | End: 2020-12-09
Payer: MEDICARE

## 2020-12-09 PROCEDURE — 97110 THERAPEUTIC EXERCISES: CPT

## 2020-12-09 PROCEDURE — 97112 NEUROMUSCULAR REEDUCATION: CPT

## 2020-12-09 NOTE — PROGRESS NOTES
PT DAILY TREATMENT NOTE     Patient Name: Jean Martínez  Date:2020  : 1945  [x]  Patient  Verified  Payor: VA MEDICARE / Plan: VA MEDICARE PART A & B / Product Type: Medicare /    In time: 10:30  Out time: 11:08  Total Treatment Time (min): 38  Visit #: 3 of 8    Medicare/BCBS Only   Total Timed Codes (min):  38 1:1 Treatment Time:  38       Treatment Area: Lack of coordination [R27.9]  Dizziness and giddiness [R42]    SUBJECTIVE  Pain Level (0-10 scale):  4/10  Any medication changes, allergies to medications, adverse drug reactions, diagnosis change, or new procedure performed?: [x] No    [] Yes (see summary sheet for update)  Subjective functional status/changes:   [] No changes reported  Pt. Reports she found out she was taking too much of a medication and has no adjusted it. She reports she is having more dizziness today    OBJECTIVE    10 min Therapeutic Exercise:  [x] See flow sheet :   Rationale: increase ROM and increase strength to improve the patients ability to increase ease of ADLs     28 min Neuromuscular Re-education:  [x]  See flow sheet : standing balance activities, VOR activities, side stepping, backward ambulation, ambulation with head turns, ambulation with self ball toss   Rationale: increase strength, improve coordination and improve balance  to improve the patients ability to increase ease of ADLs          With   [x] TE   [] TA   [] neuro   [] other: Patient Education: [x] Review HEP    [] Progressed/Changed HEP based on:   [] positioning   [] body mechanics   [] transfers   [] heat/ice application    [] other:      Other Objective/Functional Measures:   Pt. Had dizziness with most activities today  She was challenged with tandem balance and tandem walking today  Challenged with self ball toss during ambulation  She continues to have decreased balance with eyes closed    Pain Level (0-10 scale) post treatment:  4/10    ASSESSMENT/Changes in Function:  Pt.  Is progressing slowly towards goals. She continues to have significant dizziness symptoms but does have improving balance. She continues to have decreased balance with eyes closed and with single leg stability. Pt. Also continues to demonstrate decreased hip strength. Patient will continue to benefit from skilled PT services to modify and progress therapeutic interventions, address functional mobility deficits, address ROM deficits, address strength deficits, analyze and address soft tissue restrictions, analyze and cue movement patterns, analyze and modify body mechanics/ergonomics, assess and modify postural abnormalities and address imbalance/dizziness to attain remaining goals. Progress towards goals / Updated goals:  1. Patient will improve FOTO score by 5 points in order to demonstrate a significant improvement in function. 2. Patient will report a 50% improvement in symptoms since [facility] in order to improve quality of life.    3. Patient will improve DGI score to 20/24 in order to increase ease of ambulation.     PLAN  []  Upgrade activities as tolerated     [x]  Continue plan of care  []  Update interventions per flow sheet       []  Discharge due to:_  []  Other:_      Buddy Escalera PT 12/9/2020  7:13 AM    Future Appointments   Date Time Provider Yojana Cintron   12/9/2020 10:30 AM Fransisco Damon, PT [facility]

## 2020-12-16 ENCOUNTER — HOSPITAL ENCOUNTER (OUTPATIENT)
Dept: PHYSICAL THERAPY | Age: 75
Discharge: HOME | End: 2020-12-16
Payer: MEDICARE

## 2020-12-16 PROCEDURE — 97110 THERAPEUTIC EXERCISES: CPT

## 2020-12-16 PROCEDURE — 97112 NEUROMUSCULAR REEDUCATION: CPT

## 2020-12-23 ENCOUNTER — HOSPITAL ENCOUNTER (OUTPATIENT)
Dept: PHYSICAL THERAPY | Age: 75
Discharge: HOME | End: 2020-12-23
Payer: MEDICARE

## 2020-12-23 PROCEDURE — 97112 NEUROMUSCULAR REEDUCATION: CPT

## 2020-12-23 PROCEDURE — 97110 THERAPEUTIC EXERCISES: CPT

## 2020-12-23 NOTE — PROGRESS NOTES
PT DAILY TREATMENT NOTE     Patient Name: More Menendez  Date:2020  : 1945  [x]  Patient  Verified  Payor: VA MEDICARE / Plan: VA MEDICARE PART A & B / Product Type: Medicare /    In time:1120  Out time:1200  Total Treatment Time (min): 40  Visit #: 5 of     Medicare/BCBS Only   Total Timed Codes (min):  40 1:1 Treatment Time:  40       Treatment Area: Lack of coordination [R27.9]  Dizziness and giddiness [R42]    SUBJECTIVE  Pain Level (0-10 scale): 2/10  Any medication changes, allergies to medications, adverse drug reactions, diagnosis change, or new procedure performed?: [x] No    [] Yes (see summary sheet for update)  Subjective functional status/changes:   [] No changes reported  Reports she can take a shower and not feel dizzy  She went to [facility] and did a lot of walking and felt good    OBJECTIVE    15 min Therapeutic Exercise:  [] See flow sheet :   Rationale: increase ROM, increase strength, improve coordination and improve balance to improve the patients ability to ease irh ADL's    25 min Neuromuscular Re-education:  []  See flow sheet :   Rationale: increase strength, improve coordination, improve balance and increase proprioception  to improve the patients ability to ease with ADL's          With   [] TE   [] TA   [] neuro   [] other: Patient Education: [x] Review HEP    [] Progressed/Changed HEP based on:   [] positioning   [] body mechanics   [] transfers   [] heat/ice application    [] other:      Other Objective/Functional Measures: MSR on foam EO/EC, sway but no LOB. FOTO=76  Pain Level (0-10 scale) post treatment: 0/10    ASSESSMENT/Changes in Function: All goals met.     Patient will continue to benefit from skilled PT services to modify and progress therapeutic interventions, address functional mobility deficits, address ROM deficits, address strength deficits, analyze and address soft tissue restrictions, analyze and cue movement patterns, analyze and modify body mechanics/ergonomics, assess and modify postural abnormalities and address imbalance/dizziness to attain remaining goals. [x]  See Plan of Care  []  See progress note/recertification  []  See Discharge Summary         Progress towards goals / Updated goals:  1. Patient will improve FOTO score by 5 points in order to demonstrate a significant improvement in function. MET. 2. Patient will report a 50% improvement in symptoms since [facility] in order to improve quality of life. MET: reports 50%  3. Patient will improve DGI score to 20/24 in order to increase ease of ambulation.   Not met: continues to be challenged with ambulation with head turns (12/16/20)       PLAN  [x]  Upgrade activities as tolerated     [x]  Continue plan of care  []  Update interventions per flow sheet       []  Discharge due to:_  []  Other:_      Radha Navarrete, PT 12/23/2020  11:33 AM    No future appointments.

## 2020-12-29 NOTE — PROGRESS NOTES
[facility]  [address] [facility]  [phone] fax    Physical Therapy Discharge Summary         Patient name: Julia Tovar Start of Care: 10/26/2020   Referral source: Florencio Ambriz PA-C [id number]               Medical Diagnosis: Lack of coordination [R27.9]  Dizziness and giddiness [R42]  Payor: VA MEDICARE / Plan: [place] MEDICARE PART A & B / Product Type: Medicare /  Onset Date:Ongoing Dizziness and LOB. Fell 2 weeks ago               Treatment Diagnosis: Dizziness and Balance   Prior Hospitalization: see medical history Provider#: [id number]   Medications: Verified on Patient summary List    Comorbidities: HTN, Arthritis, LBP, Diabetes   Prior Level of Function: Lives with . Pt ambulates with a SPC. Reports recent multiple falls due to LOB. Visits from Start of Care: 14    Missed Visits: 0    Reporting Period : 20 to 20    Summary of Care:  Goal:. Patient will improve FOTO score by 5 points in order to demonstrate a significant improvement in function. Status at last note/certification:FOTO at Eval=57  Status at discharge: met. FOTO at DC=76    Goal:Patient will report a 50% improvement in symptoms since [facility] in order to improve quality of life. Status at last note/certification:0% at Eval  Status at discharge: met. Reports 50% improvement    Goal:Patient will improve DGI score to 20/24 in order to increase ease of ambulation.   Status at last note/certification:  Status at discharge: Progressing. DGI=18/24    SOT increased frorn 57 to 68 pts depicting an improvement in Vestibular Input. Pt reports she has changed around some meds and dizziness is a little improved. He balance has stabilized but continues to be challenged with Head turns during ambulation. Pt reports she walks around her house 2x/day and has been compliant with HEP. Pt has made good improvement in Vestibular Therapy.  She is able to ambulate with greater ease and self confidence. Pt DC'd with goals met.      ASSESSMENT/RECOMMENDATIONS:  [x]Discontinue therapy: [x]Patient has reached or is progressing toward set goals      []Patient is non-compliant or has abdicated      []Due to lack of appreciable progress towards set goals    Nancie Good, JUAN 12/29/2020 3:23 PM

## 2021-01-27 NOTE — PROGRESS NOTES
Final Anesthesia Post-op Assessment    Patient: Ronni Jc  Procedure(s) Performed: RIGHT PHACO W/ IOL - RIGHT  Anesthesia type: MAC    Vitals Value Taken Time   Temp 98.0f 01/27/21 0821   Pulse 57 01/27/21 0821   Resp 14 01/27/21 0821   SpO2 99 01/27/21 0821   /80 01/27/21 0821         Patient Location: Phase II  Post-op Vital Signs:stable  Level of Consciousness: participates in exam, answers questions appropriately, alert, awake and oriented  Respiratory Status: spontaneous ventilation  Cardiovascular stable  Hydration: euvolemic  Pain Management: well controlled  Handoff: Handoff to receiving nurse was performed and questions were answered Nausea: None  Airway Patency:patent  Post-op Assessment: awake, alert, appropriately conversant, or baseline, no complications and patient tolerated procedure well with no complications      No complications documented.    PT DAILY TREATMENT NOTE     Patient Name: Amelia Dang  Date:2020  : 1945  [x]  Patient  Verified  Payor: Jay Marcial / Plan: [place] MEDICARE PART A & B / Product Type: Medicare /    In time: 11:16  Out time: 11:54  Total Treatment Time (min): 38  Visit #: 4 of 8    Medicare/BCBS Only   Total Timed Codes (min):  38 1:1 Treatment Time:  38       Treatment Area: Lack of coordination [R27.9]  Dizziness and giddiness [R42]    SUBJECTIVE  Pain Level (0-10 scale):  0/10  Any medication changes, allergies to medications, adverse drug reactions, diagnosis change, or new procedure performed?: [x] No    [] Yes (see summary sheet for update)  Subjective functional status/changes:   [] No changes reported  Pt. Reports her dizziness is doing a little better today. OBJECTIVE    10 min Therapeutic Exercise:  [x] See flow sheet :   Rationale: increase ROM and increase strength to improve the patients ability to increase ease of ADLs     28 min Neuromuscular Re-education:  [x]  See flow sheet : standing balance activities, VOR activities, ambulation over hurdles, ambulation with head turns, side stepping, backward ambulation    Rationale: increase strength, improve coordination and improve balance  to improve the patients ability to increase ease of ambulation           With   [x] TE   [] TA   [] neuro   [] other: Patient Education: [x] Review HEP    [] Progressed/Changed HEP based on:   [] positioning   [] body mechanics   [] transfers   [] heat/ice application    [] other:      Other Objective/Functional Measures:   Pt. Continues to have some increase in dizziness with head turning exercises  Discussed D/C plans and she is agreeable to D/C next visit   She was challenged with stepping over small hurdles    Pain Level (0-10 scale) post treatment:  0/10    ASSESSMENT/Changes in Function:  Pt. Is progressing slowly towards she continues to have dizziness symptoms but it is slowly improving.  She demonstrates improving static standing balance on compliant surfaces. She has difficulty with eyes closed balance activities. Pt. Continues to be challenged with ambulation with head turns. Patient will continue to benefit from skilled PT services to modify and progress therapeutic interventions, address functional mobility deficits, address ROM deficits, address strength deficits, analyze and address soft tissue restrictions, analyze and cue movement patterns, analyze and modify body mechanics/ergonomics and assess and modify postural abnormalities to attain remaining goals. Progress towards goals / Updated goals:  1. Patient will improve FOTO score by 5 points in order to demonstrate a significant improvement in function. 2. Patient will report a 50% improvement in symptoms since [facility] in order to improve quality of life.    3. Patient will improve DGI score to 20/24 in order to increase ease of ambulation.   Not met: continues to be challenged with ambulation with head turns (12/16/20)    PLAN  []  Upgrade activities as tolerated     [x]  Continue plan of care  []  Update interventions per flow sheet       []  Discharge due to:_  []  Other:_      Lucía Boyer, PT 12/16/2020  7:17 AM    Future Appointments   Date Time Provider Yojana Cintron   12/16/2020 11:15 AM Karla Leblanc, PT [facility]   12/23/2020 11:15 AM Adilene Marinelli, PT [id number]

## 2022-03-18 PROBLEM — M17.9 KNEE OSTEOARTHRITIS: Status: ACTIVE | Noted: 2017-08-25

## 2022-03-18 PROBLEM — G89.29 CHRONIC BILATERAL LOW BACK PAIN WITH SCIATICA: Status: ACTIVE | Noted: 2018-04-11

## 2022-03-18 PROBLEM — M62.830 MUSCLE SPASM OF BACK: Status: ACTIVE | Noted: 2017-04-11

## 2022-03-18 PROBLEM — M54.40 CHRONIC BILATERAL LOW BACK PAIN WITH SCIATICA: Status: ACTIVE | Noted: 2018-04-11

## 2022-03-18 PROBLEM — E66.01 OBESITY, MORBID (HCC): Status: ACTIVE | Noted: 2018-01-10

## 2022-03-18 PROBLEM — M43.17 SPONDYLOLISTHESIS OF LUMBOSACRAL REGION: Status: ACTIVE | Noted: 2018-04-11

## 2022-03-18 PROBLEM — G89.29 OTHER CHRONIC PAIN: Status: ACTIVE | Noted: 2017-10-11

## 2022-03-19 PROBLEM — R29.898 RIGHT LEG WEAKNESS: Status: ACTIVE | Noted: 2017-03-21

## 2022-03-19 PROBLEM — M12.819 ROTATOR CUFF ARTHROPATHY: Status: ACTIVE | Noted: 2018-06-08

## 2022-03-19 PROBLEM — M47.816 LUMBAR FACET ARTHROPATHY: Status: ACTIVE | Noted: 2017-04-11

## 2022-03-19 PROBLEM — M79.2 NEURITIS: Status: ACTIVE | Noted: 2017-03-21

## 2022-03-19 PROBLEM — Z79.891 USE OF OPIATES FOR THERAPEUTIC PURPOSES: Status: ACTIVE | Noted: 2017-10-11

## 2022-03-19 PROBLEM — M53.87 SCIATICA OF RIGHT SIDE ASSOCIATED WITH DISORDER OF LUMBOSACRAL SPINE: Status: ACTIVE | Noted: 2017-06-09

## 2022-03-20 PROBLEM — R26.89 FUNCTIONAL GAIT ABNORMALITY: Status: ACTIVE | Noted: 2017-03-21

## 2022-06-17 ENCOUNTER — TRANSCRIBE ORDER (OUTPATIENT)
Dept: REGISTRATION | Age: 77
End: 2022-06-17

## 2022-06-17 ENCOUNTER — HOSPITAL ENCOUNTER (OUTPATIENT)
Dept: LAB | Age: 77
Discharge: HOME | End: 2022-06-17
Payer: MEDICARE

## 2022-06-17 DIAGNOSIS — F31.9 BIPOLAR DISORDER, UNSPECIFIED (HCC): ICD-10-CM

## 2022-06-17 DIAGNOSIS — Z79.899 ENCOUNTER FOR LONG-TERM (CURRENT) USE OF OTHER MEDICATIONS: ICD-10-CM

## 2022-06-17 DIAGNOSIS — F31.9 BIPOLAR DISORDER, UNSPECIFIED (HCC): Primary | ICD-10-CM

## 2022-06-17 LAB
ATRIAL RATE: 68 BPM
CALCULATED P AXIS, ECG09: 53 DEGREES
CALCULATED R AXIS, ECG10: -31 DEGREES
CALCULATED T AXIS, ECG11: 47 DEGREES
DIAGNOSIS, 93000: NORMAL
P-R INTERVAL, ECG05: 184 MS
Q-T INTERVAL, ECG07: 386 MS
QRS DURATION, ECG06: 86 MS
QTC CALCULATION (BEZET), ECG08: 410 MS
VENTRICULAR RATE, ECG03: 68 BPM

## 2022-06-17 PROCEDURE — 93005 ELECTROCARDIOGRAM TRACING: CPT

## 2022-09-26 ENCOUNTER — OFFICE VISIT (OUTPATIENT)
Dept: CARDIOLOGY CLINIC | Age: 77
End: 2022-09-26
Payer: MEDICARE

## 2022-09-26 VITALS
BODY MASS INDEX: 37.69 KG/M2 | SYSTOLIC BLOOD PRESSURE: 130 MMHG | OXYGEN SATURATION: 97 % | HEART RATE: 73 BPM | WEIGHT: 192 LBS | DIASTOLIC BLOOD PRESSURE: 62 MMHG | HEIGHT: 60 IN

## 2022-09-26 DIAGNOSIS — R00.2 PALPITATIONS: ICD-10-CM

## 2022-09-26 DIAGNOSIS — R06.00 DYSPNEA, UNSPECIFIED TYPE: ICD-10-CM

## 2022-09-26 DIAGNOSIS — R07.9 CHEST PAIN, UNSPECIFIED TYPE: Primary | ICD-10-CM

## 2022-09-26 PROCEDURE — G8510 SCR DEP NEG, NO PLAN REQD: HCPCS | Performed by: INTERNAL MEDICINE

## 2022-09-26 PROCEDURE — G8417 CALC BMI ABV UP PARAM F/U: HCPCS | Performed by: INTERNAL MEDICINE

## 2022-09-26 PROCEDURE — G8536 NO DOC ELDER MAL SCRN: HCPCS | Performed by: INTERNAL MEDICINE

## 2022-09-26 PROCEDURE — G8752 SYS BP LESS 140: HCPCS | Performed by: INTERNAL MEDICINE

## 2022-09-26 PROCEDURE — 99204 OFFICE O/P NEW MOD 45 MIN: CPT | Performed by: INTERNAL MEDICINE

## 2022-09-26 PROCEDURE — G8754 DIAS BP LESS 90: HCPCS | Performed by: INTERNAL MEDICINE

## 2022-09-26 PROCEDURE — G8399 PT W/DXA RESULTS DOCUMENT: HCPCS | Performed by: INTERNAL MEDICINE

## 2022-09-26 PROCEDURE — 1101F PT FALLS ASSESS-DOCD LE1/YR: CPT | Performed by: INTERNAL MEDICINE

## 2022-09-26 PROCEDURE — G8428 CUR MEDS NOT DOCUMENT: HCPCS | Performed by: INTERNAL MEDICINE

## 2022-09-26 PROCEDURE — 1090F PRES/ABSN URINE INCON ASSESS: CPT | Performed by: INTERNAL MEDICINE

## 2022-09-26 PROCEDURE — 1123F ACP DISCUSS/DSCN MKR DOCD: CPT | Performed by: INTERNAL MEDICINE

## 2022-09-26 RX ORDER — IBUPROFEN 200 MG
400 TABLET ORAL AS NEEDED
COMMUNITY

## 2022-09-26 RX ORDER — ACETAMINOPHEN 500 MG
1000 TABLET ORAL
COMMUNITY

## 2022-09-26 RX ORDER — LEVOCETIRIZINE DIHYDROCHLORIDE 5 MG/1
5 TABLET, FILM COATED ORAL DAILY
COMMUNITY

## 2022-09-26 RX ORDER — ARIPIPRAZOLE 5 MG/1
5 TABLET ORAL DAILY
COMMUNITY

## 2022-09-26 RX ORDER — HYDROCODONE BITARTRATE AND ACETAMINOPHEN 5; 325 MG/1; MG/1
1 TABLET ORAL 2 TIMES DAILY
COMMUNITY
Start: 2022-09-12

## 2022-09-26 RX ORDER — B6/MFOLATE/MCOBAL/ALA/BENFOTIA 35-3-2-300
1 CAPSULE,DELAYED RELEASE (ENTERIC COATED) ORAL 2 TIMES DAILY
COMMUNITY

## 2022-09-26 NOTE — PROGRESS NOTES
Fozia Augustin presents today for   Chief Complaint   Patient presents with    New Patient     Self Ref for Hypertension       Fozia Augustin preferred language for health care discussion is english/other. Is someone accompanying this pt? no    Is the patient using any DME equipment during [address]? walker    Depression Screening:  3 most recent PHQ Screens 9/26/2022   PHQ Not Done -   Little interest or pleasure in doing things Not at all   Feeling down, depressed, irritable, or hopeless Not at all   Total Score PHQ 2 0       Learning Assessment:  Learning Assessment 9/26/2022   PRIMARY LEARNER Patient   PRIMARY LANGUAGE ENGLISH   LEARNER PREFERENCE PRIMARY DEMONSTRATION   ANSWERED BY patient   RELATIONSHIP SELF       Abuse Screening:  Abuse Screening Questionnaire 9/26/2022   Do you ever feel afraid of your partner? N   Are you in a relationship with someone who physically or mentally threatens you? N   Is it safe for you to go home? Y       Fall Risk  Fall Risk Assessment, last 12 mths 9/26/2022   Able to walk? Yes   Fall in past 12 months? 0   Do you feel unsteady? 0   Are you worried about falling 0           Pt currently taking Anticoagulant therapy? no    Pt currently taking Antiplatelet therapy ? ASA 81 mg once a day      Coordination of Care:  1. Have you been to the ER, urgent care clinic since your last visit? Hospitalized since your last visit? no    2. Have you seen or consulted any other health care providers outside of the [address] [place] [address] since your last visit? Include any pap smears or colon screening.  no

## 2022-09-26 NOTE — PROGRESS NOTES
[facility]    Ms. Sourav Cook is 29-year-old female with a history of hypertension, diabetes, hyperlipidemia, DJD, sleep apnea, depression    Patient is here today for cardiac evaluation. She denies prior history of MI or CHF  Patient is here to establish care for the evaluation of dyspnea chest pain and dizziness. Patient has been feeling chronic constant dizziness over the last several months. She denies presyncope or syncope. She is unsteady on her gait and has had falls. She has been seen by neurology team and she was told that it was because of her \"old age\"  Occasional palpitation. She has been experiencing some chest discomfort probably once or twice a month lasting for 2 minutes without any nausea vomiting or diaphoresis. This episodes are random and not associated with any other symptoms. She has also dyspnea on exertion. She denies PND. She denies any lower extremity swelling. Denies any nausea, vomiting, abdominal pain, fever, chills, sputum production. No hematuria or other bleeding complaints    Past Medical History:   Diagnosis Date    Adverse effect of anesthesia     hard time waking up    Allergic rhinitis     weekly allergy injections    Arthritis     Chronic pain     lower back, shoulders    Diabetes (HCC)     GERD (gastroesophageal reflux disease)     Hiatal hernia     History of positive PPD, 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    1973-asymptomatic    HTN     Hypertension     Psychiatric disorder     Depression    Sleep apnea     bi pap; advise pt to bring       Review of Systems:  Cardiac symptoms as noted above in HPI. All others negative. Denies fatigue, malaise, skin rash,  blurring vision, photophobia, neck pain, hemoptysis, chronic cough, nausea, vomiting, hematuria, burning micturition, BRBPR, chronic headaches. Current Outpatient Medications   Medication Sig    levocetirizine (Xyzal) 5 mg tablet Take 5 mg by mouth daily. empagliflozin (Jardiance) 25 mg tablet Take 25 mg by mouth daily. H3-lzovcpo-hdvnxv-ALA-benfotia (EB-N6 DR) 35-3-2-300-150 mg cpDR Take 1 Tablet by mouth two (2) times a day. HYDROcodone-acetaminophen (NORCO) 5-325 mg per tablet Take 1 Tablet by mouth two (2) times a day. ARIPiprazole (Abilify) 5 mg tablet Take 5 mg by mouth daily. ibuprofen (Motrin IB) 200 mg tablet Take 400 mg by mouth as needed for Pain. acetaminophen (Tylenol Extra Strength) 500 mg tablet Take 1,000 mg by mouth every six (6) hours as needed for Pain.    exenatide microspheres 2 mg/0.65 mL pnij 2 mg by SubCUTAneous route every seven (7) days. traMADol (ULTRAM) 50 mg tablet 1-2 po daily as needed for severe chronic pain    pantoprazole (PROTONIX) 40 mg tablet Take 40 mg by mouth two (2) times a day. montelukast (SINGULAIR) 10 mg tablet Take 10 mg by mouth daily. folic acid (FOLVITE) 1 mg tablet Take  by mouth daily. aspirin delayed-release 81 mg tablet Take  by mouth daily. docusate sodium (COLACE) 100 mg capsule Take 100 mg by mouth two (2) times a day. armodafiniL (NUVIGIL) 250 mg tab tablet Take 250 mg by mouth Daily (before breakfast). metoprolol succinate (TOPROL-XL) 100 mg tablet Take 100 mg by mouth nightly. ferrous sulfate 325 mg (65 mg iron) tablet Take 325 mg by mouth Daily (before breakfast). cholecalciferol (VITAMIN D3) (1000 Units /25 mcg) tablet Take 1,000 Units by mouth daily. multivitamin (ONE A DAY) tablet Take 1 Tab by mouth daily. VIT C/VIT E/LUTEIN/MIN/OMEGA-3 (OCUVITE PO) Take 1 Cap by mouth nightly. GLUCOSAMINE/CHONDRO CARLIN A/C/MN (GLUCOSAMINE-CHONDROITIN COMPLX PO) Take 1 Cap by mouth two (2) times a day. amlodipine (NORVASC) 10 mg tablet Take 10 mg by mouth nightly. Indications: hypertension    lisinopril (PRINIVIL, ZESTRIL) 40 mg tablet Take 40 mg by mouth nightly. Indications: HYPERTENSION    chlorthalidone (HYGROTEN) 25 mg tablet Take 25 mg by mouth nightly. Indications: hypertension    FLAXSEED PO Take 1,000 mg by mouth two (2) times a day. glyBURIDE-metFORMIN (GLUCOVANCE) 5-500 mg per tablet Take 1 Tablet by mouth nightly. atorvastatin (LIPITOR) 40 mg tablet Take 40 mg by mouth nightly. sertraline (ZOLOFT) 100 mg tablet Take 200 mg by mouth nightly. Take 2 tablets by mouth daily     No current facility-administered medications for this visit. Past Surgical History:   Procedure Laterality Date    COLONOSCOPY N/A 4/27/2018    COLONOSCOPY, SCREENING,/c Polypectomies,/c Hot Snared Polypectomies performed by Karyn Barfield MD at [facility] ENDOSCOPY    COLONOSCOPY N/A 6/28/2019    COLONOSCOPY, SURVEILLANCE w/ Cold Snared Polypectomy performed by Gabrielle Michel MD at [facility] ENDOSCOPY    HX APPENDECTOMY      HX BLADDER SUSPENSION      4x    HX CHOLECYSTECTOMY      HX HEENT      Sx for droopy eyes    HX ORTHOPAEDIC  10/2013    RIGHT TKA, Left knee replacement 2015    HX SEPTOPLASTY      HX SHOULDER REPLACEMENT Right     reverse    HX SHOULDER REPLACEMENT Left 06/2018    HX TONSILLECTOMY      HX TUBAL LIGATION         Allergies and Sensitivities:  Allergies   Allergen Reactions    Other Medication Other (comments)     SEASONAL ALLERGIES    Topamax [Topiramate] Other (comments)     \"irritable\"         Family History:  Family History   Problem Relation Age of Onset    Cancer Father 61        lung    Cancer Maternal Aunt         lung       Social History:  Social History     Tobacco Use    Smoking status: Never    Smokeless tobacco: Never   Vaping Use    Vaping Use: Never used   Substance Use Topics    Alcohol use: Yes     Alcohol/week: 1.0 standard drink     Types: 1 Glasses of wine per week    Drug use: No     She  reports that she has never smoked. She has never used smokeless tobacco.  She  reports current alcohol use of about 1.0 standard drink per week.     Physical Exam:  BP Readings from Last 3 Encounters:   09/26/22 130/62   02/04/22 (!) 107/52   01/24/20 131/69 Pulse Readings from Last 3 Encounters:   22 73   22 63   20 64          Wt Readings from Last 3 Encounters:   22 87.1 kg (192 lb)   22 91 kg (200 lb 9.9 oz)   20 92.6 kg (204 lb 3.2 oz)       Constitutional: Oriented to person, place, and time. HENT: Head: Normocephalic and atraumatic. Eyes: Conjunctivae and extraocular motions are normal.   Neck: No JVD present. Carotid bruit is not appreciated. Cardiovascular: Regular rhythm. No murmur, gallop or rubs appreciated  Lung: Breath sounds normal. No respiratory distress. No ronchi or rales appreciated  Abdominal: No tenderness. No rebound and no guarding. Musculoskeletal: There is no lower extremity edema. No cynosis  Lymphadenopathy:  No cervical or supraclavicular adenopathy appriciated. Neurological: No gross motor deficit noted. Skin: No visible skin rash noted. No Ear discharge noted  Psychiatric: Normal mood and affect. LABS:   @  Lab Results   Component Value Date/Time    WBC 7.7 2018 01:15 PM    HGB 11.4 (L) 2018 02:54 AM    HCT 35.3 2018 02:54 AM    PLATELET 057  01:15 PM    MCV 86.9 2018 01:15 PM     Lab Results   Component Value Date/Time    Sodium 139 2018 01:15 PM    Potassium 4.2 2018 01:15 PM    Chloride 102 2018 01:15 PM    CO2 26 2018 01:15 PM    Glucose 91 2018 01:15 PM    BUN 33 (H) 2018 01:15 PM    Creatinine 1.01 2018 01:15 PM     No flowsheet data found. Lab Results   Component Value Date/Time    ALT (SGPT) 46 2018 01:15 PM     Lab Results   Component Value Date/Time    Hemoglobin A1c 7.4 (H) 2018 01:15 PM     No results found for: TSH, TSH2, TSH3, TSHP, TSHEXT    EK2022: Sinus rhythm at 68 bpm.  Normal IL and QRS interval.  No ST changes of ischemia.     STRESS TEST (EST, PHARM, NUC, ECHO etc)    CATHETERIZATION    IMPRESSION & PLAN:  Ms. Narvaez Holiday is 65-year-old female    Palpitation and dizziness:  Associated with some unsteady gait and falls. According to patient neurology work-up has been unremarkable. Occasional palpitation. Will place event monitor for 30-day    Chest pain: And dyspnea  mixed feature of angina. Already taking aspirin and beta-blocker. Will proceed with nuclear stress test for restratification. Echo to rule out abnormal cardiac structure or hypertensive cardiovascular disease    Hypertension: /62. Currently on metoprolol, amlodipine, lisinopril. Echo ordered to rule out hypertensive cardiovascular disease    Hyperlipidemia: Atorvastatin 40 mg daily. Continue same    Diabetes: Goal hemoglobin A1c less than 7 is recommended from cardiovascular standpoint. Defer to PCP. She is following with endocrine team as well    This plan was discussed with patient who is in agreement. Thank you for allowing me to participate in patient care. Please feel free to call me if you have any question or concern. Oleksandr Springer MD  Please note: This document has been produced using voice recognition software. Unrecognized errors in transcription may be present.

## 2022-09-26 NOTE — LETTER
9/26/2022    Patient: Boston Gusman   YOB: 1945   Date of Visit: 9/26/2022     Natasha Wilhelm MD  [address] [place] [address]  Via Fax: [phone]    Dear Natasha Wilhelm MD,      Thank you for referring Ms. Boston Gusman to [address] [place] [address] for evaluation. My notes for this consultation are attached. If you have questions, please do not hesitate to call me. I look forward to following your patient along with you.       Sincerely,    Dhiraj Ghosh MD

## 2022-10-17 ENCOUNTER — TELEPHONE (OUTPATIENT)
Dept: CARDIOLOGY CLINIC | Age: 77
End: 2022-10-17

## 2022-11-16 NOTE — TELEPHONE ENCOUNTER
Patient was still feeling a little sleepy during the day, so she just started taking the 75 mg with the 150mg at bedtime instead of the 75mg in the AM and the 150mg in the PM.    I informed patient, with medication helping with her pain, as she is allergic to topamax, and tried and failed gabapentin, they my discontinue the 75mg and just continue the 150mg in the PM.    Patient verbalized understanding, however is still concerned with the \"Ataxic\" feelings she is having. Please advise. No

## 2023-04-03 ENCOUNTER — OFFICE VISIT (OUTPATIENT)
Age: 78
End: 2023-04-03
Payer: MEDICARE

## 2023-04-03 VITALS
OXYGEN SATURATION: 97 % | BODY MASS INDEX: 33.96 KG/M2 | DIASTOLIC BLOOD PRESSURE: 70 MMHG | HEIGHT: 60 IN | WEIGHT: 173 LBS | HEART RATE: 47 BPM | SYSTOLIC BLOOD PRESSURE: 122 MMHG

## 2023-04-03 DIAGNOSIS — E78.00 PURE HYPERCHOLESTEROLEMIA: ICD-10-CM

## 2023-04-03 DIAGNOSIS — I10 PRIMARY HYPERTENSION: Primary | ICD-10-CM

## 2023-04-03 DIAGNOSIS — I10 ESSENTIAL HYPERTENSION WITH GOAL BLOOD PRESSURE LESS THAN 140/90: ICD-10-CM

## 2023-04-03 PROCEDURE — 1036F TOBACCO NON-USER: CPT | Performed by: INTERNAL MEDICINE

## 2023-04-03 PROCEDURE — G8428 CUR MEDS NOT DOCUMENT: HCPCS | Performed by: INTERNAL MEDICINE

## 2023-04-03 PROCEDURE — G8417 CALC BMI ABV UP PARAM F/U: HCPCS | Performed by: INTERNAL MEDICINE

## 2023-04-03 PROCEDURE — 3078F DIAST BP <80 MM HG: CPT | Performed by: INTERNAL MEDICINE

## 2023-04-03 PROCEDURE — 3074F SYST BP LT 130 MM HG: CPT | Performed by: INTERNAL MEDICINE

## 2023-04-03 PROCEDURE — 1123F ACP DISCUSS/DSCN MKR DOCD: CPT | Performed by: INTERNAL MEDICINE

## 2023-04-03 PROCEDURE — 99214 OFFICE O/P EST MOD 30 MIN: CPT | Performed by: INTERNAL MEDICINE

## 2023-04-03 PROCEDURE — G8400 PT W/DXA NO RESULTS DOC: HCPCS | Performed by: INTERNAL MEDICINE

## 2023-04-03 PROCEDURE — 1090F PRES/ABSN URINE INCON ASSESS: CPT | Performed by: INTERNAL MEDICINE

## 2023-04-03 NOTE — PROGRESS NOTES
standpoint. Defer to PCP. She is following with endocrine team as well     This plan was discussed with patient who is in agreement. Thank you for allowing me to participate in patient care. Please feel free to call me if you have any question or concern. Ashly Deshpande MD  Please note: This document has been produced using voice recognition software. Unrecognized errors in transcription may be present.
4

## 2023-04-06 RX ORDER — CHLORTHALIDONE 25 MG/1
25 TABLET ORAL DAILY
Qty: 90 TABLET | Refills: 3 | Status: SHIPPED | OUTPATIENT
Start: 2023-04-06

## 2023-05-15 ENCOUNTER — OFFICE VISIT (OUTPATIENT)
Age: 78
End: 2023-05-15

## 2023-05-15 VITALS — OXYGEN SATURATION: 94 % | SYSTOLIC BLOOD PRESSURE: 116 MMHG | HEART RATE: 55 BPM | DIASTOLIC BLOOD PRESSURE: 58 MMHG

## 2023-05-15 DIAGNOSIS — I10 HTN (HYPERTENSION): Primary | ICD-10-CM

## 2023-11-11 ENCOUNTER — APPOINTMENT (OUTPATIENT)
Facility: HOSPITAL | Age: 78
DRG: 917 | End: 2023-11-11
Payer: MEDICARE

## 2023-11-11 ENCOUNTER — HOSPITAL ENCOUNTER (INPATIENT)
Facility: HOSPITAL | Age: 78
LOS: 3 days | Discharge: HOME HEALTH | DRG: 917 | End: 2023-11-14
Attending: STUDENT IN AN ORGANIZED HEALTH CARE EDUCATION/TRAINING PROGRAM | Admitting: STUDENT IN AN ORGANIZED HEALTH CARE EDUCATION/TRAINING PROGRAM
Payer: MEDICARE

## 2023-11-11 DIAGNOSIS — N30.00 ACUTE CYSTITIS WITHOUT HEMATURIA: ICD-10-CM

## 2023-11-11 DIAGNOSIS — R41.82 ALTERED MENTAL STATUS, UNSPECIFIED ALTERED MENTAL STATUS TYPE: Primary | ICD-10-CM

## 2023-11-11 DIAGNOSIS — J96.01 ACUTE RESPIRATORY FAILURE WITH HYPOXIA AND HYPERCAPNIA (HCC): ICD-10-CM

## 2023-11-11 DIAGNOSIS — J96.02 ACUTE RESPIRATORY FAILURE WITH HYPOXIA AND HYPERCAPNIA (HCC): ICD-10-CM

## 2023-11-11 DIAGNOSIS — T40.604A OPIATE OVERDOSE, UNDETERMINED INTENT, INITIAL ENCOUNTER (HCC): ICD-10-CM

## 2023-11-11 PROBLEM — T40.601A OPIATE OVERDOSE, ACCIDENTAL OR UNINTENTIONAL, INITIAL ENCOUNTER (HCC): Status: ACTIVE | Noted: 2023-11-11

## 2023-11-11 PROBLEM — G93.40 ACUTE ENCEPHALOPATHY: Status: ACTIVE | Noted: 2023-11-11

## 2023-11-11 PROBLEM — T40.2X1A OPIOID OVERDOSE (HCC): Status: ACTIVE | Noted: 2023-11-11

## 2023-11-11 PROBLEM — G92.9 TOXIC ENCEPHALOPATHY: Status: ACTIVE | Noted: 2023-11-11

## 2023-11-11 LAB
ALBUMIN SERPL-MCNC: 4.3 G/DL (ref 3.4–5)
ALBUMIN/GLOB SERPL: 1.3 (ref 0.8–1.7)
ALP SERPL-CCNC: 117 U/L (ref 45–117)
ALT SERPL-CCNC: 25 U/L (ref 13–56)
AMPHET UR QL SCN: NEGATIVE
ANION GAP BLD CALC-SCNC: ABNORMAL MMOL/L (ref 10–20)
ANION GAP BLD CALC-SCNC: ABNORMAL MMOL/L (ref 10–20)
ANION GAP SERPL CALC-SCNC: 5 MMOL/L (ref 3–18)
APAP SERPL-MCNC: <2 UG/ML (ref 10–30)
APPEARANCE UR: CLEAR
ARTERIAL PATENCY WRIST A: POSITIVE
ARTERIAL PATENCY WRIST A: POSITIVE
AST SERPL-CCNC: 24 U/L (ref 10–38)
B PERT DNA SPEC QL NAA+PROBE: NOT DETECTED
BACTERIA URNS QL MICRO: ABNORMAL /HPF
BARBITURATES UR QL SCN: NEGATIVE
BASE DEFICIT BLD-SCNC: 0.5 MMOL/L
BASE EXCESS BLD CALC-SCNC: 1 MMOL/L
BASE EXCESS BLD CALC-SCNC: 1 MMOL/L
BASOPHILS # BLD: 0 K/UL (ref 0–0.1)
BASOPHILS NFR BLD: 0 % (ref 0–2)
BDY SITE: ABNORMAL
BDY SITE: ABNORMAL
BENZODIAZ UR QL: NEGATIVE
BILIRUB SERPL-MCNC: 0.4 MG/DL (ref 0.2–1)
BILIRUB UR QL: NEGATIVE
BORDETELLA PARAPERTUSSIS BY PCR: NOT DETECTED
BUN SERPL-MCNC: 20 MG/DL (ref 7–18)
BUN/CREAT SERPL: 21 (ref 12–20)
C PNEUM DNA SPEC QL NAA+PROBE: NOT DETECTED
CA-I BLD-MCNC: 1.17 MMOL/L (ref 1.12–1.32)
CA-I BLD-MCNC: 1.22 MMOL/L (ref 1.12–1.32)
CALCIUM SERPL-MCNC: 9.8 MG/DL (ref 8.5–10.1)
CANNABINOIDS UR QL SCN: NEGATIVE
CHLORIDE BLD-SCNC: 103 MMOL/L (ref 98–107)
CHLORIDE BLD-SCNC: 103 MMOL/L (ref 98–107)
CHLORIDE SERPL-SCNC: 102 MMOL/L (ref 100–111)
CO2 BLD-SCNC: 29 MMOL/L (ref 19–24)
CO2 BLD-SCNC: 31 MMOL/L (ref 19–24)
CO2 SERPL-SCNC: 31 MMOL/L (ref 21–32)
COCAINE UR QL SCN: NEGATIVE
COLOR UR: YELLOW
CREAT BLD-MCNC: 0.73 MG/DL (ref 0.6–1.3)
CREAT BLD-MCNC: 0.76 MG/DL (ref 0.6–1.3)
CREAT SERPL-MCNC: 0.94 MG/DL (ref 0.6–1.3)
D DIMER PPP FEU-MCNC: 1.52 UG/ML(FEU)
DIFFERENTIAL METHOD BLD: ABNORMAL
EOSINOPHIL # BLD: 0 K/UL (ref 0–0.4)
EOSINOPHIL NFR BLD: 0 % (ref 0–5)
EPITH CASTS URNS QL MICRO: ABNORMAL /LPF (ref 0–5)
ERYTHROCYTE [DISTWIDTH] IN BLOOD BY AUTOMATED COUNT: 12.1 % (ref 11.6–14.5)
ETHANOL SERPL-MCNC: <3 MG/DL (ref 0–3)
FIO2 ON VENT: 30 %
FLUAV SUBTYP SPEC NAA+PROBE: NOT DETECTED
FLUBV RNA SPEC QL NAA+PROBE: NOT DETECTED
GAS FLOW.O2 O2 DELIVERY SYS: ABNORMAL
GAS FLOW.O2 O2 DELIVERY SYS: ABNORMAL
GLOBULIN SER CALC-MCNC: 3.4 G/DL (ref 2–4)
GLUCOSE BLD STRIP.AUTO-MCNC: 162 MG/DL (ref 70–110)
GLUCOSE BLD-MCNC: 263 MG/DL (ref 65–100)
GLUCOSE BLD-MCNC: 265 MG/DL (ref 65–100)
GLUCOSE SERPL-MCNC: 271 MG/DL (ref 74–99)
GLUCOSE UR STRIP.AUTO-MCNC: 100 MG/DL
HADV DNA SPEC QL NAA+PROBE: NOT DETECTED
HCO3 BLD-SCNC: 28.6 MMOL/L (ref 22–26)
HCO3 BLD-SCNC: 28.8 MMOL/L (ref 22–26)
HCO3 BLD-SCNC: 30.1 MMOL/L (ref 22–26)
HCOV 229E RNA SPEC QL NAA+PROBE: NOT DETECTED
HCOV HKU1 RNA SPEC QL NAA+PROBE: NOT DETECTED
HCOV NL63 RNA SPEC QL NAA+PROBE: NOT DETECTED
HCOV OC43 RNA SPEC QL NAA+PROBE: NOT DETECTED
HCT VFR BLD AUTO: 45.8 % (ref 35–45)
HGB BLD-MCNC: 14.8 G/DL (ref 12–16)
HGB UR QL STRIP: ABNORMAL
HMPV RNA SPEC QL NAA+PROBE: NOT DETECTED
HPIV1 RNA SPEC QL NAA+PROBE: NOT DETECTED
HPIV2 RNA SPEC QL NAA+PROBE: NOT DETECTED
HPIV3 RNA SPEC QL NAA+PROBE: NOT DETECTED
HPIV4 RNA SPEC QL NAA+PROBE: NOT DETECTED
IMM GRANULOCYTES # BLD AUTO: 0 K/UL (ref 0–0.04)
IMM GRANULOCYTES NFR BLD AUTO: 0 % (ref 0–0.5)
IPAP/PIP: 18
KETONES UR QL STRIP.AUTO: NEGATIVE MG/DL
LACTATE BLD-SCNC: 1.02 MMOL/L (ref 0.4–2)
LACTATE BLD-SCNC: 1.42 MMOL/L (ref 0.4–2)
LACTATE SERPL-SCNC: 1.6 MMOL/L (ref 0.4–2)
LACTATE SERPL-SCNC: 1.7 MMOL/L (ref 0.4–2)
LEUKOCYTE ESTERASE UR QL STRIP.AUTO: ABNORMAL
LYMPHOCYTES # BLD: 1.3 K/UL (ref 0.9–3.6)
LYMPHOCYTES NFR BLD: 13 % (ref 21–52)
Lab: ABNORMAL
M PNEUMO DNA SPEC QL NAA+PROBE: NOT DETECTED
MCH RBC QN AUTO: 29.9 PG (ref 24–34)
MCHC RBC AUTO-ENTMCNC: 32.3 G/DL (ref 31–37)
MCV RBC AUTO: 92.5 FL (ref 78–100)
METHADONE UR QL: NEGATIVE
MONOCYTES # BLD: 0.3 K/UL (ref 0.05–1.2)
MONOCYTES NFR BLD: 3 % (ref 3–10)
NEUTS SEG # BLD: 8.6 K/UL (ref 1.8–8)
NEUTS SEG NFR BLD: 84 % (ref 40–73)
NITRITE UR QL STRIP.AUTO: NEGATIVE
NRBC # BLD: 0 K/UL (ref 0–0.01)
NRBC BLD-RTO: 0 PER 100 WBC
NT PRO BNP: 66 PG/ML (ref 0–1800)
OPIATES UR QL: POSITIVE
PCO2 BLD: 57.9 MMHG (ref 35–45)
PCO2 BLD: 65.3 MMHG (ref 35–45)
PCO2 BLDV: 64.1 MMHG (ref 41–51)
PCP UR QL: NEGATIVE
PEEP RESPIRATORY: 10
PH BLD: 7.25 (ref 7.35–7.45)
PH BLD: 7.31 (ref 7.35–7.45)
PH BLDV: 7.28 (ref 7.32–7.42)
PH UR STRIP: 6 (ref 5–8)
PLATELET # BLD AUTO: 213 K/UL (ref 135–420)
PMV BLD AUTO: 8.5 FL (ref 9.2–11.8)
PO2 BLD: 87 MMHG (ref 80–100)
PO2 BLD: 94 MMHG (ref 80–100)
PO2 BLDV: 28 MMHG (ref 25–40)
POTASSIUM BLD-SCNC: 3.7 MMOL/L (ref 3.5–5.1)
POTASSIUM BLD-SCNC: 4.2 MMOL/L (ref 3.5–5.1)
POTASSIUM SERPL-SCNC: 4.5 MMOL/L (ref 3.5–5.5)
PROCALCITONIN SERPL-MCNC: <0.05 NG/ML
PROT SERPL-MCNC: 7.7 G/DL (ref 6.4–8.2)
PROT UR STRIP-MCNC: NEGATIVE MG/DL
RBC # BLD AUTO: 4.95 M/UL (ref 4.2–5.3)
RBC #/AREA URNS HPF: ABNORMAL /HPF (ref 0–5)
RESPIRATORY RATE, POC: 14 (ref 5–40)
RSV RNA SPEC QL NAA+PROBE: NOT DETECTED
RV+EV RNA SPEC QL NAA+PROBE: NOT DETECTED
SALICYLATES SERPL-MCNC: <1.7 MG/DL (ref 2.8–20)
SAO2 % BLD: 43 %
SAO2 % BLD: 95 %
SAO2 % BLD: 95.6 % (ref 92–97)
SARS-COV-2 RNA RESP QL NAA+PROBE: NOT DETECTED
SERVICE CMNT-IMP: ABNORMAL
SODIUM BLD-SCNC: 140 MMOL/L (ref 136–145)
SODIUM BLD-SCNC: 140 MMOL/L (ref 136–145)
SODIUM SERPL-SCNC: 138 MMOL/L (ref 136–145)
SP GR UR REFRACTOMETRY: 1.02 (ref 1–1.03)
SPECIMEN SITE: ABNORMAL
SPECIMEN SITE: ABNORMAL
SPECIMEN TYPE: ABNORMAL
TROPONIN I SERPL HS-MCNC: 5 NG/L (ref 0–54)
UROBILINOGEN UR QL STRIP.AUTO: 0.2 EU/DL (ref 0.2–1)
VENTILATION MODE VENT: ABNORMAL
WBC # BLD AUTO: 10.3 K/UL (ref 4.6–13.2)
WBC URNS QL MICRO: ABNORMAL /HPF (ref 0–4)

## 2023-11-11 PROCEDURE — 6360000002 HC RX W HCPCS: Performed by: STUDENT IN AN ORGANIZED HEALTH CARE EDUCATION/TRAINING PROGRAM

## 2023-11-11 PROCEDURE — 96375 TX/PRO/DX INJ NEW DRUG ADDON: CPT

## 2023-11-11 PROCEDURE — 5A09457 ASSISTANCE WITH RESPIRATORY VENTILATION, 24-96 CONSECUTIVE HOURS, CONTINUOUS POSITIVE AIRWAY PRESSURE: ICD-10-PCS | Performed by: REGISTERED NURSE

## 2023-11-11 PROCEDURE — 2580000003 HC RX 258: Performed by: STUDENT IN AN ORGANIZED HEALTH CARE EDUCATION/TRAINING PROGRAM

## 2023-11-11 PROCEDURE — 6360000002 HC RX W HCPCS

## 2023-11-11 PROCEDURE — 87086 URINE CULTURE/COLONY COUNT: CPT

## 2023-11-11 PROCEDURE — 83605 ASSAY OF LACTIC ACID: CPT

## 2023-11-11 PROCEDURE — 80179 DRUG ASSAY SALICYLATE: CPT

## 2023-11-11 PROCEDURE — 70450 CT HEAD/BRAIN W/O DYE: CPT

## 2023-11-11 PROCEDURE — 84145 PROCALCITONIN (PCT): CPT

## 2023-11-11 PROCEDURE — 36600 WITHDRAWAL OF ARTERIAL BLOOD: CPT

## 2023-11-11 PROCEDURE — 96374 THER/PROPH/DIAG INJ IV PUSH: CPT

## 2023-11-11 PROCEDURE — 87186 SC STD MICRODIL/AGAR DIL: CPT

## 2023-11-11 PROCEDURE — 84295 ASSAY OF SERUM SODIUM: CPT

## 2023-11-11 PROCEDURE — 99285 EMERGENCY DEPT VISIT HI MDM: CPT

## 2023-11-11 PROCEDURE — 85025 COMPLETE CBC W/AUTO DIFF WBC: CPT

## 2023-11-11 PROCEDURE — 83880 ASSAY OF NATRIURETIC PEPTIDE: CPT

## 2023-11-11 PROCEDURE — 84132 ASSAY OF SERUM POTASSIUM: CPT

## 2023-11-11 PROCEDURE — 82330 ASSAY OF CALCIUM: CPT

## 2023-11-11 PROCEDURE — 85014 HEMATOCRIT: CPT

## 2023-11-11 PROCEDURE — 80307 DRUG TEST PRSMV CHEM ANLYZR: CPT

## 2023-11-11 PROCEDURE — 80053 COMPREHEN METABOLIC PANEL: CPT

## 2023-11-11 PROCEDURE — 82803 BLOOD GASES ANY COMBINATION: CPT

## 2023-11-11 PROCEDURE — 81001 URINALYSIS AUTO W/SCOPE: CPT

## 2023-11-11 PROCEDURE — 71045 X-RAY EXAM CHEST 1 VIEW: CPT

## 2023-11-11 PROCEDURE — 87040 BLOOD CULTURE FOR BACTERIA: CPT

## 2023-11-11 PROCEDURE — 99223 1ST HOSP IP/OBS HIGH 75: CPT | Performed by: PHYSICIAN ASSISTANT

## 2023-11-11 PROCEDURE — 84443 ASSAY THYROID STIM HORMONE: CPT

## 2023-11-11 PROCEDURE — 1100000000 HC RM PRIVATE

## 2023-11-11 PROCEDURE — 94660 CPAP INITIATION&MGMT: CPT

## 2023-11-11 PROCEDURE — 82962 GLUCOSE BLOOD TEST: CPT

## 2023-11-11 PROCEDURE — 85379 FIBRIN DEGRADATION QUANT: CPT

## 2023-11-11 PROCEDURE — 87077 CULTURE AEROBIC IDENTIFY: CPT

## 2023-11-11 PROCEDURE — 82947 ASSAY GLUCOSE BLOOD QUANT: CPT

## 2023-11-11 PROCEDURE — 84484 ASSAY OF TROPONIN QUANT: CPT

## 2023-11-11 PROCEDURE — 80143 DRUG ASSAY ACETAMINOPHEN: CPT

## 2023-11-11 PROCEDURE — 82077 ASSAY SPEC XCP UR&BREATH IA: CPT

## 2023-11-11 PROCEDURE — 93005 ELECTROCARDIOGRAM TRACING: CPT | Performed by: STUDENT IN AN ORGANIZED HEALTH CARE EDUCATION/TRAINING PROGRAM

## 2023-11-11 PROCEDURE — 0202U NFCT DS 22 TRGT SARS-COV-2: CPT

## 2023-11-11 RX ORDER — ONDANSETRON 2 MG/ML
INJECTION INTRAMUSCULAR; INTRAVENOUS
Status: COMPLETED
Start: 2023-11-11 | End: 2023-11-11

## 2023-11-11 RX ORDER — SODIUM CHLORIDE 0.9 % (FLUSH) 0.9 %
5-40 SYRINGE (ML) INJECTION EVERY 12 HOURS SCHEDULED
Status: DISCONTINUED | OUTPATIENT
Start: 2023-11-11 | End: 2023-11-14 | Stop reason: HOSPADM

## 2023-11-11 RX ORDER — NALOXONE HYDROCHLORIDE 0.4 MG/ML
0.4 INJECTION, SOLUTION INTRAMUSCULAR; INTRAVENOUS; SUBCUTANEOUS PRN
Status: DISCONTINUED | OUTPATIENT
Start: 2023-11-11 | End: 2023-11-14 | Stop reason: HOSPADM

## 2023-11-11 RX ORDER — ACETAMINOPHEN 325 MG/1
650 TABLET ORAL EVERY 6 HOURS PRN
Status: DISCONTINUED | OUTPATIENT
Start: 2023-11-11 | End: 2023-11-14 | Stop reason: HOSPADM

## 2023-11-11 RX ORDER — ENOXAPARIN SODIUM 100 MG/ML
40 INJECTION SUBCUTANEOUS DAILY
Status: DISCONTINUED | OUTPATIENT
Start: 2023-11-12 | End: 2023-11-14 | Stop reason: HOSPADM

## 2023-11-11 RX ORDER — SODIUM CHLORIDE 9 MG/ML
INJECTION, SOLUTION INTRAVENOUS PRN
Status: DISCONTINUED | OUTPATIENT
Start: 2023-11-11 | End: 2023-11-14 | Stop reason: HOSPADM

## 2023-11-11 RX ORDER — IPRATROPIUM BROMIDE AND ALBUTEROL SULFATE 2.5; .5 MG/3ML; MG/3ML
1 SOLUTION RESPIRATORY (INHALATION) EVERY 4 HOURS PRN
Status: DISCONTINUED | OUTPATIENT
Start: 2023-11-11 | End: 2023-11-14 | Stop reason: HOSPADM

## 2023-11-11 RX ORDER — ACETAMINOPHEN 650 MG/1
650 SUPPOSITORY RECTAL EVERY 6 HOURS PRN
Status: DISCONTINUED | OUTPATIENT
Start: 2023-11-11 | End: 2023-11-14 | Stop reason: HOSPADM

## 2023-11-11 RX ORDER — ONDANSETRON 2 MG/ML
4 INJECTION INTRAMUSCULAR; INTRAVENOUS EVERY 6 HOURS PRN
Status: DISCONTINUED | OUTPATIENT
Start: 2023-11-11 | End: 2023-11-14 | Stop reason: HOSPADM

## 2023-11-11 RX ORDER — POLYETHYLENE GLYCOL 3350 17 G/17G
17 POWDER, FOR SOLUTION ORAL DAILY PRN
Status: DISCONTINUED | OUTPATIENT
Start: 2023-11-11 | End: 2023-11-14 | Stop reason: HOSPADM

## 2023-11-11 RX ORDER — NALOXONE HYDROCHLORIDE 1 MG/ML
2 INJECTION INTRAMUSCULAR; INTRAVENOUS; SUBCUTANEOUS
Status: COMPLETED | OUTPATIENT
Start: 2023-11-11 | End: 2023-11-11

## 2023-11-11 RX ADMIN — AZITHROMYCIN MONOHYDRATE 500 MG: 500 INJECTION, POWDER, LYOPHILIZED, FOR SOLUTION INTRAVENOUS at 18:40

## 2023-11-11 RX ADMIN — NALOXONE HYDROCHLORIDE 2 MG: 1 INJECTION PARENTERAL at 19:44

## 2023-11-11 RX ADMIN — WATER 1000 MG: 1 INJECTION INTRAMUSCULAR; INTRAVENOUS; SUBCUTANEOUS at 18:41

## 2023-11-11 RX ADMIN — ONDANSETRON 4 MG: 2 INJECTION INTRAMUSCULAR; INTRAVENOUS at 19:51

## 2023-11-11 ASSESSMENT — LIFESTYLE VARIABLES
HOW OFTEN DO YOU HAVE A DRINK CONTAINING ALCOHOL: NEVER
HOW MANY STANDARD DRINKS CONTAINING ALCOHOL DO YOU HAVE ON A TYPICAL DAY: PATIENT DOES NOT DRINK

## 2023-11-11 ASSESSMENT — PAIN - FUNCTIONAL ASSESSMENT: PAIN_FUNCTIONAL_ASSESSMENT: NONE - DENIES PAIN

## 2023-11-11 NOTE — ED NOTES
Pt arrived via EMS to the facility for altered mental status. She was given 2 mgs of Narcan en route. She was ANOx4 on arrival. She has a prescription of tramadol and the  has a prescription of Vicodin. She denies taking any medications. Assumed care of the patient and she was placed on cardiac monitor. An EKG was completed and a rainbow was drawn for blood work. She had a straight catheter placed in to obtain a urine sample. Her SPO2 was at 88 on room air and she was placed on 2 lts of oxygen to help mend her saturation. Currently, a respiratory therapist is bedside and the patient is placed on a Bi-PAP with 18/10 and 30% of FIO2. Ilene Rodriguez RN  11/11/23 3351

## 2023-11-11 NOTE — ED PROVIDER NOTES
status, unspecified altered mental status type    2. Opiate overdose, undetermined intent, initial encounter ([address])    3. Acute cystitis without hematuria    4. Acute respiratory failure with hypoxia and hypercapnia (HCC)        Disposition: TBD    Disclaimer: Sections of this note are dictated using utilizing voice recognition software. Minor typographical errors may be present. If questions arise, please do not hesitate to contact me or call our department.          Fredy Peña, DO  11/12/23 7319

## 2023-11-11 NOTE — ED NOTES
Pt transported to CT scan on 4 liters NC. New orders noted will collect additional lab orders once return.       Andree Gonzalez RN  11/11/23 9284

## 2023-11-12 PROBLEM — R41.82 ALTERED MENTAL STATUS: Status: ACTIVE | Noted: 2023-11-12

## 2023-11-12 PROBLEM — T40.601A OPIATE OVERDOSE (HCC): Status: ACTIVE | Noted: 2023-11-12

## 2023-11-12 LAB
ANION GAP SERPL CALC-SCNC: 3 MMOL/L (ref 3–18)
BASOPHILS # BLD: 0 K/UL (ref 0–0.1)
BASOPHILS NFR BLD: 0 % (ref 0–2)
BUN SERPL-MCNC: 19 MG/DL (ref 7–18)
BUN/CREAT SERPL: 26 (ref 12–20)
CALCIUM SERPL-MCNC: 9.3 MG/DL (ref 8.5–10.1)
CHLORIDE SERPL-SCNC: 105 MMOL/L (ref 100–111)
CO2 SERPL-SCNC: 31 MMOL/L (ref 21–32)
CREAT SERPL-MCNC: 0.74 MG/DL (ref 0.6–1.3)
DIFFERENTIAL METHOD BLD: ABNORMAL
EKG ATRIAL RATE: 82 BPM
EKG DIAGNOSIS: NORMAL
EKG P AXIS: 46 DEGREES
EKG P-R INTERVAL: 204 MS
EKG Q-T INTERVAL: 374 MS
EKG QRS DURATION: 82 MS
EKG QTC CALCULATION (BAZETT): 436 MS
EKG R AXIS: -21 DEGREES
EKG T AXIS: 68 DEGREES
EKG VENTRICULAR RATE: 82 BPM
EOSINOPHIL # BLD: 0 K/UL (ref 0–0.4)
EOSINOPHIL NFR BLD: 0 % (ref 0–5)
ERYTHROCYTE [DISTWIDTH] IN BLOOD BY AUTOMATED COUNT: 12.2 % (ref 11.6–14.5)
GLUCOSE BLD STRIP.AUTO-MCNC: 131 MG/DL (ref 70–110)
GLUCOSE BLD STRIP.AUTO-MCNC: 173 MG/DL (ref 70–110)
GLUCOSE SERPL-MCNC: 105 MG/DL (ref 74–99)
HCT VFR BLD AUTO: 39.3 % (ref 35–45)
HGB BLD-MCNC: 12.7 G/DL (ref 12–16)
IMM GRANULOCYTES # BLD AUTO: 0 K/UL (ref 0–0.04)
IMM GRANULOCYTES NFR BLD AUTO: 0 % (ref 0–0.5)
LYMPHOCYTES # BLD: 1.5 K/UL (ref 0.9–3.6)
LYMPHOCYTES NFR BLD: 19 % (ref 21–52)
MAGNESIUM SERPL-MCNC: 1.8 MG/DL (ref 1.6–2.6)
MCH RBC QN AUTO: 30 PG (ref 24–34)
MCHC RBC AUTO-ENTMCNC: 32.3 G/DL (ref 31–37)
MCV RBC AUTO: 92.7 FL (ref 78–100)
MONOCYTES # BLD: 0.6 K/UL (ref 0.05–1.2)
MONOCYTES NFR BLD: 8 % (ref 3–10)
NEUTS SEG # BLD: 5.5 K/UL (ref 1.8–8)
NEUTS SEG NFR BLD: 72 % (ref 40–73)
NRBC # BLD: 0 K/UL (ref 0–0.01)
NRBC BLD-RTO: 0 PER 100 WBC
PHOSPHATE SERPL-MCNC: 3.7 MG/DL (ref 2.5–4.9)
PLATELET # BLD AUTO: 175 K/UL (ref 135–420)
PMV BLD AUTO: 8.1 FL (ref 9.2–11.8)
POTASSIUM SERPL-SCNC: 3.5 MMOL/L (ref 3.5–5.5)
RBC # BLD AUTO: 4.24 M/UL (ref 4.2–5.3)
SODIUM SERPL-SCNC: 139 MMOL/L (ref 136–145)
TSH SERPL DL<=0.05 MIU/L-ACNC: 1.26 UIU/ML (ref 0.36–3.74)
WBC # BLD AUTO: 7.7 K/UL (ref 4.6–13.2)

## 2023-11-12 PROCEDURE — 6370000000 HC RX 637 (ALT 250 FOR IP): Performed by: PHYSICIAN ASSISTANT

## 2023-11-12 PROCEDURE — 80048 BASIC METABOLIC PNL TOTAL CA: CPT

## 2023-11-12 PROCEDURE — 2700000000 HC OXYGEN THERAPY PER DAY

## 2023-11-12 PROCEDURE — 6360000002 HC RX W HCPCS: Performed by: PHYSICIAN ASSISTANT

## 2023-11-12 PROCEDURE — 85025 COMPLETE CBC W/AUTO DIFF WBC: CPT

## 2023-11-12 PROCEDURE — 2000000000 HC ICU R&B

## 2023-11-12 PROCEDURE — A4216 STERILE WATER/SALINE, 10 ML: HCPCS | Performed by: REGISTERED NURSE

## 2023-11-12 PROCEDURE — 83735 ASSAY OF MAGNESIUM: CPT

## 2023-11-12 PROCEDURE — 99291 CRITICAL CARE FIRST HOUR: CPT | Performed by: INTERNAL MEDICINE

## 2023-11-12 PROCEDURE — 36415 COLL VENOUS BLD VENIPUNCTURE: CPT

## 2023-11-12 PROCEDURE — 82962 GLUCOSE BLOOD TEST: CPT

## 2023-11-12 PROCEDURE — 93010 ELECTROCARDIOGRAM REPORT: CPT | Performed by: INTERNAL MEDICINE

## 2023-11-12 PROCEDURE — 6370000000 HC RX 637 (ALT 250 FOR IP): Performed by: REGISTERED NURSE

## 2023-11-12 PROCEDURE — 6360000002 HC RX W HCPCS: Performed by: REGISTERED NURSE

## 2023-11-12 PROCEDURE — 84100 ASSAY OF PHOSPHORUS: CPT

## 2023-11-12 PROCEDURE — 99233 SBSQ HOSP IP/OBS HIGH 50: CPT | Performed by: PHYSICIAN ASSISTANT

## 2023-11-12 PROCEDURE — 2580000003 HC RX 258: Performed by: REGISTERED NURSE

## 2023-11-12 PROCEDURE — 94761 N-INVAS EAR/PLS OXIMETRY MLT: CPT

## 2023-11-12 PROCEDURE — 92526 ORAL FUNCTION THERAPY: CPT

## 2023-11-12 PROCEDURE — 92610 EVALUATE SWALLOWING FUNCTION: CPT

## 2023-11-12 PROCEDURE — 2500000003 HC RX 250 WO HCPCS: Performed by: REGISTERED NURSE

## 2023-11-12 RX ORDER — SPIRONOLACTONE 50 MG/1
75 TABLET, FILM COATED ORAL DAILY
Status: ON HOLD | COMMUNITY
End: 2023-11-14 | Stop reason: HOSPADM

## 2023-11-12 RX ORDER — INSULIN LISPRO 100 [IU]/ML
0-4 INJECTION, SOLUTION INTRAVENOUS; SUBCUTANEOUS NIGHTLY
Status: DISCONTINUED | OUTPATIENT
Start: 2023-11-12 | End: 2023-11-14 | Stop reason: HOSPADM

## 2023-11-12 RX ORDER — ATORVASTATIN CALCIUM 40 MG/1
40 TABLET, FILM COATED ORAL NIGHTLY
Status: DISCONTINUED | OUTPATIENT
Start: 2023-11-12 | End: 2023-11-14 | Stop reason: HOSPADM

## 2023-11-12 RX ORDER — MAGNESIUM SULFATE IN WATER 40 MG/ML
2000 INJECTION, SOLUTION INTRAVENOUS ONCE
Status: COMPLETED | OUTPATIENT
Start: 2023-11-12 | End: 2023-11-12

## 2023-11-12 RX ORDER — DEXTROSE MONOHYDRATE 100 MG/ML
INJECTION, SOLUTION INTRAVENOUS CONTINUOUS PRN
Status: DISCONTINUED | OUTPATIENT
Start: 2023-11-12 | End: 2023-11-14 | Stop reason: HOSPADM

## 2023-11-12 RX ORDER — INSULIN LISPRO 100 [IU]/ML
0-8 INJECTION, SOLUTION INTRAVENOUS; SUBCUTANEOUS
Status: DISCONTINUED | OUTPATIENT
Start: 2023-11-12 | End: 2023-11-14 | Stop reason: HOSPADM

## 2023-11-12 RX ORDER — LIDOCAINE 50 MG/G
1 PATCH TOPICAL DAILY
COMMUNITY

## 2023-11-12 RX ORDER — ARIPIPRAZOLE 5 MG/1
5 TABLET ORAL DAILY
Status: DISCONTINUED | OUTPATIENT
Start: 2023-11-13 | End: 2023-11-14 | Stop reason: HOSPADM

## 2023-11-12 RX ADMIN — ONDANSETRON 4 MG: 2 INJECTION INTRAMUSCULAR; INTRAVENOUS at 22:41

## 2023-11-12 RX ADMIN — SODIUM CHLORIDE, PRESERVATIVE FREE 10 ML: 5 INJECTION INTRAVENOUS at 01:00

## 2023-11-12 RX ADMIN — ENOXAPARIN SODIUM 40 MG: 100 INJECTION SUBCUTANEOUS at 08:49

## 2023-11-12 RX ADMIN — NALOXONE HYDROCHLORIDE 0.4 MG: 0.4 INJECTION, SOLUTION INTRAMUSCULAR; INTRAVENOUS; SUBCUTANEOUS at 01:15

## 2023-11-12 RX ADMIN — WATER 1000 MG: 1 INJECTION INTRAMUSCULAR; INTRAVENOUS; SUBCUTANEOUS at 17:56

## 2023-11-12 RX ADMIN — FAMOTIDINE 20 MG: 10 INJECTION, SOLUTION INTRAVENOUS at 08:49

## 2023-11-12 RX ADMIN — SODIUM CHLORIDE, PRESERVATIVE FREE 10 ML: 5 INJECTION INTRAVENOUS at 21:27

## 2023-11-12 RX ADMIN — ONDANSETRON 4 MG: 2 INJECTION INTRAMUSCULAR; INTRAVENOUS at 04:40

## 2023-11-12 RX ADMIN — POTASSIUM BICARBONATE 40 MEQ: 782 TABLET, EFFERVESCENT ORAL at 08:49

## 2023-11-12 RX ADMIN — MAGNESIUM SULFATE HEPTAHYDRATE 2000 MG: 40 INJECTION, SOLUTION INTRAVENOUS at 08:49

## 2023-11-12 RX ADMIN — ATORVASTATIN CALCIUM 40 MG: 40 TABLET, FILM COATED ORAL at 21:27

## 2023-11-12 RX ADMIN — SODIUM CHLORIDE, PRESERVATIVE FREE 10 ML: 5 INJECTION INTRAVENOUS at 08:49

## 2023-11-12 NOTE — ED NOTES
Patient awaken she is alert and respond to this writer appropriately. She was told that she would be moving to another floor patient stated \" I don't want to be here\" continuing one to one status as ordered and reality testing along with reality orientations as needed.

## 2023-11-12 NOTE — ED NOTES
Patient signed out to me as a patient on BiPAP with CO2 narcosis and possible opioid overdose given Narcan. Is currently on BiPAP. When I went to evaluate the patient she was sleepy but is alert and answering my questions. She is no physical complaints or concerns. Head CT comes back unremarkable. Urine comes back showing an infection and patient started on antibiotics. Chest x-ray although it looks like fluid overload was read as possible pneumonia and was started on antibiotics, pneumonia. We will send off culture and lactic. Admitted the patient to the hospitalist, who requested that we add on a D-dimer and a procalcitonin. However when they went to evaluate the patient she was in a recurrent opioid toxidrome at approximately 3 hours since her initial Narcan. She was given an additional dose of Narcan and did clinically improve. After this occurred patient came forward and admitted to taking approximately 15 tablets of which she called vicodin to help with pain. She denies any suicidal or homicidal ideations. Tylenol level was added. Hospitalist requested ICU consult. Tylenol level came back negative. BMP within normal limits. D-dimer is slightly elevated at 1.1 however at this point she does not have any signs of a PE and I believe this is more so related to an opioid toxidrome. Will defer to admitting specialist if they would like to add on a CTA but this point I do not feel it is indicated at this time. UDS is positive for opioids. ICU was consulted and they will accept the patient.      Juli Najjar, MD  11/11/23 2442

## 2023-11-12 NOTE — PLAN OF CARE
SPEECH LANGUAGE PATHOLOGY BEDSIDE SWALLOW EVALUATION/TREATMENT    Patient: Janis Corona (67 y.o. female)  Date: 11/12/2023  Primary Diagnosis: AMS (altered mental status) [R41.82]  Opiate overdose, accidental or unintentional, initial encounter ([address]) [T40.601A]  Acute encephalopathy [G93.40]       Precautions: Aspiration  PLOF: As per H&P  ASSESSMENT:  Based on the objective data described below, the patient presents with mild oropharyngeal dysphagia. Chart Review and clearance obtained by RN. Today, SLP conducted a Clinical Beside Swallow Evaluation, per MD orders. Pt A&Ox2-3 and able to follow commands with redirection d/t being lethargic. Speech/voice essentially WFLs with low volume. Oral mech examination revealed structures functional for speech and deglutition. Pt observed with thin liquids +/- straw via single sips and successive swallows with timely swallow initiation, adequate laryngeal elevation to palpation and no overt s/sx aspiration. Pt demo'd acceptance of puree with delayed oral manipulation/formation, but 0 s/sx of aspiration and 0 c/o odynophagia/globus sensation . Positive rotary chew and thorough oral clearance following increased time for bolus formation  was observed with regular solids with ~ 25% lingual spread post swallow. Pt easily able to clear all residuals give min cues to alternate solids liquids;  0 s/sx aspiration witnessed. SLP RECS: Regular diet with thin liquids WHEN ALERT with supervision and alternating solids/liquids 1:1, meds as tolerated  with oral care TID and standard aspiration precautions. ST will continue to follow. Pt educated with regard to s/sx aspiration, aspiration risk, diet recs and role of SLP. Pt  D/w RN, Margie Go.     TREATMENT:  Skilled therapy initiated; Educated patient on aspiration precautions and importance of compensatory swallow techniques to decrease aspiration risk (decrease rate of intake & sip/bite size, upright @HOB for all po

## 2023-11-12 NOTE — ED NOTES
Pt arouses easily to voice. Resting with her eyes closed on Bipap.       Juancarlos Wilson, MADAN  11/11/23 6047

## 2023-11-12 NOTE — ED NOTES
TRANSFER - OUT REPORT:    Verbal report given to Bala Miles RN  on Holli Vasques  being transferred to [phone] for routine progression of patient care       Report consisted of patient's Situation, Background, Assessment and   Recommendations(SBAR). Information from the following report(s) Nurse Handoff Report, MAR, and Recent Results was reviewed with the receiving nurse. [place] Assessment:    Presents to emergency department  because of falls (Syncope, seizure, or loss of consciousness): No  Age > 70: Yes  Altered Mental Status, Intoxication with alcohol or substance confusion (Disorientation, impaired judgment, poor safety awaremess, or inability to follow instructions): Yes  Impaired Mobility: Ambulates or transfers with assistive devices or assistance; Unable to ambulate or transer.: Yes  Nursing Judgement: Yes          Lines:   Peripheral IV 11/11/23 Proximal;Right; Anterior Antecubital (Active)       Peripheral IV 11/11/23 Left Antecubital (Active)        Opportunity for questions and clarification was provided.       Patient transported with:  Channing Santana, [address]  11/12/23 2444

## 2023-11-12 NOTE — ED NOTES
Pt on bipap and not awake to sternal  rub. Provider requested 2 mg of Narcan. After administration pt more awake and talking. Pt admits to taking 15 \"Vicodin\" around noon today. States she has had the pills for a while. Denies an attempt to kill herself states she wanted to take her chronic pain away. But states she has thought about killing herself this month. Pt placed on suicide precautions with 1:1 sitter and family at the bedside.       Jacek Ramirez RN  11/11/23 2007

## 2023-11-12 NOTE — ED NOTES
Patient received to this writer @ 23:15 on one to one status as ordered for suicidal attempted  by medication over dose . She is observed lying quietly while being maintain on bipap ventilator. Writer was instructed to try to arouse patient @ intervals and assess patient 's mental status in which she does responds to questions asked and then returns back asleep. No physical discomfort noted nor voiced @ this time. Will continue one to one status as ordered and provide a safe and structured environment. attempt to awake patient @ intervals and assess mental status and report any physical or mental changes to primary care nurse.

## 2023-11-13 PROBLEM — Z51.5 PALLIATIVE CARE ENCOUNTER: Status: ACTIVE | Noted: 2023-11-13

## 2023-11-13 PROBLEM — Z71.89 DNR (DO NOT RESUSCITATE) DISCUSSION: Status: ACTIVE | Noted: 2023-11-13

## 2023-11-13 PROBLEM — M54.9 CHRONIC MIDLINE BACK PAIN: Status: ACTIVE | Noted: 2023-11-13

## 2023-11-13 PROBLEM — Z71.89 GOALS OF CARE, COUNSELING/DISCUSSION: Status: ACTIVE | Noted: 2023-11-13

## 2023-11-13 PROBLEM — R52 PAIN MANAGEMENT: Status: ACTIVE | Noted: 2023-11-13

## 2023-11-13 PROBLEM — G89.29 CHRONIC MIDLINE BACK PAIN: Status: ACTIVE | Noted: 2023-11-13

## 2023-11-13 LAB
ANION GAP SERPL CALC-SCNC: 3 MMOL/L (ref 3–18)
BASOPHILS # BLD: 0 K/UL (ref 0–0.1)
BASOPHILS NFR BLD: 0 % (ref 0–2)
BUN SERPL-MCNC: 17 MG/DL (ref 7–18)
BUN/CREAT SERPL: 22 (ref 12–20)
CALCIUM SERPL-MCNC: 8.9 MG/DL (ref 8.5–10.1)
CHLORIDE SERPL-SCNC: 101 MMOL/L (ref 100–111)
CO2 SERPL-SCNC: 31 MMOL/L (ref 21–32)
CREAT SERPL-MCNC: 0.79 MG/DL (ref 0.6–1.3)
DIFFERENTIAL METHOD BLD: ABNORMAL
EOSINOPHIL # BLD: 0.2 K/UL (ref 0–0.4)
EOSINOPHIL NFR BLD: 2 % (ref 0–5)
ERYTHROCYTE [DISTWIDTH] IN BLOOD BY AUTOMATED COUNT: 11.9 % (ref 11.6–14.5)
EST. AVERAGE GLUCOSE BLD GHB EST-MCNC: 123 MG/DL
GLUCOSE BLD STRIP.AUTO-MCNC: 126 MG/DL (ref 70–110)
GLUCOSE BLD STRIP.AUTO-MCNC: 129 MG/DL (ref 70–110)
GLUCOSE BLD STRIP.AUTO-MCNC: 141 MG/DL (ref 70–110)
GLUCOSE SERPL-MCNC: 132 MG/DL (ref 74–99)
HBA1C MFR BLD: 5.9 % (ref 4.2–5.6)
HCT VFR BLD AUTO: 37.3 % (ref 35–45)
HGB BLD-MCNC: 12.1 G/DL (ref 12–16)
IMM GRANULOCYTES # BLD AUTO: 0 K/UL (ref 0–0.04)
IMM GRANULOCYTES NFR BLD AUTO: 0 % (ref 0–0.5)
LYMPHOCYTES # BLD: 2.1 K/UL (ref 0.9–3.6)
LYMPHOCYTES NFR BLD: 23 % (ref 21–52)
MAGNESIUM SERPL-MCNC: 1.9 MG/DL (ref 1.6–2.6)
MCH RBC QN AUTO: 30 PG (ref 24–34)
MCHC RBC AUTO-ENTMCNC: 32.4 G/DL (ref 31–37)
MCV RBC AUTO: 92.6 FL (ref 78–100)
MONOCYTES # BLD: 0.8 K/UL (ref 0.05–1.2)
MONOCYTES NFR BLD: 9 % (ref 3–10)
NEUTS SEG # BLD: 6 K/UL (ref 1.8–8)
NEUTS SEG NFR BLD: 66 % (ref 40–73)
NRBC # BLD: 0 K/UL (ref 0–0.01)
NRBC BLD-RTO: 0 PER 100 WBC
PHOSPHATE SERPL-MCNC: 2.1 MG/DL (ref 2.5–4.9)
PLATELET # BLD AUTO: 182 K/UL (ref 135–420)
PMV BLD AUTO: 8.4 FL (ref 9.2–11.8)
POTASSIUM SERPL-SCNC: 3.9 MMOL/L (ref 3.5–5.5)
RBC # BLD AUTO: 4.03 M/UL (ref 4.2–5.3)
SODIUM SERPL-SCNC: 135 MMOL/L (ref 136–145)
WBC # BLD AUTO: 9.2 K/UL (ref 4.6–13.2)

## 2023-11-13 PROCEDURE — 80048 BASIC METABOLIC PNL TOTAL CA: CPT

## 2023-11-13 PROCEDURE — 99232 SBSQ HOSP IP/OBS MODERATE 35: CPT | Performed by: INTERNAL MEDICINE

## 2023-11-13 PROCEDURE — 6360000002 HC RX W HCPCS: Performed by: REGISTERED NURSE

## 2023-11-13 PROCEDURE — 6370000000 HC RX 637 (ALT 250 FOR IP): Performed by: PHYSICIAN ASSISTANT

## 2023-11-13 PROCEDURE — 2580000003 HC RX 258: Performed by: REGISTERED NURSE

## 2023-11-13 PROCEDURE — 84100 ASSAY OF PHOSPHORUS: CPT

## 2023-11-13 PROCEDURE — 2000000000 HC ICU R&B

## 2023-11-13 PROCEDURE — 82962 GLUCOSE BLOOD TEST: CPT

## 2023-11-13 PROCEDURE — 83735 ASSAY OF MAGNESIUM: CPT

## 2023-11-13 PROCEDURE — 94761 N-INVAS EAR/PLS OXIMETRY MLT: CPT

## 2023-11-13 PROCEDURE — 6370000000 HC RX 637 (ALT 250 FOR IP): Performed by: INTERNAL MEDICINE

## 2023-11-13 PROCEDURE — 99223 1ST HOSP IP/OBS HIGH 75: CPT | Performed by: INTERNAL MEDICINE

## 2023-11-13 PROCEDURE — 99233 SBSQ HOSP IP/OBS HIGH 50: CPT | Performed by: PSYCHIATRY & NEUROLOGY

## 2023-11-13 PROCEDURE — 94660 CPAP INITIATION&MGMT: CPT

## 2023-11-13 PROCEDURE — 6360000002 HC RX W HCPCS: Performed by: PHYSICIAN ASSISTANT

## 2023-11-13 PROCEDURE — 36415 COLL VENOUS BLD VENIPUNCTURE: CPT

## 2023-11-13 PROCEDURE — 83036 HEMOGLOBIN GLYCOSYLATED A1C: CPT

## 2023-11-13 PROCEDURE — 2500000003 HC RX 250 WO HCPCS: Performed by: REGISTERED NURSE

## 2023-11-13 PROCEDURE — A4216 STERILE WATER/SALINE, 10 ML: HCPCS | Performed by: REGISTERED NURSE

## 2023-11-13 PROCEDURE — 85025 COMPLETE CBC W/AUTO DIFF WBC: CPT

## 2023-11-13 RX ORDER — LIDOCAINE 4 G/G
1 PATCH TOPICAL DAILY
Status: DISCONTINUED | OUTPATIENT
Start: 2023-11-13 | End: 2023-11-14 | Stop reason: HOSPADM

## 2023-11-13 RX ORDER — FAMOTIDINE 20 MG/1
20 TABLET, FILM COATED ORAL DAILY
Status: DISCONTINUED | OUTPATIENT
Start: 2023-11-14 | End: 2023-11-14 | Stop reason: HOSPADM

## 2023-11-13 RX ORDER — MELOXICAM 7.5 MG/1
7.5 TABLET ORAL DAILY
Status: DISCONTINUED | OUTPATIENT
Start: 2023-11-13 | End: 2023-11-14 | Stop reason: HOSPADM

## 2023-11-13 RX ORDER — TRAMADOL HYDROCHLORIDE 50 MG/1
50 TABLET ORAL EVERY 6 HOURS PRN
Status: DISCONTINUED | OUTPATIENT
Start: 2023-11-13 | End: 2023-11-14 | Stop reason: HOSPADM

## 2023-11-13 RX ADMIN — POTASSIUM & SODIUM PHOSPHATES POWDER PACK 280-160-250 MG 250 MG: 280-160-250 PACK at 21:55

## 2023-11-13 RX ADMIN — SODIUM CHLORIDE, PRESERVATIVE FREE 10 ML: 5 INJECTION INTRAVENOUS at 22:57

## 2023-11-13 RX ADMIN — ATORVASTATIN CALCIUM 40 MG: 40 TABLET, FILM COATED ORAL at 21:01

## 2023-11-13 RX ADMIN — SODIUM CHLORIDE, PRESERVATIVE FREE 10 ML: 5 INJECTION INTRAVENOUS at 08:24

## 2023-11-13 RX ADMIN — ONDANSETRON 4 MG: 2 INJECTION INTRAMUSCULAR; INTRAVENOUS at 20:59

## 2023-11-13 RX ADMIN — FAMOTIDINE 20 MG: 10 INJECTION, SOLUTION INTRAVENOUS at 08:23

## 2023-11-13 RX ADMIN — ENOXAPARIN SODIUM 40 MG: 100 INJECTION SUBCUTANEOUS at 08:30

## 2023-11-13 RX ADMIN — WATER 1000 MG: 1 INJECTION INTRAMUSCULAR; INTRAVENOUS; SUBCUTANEOUS at 18:30

## 2023-11-13 RX ADMIN — SERTRALINE 200 MG: 50 TABLET, FILM COATED ORAL at 08:23

## 2023-11-13 RX ADMIN — ONDANSETRON 4 MG: 2 INJECTION INTRAMUSCULAR; INTRAVENOUS at 15:11

## 2023-11-13 RX ADMIN — MELOXICAM 7.5 MG: 7.5 TABLET ORAL at 16:38

## 2023-11-13 RX ADMIN — ARIPIPRAZOLE 5 MG: 5 TABLET ORAL at 08:23

## 2023-11-13 RX ADMIN — ONDANSETRON 4 MG: 2 INJECTION INTRAMUSCULAR; INTRAVENOUS at 08:23

## 2023-11-13 NOTE — CARE COORDINATION
11/13/23 10:15AM  LIZ met with patient's  in ICU waiting room.  requesting psych consult and disclosed patient has history of SI attempts x3. SW referred patient for mental health support to [address] [place] [address], however, per , patient is already receiving medication management and therapy services through [address] [place] [address].  reports he will be setting up follow up appointment when patient is discharged. He also reports patient is over income for Medicaid and LTSS support and requested PCA/sitter resources for patient. Patient was referred to "[employer] [facility] for personal care aide, senior activity centers and day support. SW also notified MD that  requesting psych consult prior to discharge. 11/13/23 9:12AM  Patient is referred for home care via [facility]. Confirmed with [facility] @ ext.[phone]. [facility] is now following patient for disposition needs.     Alex Thompson LMSW   Case Management

## 2023-11-13 NOTE — ACP (ADVANCE CARE PLANNING)
Advance Care Planning       General Advance Care Planning (ACP) Conversation      Date of Conversation: 11/13/2023    Conducted with: Patient, patient's  Irma Avila), Dr. Ludie Osler (Palliative) and this writer. Healthcare Decision Maker:    Patient does not have a formal healthcare decision maker document, on file. Patient's  reported that he brought in a copy of patient's [address] (MPOA). This writer was not able to locate the documents that he reportedly gave to patient's ICU nurse; yesterday.  will bring in the documents again, tomorrow. Patient's  Irma Avila, CI#[phone]) is patient's legal next-of-kin and default healthcare decision maker. Content/Action Overview: Has ACP document(s) NOT on file - requested patient to provide    ----------------------------------------------------------------  Advance Care Planning Conversation     The patient and/or family consented to a voluntary Advance Care Planning conversation. Individuals present for the conversation: Patient, patient's  Irma Avila), Dr. Ludie Osler (Palliative) and this writer. Outcome of the conversation and documents completed: This writer, along with Dr. Ludie Osler, with the Palliative Care team; visited with patient today to offer support and to also address healthcare decision makers. Patient was laying in bed and alert and oriented, with some periods of confusion. Patient's  Sandrine Pino was at the bedside. Patient resides with her  and is fairly independent with there ADLs and IADLs. Patient reported that she uses a walker, for mobility purpose; around her house. Patient went on to report that she is always in a lot of pain. She reported that she has tried a variety of pain medications and none seem to really help her pain that much. Dr. Raya Velásquez made a medication recommendation that he will order, for patient.  Hopefully this will help to

## 2023-11-13 NOTE — CARE COORDINATION
Case Management Assessment  Initial Evaluation    Date/Time of Evaluation: 11/13/2023 8:58 AM  Assessment Completed by: Dharmesh De La Cruz    If patient is discharged prior to next notation, then this note serves as note for discharge by case management. Patient Name: Julee Rutherford                   YOB: 1945  Diagnosis: AMS (altered mental status) [R41.82]  Opiate overdose, accidental or unintentional, initial encounter ([address]) [T40.601A]  Acute encephalopathy [G93.40]                   Date / Time: 11/11/2023  4:41 PM    Patient Admission Status: Inpatient   Readmission Risk (Low < 19, Mod (19-27), High > 27): Readmission Risk Score: 12.6    Current PCP: Yaneth Riley MD  PCP verified by CM? (P) Yes    Chart Reviewed: Yes      History Provided by: (P) Patient  Patient Orientation: (P) Alert and Oriented    Patient Cognition: (P) Alert    Hospitalization in the last 30 days (Readmission):  No    If yes, Readmission Assessment in CM Navigator will be completed.     Advance Directives:      Code Status: Full Code   Patient's Primary Decision Maker is: (P) Legal Next of Kin    Primary Decision Maker: Everton Ross - Spouse - [phone]    Discharge Planning:    Patient lives with: (P) Spouse/Significant Other Type of Home: (P) House  Primary Care Giver: (P) Self  Patient Support Systems include: (P) Spouse/Significant Other   Current Financial resources: (P) Medicare  Current community resources:    Current services prior to admission: (P) None            Current DME:  Walker, cane, shower chair, Bipap            Type of Home Care services:  (P) None    ADLS  Prior functional level: (P) Assistance with the following:, Mobility, Cooking, Housework, Shopping  Current functional level: (P) Assistance with the following:, Mobility, Cooking, Housework, Shopping    PT AM-PAC:   /24  OT AM-PAC:   /24    Family can provide assistance at DC: (P) Yes  Would you like Case Management to discuss the discharge Px sent, Message relayed through [employer].

## 2023-11-13 NOTE — CONSULTS
[address] Floor: [phone] ([phone])  [facility]: [phone]     Patient Name: Ben Wheatley  YOB: 1945    Date of Initial Consult: 11/13/23  Reason for Consult: goals of care discussion/ACP/symptoms management  Requesting Provider:  NAA Pond  Primary Care Physician: Radha Yin MD      SUMMARY:     Ben Wheatley is a 77 y.o. with a past history of hypertension, dyslipidemia, chronic back pain, multiple falls, BREANNA, diabetes mellitus, hypertension, bipolar disorder and polypharmacy, who was admitted on 11/11/2023 from home with a diagnosis of acute toxic encephalopathy, opiate overdose, acute hypercapnic and hypoxic respiratory failure. Patient initially presented to hospital with complaint of unresponsiveness and was found by her . She was given Narcan with some improvement. She was initially placed on BiPAP and then was transitioned to nasal cannula followed by room air. Patient admitted that she took 15 tablets of Vicodin. Patient was seen by psychiatrist earlier today who cleared patient from suicidal watch. Current medical issues leading to Palliative Medicine involvement include: To establish goals of care. 11/13/23: Patient was seen in presence of palliative care medical social worker and patient's . Patient is awake. Denies any chest or abdominal pain. Continues to have leg pain and bilateral lower extremity tingling and numbness. No nausea or vomiting. She admits taking medications as she does not want to be a burden for her .      HISTORY:     History obtained from: Patient and medical records    CHIEF COMPLAINT: Drug overdose    HPI/SUBJECTIVE:    The patient is:   [x] Verbal and participatory  [] Non-participatory due to:         PHYSICAL EXAM:     From RN flowsheet:  Wt Readings from Last 3 Encounters:   11/12/23 76.6 kg (168 lb 14 oz)   04/03/23 78.5 kg (173 lb)   10/18/22 87.1
Pneumonia PCR Not detected NOTD      Mycoplasma pneumo by PCR Not detected NOTD     POCT Blood Gas & Electrolytes    Collection Time: 11/11/23  5:42 PM   Result Value Ref Range    POC pH 7.31 (L) 7.35 - 7.45      POC pCO2 57.9 (H) 35.0 - 45.0 MMHG    POC PO2 87 80 - 100 MMHG    POC Ionized Calcium 1.17 1.12 - 1.32 mmol/L    Base Excess 1.0 mmol/L    POC HCO3 28.8 (H) 22 - 26 MMOL/L    POC TCO2 29 (H) 19 - 24 MMOL/L    POC O2 SAT 95 %    Source ARTERIAL      Site RIGHT RADIAL      Kervin Test Positive      DEVICE BIPAP MASK      Mode BILEVEL      FIO2 Arterial 30 %    Respiratory Rate 14      POC PEEP/CPA 10      IPAP/PIP 18      Performed by: Laura Varela     POC Sodium 140 136 - 145 mmol/L    POC Potassium 3.7 3.5 - 5.1 mmol/L    POC Glucose 263 (H) 65 - 100 mg/dL    POC Creatinine 0.73 0.6 - 1.3 mg/dL    POC Lactic Acid 1.42 0.40 - 2.00 mmol/L    POC Chloride 103 98 - 107 mmol/L    Anion Gap, POC Cannot be calculated  Cannot be calculated   10 - 20 mmol/L    eGFR, POC >60 >60 ml/min/1.73m2   Brain Natriuretic Peptide    Collection Time: 11/11/23  5:51 PM   Result Value Ref Range    NT Pro-BNP 66 0 - 1,800 PG/ML   Lactic Acid    Collection Time: 11/11/23  6:15 PM   Result Value Ref Range    Lactic Acid, Plasma 1.6 0.4 - 2.0 MMOL/L       Imaging Reviewed:  XR CHEST 1 VIEW    Result Date: 11/11/2023  ONE VIEW CHEST RADIOGRAPH INDICATION: AMS. Found at home on the floor barely responsive. Confusion. COMPARISON: Chest x-ray 3/25/2018. TECHNIQUE: AP view of the chest FINDINGS:  LINES/TUBES: None. MEDIASTINUM: Cardiac silhouette is within normal limits. LUNGS: Streaky opacities at the lung bases. No pleural effusion. No pneumothorax. BONES/OTHER: Bilateral shoulder arthroplasties. Streaky opacities at the lung bases are favored atelectasis, pneumonia not excluded.           Assessment/Plan       Acute toxic/metabolic encephalopathy: due to opioid overdose primarily, likely additional infectious and Co2 narcosis

## 2023-11-14 ENCOUNTER — HOME HEALTH ADMISSION (OUTPATIENT)
Age: 78
End: 2023-11-14
Payer: MEDICARE

## 2023-11-14 VITALS
HEART RATE: 75 BPM | HEIGHT: 60 IN | SYSTOLIC BLOOD PRESSURE: 146 MMHG | RESPIRATION RATE: 16 BRPM | DIASTOLIC BLOOD PRESSURE: 70 MMHG | BODY MASS INDEX: 33.15 KG/M2 | WEIGHT: 168.87 LBS | OXYGEN SATURATION: 94 % | TEMPERATURE: 97.3 F

## 2023-11-14 LAB
ANION GAP SERPL CALC-SCNC: 3 MMOL/L (ref 3–18)
BACTERIA SPEC CULT: ABNORMAL
BASOPHILS # BLD: 0 K/UL (ref 0–0.1)
BASOPHILS NFR BLD: 0 % (ref 0–2)
BUN SERPL-MCNC: 18 MG/DL (ref 7–18)
BUN/CREAT SERPL: 25 (ref 12–20)
CALCIUM SERPL-MCNC: 9.4 MG/DL (ref 8.5–10.1)
CC UR VC: ABNORMAL
CHLORIDE SERPL-SCNC: 101 MMOL/L (ref 100–111)
CO2 SERPL-SCNC: 31 MMOL/L (ref 21–32)
CREAT SERPL-MCNC: 0.73 MG/DL (ref 0.6–1.3)
DIFFERENTIAL METHOD BLD: ABNORMAL
EOSINOPHIL # BLD: 0.3 K/UL (ref 0–0.4)
EOSINOPHIL NFR BLD: 3 % (ref 0–5)
ERYTHROCYTE [DISTWIDTH] IN BLOOD BY AUTOMATED COUNT: 11.9 % (ref 11.6–14.5)
GLUCOSE SERPL-MCNC: 124 MG/DL (ref 74–99)
HCT VFR BLD AUTO: 38.1 % (ref 35–45)
HGB BLD-MCNC: 12.4 G/DL (ref 12–16)
IMM GRANULOCYTES # BLD AUTO: 0 K/UL (ref 0–0.04)
IMM GRANULOCYTES NFR BLD AUTO: 0 % (ref 0–0.5)
LYMPHOCYTES # BLD: 2.2 K/UL (ref 0.9–3.6)
LYMPHOCYTES NFR BLD: 23 % (ref 21–52)
MAGNESIUM SERPL-MCNC: 1.8 MG/DL (ref 1.6–2.6)
MCH RBC QN AUTO: 29.8 PG (ref 24–34)
MCHC RBC AUTO-ENTMCNC: 32.5 G/DL (ref 31–37)
MCV RBC AUTO: 91.6 FL (ref 78–100)
MONOCYTES # BLD: 0.9 K/UL (ref 0.05–1.2)
MONOCYTES NFR BLD: 9 % (ref 3–10)
NEUTS SEG # BLD: 6.2 K/UL (ref 1.8–8)
NEUTS SEG NFR BLD: 65 % (ref 40–73)
NRBC # BLD: 0 K/UL (ref 0–0.01)
NRBC BLD-RTO: 0 PER 100 WBC
PHOSPHATE SERPL-MCNC: 3.4 MG/DL (ref 2.5–4.9)
PLATELET # BLD AUTO: 172 K/UL (ref 135–420)
PMV BLD AUTO: 8.7 FL (ref 9.2–11.8)
POTASSIUM SERPL-SCNC: 3.7 MMOL/L (ref 3.5–5.5)
RBC # BLD AUTO: 4.16 M/UL (ref 4.2–5.3)
SERVICE CMNT-IMP: ABNORMAL
SODIUM SERPL-SCNC: 135 MMOL/L (ref 136–145)
WBC # BLD AUTO: 9.5 K/UL (ref 4.6–13.2)

## 2023-11-14 PROCEDURE — 6360000002 HC RX W HCPCS: Performed by: REGISTERED NURSE

## 2023-11-14 PROCEDURE — 6370000000 HC RX 637 (ALT 250 FOR IP): Performed by: PHYSICIAN ASSISTANT

## 2023-11-14 PROCEDURE — 6370000000 HC RX 637 (ALT 250 FOR IP): Performed by: INTERNAL MEDICINE

## 2023-11-14 PROCEDURE — 99239 HOSP IP/OBS DSCHRG MGMT >30: CPT | Performed by: INTERNAL MEDICINE

## 2023-11-14 PROCEDURE — 94660 CPAP INITIATION&MGMT: CPT

## 2023-11-14 PROCEDURE — 83735 ASSAY OF MAGNESIUM: CPT

## 2023-11-14 PROCEDURE — 2580000003 HC RX 258: Performed by: REGISTERED NURSE

## 2023-11-14 PROCEDURE — 97162 PT EVAL MOD COMPLEX 30 MIN: CPT

## 2023-11-14 PROCEDURE — 85025 COMPLETE CBC W/AUTO DIFF WBC: CPT

## 2023-11-14 PROCEDURE — 36415 COLL VENOUS BLD VENIPUNCTURE: CPT

## 2023-11-14 PROCEDURE — 84100 ASSAY OF PHOSPHORUS: CPT

## 2023-11-14 PROCEDURE — 80048 BASIC METABOLIC PNL TOTAL CA: CPT

## 2023-11-14 RX ORDER — ARIPIPRAZOLE 5 MG/1
5 TABLET ORAL DAILY
Qty: 30 TABLET | Refills: 0 | Status: SHIPPED | OUTPATIENT
Start: 2023-11-14

## 2023-11-14 RX ORDER — SERTRALINE HYDROCHLORIDE 100 MG/1
200 TABLET, FILM COATED ORAL DAILY
Qty: 30 TABLET | Refills: 3 | Status: SHIPPED | OUTPATIENT
Start: 2023-11-15

## 2023-11-14 RX ORDER — NALOXONE HYDROCHLORIDE 4 MG/.1ML
1 SPRAY NASAL PRN
Qty: 1 EACH | Refills: 3 | Status: SHIPPED | OUTPATIENT
Start: 2023-11-14

## 2023-11-14 RX ADMIN — MELOXICAM 7.5 MG: 7.5 TABLET ORAL at 07:57

## 2023-11-14 RX ADMIN — ENOXAPARIN SODIUM 40 MG: 100 INJECTION SUBCUTANEOUS at 07:57

## 2023-11-14 RX ADMIN — ARIPIPRAZOLE 5 MG: 5 TABLET ORAL at 07:57

## 2023-11-14 RX ADMIN — SERTRALINE 200 MG: 50 TABLET, FILM COATED ORAL at 07:57

## 2023-11-14 RX ADMIN — SODIUM CHLORIDE, PRESERVATIVE FREE 10 ML: 5 INJECTION INTRAVENOUS at 07:58

## 2023-11-14 RX ADMIN — FAMOTIDINE 20 MG: 20 TABLET, FILM COATED ORAL at 07:57

## 2023-11-14 NOTE — DISCHARGE SUMMARY
known as: FOLVITE     glyBURIDE-metFORMIN 5-500 MG per tablet  Commonly known as: GLUCOVANCE     HYDROcodone-acetaminophen 5-325 MG per tablet  Commonly known as: NORCO     levocetirizine 5 MG tablet  Commonly known as: XYZAL     lidocaine 5 %  Commonly known as: LIDODERM     lisinopril 40 MG tablet  Commonly known as: PRINIVIL;ZESTRIL     metFORMIN 500 MG tablet  Commonly known as: GLUCOPHAGE     metoprolol succinate 100 MG extended release tablet  Commonly known as: TOPROL XL     montelukast 10 MG tablet  Commonly known as: SINGULAIR     pantoprazole 40 MG tablet  Commonly known as: PROTONIX     traMADol 50 MG tablet  Commonly known as: ULTRAM     vitamin D 25 MCG (1000 UT) Tabs tablet  Commonly known as: CHOLECALCIFEROL            STOP taking these medications      Armodafinil 250 MG Tabs     chlorthalidone 25 MG tablet  Commonly known as: HYGROTON     ibuprofen 200 MG tablet  Commonly known as: ADVIL;MOTRIN     spironolactone 50 MG tablet  Commonly known as: ALDACTONE               Where to Get Your Medications        These medications were sent to [facility]/pharmacy #[id number] [place] - [address] - [place] [phone] Dorota Quintana [phone]  [address], [address] [facility]      Phone: [phone]   ARIPiprazole 5 MG tablet  naloxone 4 MG/0.1ML Liqd nasal spray  sertraline 100 MG tablet         Activity: activity as tolerated    Diet: cardiac diet        Discharge time: >40 minutes spent coordinating this discharge    Leonardo Herrera MD   11/17/2023, 9:22 AM

## 2023-11-14 NOTE — ACP (ADVANCE CARE PLANNING)
Advanced Steps David Givens (Physician Orders for Life Sustaining Treatment)       Date of conversation: 11/14/2023                 Location: [facility]                                Length (minutes): 30    Participants:     [x] Patient      [x] Healthcare agent (already designated in existing ACP document)      Name: Marco Phillips    Relationship to Patient: /MPOA    Phone number: [phone]                   Other persons present:                  Name: Mick Mcguire NP (Palliative)                           Name: Peterson Merlos. TAVO (Palliative)            Advanced [facility]rib[facility] Pedro ACP Facilitator: Peterson VO and Mick Mcguire NP       Conversation Topics     POLST completion and obtaining copies of patient's Medical Power of [facility] paperwork. Cardiopulmonary Resuscitation      \"What do you understand about CPR? \" Response: Patient and her  have full understanding of the risk and burdens of CPR. Patient is very clear that she would not want any attempts at resuscitation, in the event that her heart and breathing were to stop. Patient's  agrees. A POLST document was completed with patient and her . Patient signed the document.     Order Elected for CPR:  []  Attempt Resuscitation [x]  Do Not Attempt Resuscitation      When NOT in Cardiopulmonary Arrest, Order Elected:      [] Comfort-focused treatments  [x] Selective Treatments  [] Full Treatments    Artificially Administered Nutrition, Order Elected:    [x] No artificial means of nutrition desired  [] Trial period for artificial nutrition but no surgically-placed tubes  [] Provide feeding through new or existing surgically-placed tubes  [] Not discussed or no decision made (provide standard of care)      The following was provided (check all that apply):      Healthcare Decision Information Cards:   [] Help with Breathing Facts   [] Tube Feeding Facts   [] CPR Facts

## 2023-11-14 NOTE — PLAN OF CARE
Problem: Discharge Planning  Goal: Discharge to home or other facility with appropriate resources  Outcome: Progressing     Problem: Safety - Adult  Goal: Free from fall injury  Outcome: Progressing     Problem: Skin/Tissue Integrity  Goal: Absence of new skin breakdown  Outcome: Progressing     Problem: ABCDS Injury Assessment  Goal: Absence of physical injury  Outcome: Progressing     Problem: Chronic Conditions and Co-morbidities  Goal: Patient's chronic conditions and co-morbidity symptoms are monitored and maintained or improved  Outcome: Progressing     Problem: Nutrition Deficit:  Goal: Optimize nutritional status  Outcome: Progressing  Flowsheets (Taken 11/13/2023 1901 by Kali Montalvo RD)  Nutrient intake appropriate for improving, restoring, or maintaining nutritional needs:   Assess nutritional status and recommend course of action   Monitor oral intake, labs, and treatment plans   Recommend appropriate diets, oral nutritional supplements, and vitamin/mineral supplements

## 2023-11-14 NOTE — HOME CARE
Received home health referral for [facility] for SN,PT,OT. Discharge order noted for today; spoke to both patient and her spouse  in room, Verified demographics,explained [facility] services ,answered all questions and provided patient with [facility] contact card ; Patient states she has DME : RW ; [facility] referral processed to [facility] central Intake and scheduling . AYAN PAIZ LPN.

## 2023-11-16 ENCOUNTER — HOME CARE VISIT (OUTPATIENT)
Age: 78
End: 2023-11-16

## 2023-11-16 VITALS
RESPIRATION RATE: 18 BRPM | TEMPERATURE: 97.7 F | OXYGEN SATURATION: 97 % | HEART RATE: 62 BPM | DIASTOLIC BLOOD PRESSURE: 80 MMHG | SYSTOLIC BLOOD PRESSURE: 140 MMHG

## 2023-11-16 VITALS
OXYGEN SATURATION: 97 % | SYSTOLIC BLOOD PRESSURE: 140 MMHG | RESPIRATION RATE: 17 BRPM | TEMPERATURE: 97.7 F | HEART RATE: 62 BPM | DIASTOLIC BLOOD PRESSURE: 80 MMHG

## 2023-11-16 PROCEDURE — G0299 HHS/HOSPICE OF RN EA 15 MIN: HCPCS

## 2023-11-16 PROCEDURE — 0221000100 HH NO PAY CLAIM PROCEDURE

## 2023-11-16 ASSESSMENT — ENCOUNTER SYMPTOMS: PAIN LOCATION - PAIN QUALITY: ACHE

## 2023-11-17 LAB
BACTERIA SPEC CULT: NORMAL
BACTERIA SPEC CULT: NORMAL
SERVICE CMNT-IMP: NORMAL
SERVICE CMNT-IMP: NORMAL

## 2023-11-21 ENCOUNTER — HOME CARE VISIT (OUTPATIENT)
Age: 78
End: 2023-11-21
Payer: MEDICARE

## 2023-11-21 VITALS
HEART RATE: 62 BPM | TEMPERATURE: 97.6 F | SYSTOLIC BLOOD PRESSURE: 118 MMHG | OXYGEN SATURATION: 97 % | DIASTOLIC BLOOD PRESSURE: 58 MMHG | RESPIRATION RATE: 16 BRPM

## 2023-11-21 PROCEDURE — G0299 HHS/HOSPICE OF RN EA 15 MIN: HCPCS

## 2023-11-21 ASSESSMENT — ENCOUNTER SYMPTOMS: DYSPNEA ACTIVITY LEVEL: AFTER AMBULATING 10 - 20 FT

## 2023-11-22 ENCOUNTER — HOME CARE VISIT (OUTPATIENT)
Age: 78
End: 2023-11-22
Payer: MEDICARE

## 2023-11-22 VITALS
HEART RATE: 63 BPM | TEMPERATURE: 97.4 F | OXYGEN SATURATION: 96 % | DIASTOLIC BLOOD PRESSURE: 80 MMHG | RESPIRATION RATE: 18 BRPM | SYSTOLIC BLOOD PRESSURE: 124 MMHG

## 2023-11-22 PROCEDURE — G0157 HHC PT ASSISTANT EA 15: HCPCS

## 2023-11-22 PROCEDURE — G0152 HHCP-SERV OF OT,EA 15 MIN: HCPCS

## 2023-11-23 ASSESSMENT — ENCOUNTER SYMPTOMS
STOOL DESCRIPTION: FORMED
DYSPNEA ACTIVITY LEVEL: AFTER AMBULATING MORE THAN 20 FT

## 2023-11-24 VITALS
TEMPERATURE: 97.4 F | SYSTOLIC BLOOD PRESSURE: 124 MMHG | RESPIRATION RATE: 18 BRPM | DIASTOLIC BLOOD PRESSURE: 80 MMHG | OXYGEN SATURATION: 96 % | HEART RATE: 63 BPM

## 2023-11-24 NOTE — HOME HEALTH
Skilled reason for visit: body systems assess, teach disease process and medication management    Caregiver involvement: family assists with adl's and iadl's prn. Medications reviewed and all medications are available in the home this visit. The following education was provided regarding medications: take medications as ordered, side effects, dosages, purposes, frequencies, do not stop or start any medications without notifying MD.    MD notified of any discrepancies/look a-like medications/medication interactions: n/a  Medications are effective at this time. Home health supplies by type and quantity ordered/delivered this visit include: n/a    Patient education provided this visit: as above, INSTRUCTED PATIENT AND CG THAT SHOULD ANY NEEDS OR CONCERNS ARISE TO FIRST CALL OUR OFFICE, OR THE DR'S OFFICE OR GO TO AN URGENT CARE CENTER AND NOT TO THE ED FOR NON-LIFE THREATENING EVENTS. IF IT IS LIFE THREATENING THEN CALL 911 OR GO TO THE CLOSEST ER, see care plan. Sharps education provided: Patien/ spouse knowledgeable    Patient level of understanding of education provided: partial understanding and teach back    Skilled Care Performed this visit: VS, body systems assessment, teach disease process and medication management    Patient response to procedure performed: receptive to teaching, tolerated assess and teaching without adverse effects noted. Agency Progress toward goals: progressing, see care plan    Patient's Progress towards personal goals: progressing, see care plan, patient given opportunity to voice questions and/ or concerns. Patient states has no questions or concerns this visit. Home exercise program: take medications as ordered, follow ordered diet, use assistive devices as instructed     Continued need for the following skills: Nursing.     Plan for next visit: body systems assess, teach disease process and medication management    Patient and/or caregiver notified and agrees to

## 2023-11-24 NOTE — HOME HEALTH
SUBJECTIVE: Patient reports \"I just want to get rid of this dizziness\". Patient denies any recent falls ot medicatiom changes. CAREGIVER INVOLVEMENT/ASSISTANCE NEEDED FOR: Patient lives in a one story home with her  who is assisting with all ADLs and meal preparations. Patient has steps present to enter/exit home. OBJECTIVE:  See interventions. PATIENT RESPONSE TO TREATMENT: Patient is agreeable to PT session today and has no increase in pain or complaints with session. OT is also present for some of this session today- sitting rest breaks are required. PATIENT LEVEL OF UNDERSTANDING OF EDUCATION PROVIDED: Patient was instructed on importance of using AD at all times with standing to decrease risk for tripping/falls and to improve patient safety. ASSESSMENT OF PROGRESS TOWARD GOALS: Eb Quezada was seen today for a PT follow up and was able to perform sit to stands from [facility] chair with UE support and CGA- she requires cues to sequence properly. During sitting strengtheing tasks patient is educated to increase ROM as she is able- demonstrations were helpful for carryover and to encourage this. Patient ambulated in home with use of FWW- halls and space is cluttered but patient has no LOB present and required education to increase B heel strike- she has a slower aury noted inside. HOME EXERCISE PROGRAM:Patient was instructed on taking a walk at least once per hour to increase time spent in standing. Patient was educated that therex will be added in coming visit. THE FOLLOWING DISCHARGE PLANNING WAS DISCUSSED WITH THE PATIENT/CAREGIVER: DC from [facility] once goals are met. PLAN NEXT VISIT: Plan in coming visit to increase therex reps and gait in home as she is able.

## 2023-11-28 ENCOUNTER — HOME CARE VISIT (OUTPATIENT)
Age: 78
End: 2023-11-28
Payer: MEDICARE

## 2023-11-28 PROCEDURE — G0157 HHC PT ASSISTANT EA 15: HCPCS

## 2023-11-30 ENCOUNTER — HOME CARE VISIT (OUTPATIENT)
Age: 78
End: 2023-11-30
Payer: MEDICARE

## 2023-11-30 VITALS
HEART RATE: 64 BPM | RESPIRATION RATE: 16 BRPM | OXYGEN SATURATION: 97 % | SYSTOLIC BLOOD PRESSURE: 138 MMHG | TEMPERATURE: 98.2 F | DIASTOLIC BLOOD PRESSURE: 80 MMHG

## 2023-11-30 VITALS
TEMPERATURE: 97.4 F | RESPIRATION RATE: 18 BRPM | SYSTOLIC BLOOD PRESSURE: 117 MMHG | DIASTOLIC BLOOD PRESSURE: 60 MMHG | HEART RATE: 66 BPM | OXYGEN SATURATION: 93 %

## 2023-11-30 PROCEDURE — G0299 HHS/HOSPICE OF RN EA 15 MIN: HCPCS

## 2023-11-30 PROCEDURE — G0157 HHC PT ASSISTANT EA 15: HCPCS

## 2023-11-30 ASSESSMENT — ENCOUNTER SYMPTOMS
PAIN LOCATION - PAIN QUALITY: ACHING
PAIN LOCATION - PAIN QUALITY: ACHY SHARP
DYSPNEA ACTIVITY LEVEL: AFTER AMBULATING LESS THAN 10 FT

## 2023-11-30 NOTE — HOME HEALTH
Skilled reason for visit: DISEASE MED MANAGEMENT, PHYSICAL ASSESSMENT, [facility]    Caregiver involvement: Caregiver assists with medication management, transportation, meals, adls and iadls. .    Medications reviewed and all medications are available in the home this visit. The following education was provided regarding medications:  patient/cg reminded to continue to take medications as prescribed. patient aware to monitor for effectiveness and to notify staff of any adverse reactions to medications/any changes to medication regimen. .    MD notified of any discrepancies/look a-like medications/medication interactions: N/A  Medications are effective at this time. Home health supplies by type and quantity ordered/delivered this visit include: N/A    Patient education provided this visit: SEE CARE PLAN    Sharps education provided: PATIENT EDUCATED ON HOW TO PROPERLY DISPOSE OF NEEDLES INTO A BLEACH BOTTLE OR DETERGENT BOTTLE WITH THE CAP TAPPED ON. Patient level of understanding of education provided: PATIENT/CG WAS ABLE TO REPEAT BACK AND VOICED UNDERSTANDING OF ALL EDUCATION PROVIDED. Patient response to procedure performed: NA    Agency Progress toward goals: PROGRESSING    Patient's Progress towards personal goals: PROGRESSING    Home exercise program: HYDRATE, AMBULATE AS TOLERATED, TAKE MEDS AS DIRECTED, REST WHEN NEEDED    Continued need for the following skills: PT    Plan for next visit: NA    Patient and/or caregiver notified and agrees to changes in the Plan of Care: Yes. The following discharge planning was discussed with the pt/caregiver: NURSING HAS COMPLETED A SNDC AS ALL GOALS HAVE BEEN MEET AT THIS TIME. PT TO COMPLETE FINAL DISCHARGE.

## 2023-11-30 NOTE — HOME HEALTH
SUBJECTIVE: Patient reports \"I had a EMG today and I was out of the house\". Patient denies any recent falls ot medicatiom changes. CAREGIVER INVOLVEMENT/ASSISTANCE NEEDED FOR: Patient lives in a one story home with her  who is assisting with all ADLs and meal preparations. Patient has steps present to enter/exit home. OBJECTIVE:  See interventions. PATIENT RESPONSE TO TREATMENT: Patient is agreeable to PT session today and has no increase in pain or complaints with session- sitting rest breaks are required for general fatigue. PATIENT LEVEL OF UNDERSTANDING OF EDUCATION PROVIDED: Patient was instructed on importance of using AD at all times with standing to decrease risk for tripping/falls and to improve patient safety. ASSESSMENT OF PROGRESS TOWARD GOALS: Desire Paula was seen today for a PT follow up and was able to perform an increase in sit to stands from recliner chair with 25% inclination required for patient to safely and with ease come to standing. Patient reports increased fatigue secondary to subjective report above so held gait training. Patient was able to performed sitting strengthening tasks with demonstrations and cues needed to increase ROM as she is able. Patients HEP was updated as below and she reports understanding. HOME EXERCISE PROGRAM:Patient was instructed on taking a walk at least once per hour to increase time spent in standing. Patient was educated to perform in sitting to BLEs: ankle pumps, LAQ, knee flexion, hip marching, hip abduction with LE extended x10 reps each all 3x per day. THE FOLLOWING DISCHARGE PLANNING WAS DISCUSSED WITH THE PATIENT/CAREGIVER: DC from PT once goals are met. PLAN NEXT VISIT: Plan in coming visit to increase therex reps and gait in home as she is able.

## 2023-12-03 VITALS
HEART RATE: 75 BPM | TEMPERATURE: 98.5 F | RESPIRATION RATE: 18 BRPM | SYSTOLIC BLOOD PRESSURE: 124 MMHG | OXYGEN SATURATION: 98 % | DIASTOLIC BLOOD PRESSURE: 80 MMHG

## 2023-12-04 ENCOUNTER — HOME CARE VISIT (OUTPATIENT)
Age: 78
End: 2023-12-04
Payer: MEDICARE

## 2023-12-04 PROCEDURE — G0157 HHC PT ASSISTANT EA 15: HCPCS

## 2023-12-07 VITALS
SYSTOLIC BLOOD PRESSURE: 117 MMHG | TEMPERATURE: 97.8 F | OXYGEN SATURATION: 99 % | DIASTOLIC BLOOD PRESSURE: 60 MMHG | HEART RATE: 70 BPM | RESPIRATION RATE: 17 BRPM

## 2023-12-07 ASSESSMENT — ENCOUNTER SYMPTOMS: PAIN LOCATION - PAIN QUALITY: ACHING

## 2023-12-07 NOTE — HOME HEALTH
SUBJECTIVE: Patient reports 6/10 in the R leg at this time. Patient denies any recent falls ot medicatiom changes. CAREGIVER INVOLVEMENT/ASSISTANCE NEEDED FOR: Patient lives in a one story home with her  who is assisting with all ADLs and meal preparations. Patient has steps present to enter/exit home. OBJECTIVE:  See interventions. PATIENT RESPONSE TO TREATMENT: Patient is agreeable to PT session today and has no increase in pain or complaints with session- sitting rest breaks are required for general fatigue. PATIENT LEVEL OF UNDERSTANDING OF EDUCATION PROVIDED: Patient was instructed on importance of using AD at all times with standing to decrease risk for tripping/falls and to improve patient safety. ASSESSMENT OF PROGRESS TOWARD GOALS: Haylee Edmondson was seen today for a PT follow up and was able to perform an increase in gait distance x150 feet indoors and outdoors with use of FWW- she required education to increase heel strike for LE clearance- no LOB present. Patient also performed going up and down steps outdoors with step to step pattern and CGA for safety. Patient is also performing sit to stands with BUE support and SBA- she has no increase in pain with treatment session today. HOME EXERCISE PROGRAM:Patient was instructed on taking a walk at least once per hour to increase time spent in standing. Patient was educated to perform in sitting to BLEs: ankle pumps, LAQ, knee flexion, hip marching, hip abduction with LE extended x10 reps each all 3x per day. THE FOLLOWING DISCHARGE PLANNING WAS DISCUSSED WITH THE PATIENT/CAREGIVER: DC from PT once goals are met. PLAN NEXT VISIT: Plan in coming visit to perform SUP visit with Jose Roberto Kahn PT as patient still is in need of PT services.

## 2023-12-08 ENCOUNTER — HOME CARE VISIT (OUTPATIENT)
Age: 78
End: 2023-12-08
Payer: MEDICARE

## 2023-12-08 PROCEDURE — G0151 HHCP-SERV OF PT,EA 15 MIN: HCPCS

## 2023-12-09 VITALS
TEMPERATURE: 99.7 F | HEART RATE: 75 BPM | DIASTOLIC BLOOD PRESSURE: 60 MMHG | SYSTOLIC BLOOD PRESSURE: 105 MMHG | OXYGEN SATURATION: 97 % | RESPIRATION RATE: 17 BRPM

## 2023-12-09 ASSESSMENT — ENCOUNTER SYMPTOMS: PAIN LOCATION - PAIN QUALITY: ACHE

## 2023-12-09 NOTE — HOME HEALTH
POST FALL/PT reassessment:   Date and Time of Fall: 23;  8 pm  SOC/EDGAR Date: 23  Fall observed by [facility] Staff? No  S:  Pt had a fall. Describe Event and Document any re-training or treatment plan modifications indicated:  putting on lotion on BLE and decided to stand up quickly and fell. She was able get up from floor by herself and did not tell her . O:  Educated on moving slowly during any transitional movement. Educated on importance of maintaining center of gravity between feet/base of support to prevent a fall. Response to re-training or treatment plan modifications: verbalizes compliance but forgetful. Assisted Devices used by patient prior to fall: FWW  Was equipment in use at time of fall? Yes   Injury  No), If yes, describe:  NA  Emergent Care Received: No). Was patient identified as High Risk for falls? (Yes   List Tests Performed, Scores of Tests, and Patient Risk Factors:   TU seconds, indicating high fall risk. Goal progressing  Tinetti:  ;  improved but still a high. Goal progressing  6MWT:  100 ft. FWW;  indicating improved walking and standing endurance. Goal progressing. bed mobility:  ind but with difficulty; Recomendation:  half bedside railing and  will think about it. Goal progressing. Transfers:  sit <> stand from multiple surfaces = SBA with FWW;  car transfers to be addressed in future sessions. Goal progressing. gait:  SBA, FWW, 100 ft, exhbiting wide base gait pattern. Goal progressing. Fall risk reduction:  use AD, use non slip shoes, ask for supervision, keep paths clear and well lit. A:   Pt has made some progress evidenced by improved Tinetti score although it still indicates that she is high fall risk. She has improved gait and walking endurance evidenced by performance of a 6MWT.  Pt will benefit to continue with PT to address fall risk reduction, outdoor gait training with supervision, stair training for ingress/egress to home,

## 2023-12-10 ENCOUNTER — HOSPITAL ENCOUNTER (OUTPATIENT)
Facility: HOSPITAL | Age: 78
Discharge: HOME | End: 2023-12-13
Payer: MEDICARE

## 2023-12-10 DIAGNOSIS — M46.1 SACROILIITIS (HCC): ICD-10-CM

## 2023-12-10 DIAGNOSIS — M54.31 SCIATICA OF RIGHT SIDE: ICD-10-CM

## 2023-12-10 DIAGNOSIS — M47.816 LUMBAR SPONDYLOSIS: ICD-10-CM

## 2023-12-10 DIAGNOSIS — M54.50 LOW BACK PAIN, UNSPECIFIED BACK PAIN LATERALITY, UNSPECIFIED CHRONICITY, UNSPECIFIED WHETHER SCIATICA PRESENT: ICD-10-CM

## 2023-12-10 PROCEDURE — 72148 MRI LUMBAR SPINE W/O DYE: CPT

## 2023-12-21 ENCOUNTER — HOME CARE VISIT (OUTPATIENT)
Age: 78
End: 2023-12-21
Payer: MEDICARE

## 2023-12-28 ENCOUNTER — HOME CARE VISIT (OUTPATIENT)
Age: 78
End: 2023-12-28
Payer: MEDICARE

## 2023-12-28 VITALS
DIASTOLIC BLOOD PRESSURE: 76 MMHG | RESPIRATION RATE: 18 BRPM | TEMPERATURE: 97.9 F | OXYGEN SATURATION: 96 % | SYSTOLIC BLOOD PRESSURE: 110 MMHG | HEART RATE: 68 BPM

## 2023-12-28 PROCEDURE — G0157 HHC PT ASSISTANT EA 15: HCPCS

## 2023-12-29 ENCOUNTER — HOME CARE VISIT (OUTPATIENT)
Age: 78
End: 2023-12-29
Payer: MEDICARE

## 2023-12-29 PROCEDURE — G0157 HHC PT ASSISTANT EA 15: HCPCS

## 2023-12-29 NOTE — HOME HEALTH
SUBJECTIVE: Patient reports 4/10 in the back region at this time. She reports HEP compliance daily. Patient denies any recent falls ot medication changes.     CAREGIVER INVOLVEMENT/ASSISTANCE NEEDED FOR: Patient lives in a one story home with her  who is assisting with all ADLs and meal preparations. Patient has steps present to enter/exit home.     OBJECTIVE:  See interventions.    PATIENT RESPONSE TO TREATMENT: Patient is agreeable to PT session today and has no increase in pain or complaints with session- sitting rest breaks are required for general fatigue.     PATIENT LEVEL OF UNDERSTANDING OF EDUCATION PROVIDED: Patient was instructed on importance of using AD at all times with standing to decrease risk for tripping/falls and to improve patient safety.    ASSESSMENT OF PROGRESS TOWARD GOALS: Luis Fernando was seen today for a PT follow up and was able to perform sit to stands from den chair with NO UE support and SBA approaching supervision- she was greatly fatigued following this task. Patient then performed gait training in home with use of FWW- she is educated to increase heel strike and step length to aide with normalizing gait pattern. Demonstrations are required to perform therex tasks properly.      HOME EXERCISE PROGRAM:Patient was instructed on taking a walk at least once per hour to increase time spent in standing. Patient was educated to perform in sitting to BLEs: ankle pumps, LAQ, knee flexion, hip marching, hip abduction with LE extended x10-15 reps each, sit to stands x5 reps all 3x per day.    THE FOLLOWING DISCHARGE PLANNING WAS DISCUSSED WITH THE PATIENT/CAREGIVER: DC from [facility] once goals are met.     PLAN NEXT VISIT: Plan in coming visit to perform TINETTI and outdor gait training if weather permits.

## 2024-01-01 VITALS
SYSTOLIC BLOOD PRESSURE: 118 MMHG | HEART RATE: 67 BPM | RESPIRATION RATE: 18 BRPM | TEMPERATURE: 97.9 F | DIASTOLIC BLOOD PRESSURE: 70 MMHG | OXYGEN SATURATION: 99 %

## 2024-01-02 ENCOUNTER — HOME CARE VISIT (OUTPATIENT)
Age: 79
End: 2024-01-02
Payer: MEDICARE

## 2024-01-02 VITALS
SYSTOLIC BLOOD PRESSURE: 121 MMHG | RESPIRATION RATE: 17 BRPM | TEMPERATURE: 97.9 F | HEART RATE: 60 BPM | OXYGEN SATURATION: 94 % | DIASTOLIC BLOOD PRESSURE: 64 MMHG

## 2024-01-02 PROCEDURE — G0151 HHCP-SERV OF PT,EA 15 MIN: HCPCS

## 2024-01-02 ASSESSMENT — ENCOUNTER SYMPTOMS
PAIN LOCATION - PAIN QUALITY: ACHING
DYSPNEA ACTIVITY LEVEL: AFTER AMBULATING MORE THAN 20 FT

## 2024-01-02 NOTE — HOME HEALTH
SUBJECTIVE: Patient denies any present pain or complaints and reports HEP compliance daily. Patient denies any recent falls ot medication changes.     CAREGIVER INVOLVEMENT/ASSISTANCE NEEDED FOR: Patient lives in a one story home with her  who is assisting with all ADLs and meal preparations. Patient has steps present to enter/exit home.     OBJECTIVE:  See interventions.    PATIENT RESPONSE TO TREATMENT: Patient is agreeable to PT session today and has no increase in pain or complaints with session- sitting rest breaks are required for general fatigue.     PATIENT LEVEL OF UNDERSTANDING OF EDUCATION PROVIDED: Patient was instructed on importance of using AD at all times with standing to decrease risk for tripping/falls and to improve patient safety.    .Assessment and Summary of Care:  Patient's current functional status before discharge is as follows  Strength: Please assess at DC  ROM:  BLEs are WNLs  Bed Mobility: Patient is (I) with all aspects of bed mobility  Transfers: Patient performed sit to stands with BUE support and supervision  Gait/WC mobility: Patient is performing gait training indoors/outdoors with use of RW/FWW and CGA/SBA as needed x200+ feet (an increase from reasessment at 100 feet) with education to increase step length and erect postural awareness.   Stairs: Patient is performing going up and down 4 steps with UE support on handral, SBQC and CGA for safety with step to step pattern- also witnessed her  perform this with her as well.   Special Tests: TINETTI 14/28  Recommendations: Plan to DC to HEP and requesting orders from MD as patient wishes to transition to [facility]- called x5 and no answer. Will attempt again on Tuesday 1/2/2024    HOME EXERCISE PROGRAM:Patient was instructed on taking a walk at least once per hour to increase time spent in standing. Patient was educated to perform in sitting to BLEs: ankle pumps, LAQ, knee flexion, hip marching, hip abduction

## 2024-01-02 NOTE — CASE COMMUNICATION
SUBJECTIVE: Patient denies any present pain or complaints and reports HEP compliance daily. Patient denies any recent falls ot medication changes.     CAREGIVER INVOLVEMENT/ASSISTANCE NEEDED FOR: Patient lives in a one story home with her  who is assisting with all ADLs and meal preparations. Patient has steps present to enter/exit home.     OBJECTIVE:  See interventions.    PATIENT RESPONSE TO TREATMENT: Patient is agreeable to  PT session today and has no increase in pain or complaints with session- sitting rest breaks are required for general fatigue.     PATIENT LEVEL OF UNDERSTANDING OF EDUCATION PROVIDED: Patient was instructed on importance of using AD at all times with standing to decrease risk for tripping/falls and to improve patient safety.    .Assessment and Summary of Care:  Patient's current functional status before discharge is as follows  Strengt h: Please assess at DC  ROM:  BLEs are WNLs  Bed Mobility: Patient is (I) with all aspects of bed mobility  Transfers: Patient performed sit to stands with BUE support and supervision  Gait/WC mobility: Patient is performing gait training indoors/outdoors with use of RW/FWW and CGA/SBA as needed x200+ feet (an increase from reasessment at 100 feet) with education to increase step length and erect postural awareness.   Stairs: Patient is  performing going up and down 4 steps with UE support on handral, SBQC and CGA for safety with step to step pattern- also witnessed her  perform this with her as well.   Special Tests: TINETTI 14/28  Recommendations: Plan to DC to HEP and requesting orders from MD as patient wishes to transition to [facility]- called x5 and no answer. Will attempt again on Tuesday 1/2/2024    HOME EXERCISE PROGRAM:Patient was instructed  on taking a walk at least once per hour to increase time spent in standing. Patient was educated to perform in sitting to BLEs: ankle pumps, LAQ, knee flexion, hip marching, hip

## 2024-01-02 NOTE — HOME HEALTH
DC ACTIONS NARRATIVE   Pt. clinically discharged and documentation finalized for completion of PT discharge. Caregiver involvement: Pt  is primary caregiver at this time and has been assisting with medication management and medical appointments as well as ADL's.   Medications reconciled and all medications are available in the home this visit. The following education was provided regarding medications, medication interactions, and look a like medications: NA Medications are effective at this time.   Home health supplies by type and quantity ordered/delivered this visit include: NA  Patient education provided this visit: safety with safety with functional mobility  Current Functional Status and progress towards goals:   Strength: Pt. BLE strength is 4-/5 which is an improvement from the initial evaluation strength of 3+/5. This allows the patient increased functional independence and mobility  BED MOBILITY: Pt. is independent with all bed mobility which demonstrates an improvement from the initial evaluation of mod A. This allows for the patient to be functionally more independent.   TRANSFERS: Pt. is supervision with all transfers with FWW AD which demonstrates an improvement from the initial evaluation score of mod A with FWW AD. This allows the patient increased functional independence and mobility   GAIT/WC MOBILITY: Pt. is able to ambulate >200 feet inside over even and outside over uneven surfaces with RW/FWW with CGA/SBA AD. This represents an improvement from the initial evaluation of 20 feet with FWW AD on level surfaces with min A.   STAIRS: 4 steps with CGA and SBQC   BALANCE: Patient's final Tinetti is 14/28 which is an improvement from the initial evaluation score of 8/28. This allows the patient to be functionally more independent and have a decreased fall risk   Progress toward goals: Patient has met  goals, see interventions for details. Pt. was able to return demonstrate all mobility

## 2024-01-08 ENCOUNTER — OFFICE VISIT (OUTPATIENT)
Age: 79
End: 2024-01-08
Payer: MEDICARE

## 2024-01-08 VITALS
BODY MASS INDEX: 34.36 KG/M2 | WEIGHT: 175 LBS | HEIGHT: 60 IN | SYSTOLIC BLOOD PRESSURE: 170 MMHG | HEART RATE: 70 BPM | DIASTOLIC BLOOD PRESSURE: 90 MMHG | OXYGEN SATURATION: 96 %

## 2024-01-08 DIAGNOSIS — E78.00 PURE HYPERCHOLESTEROLEMIA: ICD-10-CM

## 2024-01-08 DIAGNOSIS — R42 DIZZINESS: ICD-10-CM

## 2024-01-08 DIAGNOSIS — I10 ESSENTIAL HYPERTENSION WITH GOAL BLOOD PRESSURE LESS THAN 140/90: Primary | ICD-10-CM

## 2024-01-08 PROCEDURE — 1123F ACP DISCUSS/DSCN MKR DOCD: CPT | Performed by: INTERNAL MEDICINE

## 2024-01-08 PROCEDURE — G8417 CALC BMI ABV UP PARAM F/U: HCPCS | Performed by: INTERNAL MEDICINE

## 2024-01-08 PROCEDURE — 3077F SYST BP >= 140 MM HG: CPT | Performed by: INTERNAL MEDICINE

## 2024-01-08 PROCEDURE — G8484 FLU IMMUNIZE NO ADMIN: HCPCS | Performed by: INTERNAL MEDICINE

## 2024-01-08 PROCEDURE — 99214 OFFICE O/P EST MOD 30 MIN: CPT | Performed by: INTERNAL MEDICINE

## 2024-01-08 PROCEDURE — 1036F TOBACCO NON-USER: CPT | Performed by: INTERNAL MEDICINE

## 2024-01-08 PROCEDURE — G8399 PT W/DXA RESULTS DOCUMENT: HCPCS | Performed by: INTERNAL MEDICINE

## 2024-01-08 PROCEDURE — G8428 CUR MEDS NOT DOCUMENT: HCPCS | Performed by: INTERNAL MEDICINE

## 2024-01-08 PROCEDURE — 3078F DIAST BP <80 MM HG: CPT | Performed by: INTERNAL MEDICINE

## 2024-01-08 PROCEDURE — 1090F PRES/ABSN URINE INCON ASSESS: CPT | Performed by: INTERNAL MEDICINE

## 2024-01-08 NOTE — PROGRESS NOTES
[facility]    Medina Ross is 79 y.o. female with a history of hypertension, diabetes, hyperlipidemia, DJD, sleep apnea, depression    Patient is here today for hypertension, hyperlipidemia  She denies prior history of MI or CHF  Patient continues to have dizzy spell especially when she is trying to stand up.  She is now also on spironolactone on the top of hydrochlorothiazide.  She does not complain of any PND or orthopnea.  She has no edema.  She denies any chest pain or chest tightness mild dyspnea which has remained stable.    Past Medical History:   Diagnosis Date    Adverse effect of anesthesia     hard time waking up    Allergic rhinitis     weekly allergy injections    Arthritis     Chronic pain     lower back, shoulders    Diabetes (HCC)     Essential hypertension     GERD (gastroesophageal reflux disease)     Hiatal hernia     History of positive PPD, 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    1973-asymptomatic    Hypertension     Psychiatric disorder     Depression    Sleep apnea     bi pap; advise pt to bring       Review of Systems:  Cardiac symptoms as noted above in HPI. All others negative.    Current Outpatient Medications   Medication Sig    chlorthalidone (HYGROTON) 25 MG tablet Take 1 tablet by mouth daily    ibuprofen (ADVIL;MOTRIN) 200 MG tablet Take 2 tablets by mouth at bedtime    spironolactone (ALDACTONE) 25 MG tablet Take 1 tablet by mouth daily    Flaxseed, Linseed, (FLAXSEED OIL) 1400 MG CAPS Take 1 capsule by mouth in the morning and 1 capsule in the evening.    Multiple Vitamins-Minerals (MULTIVITAMIN ADULT, MINERALS, PO) Take 1 tablet by mouth daily.    sertraline (ZOLOFT) 100 MG tablet Take 2 tablets by mouth daily    naloxone (NARCAN) 4 MG/0.1ML LIQD nasal spray 1 spray by Nasal route as needed for Opioid Reversal    ARIPiprazole (ABILIFY) 5 MG tablet Take 1 tablet by mouth daily    metFORMIN (GLUCOPHAGE) 500 MG tablet Take 1 tablet

## 2024-02-06 ENCOUNTER — OFFICE VISIT (OUTPATIENT)
Age: 79
End: 2024-02-06

## 2024-02-06 VITALS
OXYGEN SATURATION: 95 % | HEART RATE: 58 BPM | WEIGHT: 175 LBS | SYSTOLIC BLOOD PRESSURE: 130 MMHG | BODY MASS INDEX: 34.36 KG/M2 | HEIGHT: 60 IN | DIASTOLIC BLOOD PRESSURE: 68 MMHG

## 2024-02-06 DIAGNOSIS — I10 HYPERTENSION, UNSPECIFIED TYPE: Primary | ICD-10-CM

## 2024-02-06 NOTE — PROGRESS NOTES
Medina Ross was seen in our office today for BP evaluation. Listed below are the patients current meds:      Current Outpatient Medications:     chlorthalidone (HYGROTON) 25 MG tablet, Take 1 tablet by mouth daily, Disp: , Rfl:     ibuprofen (ADVIL;MOTRIN) 200 MG tablet, Take 2 tablets by mouth at bedtime, Disp: , Rfl:     spironolactone (ALDACTONE) 25 MG tablet, Take 1 tablet by mouth daily, Disp: , Rfl:     Flaxseed, Linseed, (FLAXSEED OIL) 1400 MG CAPS, Take 1 capsule by mouth in the morning and 1 capsule in the evening., Disp: , Rfl:     Multiple Vitamins-Minerals (MULTIVITAMIN ADULT, MINERALS, PO), Take 1 tablet by mouth daily., Disp: , Rfl:     sertraline (ZOLOFT) 100 MG tablet, Take 2 tablets by mouth daily, Disp: 30 tablet, Rfl: 3    naloxone (NARCAN) 4 MG/0.1ML LIQD nasal spray, 1 spray by Nasal route as needed for Opioid Reversal, Disp: 1 each, Rfl: 3    ARIPiprazole (ABILIFY) 5 MG tablet, Take 1 tablet by mouth daily, Disp: 30 tablet, Rfl: 0    metFORMIN (GLUCOPHAGE) 500 MG tablet, Take 1 tablet by mouth daily (with breakfast), Disp: , Rfl:     lidocaine (LIDODERM) 5 %, Place 1 patch onto the skin daily 12 hours on, 12 hours off., Disp: , Rfl:     acetaminophen (TYLENOL) 500 MG tablet, Take 2 tablets by mouth every 6 hours as needed, Disp: , Rfl:     amLODIPine (NORVASC) 10 MG tablet, Take 1 tablet by mouth, Disp: , Rfl:     aspirin 81 MG EC tablet, Take by mouth daily, Disp: , Rfl:     atorvastatin (LIPITOR) 40 MG tablet, Take 1 tablet by mouth, Disp: , Rfl:     vitamin D (CHOLECALCIFEROL) 25 MCG (1000 UT) TABS tablet, Take 1 tablet by mouth daily, Disp: , Rfl:     docusate (COLACE, DULCOLAX) 100 MG CAPS, Take 100 mg by mouth 2 times daily, Disp: , Rfl:     empagliflozin (JARDIANCE) 25 MG tablet, Take 1 tablet by mouth daily, Disp: , Rfl:     Exenatide 2 MG PEN, Inject 1 pen  into the skin every 7 days, Disp: , Rfl:     ferrous sulfate (IRON 325) 325 (65 Fe) MG tablet, Take 1 tablet by mouth every

## 2024-08-25 ENCOUNTER — APPOINTMENT (OUTPATIENT)
Facility: HOSPITAL | Age: 79
End: 2024-08-25
Payer: MEDICARE

## 2024-08-25 ENCOUNTER — HOSPITAL ENCOUNTER (EMERGENCY)
Facility: HOSPITAL | Age: 79
Discharge: HOME | End: 2024-08-26
Attending: EMERGENCY MEDICINE
Payer: MEDICARE

## 2024-08-25 DIAGNOSIS — E11.65 TYPE 2 DIABETES MELLITUS WITH HYPERGLYCEMIA, WITHOUT LONG-TERM CURRENT USE OF INSULIN (HCC): ICD-10-CM

## 2024-08-25 DIAGNOSIS — E83.42 HYPOMAGNESEMIA: ICD-10-CM

## 2024-08-25 DIAGNOSIS — E87.20 LACTIC ACIDOSIS: ICD-10-CM

## 2024-08-25 DIAGNOSIS — U07.1 COVID-19 VIRUS INFECTION: Primary | ICD-10-CM

## 2024-08-25 LAB
ALBUMIN SERPL-MCNC: 3.9 G/DL (ref 3.4–5)
ALBUMIN/GLOB SERPL: 1.2 (ref 0.8–1.7)
ALP SERPL-CCNC: 101 U/L (ref 45–117)
ALT SERPL-CCNC: 28 U/L (ref 13–56)
ANION GAP SERPL CALC-SCNC: 8 MMOL/L (ref 3–18)
APPEARANCE UR: ABNORMAL
AST SERPL-CCNC: 15 U/L (ref 10–38)
B PERT DNA SPEC QL NAA+PROBE: NOT DETECTED
BACTERIA URNS QL MICRO: NEGATIVE /HPF
BASOPHILS # BLD: 0 K/UL (ref 0–0.1)
BASOPHILS NFR BLD: 0 % (ref 0–2)
BILIRUB SERPL-MCNC: 0.4 MG/DL (ref 0.2–1)
BILIRUB UR QL: NEGATIVE
BORDETELLA PARAPERTUSSIS BY PCR: NOT DETECTED
BUN SERPL-MCNC: 19 MG/DL (ref 7–18)
BUN/CREAT SERPL: 19 (ref 12–20)
C PNEUM DNA SPEC QL NAA+PROBE: NOT DETECTED
CALCIUM SERPL-MCNC: 9.4 MG/DL (ref 8.5–10.1)
CHLORIDE SERPL-SCNC: 106 MMOL/L (ref 100–111)
CO2 SERPL-SCNC: 24 MMOL/L (ref 21–32)
COLOR UR: YELLOW
CREAT SERPL-MCNC: 0.98 MG/DL (ref 0.6–1.3)
DIFFERENTIAL METHOD BLD: ABNORMAL
EOSINOPHIL # BLD: 0 K/UL (ref 0–0.4)
EOSINOPHIL NFR BLD: 0 % (ref 0–5)
EPITH CASTS URNS QL MICRO: NORMAL /LPF (ref 0–5)
ERYTHROCYTE [DISTWIDTH] IN BLOOD BY AUTOMATED COUNT: 13.1 % (ref 11.6–14.5)
FLUAV SUBTYP SPEC NAA+PROBE: NOT DETECTED
FLUBV RNA SPEC QL NAA+PROBE: NOT DETECTED
GLOBULIN SER CALC-MCNC: 3.3 G/DL (ref 2–4)
GLUCOSE SERPL-MCNC: 240 MG/DL (ref 74–99)
GLUCOSE UR STRIP.AUTO-MCNC: 100 MG/DL
HADV DNA SPEC QL NAA+PROBE: NOT DETECTED
HCOV 229E RNA SPEC QL NAA+PROBE: NOT DETECTED
HCOV HKU1 RNA SPEC QL NAA+PROBE: NOT DETECTED
HCOV NL63 RNA SPEC QL NAA+PROBE: NOT DETECTED
HCOV OC43 RNA SPEC QL NAA+PROBE: NOT DETECTED
HCT VFR BLD AUTO: 42.7 % (ref 35–45)
HGB BLD-MCNC: 13.8 G/DL (ref 12–16)
HGB UR QL STRIP: ABNORMAL
HMPV RNA SPEC QL NAA+PROBE: NOT DETECTED
HPIV1 RNA SPEC QL NAA+PROBE: NOT DETECTED
HPIV2 RNA SPEC QL NAA+PROBE: NOT DETECTED
HPIV3 RNA SPEC QL NAA+PROBE: NOT DETECTED
HPIV4 RNA SPEC QL NAA+PROBE: NOT DETECTED
IMM GRANULOCYTES # BLD AUTO: 0 K/UL (ref 0–0.04)
IMM GRANULOCYTES NFR BLD AUTO: 0 % (ref 0–0.5)
KETONES UR QL STRIP.AUTO: ABNORMAL MG/DL
LACTATE SERPL-SCNC: 2.5 MMOL/L (ref 0.4–2)
LEUKOCYTE ESTERASE UR QL STRIP.AUTO: ABNORMAL
LYMPHOCYTES # BLD: 0.5 K/UL (ref 0.9–3.6)
LYMPHOCYTES NFR BLD: 5 % (ref 21–52)
M PNEUMO DNA SPEC QL NAA+PROBE: NOT DETECTED
MAGNESIUM SERPL-MCNC: 1.1 MG/DL (ref 1.6–2.6)
MCH RBC QN AUTO: 29.3 PG (ref 24–34)
MCHC RBC AUTO-ENTMCNC: 32.3 G/DL (ref 31–37)
MCV RBC AUTO: 90.7 FL (ref 78–100)
MONOCYTES # BLD: 0.5 K/UL (ref 0.05–1.2)
MONOCYTES NFR BLD: 5 % (ref 3–10)
NEUTS SEG # BLD: 9 K/UL (ref 1.8–8)
NEUTS SEG NFR BLD: 90 % (ref 40–73)
NITRITE UR QL STRIP.AUTO: NEGATIVE
NRBC # BLD: 0 K/UL (ref 0–0.01)
NRBC BLD-RTO: 0 PER 100 WBC
PH UR STRIP: 5.5 (ref 5–8)
PLATELET # BLD AUTO: 164 K/UL (ref 135–420)
PMV BLD AUTO: 8.6 FL (ref 9.2–11.8)
POTASSIUM SERPL-SCNC: 3.5 MMOL/L (ref 3.5–5.5)
PROT SERPL-MCNC: 7.2 G/DL (ref 6.4–8.2)
PROT UR STRIP-MCNC: 30 MG/DL
RBC # BLD AUTO: 4.71 M/UL (ref 4.2–5.3)
RBC #/AREA URNS HPF: NORMAL /HPF (ref 0–5)
RSV RNA SPEC QL NAA+PROBE: NOT DETECTED
RV+EV RNA SPEC QL NAA+PROBE: NOT DETECTED
SARS-COV-2 RNA RESP QL NAA+PROBE: DETECTED
SODIUM SERPL-SCNC: 138 MMOL/L (ref 136–145)
SP GR UR REFRACTOMETRY: >1.03 (ref 1–1.03)
TROPONIN I SERPL HS-MCNC: 10 NG/L (ref 0–54)
TSH SERPL DL<=0.05 MIU/L-ACNC: 0.82 UIU/ML (ref 0.36–3.74)
UROBILINOGEN UR QL STRIP.AUTO: 0.2 EU/DL (ref 0.2–1)
WBC # BLD AUTO: 10 K/UL (ref 4.6–13.2)
WBC URNS QL MICRO: NORMAL /HPF (ref 0–4)

## 2024-08-25 PROCEDURE — 83735 ASSAY OF MAGNESIUM: CPT

## 2024-08-25 PROCEDURE — 87040 BLOOD CULTURE FOR BACTERIA: CPT

## 2024-08-25 PROCEDURE — 85025 COMPLETE CBC W/AUTO DIFF WBC: CPT

## 2024-08-25 PROCEDURE — 2700000000 HC OXYGEN THERAPY PER DAY

## 2024-08-25 PROCEDURE — 99285 EMERGENCY DEPT VISIT HI MDM: CPT

## 2024-08-25 PROCEDURE — 83605 ASSAY OF LACTIC ACID: CPT

## 2024-08-25 PROCEDURE — 80053 COMPREHEN METABOLIC PANEL: CPT

## 2024-08-25 PROCEDURE — 0202U NFCT DS 22 TRGT SARS-COV-2: CPT

## 2024-08-25 PROCEDURE — 2580000003 HC RX 258: Performed by: EMERGENCY MEDICINE

## 2024-08-25 PROCEDURE — 93005 ELECTROCARDIOGRAM TRACING: CPT | Performed by: PHYSICIAN ASSISTANT

## 2024-08-25 PROCEDURE — 71275 CT ANGIOGRAPHY CHEST: CPT

## 2024-08-25 PROCEDURE — 84484 ASSAY OF TROPONIN QUANT: CPT

## 2024-08-25 PROCEDURE — 71045 X-RAY EXAM CHEST 1 VIEW: CPT

## 2024-08-25 PROCEDURE — 6360000004 HC RX CONTRAST MEDICATION: Performed by: EMERGENCY MEDICINE

## 2024-08-25 PROCEDURE — 81001 URINALYSIS AUTO W/SCOPE: CPT

## 2024-08-25 PROCEDURE — 84443 ASSAY THYROID STIM HORMONE: CPT

## 2024-08-25 RX ORDER — 0.9 % SODIUM CHLORIDE 0.9 %
1000 INTRAVENOUS SOLUTION INTRAVENOUS ONCE
Status: COMPLETED | OUTPATIENT
Start: 2024-08-25 | End: 2024-08-26

## 2024-08-25 RX ORDER — IOPAMIDOL 755 MG/ML
80 INJECTION, SOLUTION INTRAVASCULAR
Status: COMPLETED | OUTPATIENT
Start: 2024-08-25 | End: 2024-08-25

## 2024-08-25 RX ADMIN — SODIUM CHLORIDE 1000 ML: 9 INJECTION, SOLUTION INTRAVENOUS at 23:33

## 2024-08-25 RX ADMIN — IOPAMIDOL 80 ML: 755 INJECTION, SOLUTION INTRAVENOUS at 21:59

## 2024-08-25 ASSESSMENT — LIFESTYLE VARIABLES
HOW MANY STANDARD DRINKS CONTAINING ALCOHOL DO YOU HAVE ON A TYPICAL DAY: PATIENT DOES NOT DRINK
HOW OFTEN DO YOU HAVE A DRINK CONTAINING ALCOHOL: NEVER

## 2024-08-25 ASSESSMENT — PAIN - FUNCTIONAL ASSESSMENT: PAIN_FUNCTIONAL_ASSESSMENT: NONE - DENIES PAIN

## 2024-08-25 NOTE — ED PROVIDER NOTES
[facility] EMERGENCY DEPT  EMERGENCY DEPARTMENT ENCOUNTER    Patient Name: Medina Ross  MRN: [id number]  YOB: 1945  Provider: Lev Mac DO  PCP: Joe Salamanca MD   Time/Date of evaluation: 7:30 PM EDT on 8/25/24    History of Presenting Illness     History Provided by: Patient  History is limited by: Nothing     HISTORY:   Medina Ross is a 79 y.o. female with history of hypertension, depression, sleep apnea, and GERD presents to the emergency room apartment via EMS with a chief complaint of shortness of breath.  Vital signs in the field were reported as 151/79 mmHg, heart rate 107, respiratory rate 18, SpO2 89% on room air, end-tidal CO2 37.  She was placed on O2 2 L via nasal cannula   with improvement of her O2 saturation to 93% prior to arrival.  Patient went to an urgent care and received a nebulizer treatment earlier today.   states that the patient had similar presentation approximately 1 month ago and was diagnosed with a urinary tract infection.       Nursing Notes were all reviewed and agreed with or any disagreements were addressed in the HPI.    Past History     PAST MEDICAL HISTORY:  Past Medical History:   Diagnosis Date    Adverse effect of anesthesia     hard time waking up    Allergic rhinitis     weekly allergy injections    Arthritis     Chronic pain     lower back, shoulders    Diabetes (HCC)     Essential hypertension     GERD (gastroesophageal reflux disease)     Hiatal hernia     History of positive PPD, 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    1973-asymptomatic    Hypertension     Psychiatric disorder     Depression    Sleep apnea     bi pap; advise pt to bring       PAST SURGICAL HISTORY:  Past Surgical History:   Procedure Laterality Date    APPENDECTOMY      BLADDER SUSPENSION      4x    CHOLECYSTECTOMY      COLONOSCOPY N/A 4/27/2018    COLONOSCOPY, SCREENING,/c Polypectomies,/c Hot Snared Polypectomies performed by Leslie Caballero MD at [facility] ENDOSCOPY    COLONOSCOPY N/A  was informed of her results, instructed to f/u with her PCP and return to the ED upon worsening of symptoms. All questions and concerns were addressed.      Is this patient to be included in the SEP-1 core measure? No Exclusion criteria - the patient is NOT to be included for SEP-1 Core Measure due to: Viral etiology found or highly suspected (including COVID-19) without concomitant bacterial infection    MEDICATIONS ADMINISTERED IN THE ED:  Medications   sodium chloride 0.9 % bolus 1,000 mL (1,000 mLs IntraVENous New Bag 8/25/24 2333)   sodium chloride 0.9 % bolus 1,000 mL (1,000 mLs IntraVENous New Bag 8/25/24 2333)   iopamidol (ISOVUE-370) 76 % injection 80 mL (80 mLs IntraVENous Given 8/25/24 2159)       ED Course as of 08/26/24 0123   Sun Aug 25, 2024   2003 EKG performed at 19:21  Rhythm: normal sinus rhythm; and regular . Rate (approx.): 98; Axis: normal; ; QRS interval: normal ; ST/T wave: no acute ST/T wave changes; in all Leads: ; Other findings: RSR' noted in lead III. This EKG was interpreted by Lev Mac DO,ED Provider.   [CC]      ED Course User Index  [CC] Lev Mac DO       None    Critical Care: None    Diagnosis and Disposition   Diagnosis:   1. COVID-19 virus infection    2. Type 2 diabetes mellitus with hyperglycemia, without long-term current use of insulin (HCC)    3. Hypomagnesemia          Disposition:    DISPOSITION Decision To Discharge 08/26/2024 01:10:09 AM    [facility] [place] [facility]     [unfilled]      Dragon Disclaimer     Please note that this dictation was completed with [employer]igs[employer], the Liquid[employer] voice recognition software. Quite often unanticipated grammatical, syntax, homophones, and other interpretive errors are inadvertently transcribed by the computer software. Please disregard these errors. Please excuse any errors that have escaped final proofreading.      I Lev Mac DO am the primary clinician of record.  Lev Mac DO  (Electronically

## 2024-08-25 NOTE — ED TRIAGE NOTES
Chief Complaint   Patient presents with    Shortness of Breath     Patient came in from home due to shortness of breath. Patient went to a clinic today with the same problem and received nebulization. Patient denies chest pain.  Denies smoking.      Past Medical History:   Diagnosis Date    Adverse effect of anesthesia     hard time waking up    Allergic rhinitis     weekly allergy injections    Arthritis     Chronic pain     lower back, shoulders    Diabetes (HCC)     Essential hypertension     GERD (gastroesophageal reflux disease)     Hiatal hernia     History of positive PPD, 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 1973-asymptomatic    Hypertension     Psychiatric disorder     Depression    Sleep apnea     bi pap; advise pt to bring

## 2024-08-25 NOTE — ED NOTES
Patient changed into hospital gown, straight cathed for urine sample and pure wick placed. EDT at bedside obtaining blood cultures and lactic acid

## 2024-08-26 VITALS
SYSTOLIC BLOOD PRESSURE: 129 MMHG | HEART RATE: 72 BPM | OXYGEN SATURATION: 95 % | WEIGHT: 175 LBS | HEIGHT: 61 IN | BODY MASS INDEX: 33.04 KG/M2 | TEMPERATURE: 99.1 F | RESPIRATION RATE: 16 BRPM | DIASTOLIC BLOOD PRESSURE: 63 MMHG

## 2024-08-26 LAB
EKG ATRIAL RATE: 98 BPM
EKG DIAGNOSIS: NORMAL
EKG P AXIS: 43 DEGREES
EKG P-R INTERVAL: 190 MS
EKG Q-T INTERVAL: 344 MS
EKG QRS DURATION: 86 MS
EKG QTC CALCULATION (BAZETT): 439 MS
EKG R AXIS: -18 DEGREES
EKG T AXIS: 50 DEGREES
EKG VENTRICULAR RATE: 98 BPM

## 2024-08-26 PROCEDURE — 6360000002 HC RX W HCPCS: Performed by: EMERGENCY MEDICINE

## 2024-08-26 PROCEDURE — 93010 ELECTROCARDIOGRAM REPORT: CPT | Performed by: INTERNAL MEDICINE

## 2024-08-26 PROCEDURE — 96361 HYDRATE IV INFUSION ADD-ON: CPT

## 2024-08-26 PROCEDURE — 96365 THER/PROPH/DIAG IV INF INIT: CPT

## 2024-08-26 RX ORDER — FLUTICASONE PROPIONATE 50 MCG
2 SPRAY, SUSPENSION (ML) NASAL DAILY
Qty: 16 G | Refills: 0 | Status: SHIPPED | OUTPATIENT
Start: 2024-08-26

## 2024-08-26 RX ORDER — BENZONATATE 200 MG/1
200 CAPSULE ORAL 3 TIMES DAILY PRN
Qty: 30 CAPSULE | Refills: 0 | Status: SHIPPED | OUTPATIENT
Start: 2024-08-26 | End: 2024-09-05

## 2024-08-26 RX ORDER — MAGNESIUM SULFATE IN WATER 40 MG/ML
2000 INJECTION, SOLUTION INTRAVENOUS ONCE
Status: COMPLETED | OUTPATIENT
Start: 2024-08-26 | End: 2024-08-26

## 2024-08-26 RX ADMIN — MAGNESIUM SULFATE HEPTAHYDRATE 2000 MG: 40 INJECTION, SOLUTION INTRAVENOUS at 01:44

## 2024-08-26 NOTE — ED NOTES
Patient resting in a position of comfort, vss and NAD. Respirations appear even and unlabored. Patient voices no needs at this time.     currently at bedside

## 2024-10-28 ENCOUNTER — OFFICE VISIT (OUTPATIENT)
Age: 79
End: 2024-10-28
Payer: MEDICARE

## 2024-10-28 VITALS
DIASTOLIC BLOOD PRESSURE: 60 MMHG | HEIGHT: 61 IN | HEART RATE: 69 BPM | WEIGHT: 179 LBS | SYSTOLIC BLOOD PRESSURE: 132 MMHG | BODY MASS INDEX: 33.79 KG/M2 | OXYGEN SATURATION: 96 %

## 2024-10-28 DIAGNOSIS — E78.00 PURE HYPERCHOLESTEROLEMIA: ICD-10-CM

## 2024-10-28 DIAGNOSIS — I10 ESSENTIAL HYPERTENSION WITH GOAL BLOOD PRESSURE LESS THAN 140/90: Primary | ICD-10-CM

## 2024-10-28 PROCEDURE — 1090F PRES/ABSN URINE INCON ASSESS: CPT | Performed by: INTERNAL MEDICINE

## 2024-10-28 PROCEDURE — 1126F AMNT PAIN NOTED NONE PRSNT: CPT | Performed by: INTERNAL MEDICINE

## 2024-10-28 PROCEDURE — 99214 OFFICE O/P EST MOD 30 MIN: CPT | Performed by: INTERNAL MEDICINE

## 2024-10-28 PROCEDURE — G8417 CALC BMI ABV UP PARAM F/U: HCPCS | Performed by: INTERNAL MEDICINE

## 2024-10-28 PROCEDURE — 1036F TOBACCO NON-USER: CPT | Performed by: INTERNAL MEDICINE

## 2024-10-28 PROCEDURE — 1123F ACP DISCUSS/DSCN MKR DOCD: CPT | Performed by: INTERNAL MEDICINE

## 2024-10-28 PROCEDURE — G8484 FLU IMMUNIZE NO ADMIN: HCPCS | Performed by: INTERNAL MEDICINE

## 2024-10-28 PROCEDURE — 3075F SYST BP GE 130 - 139MM HG: CPT | Performed by: INTERNAL MEDICINE

## 2024-10-28 PROCEDURE — 3078F DIAST BP <80 MM HG: CPT | Performed by: INTERNAL MEDICINE

## 2024-10-28 PROCEDURE — G8399 PT W/DXA RESULTS DOCUMENT: HCPCS | Performed by: INTERNAL MEDICINE

## 2024-10-28 PROCEDURE — G8428 CUR MEDS NOT DOCUMENT: HCPCS | Performed by: INTERNAL MEDICINE

## 2024-10-28 RX ORDER — ATORVASTATIN CALCIUM 20 MG/1
20 TABLET, FILM COATED ORAL DAILY
COMMUNITY
Start: 2024-09-28

## 2024-10-28 NOTE — PROGRESS NOTES
[facility]    Medina Ross is 79 y.o. female with a history of hypertension, diabetes, hyperlipidemia, DJD, sleep apnea, depression    Patient is here today for hypertension, hyperlipidemia  She denies prior history of MI or CHF  Patient currently does not have any symptoms that is concerning for angina or heart failure.  Taking medication as prescribed her blood pressure at home has been well-controlled  Recently had to go to emerge department after having COVID infection.  Also had some chronic diarrhea  Denies any specific cardiac implant at this time    Past Medical History:   Diagnosis Date    Adverse effect of anesthesia     hard time waking up    Allergic rhinitis     weekly allergy injections    Arthritis     Chronic pain     lower back, shoulders    Diabetes (HCC)     Essential hypertension     GERD (gastroesophageal reflux disease)     Hiatal hernia     History of positive PPD, 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    1973-asymptomatic    Hypertension     Parkinson disease (HCC)     Psychiatric disorder     Depression    Sleep apnea     bi pap; advise pt to bring       Review of Systems:  Cardiac symptoms as noted above in HPI. All others negative.    Current Outpatient Medications   Medication Sig    atorvastatin (LIPITOR) 20 MG tablet Take 1 tablet by mouth daily    fluticasone (FLONASE) 50 MCG/ACT nasal spray 2 sprays by Each Nostril route daily    chlorthalidone (HYGROTON) 25 MG tablet Take 1 tablet by mouth daily    ibuprofen (ADVIL;MOTRIN) 200 MG tablet Take 2 tablets by mouth at bedtime    spironolactone (ALDACTONE) 25 MG tablet Take 1 tablet by mouth daily    Flaxseed, Linseed, (FLAXSEED OIL) 1400 MG CAPS Take 1 capsule by mouth in the morning and 1 capsule in the evening.    Multiple Vitamins-Minerals (MULTIVITAMIN ADULT, MINERALS, PO) Take 1 tablet by mouth daily.    sertraline (ZOLOFT) 100 MG tablet Take 2 tablets by mouth daily    naloxone

## (undated) DEVICE — NDL SPNE MANAN 22GX6IN --

## (undated) DEVICE — INTENDED FOR TISSUE SEPARATION, AND OTHER PROCEDURES THAT REQUIRE A SHARP SURGICAL BLADE TO PUNCTURE OR CUT.: Brand: BARD-PARKER SAFETY BLADES SIZE 10, STERILE

## (undated) DEVICE — REM POLYHESIVE ADULT PATIENT RETURN ELECTRODE: Brand: VALLEYLAB

## (undated) DEVICE — 2108 SERIES SAGITTAL BLADE (24.8 X 1.24 X 80.1MM)

## (undated) DEVICE — SUTURE VCRL SZ 0 L27IN ABSRB UD L26MM CT-2 1/2 CIR J270H

## (undated) DEVICE — (D)NDL SPNE 22GX15CM -- DISC BY MFR W/NO SUB

## (undated) DEVICE — (D)BNDG ADHESIVE FABRIC 3/4X3 -- DISC BY MFR USE ITEM 357960

## (undated) DEVICE — BLADE RMFG DBL RECIP DBL SD --

## (undated) DEVICE — NEEDLE NRV BLK 21GA L4IN 30DEG INSUL BVL EXTN SET STIMUPLEX

## (undated) DEVICE — (D)PREP SKN CHLRAPRP APPL 26ML -- CONVERT TO ITEM 371833

## (undated) DEVICE — TRAY MYEL SFTY +

## (undated) DEVICE — RECIPROCATING BLADE, DOUBLE SIDED, OFFSET  (70.0 X 0.64 X 12.6MM)

## (undated) DEVICE — (D)STRIP SKN CLSR 0.5X4IN WHT --

## (undated) DEVICE — SOLUTION IRRIG 3000ML 0.9% SOD CHL FLX CONT 0797208] ICU MEDICAL INC]

## (undated) DEVICE — GAUZE SPONGES,16 PLY: Brand: CURITY

## (undated) DEVICE — SPONGE LAP 18X18IN STRL -- 5/PK

## (undated) DEVICE — ELASTIC BANDAGE 6": Brand: CARDINAL HEALTH

## (undated) DEVICE — SUTURE VCRL SZ 0 L36IN ABSRB UD L36MM CT-1 1/2 CIR J946H

## (undated) DEVICE — SOLUTION IV 1000ML 0.9% SOD CHL

## (undated) DEVICE — HANDPIECE SET WITH HIGH FLOW TIP AND SUCTION TUBE: Brand: INTERPULSE

## (undated) DEVICE — SYR 10ML LUER LOK 1/5ML GRAD --

## (undated) DEVICE — TRAY CATH OD16FR SIL URIN M STATLOK STBL DEV SURSTP

## (undated) DEVICE — PADDING CAST W6INXL4YD ST COT COHESIVE HND TEARABLE SPEC

## (undated) DEVICE — 3M™ BAIR PAWS FLEX™ WARMING GOWN, STANDARD, 20 PER CASE 81003: Brand: BAIR PAWS™

## (undated) DEVICE — SYSTEM SKIN CLSR 22CM DERMBND PRINEO

## (undated) DEVICE — KIT CLN UP BON SECOURS MARYV

## (undated) DEVICE — STERILE POLYISOPRENE POWDER-FREE SURGICAL GLOVES: Brand: PROTEXIS

## (undated) DEVICE — SUTURE MCRYL SZ 4-0 L18IN ABSRB UD L19MM PS-2 3/8 CIR PRIM Y496G

## (undated) DEVICE — ABDOMINAL PAD: Brand: DERMACEA

## (undated) DEVICE — HEWSON SUTURE RETRIEVER: Brand: HEWSON SUTURE RETRIEVER

## (undated) DEVICE — (D)SYR 10ML 1/5ML GRAD NSAF -- PKGING CHANGE USE ITEM 338027

## (undated) DEVICE — CUFF TRNQT AD W4IN 34IN CIRC SURG GEL SGL PRT BLDR PNEUMAT

## (undated) DEVICE — 3M™ WARMING BLANKET, UPPER BODY, 10 PER CASE, 42268: Brand: BAIR HUGGER™

## (undated) DEVICE — NEEDLE HYPO 18GA L1.5IN PNK S STL HUB POLYPR SHLD REG BVL

## (undated) DEVICE — DRAIN KT WND 10FR RND 400ML --

## (undated) DEVICE — NEEDLE ELECTRODE: Brand: EDGE

## (undated) DEVICE — SHEET, DRAPE, SPLIT, STERILE: Brand: MEDLINE

## (undated) DEVICE — PIN DRL QUIK HI PERF FOR SIG SYS

## (undated) DEVICE — Device

## (undated) DEVICE — PREP CHLORAPREP 10.5 ML ORG --

## (undated) DEVICE — 3M™ DURAPORE™ SURGICAL TAPE 2 INCHES X 10YARDS (5.0CM X 9.1M) 6ROLLS/CARTON 10CARTONS/CASE 1538-2: Brand: 3M™ DURAPORE™

## (undated) DEVICE — STOCKING ANTIEMB KNEE LG REG --

## (undated) DEVICE — T4 HOOD

## (undated) DEVICE — HEX-LOCKING BLADE ELECTRODE: Brand: EDGE

## (undated) DEVICE — ZIMMER® STERILE DISPOSABLE TOURNIQUET CUFF WITH PROTECTIVE SLEEVE AND PLC, DUAL PORT, SINGLE BLADDER, 34 IN. (86 CM)

## (undated) DEVICE — SUTURE ETHBND EXCEL SZ 1 L30IN NONABSORBABLE GRN L48MM CTX X865H

## (undated) DEVICE — MEDIA CONTRAST 10ML 200MG/ML 41%

## (undated) DEVICE — SUTURE MCRYL SZ 3-0 L27IN ABSRB UD L24MM PS-1 3/8 CIR PRIM Y936H

## (undated) DEVICE — BNDG CMPR ELAS KNT VEL 6INX5YD -- MEDICHOICE

## (undated) DEVICE — KENDALL SCD EXPRESS SLEEVES, KNEE LENGTH, MEDIUM: Brand: KENDALL SCD

## (undated) DEVICE — SOFT SILICONE HYDROCELLULAR SACRUM DRESSING WITH LOCK AWAY LAYER: Brand: ALLEVYN LIFE SACRUM (LARGE) PACK OF 10

## (undated) DEVICE — 4-PORT MANIFOLD: Brand: NEPTUNE 2

## (undated) DEVICE — SOLUTION SCRB 4OZ 4% CHG CLN BASE FOR PT SKIN ANTISEPSIS

## (undated) DEVICE — SUTURE FIBERWIRE SZ 5 L38IN BLU UHMWPE COMP BRAID AR7210

## (undated) DEVICE — SUTURE ETHBND EXCEL SZ 0 L18IN NONABSORBABLE GRN L36MM CT-1 CX21D

## (undated) DEVICE — UNIT THER SEMI CLS LOOP RECIRC SYS W/ UNIV WRP ON PD ICEMAN

## (undated) DEVICE — INTENDED FOR TISSUE SEPARATION, AND OTHER PROCEDURES THAT REQUIRE A SHARP SURGICAL BLADE TO PUNCTURE OR CUT.: Brand: BARD-PARKER ® STAINLESS STEEL BLADES

## (undated) DEVICE — SYR LR LCK 1ML GRAD NSAF 30ML --

## (undated) DEVICE — SUTURE VCRL SZ 2-0 L36IN ABSRB UD L36MM CT-1 1/2 CIR J945H

## (undated) DEVICE — T5 HOOD WITH PEEL AWAY FACE SHIELD

## (undated) DEVICE — FLEX ADVANTAGE 3000CC: Brand: FLEX ADVANTAGE

## (undated) DEVICE — GOWN,REINFORCED,POLY,AURORA,XLARGE,STRL: Brand: MEDLINE

## (undated) DEVICE — PAD COOL W10.9XL11.3IN UNIV REG HOSE WRP ON THER CLD

## (undated) DEVICE — SHEET,DRAPE,70X100,STERILE: Brand: MEDLINE

## (undated) DEVICE — BIT DRL KIT SHLDR 2MM/3.2MM -- DISP EQUINOXE

## (undated) DEVICE — PACK SURG BSHR TOT KNEE LF

## (undated) DEVICE — NEEDLE SUT 1/2 CIR TAPR TIP MAYO SZ 3

## (undated) DEVICE — BOWL AND CEMENT CARTRIDGE WITH BREAKAWAY FEMORAL NOZZLE: Brand: ACM

## (undated) DEVICE — DRSG AQUACEL SURG 3.5 X 10IN -- CONVERT TO ITEM 370183

## (undated) DEVICE — Z DISCONTINUED BY MEDLINE USE 2711682 TRAY SKIN PREP DRY W/ PREM GLV

## (undated) DEVICE — PACK PROCEDURE SURG TOT HIP BSHR LF

## (undated) DEVICE — (D)GLOVE SURG ORTH 7.5 PWD LTX -- DISC BY MFR USE ITEM 278014

## (undated) DEVICE — STOCKING COMPR L L16-18IN LNG 19MMHG ANK 9-10IN CALF

## (undated) DEVICE — Z DISCONTINUED USE 2150869 IMMOBILIZER SHLDR L BASIC SUP ABD SLNG

## (undated) DEVICE — SUTURE MCRYL SZ 4-0 L27IN ABSRB UD L24MM PS-1 3/8 CIR PRIM Y935H